# Patient Record
Sex: FEMALE | Race: WHITE | Employment: OTHER | URBAN - METROPOLITAN AREA
[De-identification: names, ages, dates, MRNs, and addresses within clinical notes are randomized per-mention and may not be internally consistent; named-entity substitution may affect disease eponyms.]

---

## 2018-01-11 ENCOUNTER — HOSPITAL ENCOUNTER (OUTPATIENT)
Dept: OTHER | Age: 68
Discharge: HOME | End: 2018-01-11
Attending: INTERNAL MEDICINE | Admitting: INTERNAL MEDICINE

## 2018-01-11 DIAGNOSIS — R52 PAIN: ICD-10-CM

## 2020-11-08 ENCOUNTER — APPOINTMENT (OUTPATIENT)
Dept: CT IMAGING | Facility: HOSPITAL | Age: 70
End: 2020-11-08

## 2020-11-08 ENCOUNTER — APPOINTMENT (OUTPATIENT)
Dept: MRI IMAGING | Facility: HOSPITAL | Age: 70
End: 2020-11-08

## 2020-11-08 ENCOUNTER — APPOINTMENT (OUTPATIENT)
Dept: GENERAL RADIOLOGY | Facility: HOSPITAL | Age: 70
End: 2020-11-08

## 2020-11-08 ENCOUNTER — HOSPITAL ENCOUNTER (OUTPATIENT)
Facility: HOSPITAL | Age: 70
Setting detail: OBSERVATION
Discharge: HOME | End: 2020-11-12
Attending: EMERGENCY MEDICINE | Admitting: INTERNAL MEDICINE

## 2020-11-08 DIAGNOSIS — R47.9 DIFFICULTY WITH SPEECH: ICD-10-CM

## 2020-11-08 DIAGNOSIS — I10 ESSENTIAL HYPERTENSION: ICD-10-CM

## 2020-11-08 DIAGNOSIS — E11.69 TYPE 2 DIABETES MELLITUS WITH OTHER SPECIFIED COMPLICATION, UNSPECIFIED WHETHER LONG TERM INSULIN USE (HCC): ICD-10-CM

## 2020-11-08 DIAGNOSIS — N39.0 URINARY TRACT INFECTION WITHOUT HEMATURIA, SITE UNSPECIFIED: ICD-10-CM

## 2020-11-08 DIAGNOSIS — G45.9 TIA (TRANSIENT ISCHEMIC ATTACK): ICD-10-CM

## 2020-11-08 DIAGNOSIS — Z74.09 IMPAIRED MOBILITY AND ADLS: ICD-10-CM

## 2020-11-08 DIAGNOSIS — N28.9 RENAL INSUFFICIENCY: ICD-10-CM

## 2020-11-08 DIAGNOSIS — R47.1 DYSARTHRIA: ICD-10-CM

## 2020-11-08 DIAGNOSIS — R41.841 COGNITIVE COMMUNICATION DEFICIT: ICD-10-CM

## 2020-11-08 DIAGNOSIS — R53.1 GENERALIZED WEAKNESS: Primary | ICD-10-CM

## 2020-11-08 DIAGNOSIS — Z78.9 IMPAIRED MOBILITY AND ADLS: ICD-10-CM

## 2020-11-08 PROBLEM — E11.9 TYPE 2 DIABETES MELLITUS (HCC): Status: ACTIVE | Noted: 2020-11-08

## 2020-11-08 LAB
ALBUMIN SERPL-MCNC: 4 G/DL (ref 3.5–5.2)
ALBUMIN/GLOB SERPL: 1.7 G/DL
ALP SERPL-CCNC: 85 U/L (ref 39–117)
ALT SERPL W P-5'-P-CCNC: 12 U/L (ref 1–33)
ANION GAP SERPL CALCULATED.3IONS-SCNC: 13 MMOL/L (ref 5–15)
AST SERPL-CCNC: 14 U/L (ref 1–32)
BACTERIA UR QL AUTO: ABNORMAL /HPF
BASOPHILS # BLD AUTO: 0.03 10*3/MM3 (ref 0–0.2)
BASOPHILS NFR BLD AUTO: 0.4 % (ref 0–1.5)
BILIRUB SERPL-MCNC: 0.2 MG/DL (ref 0–1.2)
BILIRUB UR QL STRIP: NEGATIVE
BUN SERPL-MCNC: 36 MG/DL (ref 8–23)
BUN/CREAT SERPL: 22.4 (ref 7–25)
CALCIUM SPEC-SCNC: 9.1 MG/DL (ref 8.6–10.5)
CHLORIDE SERPL-SCNC: 103 MMOL/L (ref 98–107)
CLARITY UR: CLEAR
CO2 SERPL-SCNC: 21 MMOL/L (ref 22–29)
COLOR UR: YELLOW
CREAT BLDA-MCNC: 1.6 MG/DL (ref 0.6–1.3)
CREAT SERPL-MCNC: 1.61 MG/DL (ref 0.57–1)
DEPRECATED RDW RBC AUTO: 47.3 FL (ref 37–54)
EOSINOPHIL # BLD AUTO: 0.24 10*3/MM3 (ref 0–0.4)
EOSINOPHIL NFR BLD AUTO: 3 % (ref 0.3–6.2)
ERYTHROCYTE [DISTWIDTH] IN BLOOD BY AUTOMATED COUNT: 14.2 % (ref 12.3–15.4)
GFR SERPL CREATININE-BSD FRML MDRD: 32 ML/MIN/1.73
GLOBULIN UR ELPH-MCNC: 2.4 GM/DL
GLUCOSE BLDC GLUCOMTR-MCNC: 122 MG/DL (ref 70–130)
GLUCOSE BLDC GLUCOMTR-MCNC: 170 MG/DL (ref 70–130)
GLUCOSE SERPL-MCNC: 173 MG/DL (ref 65–99)
GLUCOSE UR STRIP-MCNC: NEGATIVE MG/DL
HCT VFR BLD AUTO: 35.5 % (ref 34–46.6)
HGB BLD-MCNC: 10.6 G/DL (ref 12–15.9)
HGB UR QL STRIP.AUTO: NEGATIVE
HOLD SPECIMEN: NORMAL
HOLD SPECIMEN: NORMAL
HYALINE CASTS UR QL AUTO: ABNORMAL /LPF
IMM GRANULOCYTES # BLD AUTO: 0.05 10*3/MM3 (ref 0–0.05)
IMM GRANULOCYTES NFR BLD AUTO: 0.6 % (ref 0–0.5)
KETONES UR QL STRIP: NEGATIVE
LEUKOCYTE ESTERASE UR QL STRIP.AUTO: ABNORMAL
LYMPHOCYTES # BLD AUTO: 0.78 10*3/MM3 (ref 0.7–3.1)
LYMPHOCYTES NFR BLD AUTO: 9.6 % (ref 19.6–45.3)
MAGNESIUM SERPL-MCNC: 1.5 MG/DL (ref 1.6–2.4)
MCH RBC QN AUTO: 27.2 PG (ref 26.6–33)
MCHC RBC AUTO-ENTMCNC: 29.9 G/DL (ref 31.5–35.7)
MCV RBC AUTO: 91.3 FL (ref 79–97)
MONOCYTES # BLD AUTO: 0.5 10*3/MM3 (ref 0.1–0.9)
MONOCYTES NFR BLD AUTO: 6.2 % (ref 5–12)
NEUTROPHILS NFR BLD AUTO: 6.53 10*3/MM3 (ref 1.7–7)
NEUTROPHILS NFR BLD AUTO: 80.2 % (ref 42.7–76)
NITRITE UR QL STRIP: NEGATIVE
NRBC BLD AUTO-RTO: 0 /100 WBC (ref 0–0.2)
PH UR STRIP.AUTO: 5.5 [PH] (ref 5–8)
PLATELET # BLD AUTO: 208 10*3/MM3 (ref 140–450)
PMV BLD AUTO: 10.3 FL (ref 6–12)
POTASSIUM SERPL-SCNC: 4.2 MMOL/L (ref 3.5–5.2)
PROT SERPL-MCNC: 6.4 G/DL (ref 6–8.5)
PROT UR QL STRIP: NEGATIVE
QT INTERVAL: 400 MS
QTC INTERVAL: 481 MS
RBC # BLD AUTO: 3.89 10*6/MM3 (ref 3.77–5.28)
RBC # UR: ABNORMAL /HPF
REF LAB TEST METHOD: ABNORMAL
SARS-COV-2 RNA RESP QL NAA+PROBE: NOT DETECTED
SODIUM SERPL-SCNC: 137 MMOL/L (ref 136–145)
SP GR UR STRIP: 1.03 (ref 1–1.03)
SQUAMOUS #/AREA URNS HPF: ABNORMAL /HPF
TROPONIN T SERPL-MCNC: <0.01 NG/ML (ref 0–0.03)
UROBILINOGEN UR QL STRIP: ABNORMAL
WBC # BLD AUTO: 8.13 10*3/MM3 (ref 3.4–10.8)
WBC UR QL AUTO: ABNORMAL /HPF
WHOLE BLOOD HOLD SPECIMEN: NORMAL
WHOLE BLOOD HOLD SPECIMEN: NORMAL

## 2020-11-08 PROCEDURE — 96365 THER/PROPH/DIAG IV INF INIT: CPT

## 2020-11-08 PROCEDURE — 96367 TX/PROPH/DG ADDL SEQ IV INF: CPT

## 2020-11-08 PROCEDURE — 99285 EMERGENCY DEPT VISIT HI MDM: CPT

## 2020-11-08 PROCEDURE — G0378 HOSPITAL OBSERVATION PER HR: HCPCS

## 2020-11-08 PROCEDURE — 70450 CT HEAD/BRAIN W/O DYE: CPT

## 2020-11-08 PROCEDURE — 82962 GLUCOSE BLOOD TEST: CPT

## 2020-11-08 PROCEDURE — 84295 ASSAY OF SERUM SODIUM: CPT

## 2020-11-08 PROCEDURE — 25010000002 CEFTRIAXONE PER 250 MG: Performed by: EMERGENCY MEDICINE

## 2020-11-08 PROCEDURE — 70551 MRI BRAIN STEM W/O DYE: CPT

## 2020-11-08 PROCEDURE — 82947 ASSAY GLUCOSE BLOOD QUANT: CPT

## 2020-11-08 PROCEDURE — 83735 ASSAY OF MAGNESIUM: CPT | Performed by: EMERGENCY MEDICINE

## 2020-11-08 PROCEDURE — 82330 ASSAY OF CALCIUM: CPT

## 2020-11-08 PROCEDURE — 82803 BLOOD GASES ANY COMBINATION: CPT

## 2020-11-08 PROCEDURE — 25010000002 MAGNESIUM SULFATE 2 GM/50ML SOLUTION: Performed by: INTERNAL MEDICINE

## 2020-11-08 PROCEDURE — 96361 HYDRATE IV INFUSION ADD-ON: CPT

## 2020-11-08 PROCEDURE — 63710000001 INSULIN LISPRO (HUMAN) PER 5 UNITS: Performed by: INTERNAL MEDICINE

## 2020-11-08 PROCEDURE — 0042T HC CT CEREBRAL PERFUSION W/WO CONTRAST: CPT

## 2020-11-08 PROCEDURE — 84484 ASSAY OF TROPONIN QUANT: CPT | Performed by: EMERGENCY MEDICINE

## 2020-11-08 PROCEDURE — 96366 THER/PROPH/DIAG IV INF ADDON: CPT

## 2020-11-08 PROCEDURE — 93005 ELECTROCARDIOGRAM TRACING: CPT | Performed by: EMERGENCY MEDICINE

## 2020-11-08 PROCEDURE — 80053 COMPREHEN METABOLIC PANEL: CPT | Performed by: EMERGENCY MEDICINE

## 2020-11-08 PROCEDURE — 99220 PR INITIAL OBSERVATION CARE/DAY 70 MINUTES: CPT | Performed by: INTERNAL MEDICINE

## 2020-11-08 PROCEDURE — 85014 HEMATOCRIT: CPT

## 2020-11-08 PROCEDURE — 70498 CT ANGIOGRAPHY NECK: CPT

## 2020-11-08 PROCEDURE — 81001 URINALYSIS AUTO W/SCOPE: CPT | Performed by: EMERGENCY MEDICINE

## 2020-11-08 PROCEDURE — C9803 HOPD COVID-19 SPEC COLLECT: HCPCS

## 2020-11-08 PROCEDURE — 71045 X-RAY EXAM CHEST 1 VIEW: CPT

## 2020-11-08 PROCEDURE — 99204 OFFICE O/P NEW MOD 45 MIN: CPT | Performed by: PSYCHIATRY & NEUROLOGY

## 2020-11-08 PROCEDURE — 70496 CT ANGIOGRAPHY HEAD: CPT

## 2020-11-08 PROCEDURE — 82565 ASSAY OF CREATININE: CPT

## 2020-11-08 PROCEDURE — U0004 COV-19 TEST NON-CDC HGH THRU: HCPCS | Performed by: INTERNAL MEDICINE

## 2020-11-08 PROCEDURE — 25010000002 LORAZEPAM PER 2 MG: Performed by: EMERGENCY MEDICINE

## 2020-11-08 PROCEDURE — 84132 ASSAY OF SERUM POTASSIUM: CPT

## 2020-11-08 PROCEDURE — 96375 TX/PRO/DX INJ NEW DRUG ADDON: CPT

## 2020-11-08 PROCEDURE — 0 IOPAMIDOL PER 1 ML: Performed by: EMERGENCY MEDICINE

## 2020-11-08 PROCEDURE — 85025 COMPLETE CBC W/AUTO DIFF WBC: CPT | Performed by: EMERGENCY MEDICINE

## 2020-11-08 RX ORDER — SODIUM CHLORIDE 0.9 % (FLUSH) 0.9 %
10 SYRINGE (ML) INJECTION AS NEEDED
Status: DISCONTINUED | OUTPATIENT
Start: 2020-11-08 | End: 2020-11-12 | Stop reason: HOSPADM

## 2020-11-08 RX ORDER — ATORVASTATIN CALCIUM 40 MG/1
80 TABLET, FILM COATED ORAL NIGHTLY
Status: DISCONTINUED | OUTPATIENT
Start: 2020-11-08 | End: 2020-11-09

## 2020-11-08 RX ORDER — DEXTROSE MONOHYDRATE 25 G/50ML
25 INJECTION, SOLUTION INTRAVENOUS
Status: DISCONTINUED | OUTPATIENT
Start: 2020-11-08 | End: 2020-11-12 | Stop reason: HOSPADM

## 2020-11-08 RX ORDER — ASPIRIN 81 MG/1
324 TABLET, CHEWABLE ORAL ONCE
Status: COMPLETED | OUTPATIENT
Start: 2020-11-08 | End: 2020-11-08

## 2020-11-08 RX ORDER — SODIUM CHLORIDE 0.9 % (FLUSH) 0.9 %
10 SYRINGE (ML) INJECTION EVERY 12 HOURS SCHEDULED
Status: DISCONTINUED | OUTPATIENT
Start: 2020-11-08 | End: 2020-11-08

## 2020-11-08 RX ORDER — SERTRALINE HYDROCHLORIDE 100 MG/1
100 TABLET, FILM COATED ORAL DAILY
Status: DISCONTINUED | OUTPATIENT
Start: 2020-11-08 | End: 2020-11-12 | Stop reason: HOSPADM

## 2020-11-08 RX ORDER — NICOTINE POLACRILEX 4 MG
15 LOZENGE BUCCAL
Status: DISCONTINUED | OUTPATIENT
Start: 2020-11-08 | End: 2020-11-12 | Stop reason: HOSPADM

## 2020-11-08 RX ORDER — PANTOPRAZOLE SODIUM 40 MG/1
40 TABLET, DELAYED RELEASE ORAL DAILY
COMMUNITY

## 2020-11-08 RX ORDER — GLIPIZIDE 5 MG/1
5 TABLET ORAL
COMMUNITY

## 2020-11-08 RX ORDER — SODIUM CHLORIDE 0.9 % (FLUSH) 0.9 %
10 SYRINGE (ML) INJECTION AS NEEDED
Status: DISCONTINUED | OUTPATIENT
Start: 2020-11-08 | End: 2020-11-08 | Stop reason: SDUPTHER

## 2020-11-08 RX ORDER — MAGNESIUM SULFATE HEPTAHYDRATE 40 MG/ML
4 INJECTION, SOLUTION INTRAVENOUS AS NEEDED
Status: DISCONTINUED | OUTPATIENT
Start: 2020-11-08 | End: 2020-11-12 | Stop reason: HOSPADM

## 2020-11-08 RX ORDER — SERTRALINE HYDROCHLORIDE 100 MG/1
100 TABLET, FILM COATED ORAL DAILY
COMMUNITY

## 2020-11-08 RX ORDER — MAGNESIUM SULFATE HEPTAHYDRATE 40 MG/ML
2 INJECTION, SOLUTION INTRAVENOUS AS NEEDED
Status: DISCONTINUED | OUTPATIENT
Start: 2020-11-08 | End: 2020-11-12 | Stop reason: HOSPADM

## 2020-11-08 RX ORDER — ATORVASTATIN CALCIUM 40 MG/1
40 TABLET, FILM COATED ORAL DAILY
COMMUNITY

## 2020-11-08 RX ORDER — SERTRALINE HYDROCHLORIDE 100 MG/1
100 TABLET, FILM COATED ORAL DAILY
Status: DISCONTINUED | OUTPATIENT
Start: 2020-11-08 | End: 2020-11-08

## 2020-11-08 RX ORDER — AMLODIPINE BESYLATE 2.5 MG/1
2.5 TABLET ORAL DAILY
COMMUNITY
End: 2020-11-12 | Stop reason: HOSPADM

## 2020-11-08 RX ORDER — SODIUM CHLORIDE 9 MG/ML
100 INJECTION, SOLUTION INTRAVENOUS CONTINUOUS
Status: DISCONTINUED | OUTPATIENT
Start: 2020-11-08 | End: 2020-11-09

## 2020-11-08 RX ORDER — PANTOPRAZOLE SODIUM 40 MG/1
40 TABLET, DELAYED RELEASE ORAL DAILY
Status: DISCONTINUED | OUTPATIENT
Start: 2020-11-08 | End: 2020-11-12 | Stop reason: HOSPADM

## 2020-11-08 RX ORDER — SODIUM CHLORIDE 0.9 % (FLUSH) 0.9 %
10 SYRINGE (ML) INJECTION EVERY 12 HOURS SCHEDULED
Status: DISCONTINUED | OUTPATIENT
Start: 2020-11-08 | End: 2020-11-12 | Stop reason: HOSPADM

## 2020-11-08 RX ORDER — PIOGLITAZONEHYDROCHLORIDE 15 MG/1
15 TABLET ORAL DAILY
COMMUNITY

## 2020-11-08 RX ORDER — ASPIRIN 300 MG/1
300 SUPPOSITORY RECTAL DAILY
Status: DISCONTINUED | OUTPATIENT
Start: 2020-11-09 | End: 2020-11-12 | Stop reason: HOSPADM

## 2020-11-08 RX ORDER — CLOPIDOGREL BISULFATE 75 MG/1
75 TABLET ORAL DAILY
Status: DISCONTINUED | OUTPATIENT
Start: 2020-11-08 | End: 2020-11-09

## 2020-11-08 RX ORDER — ASPIRIN 325 MG
325 TABLET ORAL DAILY
Status: DISCONTINUED | OUTPATIENT
Start: 2020-11-09 | End: 2020-11-12 | Stop reason: HOSPADM

## 2020-11-08 RX ORDER — ATORVASTATIN CALCIUM 40 MG/1
40 TABLET, FILM COATED ORAL NIGHTLY
Status: DISCONTINUED | OUTPATIENT
Start: 2020-11-08 | End: 2020-11-08 | Stop reason: SDUPTHER

## 2020-11-08 RX ORDER — POTASSIUM CHLORIDE 7.45 MG/ML
10 INJECTION INTRAVENOUS
Status: DISCONTINUED | OUTPATIENT
Start: 2020-11-08 | End: 2020-11-12 | Stop reason: HOSPADM

## 2020-11-08 RX ORDER — LORAZEPAM 2 MG/ML
1 INJECTION INTRAMUSCULAR ONCE
Status: COMPLETED | OUTPATIENT
Start: 2020-11-08 | End: 2020-11-08

## 2020-11-08 RX ORDER — CLOPIDOGREL BISULFATE 75 MG/1
75 TABLET ORAL DAILY
Status: DISCONTINUED | OUTPATIENT
Start: 2020-11-08 | End: 2020-11-08

## 2020-11-08 RX ORDER — HYDROCHLOROTHIAZIDE 25 MG/1
25 TABLET ORAL DAILY
COMMUNITY

## 2020-11-08 RX ORDER — ASPIRIN 81 MG/1
TABLET, CHEWABLE ORAL
Status: DISPENSED
Start: 2020-11-08 | End: 2020-11-08

## 2020-11-08 RX ORDER — POTASSIUM CHLORIDE 750 MG/1
40 CAPSULE, EXTENDED RELEASE ORAL AS NEEDED
Status: DISCONTINUED | OUTPATIENT
Start: 2020-11-08 | End: 2020-11-12 | Stop reason: HOSPADM

## 2020-11-08 RX ORDER — METOPROLOL TARTRATE 100 MG/1
100 TABLET ORAL 2 TIMES DAILY
COMMUNITY
End: 2020-11-12 | Stop reason: HOSPADM

## 2020-11-08 RX ORDER — LOSARTAN POTASSIUM 50 MG/1
100 TABLET ORAL DAILY
COMMUNITY
End: 2020-11-12 | Stop reason: HOSPADM

## 2020-11-08 RX ORDER — PANTOPRAZOLE SODIUM 40 MG/1
40 TABLET, DELAYED RELEASE ORAL DAILY
Status: DISCONTINUED | OUTPATIENT
Start: 2020-11-08 | End: 2020-11-08

## 2020-11-08 RX ORDER — SODIUM CHLORIDE 0.9 % (FLUSH) 0.9 %
10 SYRINGE (ML) INJECTION AS NEEDED
Status: DISCONTINUED | OUTPATIENT
Start: 2020-11-08 | End: 2020-11-08

## 2020-11-08 RX ORDER — SODIUM CHLORIDE 9 MG/ML
125 INJECTION, SOLUTION INTRAVENOUS CONTINUOUS
Status: DISCONTINUED | OUTPATIENT
Start: 2020-11-08 | End: 2020-11-08 | Stop reason: SDUPTHER

## 2020-11-08 RX ORDER — POTASSIUM CHLORIDE 1.5 G/1.77G
40 POWDER, FOR SOLUTION ORAL AS NEEDED
Status: DISCONTINUED | OUTPATIENT
Start: 2020-11-08 | End: 2020-11-12 | Stop reason: HOSPADM

## 2020-11-08 RX ADMIN — MAGNESIUM SULFATE 2 G: 2 INJECTION INTRAVENOUS at 22:16

## 2020-11-08 RX ADMIN — SERTRALINE HYDROCHLORIDE 100 MG: 100 TABLET ORAL at 21:58

## 2020-11-08 RX ADMIN — MAGNESIUM SULFATE 2 G: 2 INJECTION INTRAVENOUS at 18:36

## 2020-11-08 RX ADMIN — INSULIN LISPRO 2 UNITS: 100 INJECTION, SOLUTION INTRAVENOUS; SUBCUTANEOUS at 10:41

## 2020-11-08 RX ADMIN — SODIUM CHLORIDE, PRESERVATIVE FREE 10 ML: 5 INJECTION INTRAVENOUS at 21:59

## 2020-11-08 RX ADMIN — SODIUM CHLORIDE 1000 ML: 9 INJECTION, SOLUTION INTRAVENOUS at 06:54

## 2020-11-08 RX ADMIN — CLOPIDOGREL BISULFATE 75 MG: 75 TABLET ORAL at 21:58

## 2020-11-08 RX ADMIN — LORAZEPAM 1 MG: 2 INJECTION INTRAMUSCULAR; INTRAVENOUS at 08:29

## 2020-11-08 RX ADMIN — PANTOPRAZOLE SODIUM 40 MG: 40 TABLET, DELAYED RELEASE ORAL at 21:58

## 2020-11-08 RX ADMIN — CEFTRIAXONE SODIUM 1 G: 1 INJECTION, POWDER, FOR SOLUTION INTRAMUSCULAR; INTRAVENOUS at 07:35

## 2020-11-08 RX ADMIN — ATORVASTATIN CALCIUM 80 MG: 40 TABLET, FILM COATED ORAL at 21:58

## 2020-11-08 RX ADMIN — MAGNESIUM SULFATE 2 G: 2 INJECTION INTRAVENOUS at 16:21

## 2020-11-08 RX ADMIN — ASPIRIN 324 MG: 81 TABLET, CHEWABLE ORAL at 08:28

## 2020-11-08 RX ADMIN — IOPAMIDOL 110 ML: 755 INJECTION, SOLUTION INTRAVENOUS at 05:50

## 2020-11-08 RX ADMIN — SODIUM CHLORIDE 100 ML/HR: 9 INJECTION, SOLUTION INTRAVENOUS at 10:58

## 2020-11-08 RX ADMIN — SODIUM CHLORIDE 125 ML/HR: 9 INJECTION, SOLUTION INTRAVENOUS at 06:54

## 2020-11-08 NOTE — CONSULTS
Stroke Consult Note    Patient Name: Zita Ramirez   MRN: [id number]  Age: 70 y.o.  Sex: female  : 1950    Primary Care Physician: No primary care provider on file.  Referring Physician:  No ref. provider found    TIME STROKE TEAM CALLED: 0515 EST     TIME PATIENT SEEN: 0515 EST    Handedness: right   Race:     Chief Complaint/Reason for Consultation: drowsiness, generalized weakness, dysarthria    HPI:   Zita Ramirez is a 70 y.o. right handed  female with known medical diagnoses of prior CVA (deficits unknown), HTN, DM. She presents to [facility] ED via [facility] for evaluation of generalized weakness and dysarthria.  Per the family patient was last in her usual state of health at midnight before going to sleep. The patient awoke about 4:00 this morning while she was sleeping on the couch, was very weak. She had trouble coming to a sitting then standing position. She was off balance, dizzy and lightheaded. Subsequently she fell to the floor. No injury was sustained.  The patient denies any unilateral weakness, numbness, visual disturbance, headache The patient has had a stroke in the past but she cannot remember when exactly or what the cause of the stroke was. She takes a  daily.  Of note, EMS reports her BP on their arrival was in the 90's/50's. She denies having felt feverish, sick, nauseated or having any pain. On my exam, she is drowsy but arousable to light verbal stimuli. She has some slight delay in speech at times and has mild dysarthria. She is alert and oriented. Moving all extremities but weak. No obvious dysmetria. No diminished sensation to light touch. Did not walk patient at this time       Last Known Normal Date/Time: 0000 EST     Review of Systems   Constitutional: Negative for chills, diaphoresis, fatigue and fever.   Respiratory: Negative for cough and shortness of breath.    Cardiovascular: Negative for chest pain and leg swelling.   Gastrointestinal: Negative for  abdominal pain and nausea.   Musculoskeletal: Positive for gait problem.   Neurological: Positive for dizziness, speech difficulty and weakness. Negative for facial asymmetry, numbness and headaches.        Generalized weakness   All other systems reviewed and are negative.       Temp:  [97.8 °F (36.6 °C)] 97.8 °F (36.6 °C)  Heart Rate:  [95] 95  Resp:  [16] 16  BP: (122)/(66) 122/66    Neurological Exam  Mental Status  Drowsy. Oriented only to person, place, time and situation. Mild dysarthria present. Expressive aphasia present. Language: Speech is slightly delayed at times .    Cranial Nerves  CN II: Visual fields full to confrontation.  CN III, IV, VI: Extraocular movements intact bilaterally. Normal lids and orbits bilaterally. Pupils equal round and reactive to light bilaterally.  CN V: Facial sensation is normal.  CN VII: Full and symmetric facial movement.  CN VIII: Hearing is normal to speech.  CN IX, X: Palate elevates symmetrically. Normal gag reflex.  CN XI: Shoulder shrug strength is normal.  CN XII: Tongue midline without atrophy or fasciculations.    Motor    Patient has 4/5 strength in all extremities. She has a mild drift in bilateral upper extremities and right lower extremity .    Sensory  Light touch is normal in upper and lower extremities.     Reflexes                                           Right                      Left  Patellar                                2+                         2+  Plantar                           Downgoing                Downgoing    Coordination  Right: Finger-to-nose normal.  Left: Finger-to-nose normal.    Gait  Not assessed.      Physical Exam  Vitals signs and nursing note reviewed.   Constitutional:       Appearance: Normal appearance.      Comments: Drowsy    HENT:      Head: Normocephalic.      Nose: Nose normal.      Mouth/Throat:      Mouth: Mucous membranes are dry.   Eyes:      General: Lids are normal.      Extraocular Movements: Extraocular  movements intact.      Pupils: Pupils are equal, round, and reactive to light.   Neck:      Musculoskeletal: Normal range of motion and neck supple.   Cardiovascular:      Rate and Rhythm: Normal rate and regular rhythm.   Pulmonary:      Effort: Pulmonary effort is normal.   Skin:     General: Skin is warm and dry.   Neurological:      Cranial Nerves: Dysarthria present.      Motor: Weakness present.      Gait: Gait abnormal.      Deep Tendon Reflexes:      Reflex Scores:       Patellar reflexes are 2+ on the right side and 2+ on the left side.  Psychiatric:         Mood and Affect: Mood normal.         Behavior: Behavior normal.         Acute Stroke Data    Alteplase (tPA) Inclusion / Exclusion Criteria    Time: 06:11 EST  Person Administering Scale: TAN Varghese    Inclusion Criteria  [x]   18 years of age or greater   []   Onset of symptoms < 4.5 hours before beginning treatment (stroke onset = time patient was last seen well or without symptoms).   []   Diagnosis of acute ischemic stroke causing measurable disabling deficit (Complete Hemianopia, Any Aphasia, Visual or Sensory Extinction, Any weakness limiting sustained effort against gravity)   []   Any remaining deficit considered potentially disabling in view of patient and practitioner   Exclusion criteria (Do not proceed with Alteplase if any are checked under exclusion criteria)  [x]   Onset unknown or GREATER than 4.5 hours   []   ICH on CT/MRI   []   CT demonstrates hypodensity representing acute or subacute infarct   []   Significant head trauma or prior stroke in the previous 3 months   []   Symptoms suggestive of subarachnoid hemorrhage   []   History of un-ruptured intracranial aneurysm GREATER than 10 mm   []   Recent intracranial or intraspinal surgery within the last 3 months   []   Arterial puncture at a non-compressible site in the previous 7 days   []   Active internal bleeding   []   Acute bleeding tendency   []   Platelet count  LESS than 100,000 for known hematological diseases such as leukemia, thrombocytopenia or chronic cirrhosis   []   Current use of anticoagulant with INR GREATER than 1.7 or PT GREATER than 15 seconds, aPTT GREATER than 40 seconds   []   Heparin received within 48 hours, resulting in abnormally elevated aPTT GREATER than upper limit of normal   []   Current use of direct thrombin inhibitors or direct factor Xa inhibitors in the past 48 hours   []   Elevated blood pressure refractory to treatment (systolic GREATER than 185 mm/Hg or diastolic  GREATER than 110 mm/Hg   []   Suspected infective endocarditis and aortic arch dissection   []   Current use of therapeutic treatment dose of low-molecular-weight heparin (LMWH) within the previous 24 hours   []   Structural GI malignancy or bleed   Relative exclusion for all patients  []   Only minor non-disabling symptoms   []   Pregnancy   []   Seizure at onset with postictal residual neurological impairments   []   Major surgery or previous trauma within past 14 days   []   History of previous spontaneous ICH, intracranial neoplasm, or AV malformation   []   Postpartum (within previous 14 days)   []   Recent GI or urinary tract hemorrhage (within previous 21 days)   []   Recent acute MI (within previous 3 months)   []   History of un-ruptured intracranial aneurysm LESS than 10 mm   []   History of ruptured intracranial aneurysm   []   Blood glucose LESS than 50 mg/dL (2.7 mmol/L)   []   Dural puncture within the last 7 days   []   Known GREATER than 10 cerebral microbleeds   Additional exclusions for patients with symptoms onset between 3 and 4.5 hours.  []   Age > 80.   []   On any anticoagulants regardless of INR  >>> Warfarin (Coumadin), Heparin, Enoxaparin (Lovenox), fondaparinux (Arixtra), bivalirudin (Angiomax), Argatroban, dabigatran (Pradaxa), rivaroxaban (Xarelto), or apixaban (Eliquis)   []   Severe stroke (NIHSS > 25).   []   History of BOTH diabetes and previous  ischemic stroke.   []   The risks and benefits have been discussed with the patient or family related to the administration of IV Alteplase for stroke symptoms.   []   I have discussed and reviewed the patient's case and imaging with the attending prior to IV Alteplase.   Not given Time Alteplase administered       No past medical history on file.  No past surgical history on file.  No family history on file.  Social History     Socioeconomic History   • Marital status:      Spouse name: Not on file   • Number of children: Not on file   • Years of education: Not on file   • Highest education level: Not on file     Allergies   Allergen Reactions   • Penicillins Unknown - Low Severity     Pt states I dont know     Prior to Admission medications    Not on File       Hospital Meds:  Scheduled-    Infusions-     PRNs- •  sodium chloride    Functional Status Prior to Current Stroke/Caswell Score: 0    NIH Stroke Scale  Time: 06:11 EST  Person Administering Scale: TAN Varghese    Interval: baseline  1a. Level of Consciousness: 0-->Alert, keenly responsive  1b. LOC Questions: 1-->Answers one question correctly  1c. LOC Commands: 0-->Performs both tasks correctly  2. Best Gaze: 0-->Normal  3. Visual: 0-->No visual loss  4. Facial Palsy: 0-->Normal symmetrical movements  5a. Motor Arm, Left: 1-->Drift, limb holds 90 (or 45) degrees, but drifts down before full 10 seconds, does not hit bed or other support  5b. Motor Arm, Right: 1-->Drift, limb holds 90 (or 45) degrees, but drifts down before full 10 secs, does not hit bed or other support  6a. Motor Leg, Left: 0-->No drift, leg holds 30 degree position for full 5 secs  6b. Motor Leg, Right: 1-->Drift, leg falls by the end of the 5-sec period but does not hit bed  7. Limb Ataxia: 0-->Absent  8. Sensory: 0-->Normal, no sensory loss  9. Best Language: 1-->Mild-to-moderate aphasia, some obvious loss of fluency or facility of comprehension, without significant  limitation on ideas expressed or form of expression. Reduction of speech and/or comprehension, however, makes conversation. . . (see row details)  10. Dysarthria: 1-->Mild-to-moderate dysarthria, patient slurs at least some words and, at worst, can be understood with some difficulty  11. Extinction and Inattention (formerly Neglect): 0-->No abnormality    Total (NIH Stroke Scale): 6      Results Reviewed:  I have personally reviewed current lab, radiology, and data and agree with results.  Lab Results (last 24 hours)     Procedure Component Value Units Date/Time    CBC & Differential [[id number]]  (Abnormal) Collected: 11/08/20 0521    Specimen: Blood Updated: 11/08/20 0557    Narrative:      The following orders were created for panel order CBC & Differential.  Procedure                               Abnormality         Status                     ---------                               -----------         ------                     CBC Auto Differential[[id number]]        Abnormal            Final result                 Please view results for these tests on the individual orders.    CBC Auto Differential [[id number]]  (Abnormal) Collected: 11/08/20 0521    Specimen: Blood Updated: 11/08/20 0557     WBC 8.13 10*3/mm3      RBC 3.89 10*6/mm3      Hemoglobin 10.6 g/dL      Hematocrit 35.5 %      MCV 91.3 fL      MCH 27.2 pg      MCHC 29.9 g/dL      RDW 14.2 %      RDW-SD 47.3 fl      MPV 10.3 fL      Platelets 208 10*3/mm3      Neutrophil % 80.2 %      Lymphocyte % 9.6 %      Monocyte % 6.2 %      Eosinophil % 3.0 %      Basophil % 0.4 %      Immature Grans % 0.6 %      Neutrophils, Absolute 6.53 10*3/mm3      Lymphocytes, Absolute 0.78 10*3/mm3      Monocytes, Absolute 0.50 10*3/mm3      Eosinophils, Absolute 0.24 10*3/mm3      Basophils, Absolute 0.03 10*3/mm3      Immature Grans, Absolute 0.05 10*3/mm3      nRBC 0.0 /100 WBC     POC Creatinine [[id number]]  (Abnormal) Collected: 11/08/20 0533    Specimen: Blood  Updated: 11/08/20 0552     Creatinine 1.60 mg/dL      Comment: Serial Number: [id number]:  [id number]       Light Blue Top [[id number]] Collected: 11/08/20 0521    Specimen: Blood Updated: 11/08/20 0550    Lavender Top [[id number]] Collected: 11/08/20 0521    Specimen: Blood Updated: 11/08/20 0549    Gold Top - SST [[id number]] Collected: 11/08/20 0521    Specimen: Blood Updated: 11/08/20 0537    [place] Draw [[id number]] Collected: 11/08/20 0521    Specimen: Blood Updated: 11/08/20 0537    Narrative:      The following orders were created for panel order [place] Draw.  Procedure                               Abnormality         Status                     ---------                               -----------         ------                     Light Blue Top[[id number]]                                   In process                 Green Top (Gel)[[id number]]                                  In process                 Lavender Top[[id number]]                                     In process                 Gold Top - SST[[id number]]                                   In process                   Please view results for these tests on the individual orders.    Comprehensive Metabolic Panel [[id number]] Collected: 11/08/20 0521    Specimen: Blood Updated: 11/08/20 0537    Troponin [[id number]] Collected: 11/08/20 0521    Specimen: Blood Updated: 11/08/20 0537    Magnesium [[id number]] Collected: 11/08/20 0521    Specimen: Blood Updated: 11/08/20 0537    Green Top (Gel) [[id number]] Collected: 11/08/20 0521    Specimen: Blood Updated: 11/08/20 0537        Imaging Results (Last 24 Hours)     Procedure Component Value Units Date/Time    XR Chest 1 View [[id number]] Resulted: 11/08/20 0558     Updated: 11/08/20 0608    CT Cerebral Perfusion With & Without Contrast [[id number]] Collected: 11/08/20 0605     Updated: 11/08/20 0607    Narrative:      CT CEREBRAL PERFUSION W WO CONTRAST    HISTORY:   70-year-old female with weakness confusion and  history of stroke.    TECHNIQUE:   Axial CT images of the brain without and with intravenous contrast using cerebral perfusion protocol. Post-processing parametric maps were created using RAPID software and reviewed. Radiation dose reduction techniques included automated exposure control  or exposure modulation based on body size. CT and nuclear cardiology exams in the last 12 months: 0.    COMPARISON:   None available    FINDINGS:   Arterial input function is optimal.     Perfusion maps are symmetric without evidence of cerebral ischemia or core infarction.      Impression:      Normal brain perfusion CT.          Signer Name: Kelby Seals MD   Signed: 11/8/2020 6:05 AM   Workstation Name: [email or web address]    [facility]    CT Angiogram Head [[id number]] Resulted: 11/08/20 0536     Updated: 11/08/20 0551    CT Angiogram Neck [[id number]] Resulted: 11/08/20 0536     Updated: 11/08/20 0551    CT Head Without Contrast Stroke Protocol [[id number]] Collected: 11/08/20 0541     Updated: 11/08/20 0543    Narrative:      CT Head WO Code Stroke    HISTORY:   70-year-old female with weakness and confusion. History of stroke. Acute stroke suspected.    TECHNIQUE:   Axial unenhanced head CT. Radiation dose reduction techniques included automated exposure control or exposure modulation based on body size. Count of known CT and cardiac nuc med studies performed in previous 12 months: 0.     Time of scan: 0527 hours    COMPARISON:   None.    FINDINGS:   No intracranial hemorrhage, mass, or infarct. No hydrocephalus or extra-axial fluid collection. There are senescent changes, including volume loss and nonspecific white matter change, but no acute abnormality is seen. The skull base, calvarium, and  extracranial soft tissues are normal.      Impression:      Senescent changes without acute abnormality.      NOTIFICATION: Critical Value/emergent results were called by telephone at the time of interpretation on  11/8/2020 5:39 AM to Janki CT technologist who verbally acknowledged these results per protocol.    Signer Name: Kelby Seals MD   Signed: 11/8/2020 5:41 AM   Workstation Name: [email or web address]    [facility]             Assessment/Plan:    Zita Soto is a 70-year-old female with known medical diagnoses of hypertension, diabetes, and previous CVA.  She presents to [facility] ED with new onset generalized weakness, gait instability resulting in a fall, dysarthria and drowsiness.    1.  Weakness  -CVA versus metabolic versus infectious process  -Per EMS patient was hypotensive on arrival but has since improved and is perfusing with adequate blood pressure  -Initiate TIA/ischemic stroke standing order set  -Patient not a candidate for TPA due to symptom onset being greater than 4.5 hours prior to arrival  -CT head without contrast negative for acute intracranial abnormality  -CTA head and neck and CT cerebral perfusion reviewed with on-call stroke interventional list and no large vessel occlusion or reversible ischemia  -MRI brain without ordered  -Check echo, lipid panel, hemoglobin A1c  -Patient takes aspirin 325 at home, continue  -Start high intensity statin for secondary stroke prevention  -Consider initiating Plavix 75 mg  -Normal saline at 75 cc/ hour  -UA/UCx    2.  Hypertension  -Initially hypotensive but now slightly hypertensive  -Allow for permissive hypertension    3.  Diabetes mellitus  -Management per hospitalist team  -Check hemoglobin A1c    4. Activity   -bedrest until after MRI  -PT/OT consulted for later today or tomorrow     5.Diet   -NPO until dysphagia screening   -healthy heart, consistent carb diet     Discussed this plan with patient, , nursing staff, on call interventionalist and ED physician. Thank you for this consult.         Suzanne Pryor, APRN  November 8, 2020  06:11 EST      Addendum-  Patient is a 70-year-old female with a history of hypertension, diabetes  mellitus type 2, prior stroke who presented with dysarthria and generalized weakness.  As per  they are currently residing with their daughter and had been sleeping on a mattress in her living room.  Earlier today patient stood up and fell down as her legs gave out.  She did not lose consciousness but felt weak all over and also had mild slurring of speech with no word finding problems.  Her blood pressure monitored by the EMS was 90/50.  It has been running low ever since she got here.  She had a CT scan of the head that was unremarkable and a CT perfusion study that did not show any perfusion deficits.  CT angiogram of the head showed mild right A1 stenosis and CT angiogram of the neck showed plaques in bilateral carotid bifurcations with about 60% stenosis in the distal left common carotid artery as well as calcifications of the both carotid siphons.  MRI of the brain that I personally reviewed showed an old left posterior parietal infarct but no acute abnormalities.  Patient has been taking aspirin 325 mg at home.  She has a normal white count with a GFR is reduced at 32.  UA also shows a mild UTI and she has been started on Rocephin.  Of note-when I went in to see the patient in the ER she was sitting on a bedside commode but could not get up due to weakness of her legs and had to be held up by 2 people.      Neurology Exam:    General apperance: as above    Mental status: Alert, awake and oriented to time place and person.    Recent and Remote memory:Intact    Language and Speech: Intact- No dysarthria.    Fluency, Naming , Repitition and Comprehension:  Intact    Cranial Nerves:   CN II: Visual fields are full. Intact. Fundi - Normal, No papillederma. Pupils - EVERT.   CN III, IV and VI: Extraocular movements are intact. Normal saccades.   CN V: Facial sensation is intact.   CN VII: Muscles of facial expression reveal no asymmetry. Intact.   CN VIII: Hearing is intact. Whispered voice intact.   CN IX  and X: Palate elevates symmetrically. Intact  CN XI: Shoulder shrug is intact.   CN XII: Tongue is midline without evidence of atrophy or fasciculation.     Motor:  Right UE muscle strength 5/5. Normal tone.     Left UE muscle strength 5/5. Normal tone.      Right LE muscle strength4/5. Normal tone.     Left LE muscle strength 4/5. Normal tone.      Sensory: Normal light touch, vibration and pinprick sensation bilaterally.    DTRs: 2+ bilaterally in upper and lower extremities.    Babinski: Negative bilaterally.    Co-ordination: Normal finger-to-nose, heel to shin B/L.    Rhomberg: Negative.    Gait: deferred     Cardiovascular: Regular rate and rhythm without murmur, gallop or rub.    70-year-old female female with a past medical history of hypertension, prior stroke, diabetes mellitus type 2 presents with an episode of falling secondary to her legs giving out.  Blood pressure 90/50    1.  Orthostatic hypotension  Based on her symptoms I feel patient could have  autonomic dysfunction as a result of her diabetes further worsened by her UTI (for which she is on Rocephin)  -MRI brain did not show any acute abnormalities.  Echocardiogram is still pending  -Whenever she feels stronger her orthostatics need to be checked and if positive she needs to be started on medication such as midodrine or fludrocortisone  -A1c is still pending  -She should also keep her self well-hydrated at home and start wearing above-knee moderate compression stockings for increased venous return    2.  Extracranial stenosis   I will add Plavix to her aspirin.After 3 months her Plavix can be stopped   We will also start her on Lipitor 40 mg daily.  Lipid panel still pending

## 2020-11-08 NOTE — PLAN OF CARE
Goal Outcome Evaluation:  Plan of Care Reviewed With: patient  Progress: improving  Outcome Summary: Pt VSS this shift. A&O to self and situation. NSR per monitor. NIH- 9. Spouse at bedside this shift. Mag replaced per protocol. Will continue to monitor.

## 2020-11-08 NOTE — ED PROVIDER NOTES
EMERGENCY DEPARTMENT ENCOUNTER      Pt Name: Zita Ramirez  MRN: [id number]  YOB: 1950  Date of evaluation: 11/8/2020  Provider: David Brar DO    CHIEF COMPLAINT       Chief Complaint   Patient presents with   • Weakness - Generalized         HISTORY OF PRESENT ILLNESS  (Location/Symptom, Timing/Onset, Context/Setting, Quality, Duration, Modifying Factors, Severity.)   Zita Ramirez is a 70 y.o. female who presents to the emergency department via [facility] for evaluation of generalized weakness.  Per the family and EMS the patient awoke about 4:00 this morning while she was sleeping on the couch, was very weak, tried to get up and was unable to assist herself up off the couch and move around she was off balance, dizzy and lightheaded which was an acute change for the patient.  The patient denies any unilateral weakness, her family was concerned about her acute change called EMS for evaluation of concern for possible neurological deficit or stroke.  The patient has had a prior history of mini strokes.  She denies any loss of sensation unilaterally, also does note that she is having some difficulty with getting her words out, has a very dry mouth.  She denies any recent illness, denies any chest pain or difficulty breathing.  She denies any nausea, vomiting, diarrhea.  She denies any recent head injury or trauma.  Last known well she notes with prior to going to bed last night.  She denies any other acute stomach complaints at this time.  No headache, vision changes.      Nursing notes were reviewed.    REVIEW OF SYSTEMS    (2-9 systems for level 4, 10 or more for level 5)   ROS:  General:  No fevers, no chills, + generalized weakness  Cardiovascular:  No chest pain, no palpitations  Respiratory:  No shortness of breath, no cough, no wheezing  Gastrointestinal:  No pain, no nausea, no vomiting, no diarrhea  Musculoskeletal:  No muscle pain, no joint pain  Skin:  No rash, no easy  bruising  Neurologic:  + speech problems, no headache, no extremity numbness, no extremity tingling, no extremity weakness  Psychiatric:  No anxiety  Genitourinary:  No dysuria, no hematuria    Except as noted above the remainder of the review of systems was reviewed and negative.       PAST MEDICAL HISTORY     Past Medical History:   Diagnosis Date   • Diabetes mellitus (CMS/HCC)    • Hypertension    • Stroke (CMS/HCC)          SURGICAL HISTORY       Past Surgical History:   Procedure Laterality Date   • HYSTERECTOMY     • TUBAL ABDOMINAL LIGATION           CURRENT MEDICATIONS       Current Facility-Administered Medications:   •  cefTRIAXone (ROCEPHIN) 1 g/100 mL 0.9% NS (MBP), 1 g, Intravenous, Once, David Brar,   •  sodium chloride 0.9 % flush 10 mL, 10 mL, Intravenous, PRN, David Brar,   •  sodium chloride 0.9 % infusion, 100 mL/hr, Intravenous, Continuous, Suzanne Pryor APRN    Current Outpatient Medications:   •  amLODIPine (NORVASC) 2.5 MG tablet, Take 2.5 mg by mouth Daily., Disp: , Rfl:   •  atorvastatin (LIPITOR) 40 MG tablet, Take 40 mg by mouth Daily., Disp: , Rfl:   •  glipizide (GLUCOTROL) 5 MG tablet, Take 5 mg by mouth 2 (Two) Times a Day Before Meals., Disp: , Rfl:   •  hydroCHLOROthiazide (HYDRODIURIL) 25 MG tablet, Take 25 mg by mouth Daily., Disp: , Rfl:   •  losartan (COZAAR) 50 MG tablet, Take 100 mg by mouth Daily., Disp: , Rfl:   •  metFORMIN (GLUCOPHAGE) 500 MG tablet, Take 500 mg by mouth 2 (Two) Times a Day With Meals., Disp: , Rfl:   •  metoprolol tartrate (LOPRESSOR) 100 MG tablet, Take 100 mg by mouth 2 (Two) Times a Day., Disp: , Rfl:   •  pantoprazole (PROTONIX) 40 MG EC tablet, Take 40 mg by mouth Daily., Disp: , Rfl:   •  pioglitazone (ACTOS) 15 MG tablet, Take 15 mg by mouth Daily., Disp: , Rfl:   •  sertraline (ZOLOFT) 100 MG tablet, Take 100 mg by mouth Daily., Disp: , Rfl:     ALLERGIES     Penicillins    FAMILY HISTORY     History reviewed. No  pertinent family history.       SOCIAL HISTORY       Social History     Socioeconomic History   • Marital status:      Spouse name: Not on file   • Number of children: Not on file   • Years of education: Not on file   • Highest education level: Not on file   Tobacco Use   • Smoking status: Former Smoker   • Smokeless tobacco: Never Used   Substance and Sexual Activity   • Alcohol use: Not Currently   • Drug use: Not Currently   • Sexual activity: Defer         PHYSICAL EXAM    (up to 7 for level 4, 8 or more for level 5)     Vitals:    11/08/20 0620 11/08/20 0630 11/08/20 0645 11/08/20 0715   BP:  (!) 142/114     BP Location:       Patient Position:       Pulse: 87 91 92 87   Resp:       Temp:       TempSrc:       SpO2: 92% 96% 98% 100%   Weight:       Height:           Physical Exam  General : Patient is awake, alert, oriented, answering questions appropriately.  Having some difficulty with getting her words out.  HEENT: Pupils are equally round and reactive to light, EOMI, conjunctivae clear, sclerae white, there is no injection no icterus.  Oral mucosa is dry, no exudate. Uvula is midline  Neck: Neck is supple, full range of motion, trachea midline  Cardiac: Heart regular rate, rhythm, no murmurs, rubs, or gallops  Lungs: Lungs are clear to auscultation, there is no wheezing, rhonchi, or rales. There is no use of accessory muscles  Abdomen: Abdomen is soft, nontender, nondistended. There are no firm or pulsatile masses, no rebound rigidity or guarding.   Musculoskeletal: 5 out of 5 strength in all 4 extremities.  No focal muscle deficits are appreciated  Neuro: Patient is awake, answering questions appropriately, seems to have some very mild difficulty with getting her words out.  There is no facial droop, no sensory deficit in the face, upper or lower extremities.  No dysdiadochokinesia, no unilateral weakness, normal finger-to-nose.   strength is equal bilateral upper extremities.  4-5 strength  bilateral lower extremities.  Gait not tested.  Dermatology: Skin is warm and dry  Psych: Mentation is grossly normal, cognition is grossly normal. Affect is appropriate.      DIAGNOSTIC RESULTS     EKG: All EKG's are interpreted by the Emergency Department Physician who either signs or Co-signs this chart in the absence of a cardiologist.    ECG 12 Lead             RADIOLOGY:   Non-plain film images such as CT, Ultrasound and MRI are read by the radiologist. Plain radiographic images are visualized and preliminarily interpreted by the emergency physician with the below findings:      [] Radiologist's Report Reviewed:  XR Chest 1 View   Final Result      1. Cardiomegaly and probable chronic interstitial changes.      Signer Name: Kelby Seals MD    Signed: 11/8/2020 6:14 AM    Workstation Name: [facility]      CT Cerebral Perfusion With & Without Contrast   Final Result   Normal brain perfusion CT.               Signer Name: Kelby Seals MD    Signed: 11/8/2020 6:05 AM    Workstation Name: [facility]      CT Angiogram Head         CT Angiogram Neck         CT Head Without Contrast Stroke Protocol   Final Result   Senescent changes without acute abnormality.         NOTIFICATION: Critical Value/emergent results were called by telephone at the time of interpretation on 11/8/2020 5:39 AM to Janki CT technologist who verbally acknowledged these results per protocol.      Signer Name: Kelby Seals MD    Signed: 11/8/2020 5:41 AM    Workstation Name: [facility]      MRI Brain Without Contrast    (Results Pending)         ED BEDSIDE ULTRASOUND:   Performed by ED Physician - none    LABS:    I have reviewed and interpreted all of the currently available lab results from this visit (if applicable):  Results for orders placed or performed during the hospital encounter of 11/08/20   Comprehensive  Metabolic Panel    Specimen: Blood   Result Value Ref Range    Glucose 173 (H) 65 - 99 mg/dL    BUN 36 (H) 8 - 23 mg/dL    Creatinine 1.61 (H) 0.57 - 1.00 mg/dL    Sodium 137 136 - 145 mmol/L    Potassium 4.2 3.5 - 5.2 mmol/L    Chloride 103 98 - 107 mmol/L    CO2 21.0 (L) 22.0 - 29.0 mmol/L    Calcium 9.1 8.6 - 10.5 mg/dL    Total Protein 6.4 6.0 - 8.5 g/dL    Albumin 4.00 3.50 - 5.20 g/dL    ALT (SGPT) 12 1 - 33 U/L    AST (SGOT) 14 1 - 32 U/L    Alkaline Phosphatase 85 39 - 117 U/L    Total Bilirubin 0.2 0.0 - 1.2 mg/dL    eGFR Non African Amer 32 (L) >60 mL/min/1.73    Globulin 2.4 gm/dL    A/G Ratio 1.7 g/dL    BUN/Creatinine Ratio 22.4 7.0 - 25.0    Anion Gap 13.0 5.0 - 15.0 mmol/L   Troponin    Specimen: Blood   Result Value Ref Range    Troponin T <0.010 0.000 - 0.030 ng/mL   Magnesium    Specimen: Blood   Result Value Ref Range    Magnesium 1.5 (L) 1.6 - 2.4 mg/dL   Urinalysis With Microscopic If Indicated (No Culture) - Urine, Clean Catch    Specimen: Urine, Clean Catch   Result Value Ref Range    Color, UA Yellow Yellow, Straw    Appearance, UA Clear Clear    pH, UA 5.5 5.0 - 8.0    Specific Gravity, UA 1.032 (H) 1.001 - 1.030    Glucose, UA Negative Negative    Ketones, UA Negative Negative    Bilirubin, UA Negative Negative    Blood, UA Negative Negative    Protein, UA Negative Negative    Leuk Esterase, UA Moderate (2+) (A) Negative    Nitrite, UA Negative Negative    Urobilinogen, UA 0.2 E.U./dL 0.2 - 1.0 E.U./dL   CBC Auto Differential    Specimen: Blood   Result Value Ref Range    WBC 8.13 3.40 - 10.80 10*3/mm3    RBC 3.89 3.77 - 5.28 10*6/mm3    Hemoglobin 10.6 (L) 12.0 - 15.9 g/dL    Hematocrit 35.5 34.0 - 46.6 %    MCV 91.3 79.0 - 97.0 fL    MCH 27.2 26.6 - 33.0 pg    MCHC 29.9 (L) 31.5 - 35.7 g/dL    RDW 14.2 12.3 - 15.4 %    RDW-SD 47.3 37.0 - 54.0 fl    MPV 10.3 6.0 - 12.0 fL    Platelets 208 140 - 450 10*3/mm3    Neutrophil % 80.2 (H) 42.7 - 76.0 %    Lymphocyte % 9.6 (L) 19.6 - 45.3 %     Monocyte % 6.2 5.0 - 12.0 %    Eosinophil % 3.0 0.3 - 6.2 %    Basophil % 0.4 0.0 - 1.5 %    Immature Grans % 0.6 (H) 0.0 - 0.5 %    Neutrophils, Absolute 6.53 1.70 - 7.00 10*3/mm3    Lymphocytes, Absolute 0.78 0.70 - 3.10 10*3/mm3    Monocytes, Absolute 0.50 0.10 - 0.90 10*3/mm3    Eosinophils, Absolute 0.24 0.00 - 0.40 10*3/mm3    Basophils, Absolute 0.03 0.00 - 0.20 10*3/mm3    Immature Grans, Absolute 0.05 0.00 - 0.05 10*3/mm3    nRBC 0.0 0.0 - 0.2 /100 WBC   Urinalysis, Microscopic Only - Urine, Clean Catch    Specimen: Urine, Clean Catch   Result Value Ref Range    RBC, UA 0-2 None Seen, 0-2 /HPF    WBC, UA 21-30 (A) None Seen, 0-2 /HPF    Bacteria, UA Trace None Seen, Trace /HPF    Squamous Epithelial Cells, UA 3-6 (A) None Seen, 0-2 /HPF    Hyaline Casts, UA 0-6 0 - 6 /LPF    Methodology Automated Microscopy    POC Creatinine    Specimen: Blood   Result Value Ref Range    Creatinine 1.60 (H) 0.60 - 1.30 mg/dL   Light Blue Top   Result Value Ref Range    Extra Tube hold for add-on    Green Top (Gel)   Result Value Ref Range    Extra Tube Hold for add-ons.    Lavender Top   Result Value Ref Range    Extra Tube hold for add-on    Gold Top - SST   Result Value Ref Range    Extra Tube Hold for add-ons.         All other labs were within normal range or not returned as of this dictation.      EMERGENCY DEPARTMENT COURSE and DIFFERENTIAL DIAGNOSIS/MDM:   Vitals:    Vitals:    11/08/20 0620 11/08/20 0630 11/08/20 0645 11/08/20 0715   BP:  (!) 142/114     BP Location:       Patient Position:       Pulse: 87 91 92 87   Resp:       Temp:       TempSrc:       SpO2: 92% 96% 98% 100%   Weight:       Height:                Patient presents for a generalized weakness suddenly upon awakening early this morning prior to arrival.  Last known well was prior to wanting she went to bed last night.  Patient was concerned secondary to dizziness lightheadedness, difficulties with getting herself up off the couch was an acute change  for the patient.  Blood sugar stable per EMS on arrival.  After my evaluation was concerned secondary to her acute onset of dizziness lightheadedness and difficulty with speech, stroke was initiated after my evaluation in the ED.  Case discussed with our stroke navigator, CT, angiography and perfusions were obtained.  Results as above, BUN and creatinine are slightly elevated, patient was given IV fluids.  Urinalysis with moderate leukocytes 21-30 WBCs with trace bacteria.  We will treat for underlying urinary tract infection, no acute abnormalities on angiography or perfusion studies.  Give the patient a full dose aspirin, antibiotics urinary tract infection, plan for admission for more detailed imaging, MRI and neuro evaluation.  Patient family updated current plan of care and they are in agreement.  Case discussed with the hospitalist team for admission.      MEDICATIONS ADMINISTERED IN ED:  Medications   sodium chloride 0.9 % flush 10 mL (has no administration in time range)   cefTRIAXone (ROCEPHIN) 1 g/100 mL 0.9% NS (MBP) (has no administration in time range)   sodium chloride 0.9 % infusion (has no administration in time range)   iopamidol (ISOVUE-370) 76 % injection 150 mL (110 mL Intravenous Given 11/8/20 0677)   sodium chloride 0.9 % bolus 1,000 mL (1,000 mL Intravenous New Bag 11/8/20 5795)       PROCEDURES:  Procedures    CRITICAL CARE TIME    Total Critical Care time was 30 minutes, excluding separately reportable procedures.  Acute CVA, stroke work-up require multiple neurological evaluations, rechecks, discussions with consultants.  There was a high probability of clinically significant/life threatening deterioration in the patient's condition which required my urgent intervention.      FINAL IMPRESSION      1. Generalized weakness    2. Difficulty with speech    3. Renal insufficiency    4. Urinary tract infection without hematuria, site unspecified          DISPOSITION/PLAN     ED Disposition     ED  Disposition Condition Comment    Decision to Admit            PATIENT REFERRED TO:  No follow-up provider specified.    DISCHARGE MEDICATIONS:     Medication List      ASK your doctor about these medications    amLODIPine 2.5 MG tablet  Commonly known as: NORVASC     atorvastatin 40 MG tablet  Commonly known as: LIPITOR     glipizide 5 MG tablet  Commonly known as: GLUCOTROL     hydroCHLOROthiazide 25 MG tablet  Commonly known as: HYDRODIURIL     losartan 50 MG tablet  Commonly known as: COZAAR     metFORMIN 500 MG tablet  Commonly known as: GLUCOPHAGE     metoprolol tartrate 100 MG tablet  Commonly known as: LOPRESSOR     pantoprazole 40 MG EC tablet  Commonly known as: PROTONIX     pioglitazone 15 MG tablet  Commonly known as: ACTOS     sertraline 100 MG tablet  Commonly known as: ZOLOFT                Comment: Please note this report has been produced using speech recognition software.      David Brar DO  Attending Emergency Physician               David Brar DO  11/08/20 0798

## 2020-11-09 ENCOUNTER — APPOINTMENT (OUTPATIENT)
Dept: CARDIOLOGY | Facility: HOSPITAL | Age: 70
End: 2020-11-09

## 2020-11-09 LAB
25(OH)D3 SERPL-MCNC: 13.4 NG/ML (ref 30–100)
ANION GAP SERPL CALCULATED.3IONS-SCNC: 7 MMOL/L (ref 5–15)
BH CV ECHO MEAS - AO MAX PG (FULL): 9.4 MMHG
BH CV ECHO MEAS - AO MAX PG: 14 MMHG
BH CV ECHO MEAS - AO MEAN PG (FULL): 6.2 MMHG
BH CV ECHO MEAS - AO MEAN PG: 8.3 MMHG
BH CV ECHO MEAS - AO ROOT AREA (BSA CORRECTED): 1.9
BH CV ECHO MEAS - AO ROOT AREA: 9.6 CM^2
BH CV ECHO MEAS - AO ROOT DIAM: 3.5 CM
BH CV ECHO MEAS - AO V2 MAX: 186.9 CM/SEC
BH CV ECHO MEAS - AO V2 MEAN: 134.7 CM/SEC
BH CV ECHO MEAS - AO V2 VTI: 43.1 CM
BH CV ECHO MEAS - AVA(I,A): 1.5 CM^2
BH CV ECHO MEAS - AVA(I,D): 1.5 CM^2
BH CV ECHO MEAS - AVA(V,A): 1.7 CM^2
BH CV ECHO MEAS - AVA(V,D): 1.7 CM^2
BH CV ECHO MEAS - BSA(HAYCOCK): 1.9 M^2
BH CV ECHO MEAS - BSA(HAYCOCK): 1.9 M^2
BH CV ECHO MEAS - BSA: 1.9 M^2
BH CV ECHO MEAS - BSA: 1.9 M^2
BH CV ECHO MEAS - BZI_BMI: 27.4 KILOGRAMS/M^2
BH CV ECHO MEAS - BZI_BMI: 27.4 KILOGRAMS/M^2
BH CV ECHO MEAS - BZI_METRIC_HEIGHT: 167.6 CM
BH CV ECHO MEAS - BZI_METRIC_HEIGHT: 167.6 CM
BH CV ECHO MEAS - BZI_METRIC_WEIGHT: 77.1 KG
BH CV ECHO MEAS - BZI_METRIC_WEIGHT: 77.1 KG
BH CV ECHO MEAS - EDV(CUBED): 59.2 ML
BH CV ECHO MEAS - EDV(MOD-SP2): 50 ML
BH CV ECHO MEAS - EDV(MOD-SP4): 52 ML
BH CV ECHO MEAS - EDV(TEICH): 65.8 ML
BH CV ECHO MEAS - EF(CUBED): 80.5 %
BH CV ECHO MEAS - EF(MOD-BP): 67 %
BH CV ECHO MEAS - EF(MOD-SP2): 64 %
BH CV ECHO MEAS - EF(MOD-SP4): 67.3 %
BH CV ECHO MEAS - EF(TEICH): 73.6 %
BH CV ECHO MEAS - ESV(CUBED): 11.5 ML
BH CV ECHO MEAS - ESV(MOD-SP2): 18 ML
BH CV ECHO MEAS - ESV(MOD-SP4): 17 ML
BH CV ECHO MEAS - ESV(TEICH): 17.3 ML
BH CV ECHO MEAS - FS: 42 %
BH CV ECHO MEAS - IVS/LVPW: 0.96
BH CV ECHO MEAS - IVSD: 1.2 CM
BH CV ECHO MEAS - LA DIMENSION: 2.4 CM
BH CV ECHO MEAS - LA/AO: 0.7
BH CV ECHO MEAS - LAD MAJOR: 5.2 CM
BH CV ECHO MEAS - LAT PEAK E' VEL: 6.7 CM/SEC
BH CV ECHO MEAS - LATERAL E/E' RATIO: 21.6
BH CV ECHO MEAS - LV DIASTOLIC VOL/BSA (35-75): 27.9 ML/M^2
BH CV ECHO MEAS - LV MASS(C)D: 147.3 GRAMS
BH CV ECHO MEAS - LV MASS(C)DI: 78.9 GRAMS/M^2
BH CV ECHO MEAS - LV MAX PG: 4.6 MMHG
BH CV ECHO MEAS - LV MEAN PG: 2.2 MMHG
BH CV ECHO MEAS - LV SYSTOLIC VOL/BSA (12-30): 9.1 ML/M^2
BH CV ECHO MEAS - LV V1 MAX: 106.9 CM/SEC
BH CV ECHO MEAS - LV V1 MEAN: 66.2 CM/SEC
BH CV ECHO MEAS - LV V1 VTI: 21.9 CM
BH CV ECHO MEAS - LVIDD: 3.9 CM
BH CV ECHO MEAS - LVIDS: 2.3 CM
BH CV ECHO MEAS - LVLD AP2: 7.3 CM
BH CV ECHO MEAS - LVLD AP4: 6.9 CM
BH CV ECHO MEAS - LVLS AP2: 5.9 CM
BH CV ECHO MEAS - LVLS AP4: 5.9 CM
BH CV ECHO MEAS - LVOT AREA (M): 3.1 CM^2
BH CV ECHO MEAS - LVOT AREA: 3 CM^2
BH CV ECHO MEAS - LVOT DIAM: 2 CM
BH CV ECHO MEAS - LVPWD: 1.2 CM
BH CV ECHO MEAS - MED PEAK E' VEL: 4.6 CM/SEC
BH CV ECHO MEAS - MEDIAL E/E' RATIO: 31.4
BH CV ECHO MEAS - MV A MAX VEL: 99.3 CM/SEC
BH CV ECHO MEAS - MV DEC TIME: 0.15 SEC
BH CV ECHO MEAS - MV E MAX VEL: 147 CM/SEC
BH CV ECHO MEAS - MV E/A: 1.5
BH CV ECHO MEAS - PA ACC SLOPE: 1367 CM/SEC^2
BH CV ECHO MEAS - PA ACC TIME: 0.06 SEC
BH CV ECHO MEAS - PA PR(ACCEL): 50.5 MMHG
BH CV ECHO MEAS - PI END-D VEL: 137.1 CM/SEC
BH CV ECHO MEAS - PULM DIAS VEL: 60.5 CM/SEC
BH CV ECHO MEAS - PULM S/D: 0.71
BH CV ECHO MEAS - PULM SYS VEL: 42.8 CM/SEC
BH CV ECHO MEAS - SI(AO): 220.8 ML/M^2
BH CV ECHO MEAS - SI(CUBED): 25.5 ML/M^2
BH CV ECHO MEAS - SI(LVOT): 35.2 ML/M^2
BH CV ECHO MEAS - SI(MOD-SP2): 17.1 ML/M^2
BH CV ECHO MEAS - SI(MOD-SP4): 18.8 ML/M^2
BH CV ECHO MEAS - SI(TEICH): 25.9 ML/M^2
BH CV ECHO MEAS - SV(AO): 412.2 ML
BH CV ECHO MEAS - SV(CUBED): 47.6 ML
BH CV ECHO MEAS - SV(LVOT): 65.8 ML
BH CV ECHO MEAS - SV(MOD-SP2): 32 ML
BH CV ECHO MEAS - SV(MOD-SP4): 35 ML
BH CV ECHO MEAS - SV(TEICH): 48.4 ML
BH CV ECHO MEAS - TAPSE (>1.6): 2.1 CM
BH CV ECHO MEASUREMENTS AVERAGE E/E' RATIO: 26.02
BH CV XLRA - RV BASE: 2.9 CM
BH CV XLRA - RV LENGTH: 7.7 CM
BH CV XLRA - RV MID: 3 CM
BH CV XLRA - TDI S': 12.6 CM/SEC
BH CV XLRA MEAS LEFT CCA RATIO VEL: 84.5 CM/SEC
BH CV XLRA MEAS LEFT DIST CCA EDV: 19.2 CM/SEC
BH CV XLRA MEAS LEFT DIST CCA PSV: 67.8 CM/SEC
BH CV XLRA MEAS LEFT DIST ICA EDV: 38.5 CM/SEC
BH CV XLRA MEAS LEFT DIST ICA PSV: 107.6 CM/SEC
BH CV XLRA MEAS LEFT ICA RATIO VEL: 104 CM/SEC
BH CV XLRA MEAS LEFT ICA/CCA RATIO: 1.2
BH CV XLRA MEAS LEFT MID CCA EDV: 15.2 CM/SEC
BH CV XLRA MEAS LEFT MID CCA PSV: 85 CM/SEC
BH CV XLRA MEAS LEFT MID ICA EDV: 41.6 CM/SEC
BH CV XLRA MEAS LEFT MID ICA PSV: 104.5 CM/SEC
BH CV XLRA MEAS LEFT PROX CCA EDV: 17.2 CM/SEC
BH CV XLRA MEAS LEFT PROX CCA PSV: 75.6 CM/SEC
BH CV XLRA MEAS LEFT PROX ECA PSV: 174.7 CM/SEC
BH CV XLRA MEAS LEFT PROX ICA EDV: 27.5 CM/SEC
BH CV XLRA MEAS LEFT PROX ICA PSV: 102.9 CM/SEC
BH CV XLRA MEAS LEFT PROX SCLA PSV: 232.6 CM/SEC
BH CV XLRA MEAS LEFT VERTEBRAL A EDV: 28.9 CM/SEC
BH CV XLRA MEAS LEFT VERTEBRAL A PSV: 73.5 CM/SEC
BH CV XLRA MEAS RIGHT CCA RATIO VEL: 69.8 CM/SEC
BH CV XLRA MEAS RIGHT DIST CCA EDV: 11.8 CM/SEC
BH CV XLRA MEAS RIGHT DIST CCA PSV: 52.4 CM/SEC
BH CV XLRA MEAS RIGHT ICA RATIO VEL: 86.6 CM/SEC
BH CV XLRA MEAS RIGHT ICA/CCA RATIO: 1.2
BH CV XLRA MEAS RIGHT MID CCA EDV: 11.4 CM/SEC
BH CV XLRA MEAS RIGHT MID CCA PSV: 70.3 CM/SEC
BH CV XLRA MEAS RIGHT MID ICA EDV: 22.6 CM/SEC
BH CV XLRA MEAS RIGHT MID ICA PSV: 87.1 CM/SEC
BH CV XLRA MEAS RIGHT PROX CCA EDV: 10.5 CM/SEC
BH CV XLRA MEAS RIGHT PROX CCA PSV: 72.5 CM/SEC
BH CV XLRA MEAS RIGHT PROX ECA PSV: 176.8 CM/SEC
BH CV XLRA MEAS RIGHT PROX ICA EDV: 14.3 CM/SEC
BH CV XLRA MEAS RIGHT PROX ICA PSV: 62.3 CM/SEC
BH CV XLRA MEAS RIGHT PROX SCLA PSV: 184.8 CM/SEC
BH CV XLRA MEAS RIGHT VERTEBRAL A EDV: 17.7 CM/SEC
BH CV XLRA MEAS RIGHT VERTEBRAL A PSV: 50.1 CM/SEC
BUN SERPL-MCNC: 23 MG/DL (ref 8–23)
BUN/CREAT SERPL: 19.7 (ref 7–25)
CALCIUM SPEC-SCNC: 8.2 MG/DL (ref 8.6–10.5)
CHLORIDE SERPL-SCNC: 109 MMOL/L (ref 98–107)
CHOLEST SERPL-MCNC: 91 MG/DL (ref 0–200)
CO2 SERPL-SCNC: 22 MMOL/L (ref 22–29)
CREAT SERPL-MCNC: 1.17 MG/DL (ref 0.57–1)
FOLATE SERPL-MCNC: 8.27 NG/ML (ref 4.78–24.2)
GFR SERPL CREATININE-BSD FRML MDRD: 46 ML/MIN/1.73
GLUCOSE BLDC GLUCOMTR-MCNC: 130 MG/DL (ref 70–130)
GLUCOSE BLDC GLUCOMTR-MCNC: 251 MG/DL (ref 70–130)
GLUCOSE BLDC GLUCOMTR-MCNC: 262 MG/DL (ref 70–130)
GLUCOSE BLDC GLUCOMTR-MCNC: 274 MG/DL (ref 70–130)
GLUCOSE SERPL-MCNC: 150 MG/DL (ref 65–99)
HBA1C MFR BLD: 6.5 % (ref 4.8–5.6)
HDLC SERPL-MCNC: 31 MG/DL (ref 40–60)
IRON 24H UR-MRATE: 22 MCG/DL (ref 37–145)
IRON SATN MFR SERPL: 7 % (ref 20–50)
LDLC SERPL CALC-MCNC: 40 MG/DL (ref 0–100)
LDLC/HDLC SERPL: 1.25 {RATIO}
LEFT ATRIUM VOLUME INDEX: 27.3 ML/M^2
LEFT ATRIUM VOLUME: 51 ML
LV EF 2D ECHO EST: 68 %
MAGNESIUM SERPL-MCNC: 2.9 MG/DL (ref 1.6–2.4)
MAXIMAL PREDICTED HEART RATE: 151 BPM
POTASSIUM SERPL-SCNC: 4.6 MMOL/L (ref 3.5–5.2)
SODIUM SERPL-SCNC: 138 MMOL/L (ref 136–145)
STRESS TARGET HR: 128 BPM
T4 FREE SERPL-MCNC: 0.99 NG/DL (ref 0.93–1.7)
TIBC SERPL-MCNC: 319 MCG/DL (ref 298–536)
TRANSFERRIN SERPL-MCNC: 214 MG/DL (ref 200–360)
TRIGL SERPL-MCNC: 107 MG/DL (ref 0–150)
TSH SERPL DL<=0.05 MIU/L-ACNC: 0.75 UIU/ML (ref 0.27–4.2)
VIT B12 BLD-MCNC: 197 PG/ML (ref 211–946)
VLDLC SERPL-MCNC: 20 MG/DL (ref 5–40)

## 2020-11-09 PROCEDURE — 84439 ASSAY OF FREE THYROXINE: CPT | Performed by: INTERNAL MEDICINE

## 2020-11-09 PROCEDURE — 84466 ASSAY OF TRANSFERRIN: CPT | Performed by: INTERNAL MEDICINE

## 2020-11-09 PROCEDURE — 96361 HYDRATE IV INFUSION ADD-ON: CPT

## 2020-11-09 PROCEDURE — 93880 EXTRACRANIAL BILAT STUDY: CPT | Performed by: INTERNAL MEDICINE

## 2020-11-09 PROCEDURE — 80048 BASIC METABOLIC PNL TOTAL CA: CPT | Performed by: INTERNAL MEDICINE

## 2020-11-09 PROCEDURE — 82746 ASSAY OF FOLIC ACID SERUM: CPT | Performed by: INTERNAL MEDICINE

## 2020-11-09 PROCEDURE — 82607 VITAMIN B-12: CPT | Performed by: INTERNAL MEDICINE

## 2020-11-09 PROCEDURE — 25010000002 CEFTRIAXONE PER 250 MG: Performed by: INTERNAL MEDICINE

## 2020-11-09 PROCEDURE — 83036 HEMOGLOBIN GLYCOSYLATED A1C: CPT | Performed by: PSYCHIATRY & NEUROLOGY

## 2020-11-09 PROCEDURE — 92523 SPEECH SOUND LANG COMPREHEN: CPT

## 2020-11-09 PROCEDURE — G0378 HOSPITAL OBSERVATION PER HR: HCPCS

## 2020-11-09 PROCEDURE — 93880 EXTRACRANIAL BILAT STUDY: CPT

## 2020-11-09 PROCEDURE — 99225 PR SBSQ OBSERVATION CARE/DAY 25 MINUTES: CPT | Performed by: INTERNAL MEDICINE

## 2020-11-09 PROCEDURE — 97161 PT EVAL LOW COMPLEX 20 MIN: CPT

## 2020-11-09 PROCEDURE — 80061 LIPID PANEL: CPT | Performed by: PSYCHIATRY & NEUROLOGY

## 2020-11-09 PROCEDURE — 96366 THER/PROPH/DIAG IV INF ADDON: CPT

## 2020-11-09 PROCEDURE — 83540 ASSAY OF IRON: CPT | Performed by: INTERNAL MEDICINE

## 2020-11-09 PROCEDURE — 97165 OT EVAL LOW COMPLEX 30 MIN: CPT | Performed by: OCCUPATIONAL THERAPIST

## 2020-11-09 PROCEDURE — 93306 TTE W/DOPPLER COMPLETE: CPT | Performed by: INTERNAL MEDICINE

## 2020-11-09 PROCEDURE — 82306 VITAMIN D 25 HYDROXY: CPT | Performed by: INTERNAL MEDICINE

## 2020-11-09 PROCEDURE — 82962 GLUCOSE BLOOD TEST: CPT

## 2020-11-09 PROCEDURE — 83735 ASSAY OF MAGNESIUM: CPT | Performed by: INTERNAL MEDICINE

## 2020-11-09 PROCEDURE — 84443 ASSAY THYROID STIM HORMONE: CPT | Performed by: INTERNAL MEDICINE

## 2020-11-09 PROCEDURE — 93306 TTE W/DOPPLER COMPLETE: CPT

## 2020-11-09 PROCEDURE — 63710000001 INSULIN LISPRO (HUMAN) PER 5 UNITS: Performed by: INTERNAL MEDICINE

## 2020-11-09 PROCEDURE — G0108 DIAB MANAGE TRN  PER INDIV: HCPCS

## 2020-11-09 PROCEDURE — 99214 OFFICE O/P EST MOD 30 MIN: CPT | Performed by: PSYCHIATRY & NEUROLOGY

## 2020-11-09 PROCEDURE — 97530 THERAPEUTIC ACTIVITIES: CPT

## 2020-11-09 RX ORDER — ATORVASTATIN CALCIUM 40 MG/1
40 TABLET, FILM COATED ORAL NIGHTLY
Status: DISCONTINUED | OUTPATIENT
Start: 2020-11-09 | End: 2020-11-12 | Stop reason: HOSPADM

## 2020-11-09 RX ADMIN — CEFTRIAXONE SODIUM 1 G: 1 INJECTION, POWDER, FOR SOLUTION INTRAMUSCULAR; INTRAVENOUS at 18:19

## 2020-11-09 RX ADMIN — Medication 5000 UNITS: at 15:16

## 2020-11-09 RX ADMIN — SODIUM CHLORIDE 100 ML/HR: 9 INJECTION, SOLUTION INTRAVENOUS at 03:02

## 2020-11-09 RX ADMIN — ATORVASTATIN CALCIUM 40 MG: 40 TABLET, FILM COATED ORAL at 21:11

## 2020-11-09 RX ADMIN — PANTOPRAZOLE SODIUM 40 MG: 40 TABLET, DELAYED RELEASE ORAL at 08:20

## 2020-11-09 RX ADMIN — SERTRALINE HYDROCHLORIDE 100 MG: 100 TABLET ORAL at 08:20

## 2020-11-09 RX ADMIN — SODIUM CHLORIDE, PRESERVATIVE FREE 10 ML: 5 INJECTION INTRAVENOUS at 08:22

## 2020-11-09 RX ADMIN — INSULIN LISPRO 4 UNITS: 100 INJECTION, SOLUTION INTRAVENOUS; SUBCUTANEOUS at 08:20

## 2020-11-09 RX ADMIN — CLOPIDOGREL BISULFATE 75 MG: 75 TABLET ORAL at 08:20

## 2020-11-09 RX ADMIN — ASPIRIN 325 MG ORAL TABLET 325 MG: 325 PILL ORAL at 08:20

## 2020-11-09 RX ADMIN — INSULIN LISPRO 4 UNITS: 100 INJECTION, SOLUTION INTRAVENOUS; SUBCUTANEOUS at 12:44

## 2020-11-09 NOTE — THERAPY EVALUATION
Patient Name: Zita Ramirez  : 1950    MRN: [id number]                              Today's Date: 2020       Admit Date: 2020    Visit Dx:     ICD-10-CM ICD-9-CM   1. Generalized weakness  R53.1 780.79   2. Difficulty with speech  R47.9 784.59   3. Renal insufficiency  N28.9 593.9   4. Urinary tract infection without hematuria, site unspecified  N39.0 599.0   5. Cognitive communication deficit  R41.841 799.52   6. Dysarthria  R47.1 784.51   7. Impaired mobility and ADLs  Z74.09 V49.89    Z78.9      Patient Active Problem List   Diagnosis   • TIA (transient ischemic attack)   • Essential hypertension   • Type 2 diabetes mellitus (CMS/HCC)     Past Medical History:   Diagnosis Date   • Diabetes mellitus (CMS/HCC)    • Hypertension    • Stroke (CMS/HCC)      Past Surgical History:   Procedure Laterality Date   • HYSTERECTOMY     • TUBAL ABDOMINAL LIGATION       General Information     Row Name 20 1026          OT Time and Intention    Document Type  evaluation  -SD     Mode of Treatment  occupational therapy  -SD     Row Name 20 1026          General Information    Patient Profile Reviewed  yes  -SD     Prior Level of Function  independent:;all household mobility;community mobility;ADL's  -SD     Existing Precautions/Restrictions  fall  -SD     Barriers to Rehab  none identified  -SD     Row Name 20 1026          Living Environment    Lives With  spouse  -SD     Row Name 20 1026          Home Main Entrance    Number of Stairs, Main Entrance  four  -SD     Row Name 20 1026          Stairs Within Home, Primary    Number of Stairs, Within Home, Primary  other (see comments) pt. estimated 15  -SD     Stair Railings, Within Home, Primary  railings safe and in good condition  -SD     Stairs Comment, Within Home, Primary  pt. stated that her bedroon is on the 2nd floor  -SD     Row Name 20 1026          Cognition    Orientation Status (Cognition)  oriented x 4  -SD     Row  Name 11/09/20 1026          Safety Issues, Functional Mobility    Safety Issues Affecting Function (Mobility)  awareness of need for assistance;insight into deficits/self-awareness;safety precautions follow-through/compliance;safety precaution awareness  -SD     Impairments Affecting Function (Mobility)  balance;coordination;endurance/activity tolerance;strength  -SD       User Key  (r) = Recorded By, (t) = Taken By, (c) = Cosigned By    Initials Name Provider Type    Megan Maharaj OT Occupational Therapist        Mobility/ADL's     Row Name 11/09/20 1115          Bed Mobility    Comment (Bed Mobility)  pt. UIC; pt. stated that getting out of the bed required more effort than usual  -SD     Row Name 11/09/20 1115          Transfers    Transfers  sit-stand transfer;toilet transfer  -SD     Sit-Stand [place] (Transfers)  minimum assist (75% patient effort);verbal cues  -SD     [place] Level (Toilet Transfer)  minimum assist (75% patient effort);verbal cues  -SD     Assistive Device (Toilet Transfer)  commode;grab bars/safety frame;raised toilet seat;walker, front-wheeled  -SD     Row Name 11/09/20 1115          Sit-Stand Transfer    Assistive Device (Sit-Stand Transfers)  walker, front-wheeled  -SD     Row Name 11/09/20 1115          Toilet Transfer    Type (Toilet Transfer)  sit-stand;stand-sit  -SD     Row Name 11/09/20 1115          Functional Mobility    Functional Mobility- Ind. Level  contact guard assist;verbal cues required  -SD     Functional Mobility- Device  rolling walker  -SD     Functional Mobility-Distance (Feet)  10  -SD     Functional Mobility- Safety Issues  balance decreased during turns;step length decreased  -SD     Row Name 11/09/20 1115          Activities of Daily Living    BADL Assessment/Intervention  upper body dressing;lower body dressing;grooming;toileting  -SD     Row Name 11/09/20 1115          Upper Body Dressing Assessment/Training    [place] Level (Upper  Body Dressing)  doff;don;pajama/robe;minimum assist (75% patient effort)  -SD     Position (Upper Body Dressing)  supported sitting  -SD     Row Name 11/09/20 1115          Lower Body Dressing Assessment/Training    [place] Level (Lower Body Dressing)  doff;don;pants/bottoms;moderate assist (50% patient effort) underwear  -SD     Position (Lower Body Dressing)  supported sitting;supported standing  -SD     Row Name 11/09/20 1115          Grooming Assessment/Training    [place] Level (Grooming)  wash face, hands;set up  -SD     Position (Grooming)  supported sitting  -SD     Row Name 11/09/20 1115          Toileting Assessment/Training    [place] Level (Toileting)  perform perineal hygiene;set up;adjust/manage clothing;change pad/brief;moderate assist (50% patient effort)  -SD     Assistive Devices (Toileting)  commode;raised toilet seat;grab bar/safety frame  -SD     Position (Toileting)  supported sitting;supported standing  -SD       User Key  (r) = Recorded By, (t) = Taken By, (c) = Cosigned By    Initials Name Provider Type    SD Megan Woodard OT Occupational Therapist        Obj/Interventions     Row Name 11/09/20 1118          Sensory Assessment (Somatosensory)    Sensory Assessment (Somatosensory)  sensation intact  -[facility] Name 11/09/20 1118          Vision Assessment/Intervention    Visual Impairment/Limitations  corrective lenses full-time;WFL  -SD     [facility] Name 11/09/20 1118          Range of Motion Comprehensive    General Range of Motion  bilateral upper extremity ROM WFL  -SD     Row Name 11/09/20 1118          Strength Comprehensive (MMT)    General Manual Muscle Testing (MMT) Assessment  upper extremity strength deficits identified  -SD     Comment, General Manual Muscle Testing (MMT) Assessment  B UE Strength: 4/5 grossly  -SD     Row Name 11/09/20 1118          Balance    Balance Assessment  sitting static balance;sitting dynamic balance;sit to stand dynamic  balance;standing static balance;standing dynamic balance  -SD     Static Sitting Balance  WFL  -SD     Dynamic Sitting Balance  mild impairment  -SD     Sit to Stand Dynamic Balance  mild impairment  -SD     Static Standing Balance  mild impairment  -SD     Dynamic Standing Balance  mild impairment  -SD     Balance Interventions  occupation based/functional task;supported;standing;sitting;UE activity with balance activity  -SD       User Key  (r) = Recorded By, (t) = Taken By, (c) = Cosigned By    Initials Name Provider Type    Megan Maharaj OT Occupational Therapist        Goals/Plan     Row Name 11/09/20 1125          Bed Mobility Goal 1 (OT)    Activity/Assistive Device (Bed Mobility Goal 1, OT)  bed mobility activities, all  -SD     [place] Level/Cues Needed (Bed Mobility Goal 1, OT)  modified independence  -SD     Time Frame (Bed Mobility Goal 1, OT)  by discharge  -SD     Progress/Outcomes (Bed Mobility Goal 1, OT)  goal ongoing  -SD     Row Name 11/09/20 1125          Transfer Goal 1 (OT)    Activity/Assistive Device (Transfer Goal 1, OT)  sit-to-stand/stand-to-sit;bed-to-chair/chair-to-bed;toilet;shower chair;walker, rolling  -SD     [place] Level/Cues Needed (Transfer Goal 1, OT)  standby assist  -SD     Time Frame (Transfer Goal 1, OT)  by discharge  -SD     Progress/Outcome (Transfer Goal 1, OT)  goal ongoing  -SD     Row Name 11/09/20 1125          Toileting Goal 1 (OT)    Activity/Device (Toileting Goal 1, OT)  toileting skills, all;grab bar/safety frame;commode;raised toilet seat  -SD     [place] Level/Cues Needed (Toileting Goal 1, OT)  modified independence  -SD     Time Frame (Toileting Goal 1, OT)  by discharge  -SD     Progress/Outcome (Toileting Goal 1, OT)  goal ongoing  -SD     Row Name 11/09/20 1125          Therapy Assessment/Plan (OT)    Planned Therapy Interventions (OT)  activity tolerance training;IADL retraining;functional balance retraining;patient/caregiver  education/training;transfer/mobility retraining;strengthening exercise;occupation/activity based interventions  -SD       User Key  (r) = Recorded By, (t) = Taken By, (c) = Cosigned By    Initials Name Provider Type    Megan Maharaj, OT Occupational Therapist        Clinical Impression     Row Name 11/09/20 1120          Pain Assessment    Additional Documentation  Pain Scale: Numbers Pre/Post-Treatment (Group)  -SD     Row Name 11/09/20 1120          Pain Scale: Numbers Pre/Post-Treatment    Pretreatment Pain Rating  0/10 - no pain  -SD     Posttreatment Pain Rating  0/10 - no pain  -SD     Pain Intervention(s)  Repositioned;Ambulation/increased activity;Emotional support  -SD     Row Name 11/09/20 1120          Plan of Care Review    Plan of Care Reviewed With  patient;spouse  -SD     Progress  no change  -SD     Outcome Summary  OT IE completed. Pt. sitting up in b/s chair w/ present. Pt. reporting that she has been incontinent of urine during this hospital stay. STS from chair: min A using RW, LBD: mod A, toileting: min A, toilet t/f: CGA using RW, ambulating in room & bathroom: CGA using RW, standing balance: CGA. Pt. is appropriate for OT skilled services to address decreased ADL & functional mobility independence, decreased strength, and decreased occupational endurance. Recommend IPRF upon d/c.  -SD     Row Name 11/09/20 1120          Therapy Assessment/Plan (OT)    Patient/Family Therapy Goal Statement (OT)  return to PLOF  -SD     Rehab Potential (OT)  good, to achieve stated therapy goals  -SD     Criteria for Skilled Therapeutic Interventions Met (OT)  yes;meets criteria;skilled treatment is necessary  -SD     Therapy Frequency (OT)  daily  -SD     Row Name 11/09/20 1120          Therapy Plan Review/Discharge Plan (OT)    Anticipated Discharge Disposition (OT)  inpatient rehabilitation facility  -SD     Row Name 11/09/20 1120          Vital Signs    Pre Systolic BP Rehab  112  -SD      Pre Treatment Diastolic BP  58  -SD     Post Systolic BP Rehab  131  -SD     Post Treatment Diastolic BP  58  -SD     Posttreatment Heart Rate (beats/min)  79  -SD     Intra SpO2 (%)  94  -SD     O2 Delivery Intra Treatment  room air  -SD     Post SpO2 (%)  100  -SD     O2 Delivery Post Treatment  supplemental O2  -SD     Pre Patient Position  Sitting  -SD     Intra Patient Position  Standing  -SD     Post Patient Position  Sitting  -SD     Row Name 11/09/20 1120          Positioning and Restraints    Pre-Treatment Position  sitting in chair/recliner  -SD     Post Treatment Position  chair  -SD     In Chair  notified nsg;reclined;call light within reach;encouraged to call for assist;exit alarm on;with family/caregiver;legs elevated  -SD       User Key  (r) = Recorded By, (t) = Taken By, (c) = Cosigned By    Initials Name Provider Type    Megan Maharaj OT Occupational Therapist        Outcome Measures     Row Name 11/09/20 1026          How much help from another is currently needed...    Bathing (including washing, rinsing, and drying)  2  -SD     Toileting (which includes using toilet bed pan or urinal)  3  -SD     Putting on and taking off regular upper body clothing  3  -SD     Taking care of personal grooming (such as brushing teeth)  3  -SD     Eating meals  4  -SD     Row Name 11/09/20 1026          Functional Assessment    Outcome Measure Options  AM-PAC 6 Clicks Daily Activity (OT)  -SD       User Key  (r) = Recorded By, (t) = Taken By, (c) = Cosigned By    Initials Name Provider Type    Megan Maharaj OT Occupational Therapist        Occupational Therapy Education                 Title: PT OT SLP Therapies (Done)     Topic: Occupational Therapy (Done)     Point: ADL training (Done)     Description:   Instruct learner(s) on proper safety adaptation and remediation techniques during self care or transfers.   Instruct in proper use of assistive devices.              Learning Progress  Summary           Patient Acceptance, E, VU,NR by SD at 11/9/2020 1127    Comment: Pt. & spouse educated on role of OT, and both are agreeable with OT POC.   Significant Other Acceptance, E, VU,NR by SD at 11/9/2020 1127    Comment: Pt. & spouse educated on role of OT, and both are agreeable with OT POC.                   Point: Home exercise program (Done)     Description:   Instruct learner(s) on appropriate technique for monitoring, assisting and/or progressing therapeutic exercises/activities.              Learning Progress Summary           Patient Acceptance, E, VU,NR by SD at 11/9/2020 1127    Comment: Pt. & spouse educated on role of OT, and both are agreeable with OT POC.   Significant Other Acceptance, E, VU,NR by SD at 11/9/2020 1127    Comment: Pt. & spouse educated on role of OT, and both are agreeable with OT POC.                   Point: Precautions (Done)     Description:   Instruct learner(s) on prescribed precautions during self-care and functional transfers.              Learning Progress Summary           Patient Acceptance, E, VU,NR by SD at 11/9/2020 1127    Comment: Pt. & spouse educated on role of OT, and both are agreeable with OT POC.   Significant Other Acceptance, E, VU,NR by SD at 11/9/2020 1127    Comment: Pt. & spouse educated on role of OT, and both are agreeable with OT POC.                   Point: Body mechanics (Done)     Description:   Instruct learner(s) on proper positioning and spine alignment during self-care, functional mobility activities and/or exercises.              Learning Progress Summary           Patient Acceptance, E, VU,NR by SD at 11/9/2020 1127    Comment: Pt. & spouse educated on role of OT, and both are agreeable with OT POC.   Significant Other Acceptance, E, VU,NR by SD at 11/9/2020 1127    Comment: Pt. & spouse educated on role of OT, and both are agreeable with OT POC.                               User Key     Initials Effective Dates Name Provider Type  Discipline    SD 06/08/18 -  Megan Woodard OT Occupational Therapist OT              OT Recommendation and Plan  Planned Therapy Interventions (OT): activity tolerance training, IADL retraining, functional balance retraining, patient/caregiver education/training, transfer/mobility retraining, strengthening exercise, occupation/activity based interventions  Therapy Frequency (OT): daily  Plan of Care Review  Plan of Care Reviewed With: patient, spouse  Progress: no change  Outcome Summary: OT IE completed. Pt. sitting up in b/s chair w/ present. Pt. reporting that she has been incontinent of urine during this hospital stay. STS from chair: min A using RW, LBD: mod A, toileting: min A, toilet t/f: CGA using RW, ambulating in room & bathroom: CGA using RW, standing balance: CGA. Pt. is appropriate for OT skilled services to address decreased ADL & functional mobility independence, decreased strength, and decreased occupational endurance. Recommend IPRF upon d/c.     Time Calculation:   Time Calculation- OT     Row Name 11/09/20 1128             Time Calculation- OT    OT Received On  11/09/20  -SD      OT Goal Re-Cert Due Date  11/19/20  -SD        User Key  (r) = Recorded By, (t) = Taken By, (c) = Cosigned By    Initials Name Provider Type    Megan Maharaj OT Occupational Therapist        Therapy Charges for Today     Code Description Service Date Service Provider Modifiers Qty    [id number]  OT EVAL LOW COMPLEXITY 4 11/9/2020 Megan Woodard OT GO 1               Megan Woodard OT  11/9/2020

## 2020-11-09 NOTE — PLAN OF CARE
Problem: Adult Inpatient Plan of Care  Goal: Plan of Care Review  Outcome: Ongoing, Progressing  Flowsheets (Taken 11/9/2020 2797)  Plan of Care Reviewed With:   patient   spouse    SLP evaluation completed. Will address cognitive communication in treatment. Please see note for further details and recommendations.

## 2020-11-09 NOTE — THERAPY EVALUATION
Patient Name: Zita Ramirez  : 1950    MRN: [id number]                              Today's Date: 2020       Admit Date: 2020    Visit Dx:     ICD-10-CM ICD-9-CM   1. Generalized weakness  R53.1 780.79   2. Difficulty with speech  R47.9 784.59   3. Renal insufficiency  N28.9 593.9   4. Urinary tract infection without hematuria, site unspecified  N39.0 599.0   5. Cognitive communication deficit  R41.841 799.52   6. Dysarthria  R47.1 784.51   7. Impaired mobility and ADLs  Z74.09 V49.89    Z78.9      Patient Active Problem List   Diagnosis   • TIA (transient ischemic attack)   • Essential hypertension   • Type 2 diabetes mellitus (CMS/HCC)     Past Medical History:   Diagnosis Date   • Diabetes mellitus (CMS/HCC)    • Hypertension    • Stroke (CMS/HCC)      Past Surgical History:   Procedure Laterality Date   • HYSTERECTOMY     • TUBAL ABDOMINAL LIGATION       General Information     Row Name 20 [facility]          Physical Therapy Time and Intention    Document Type  evaluation  -CD     Mode of Treatment  physical therapy  -CD     Row Name 20 [facility]          General Information    Patient Profile Reviewed  yes  -CD     Prior Level of Function  independent:;all household mobility;community mobility;gait;transfer;ADL's;bed mobility  -CD     Existing Precautions/Restrictions  fall GENERALIZED WEAKNESS AND FUNCTIONAL DECLINE. INCONTINENT - WEAR DEPENDS FOR MOBILITY.  -CD     Barriers to Rehab  none identified  -CD     Row Name 20 1358          Living Environment    Lives With  spouse  -CD     Row Name 20 [facility]          Home Main Entrance    Number of Stairs, Main Entrance  four  -CD     Stair Railings, Main Entrance  railing on left side (ascending)  -CD     Row Name 20 1358          Stairs Within Home, Primary    Number of Stairs, Within Home, Primary  other (see comments) 15  -CD     Stair Railings, Within Home, Primary  railing on left side (ascending)  -CD     Stairs Comment, Within  Home, Primary  BEDROOM IS ON 2ND FLOOR.  -CD     Row Name 11/09/20 1358          Cognition    Orientation Status (Cognition)  oriented x 4  -CD     Row Name 11/09/20 1358          Safety Issues, Functional Mobility    Safety Issues Affecting Function (Mobility)  awareness of need for assistance;insight into deficits/self-awareness;safety precaution awareness;safety precautions follow-through/compliance  -CD     Impairments Affecting Function (Mobility)  balance;coordination;endurance/activity tolerance;strength  -CD     Comment, Safety Issues/Impairments (Mobility)  PT IS SLOW TO RESPOND BUT FOLLOWS COMMANDS. NOTED SLURRED SPEECH BUT SPOUSE REPORTS SPEECH IS BASELINE.  -CD       User Key  (r) = Recorded By, (t) = Taken By, (c) = Cosigned By    Initials Name Provider Type    CD Celeste Morrison, PT Physical Therapist        Mobility     Row Name 11/09/20 1400          Bed Mobility    Bed Mobility  supine-sit  -CD     Supine-Sit [place] (Bed Mobility)  minimum assist (75% patient effort);verbal cues  -CD     Assistive Device (Bed Mobility)  head of bed elevated  -CD     Comment (Bed Mobility)  INCREASED TIME AND EFFORT.  -CD     Row Name 11/09/20 1400          Transfers    Comment (Transfers)  CUES FOR HAND PLACEMENT.  -CD     Row Name 11/09/20 1400          Bed-Chair Transfer    Bed-Chair [place] (Transfers)  minimum assist (75% patient effort)  -CD     Assistive Device (Bed-Chair Transfers)  walker, front-wheeled  -CD     Row Name 11/09/20 1400          Sit-Stand Transfer    Sit-Stand [place] (Transfers)  contact guard;verbal cues  -CD     Assistive Device (Sit-Stand Transfers)  walker, front-wheeled  -CD     Row Name 11/09/20 1400          Gait/Stairs (Locomotion)    Comment (Gait/Stairs)  PT DECLINED DUE TO INCONTINENCE. RECOMMEND BRIEF FOR MOBILITY NEXT VISIT.  -CD       User Key  (r) = Recorded By, (t) = Taken By, (c) = Cosigned By    Initials Name Provider Type    Celeste Reed PT Physical  Therapist        Obj/Interventions     Row Name 11/09/20 1403          Range of Motion Comprehensive    General Range of Motion  bilateral lower extremity ROM WFL  -CD     Row Name 11/09/20 1403          Strength Comprehensive (MMT)    General Manual Muscle Testing (MMT) Assessment  lower extremity strength deficits identified  -CD     Comment, General Manual Muscle Testing (MMT) Assessment  B HIP FLEX 3+/5, B KNEE EXT/DF 4/5.  -CD     Row Name 11/09/20 1403          Motor Skills    Motor Skills  functional endurance  -CD     Functional Endurance  LIMITED BY FATIGUE/WEAKNESS.  -CD     Row Name 11/09/20 1403          Balance    Balance Assessment  sitting static balance  -CD     Static Sitting Balance  WFL  -CD     Dynamic Sitting Balance  mild impairment HAS DIFFICULTY WITH RECIPROCAL SCOOTING.  -CD     Static Standing Balance  mild impairment;supported  -CD     Dynamic Standing Balance  mild impairment;supported  -CD       User Key  (r) = Recorded By, (t) = Taken By, (c) = Cosigned By    Initials Name Provider Type    CD Celeste Morrison A, PT Physical Therapist        Goals/Plan     Row Name 11/09/20 1414          Bed Mobility Goal 1 (PT)    Activity/Assistive Device (Bed Mobility Goal 1, PT)  sit to supine/supine to sit  -CD     [place] Level/Cues Needed (Bed Mobility Goal 1, PT)  independent  -CD     Time Frame (Bed Mobility Goal 1, PT)  long term goal (LTG);2 weeks  -CD     Row Name 11/09/20 1414          Transfer Goal 1 (PT)    Activity/Assistive Device (Transfer Goal 1, PT)  sit-to-stand/stand-to-sit;bed-to-chair/chair-to-bed  -CD     [place] Level/Cues Needed (Transfer Goal 1, PT)  independent  -CD     Time Frame (Transfer Goal 1, PT)  long term goal (LTG);2 weeks  -CD     Row Name 11/09/20 1414          Gait Training Goal 1 (PT)    Activity/Assistive Device (Gait Training Goal 1, PT)  gait (walking locomotion);walker, rolling  -CD     [place] Level (Gait Training Goal 1, PT)  independent  -CD      Distance (Gait Training Goal 1, PT)  400 FEET.  -CD     Time Frame (Gait Training Goal 1, PT)  long term goal (LTG);2 weeks  -CD     Row Name 11/09/20 1414          Stairs Goal 1 (PT)    Activity/Assistive Device (Stairs Goal 1, PT)  ascending stairs;descending stairs;using handrail, left  -CD     [place] Level/Cues Needed (Stairs Goal 1, PT)  contact guard assist  -CD     Time Frame (Stairs Goal 1, PT)  long term goal (LTG);2 weeks  -CD       User Key  (r) = Recorded By, (t) = Taken By, (c) = Cosigned By    Initials Name Provider Type    CD Celeste Morrison, PT Physical Therapist        Clinical Impression     Row Name 11/09/20 1407 11/09/20 1405       Pain Scale: Numbers Pre/Post-Treatment    Pretreatment Pain Rating  0/10 - no pain  -CD  0/10 - no pain  -CD    Posttreatment Pain Rating  0/10 - no pain  -CD  0/10 - no pain  -CD    Row Name 11/09/20 1407          Plan of Care Review    Plan of Care Reviewed With  patient;spouse  -CD     Outcome Summary  PT PRESENTS WITH EVOLVING SYMPTOMS TO INCLUDE IMPAIRED BALANCE, GENERALIZED WEAKNESS, AND DECLINE IN FUNCTIONAL MOBILITY. ORTHOSTATIC BLOOD PRESSURE STABLE AT TIME OF EVAL. PT FOLLOWS COMMANDS BUT IS DELAYED AND DEMONSTRATES SLURRED SPEECH. SPOUSE REPORTS SPEECH IS BASELINE. RECOMMEND IRF AT D/C FOR RETURN TO MAX LEVEL OF FUNCTION PRIOR TO HOME.  -CD     Row Name 11/09/20 1407          Therapy Assessment/Plan (PT)    Patient/Family Therapy Goals Statement (PT)  DID NOT STATE.  -CD     Rehab Potential (PT)  good, to achieve stated therapy goals  -CD     Criteria for Skilled Interventions Met (PT)  yes  -CD     Row Name 11/09/20 1407          Vital Signs    Pre Systolic BP Rehab  121  -CD     Pre Treatment Diastolic BP  55  -CD     Intra Systolic BP Rehab  131  -CD     Intra Treatment Diastolic BP  55  -CD     Post Systolic BP Rehab  112  -CD     Post Treatment Diastolic BP  58  -CD     Pre SpO2 (%)  94  -CD     O2 Delivery Pre Treatment  supplemental O2  -CD      Intra SpO2 (%)  88  -CD     O2 Delivery Intra Treatment  room air  -CD     Post SpO2 (%)  95  -CD     O2 Delivery Post Treatment  supplemental O2  -CD     Pre Patient Position  Supine  -CD     Intra Patient Position  Standing  -CD     Post Patient Position  Sitting  -CD     Row Name 11/09/20 1407          Positioning and Restraints    Pre-Treatment Position  in bed  -CD     Post Treatment Position  chair  -CD     In Chair  call light within reach;encouraged to call for assist;exit alarm on;with family/caregiver;legs elevated;notified nsg  -CD       User Key  (r) = Recorded By, (t) = Taken By, (c) = Cosigned By    Initials Name Provider Type    CD Celeste Morrison PT Physical Therapist        Outcome Measures     Row Name 11/09/20 1416          How much help from another person do you currently need...    Turning from your back to your side while in flat bed without using bedrails?  3  -CD     Moving from lying on back to sitting on the side of a flat bed without bedrails?  3  -CD     Moving to and from a bed to a chair (including a wheelchair)?  3  -CD     Standing up from a chair using your arms (e.g., wheelchair, bedside chair)?  3  -CD     Climbing 3-5 steps with a railing?  2  -CD     To walk in hospital room?  3  -CD     AM-PAC 6 Clicks Score (PT)  17  -CD     Row Name 11/09/20 1416          Modified [place] Scale    Modified [place] Scale  4 - Moderately severe disability.  Unable to walk without assistance, and unable to attend to own bodily needs without assistance.  -CD     Row Name 11/09/20 1416          Functional Assessment    Outcome Measure Options  AM-PAC 6 Clicks Basic Mobility (PT);Modified [place]  -CD       User Key  (r) = Recorded By, (t) = Taken By, (c) = Cosigned By    Initials Name Provider Type    Celeste Reed PT Physical Therapist        Physical Therapy Education                 Title: PT OT SLP Therapies (Done)     Topic: Physical Therapy (Done)     Point: Mobility training (Done)      Learning Progress Summary           Patient Acceptance, E, VU,NR by CD at 11/9/2020 1417    Comment: BENEFITS OF OOB ACTIVITY,SAFETY WITH MOBILITY, PROGRESSION OF POC, D/C PLANNING,   Significant Other Acceptance, E, VU,NR by CD at 11/9/2020 1417    Comment: BENEFITS OF OOB ACTIVITY,SAFETY WITH MOBILITY, PROGRESSION OF POC, D/C PLANNING,                   Point: Home exercise program (Done)     Learning Progress Summary           Patient Acceptance, E, VU,NR by CD at 11/9/2020 1417    Comment: BENEFITS OF OOB ACTIVITY,SAFETY WITH MOBILITY, PROGRESSION OF POC, D/C PLANNING,   Significant Other Acceptance, E, VU,NR by CD at 11/9/2020 1417    Comment: BENEFITS OF OOB ACTIVITY,SAFETY WITH MOBILITY, PROGRESSION OF POC, D/C PLANNING,                   Point: Body mechanics (Done)     Learning Progress Summary           Patient Acceptance, E, VU,NR by CD at 11/9/2020 1417    Comment: BENEFITS OF OOB ACTIVITY,SAFETY WITH MOBILITY, PROGRESSION OF POC, D/C PLANNING,   Significant Other Acceptance, E, VU,NR by CD at 11/9/2020 1417    Comment: BENEFITS OF OOB ACTIVITY,SAFETY WITH MOBILITY, PROGRESSION OF POC, D/C PLANNING,                   Point: Precautions (Done)     Learning Progress Summary           Patient Acceptance, E, VU,NR by CD at 11/9/2020 1417    Comment: BENEFITS OF OOB ACTIVITY,SAFETY WITH MOBILITY, PROGRESSION OF POC, D/C PLANNING,   Significant Other Acceptance, E, VU,NR by CD at 11/9/2020 1417    Comment: BENEFITS OF OOB ACTIVITY,SAFETY WITH MOBILITY, PROGRESSION OF POC, D/C PLANNING,                               User Key     Initials Effective Dates Name Provider Type Discipline    CD 06/19/15 -  Celeste Morrison, PT Physical Therapist PT              PT Recommendation and Plan  Planned Therapy Interventions (PT): balance training, bed mobility training, gait training, home exercise program, transfer training, strengthening, stair training, patient/family education  Plan of Care Reviewed With: patient,  spouse  Outcome Summary: PT PRESENTS WITH EVOLVING SYMPTOMS TO INCLUDE IMPAIRED BALANCE, GENERALIZED WEAKNESS, AND DECLINE IN FUNCTIONAL MOBILITY. ORTHOSTATIC BLOOD PRESSURE STABLE AT TIME OF EVAL. PT FOLLOWS COMMANDS BUT IS DELAYED AND DEMONSTRATES SLURRED SPEECH. SPOUSE REPORTS SPEECH IS BASELINE. RECOMMEND IRF AT D/C FOR RETURN TO MAX LEVEL OF FUNCTION PRIOR TO HOME.     Time Calculation:   PT Charges     Row Name 11/09/20 1420             Time Calculation    Start Time  0908  -CD      PT Received On  11/09/20  -CD      PT Goal Re-Cert Due Date  11/19/20  -CD         Time Calculation- PT    Total Timed Code Minutes- PT  17 minute(s)  -CD         Timed Charges    56125 - PT Therapeutic Activity Minutes  17  -CD        User Key  (r) = Recorded By, (t) = Taken By, (c) = Cosigned By    Initials Name Provider Type    CD Celeste Morrison, PT Physical Therapist        Therapy Charges for Today     Code Description Service Date Service Provider Modifiers Qty    [id number]  PT THERAPEUTIC ACT EA 15 MIN 11/9/2020 Celeste Morrison, PT GP 1    [id number]  PT EVAL LOW COMPLEXITY 4 11/9/2020 Celeste Morrison, PT GP 1          PT G-Codes  Outcome Measure Options: AM-PAC 6 Clicks Basic Mobility (PT), Modified [place]  AM-PAC 6 Clicks Score (PT): 17  Modified [place] Scale: 4 - Moderately severe disability.  Unable to walk without assistance, and unable to attend to own bodily needs without assistance.    Celeste Morrison, ANA M  11/9/2020

## 2020-11-09 NOTE — PROGRESS NOTES
Discharge Planning Assessment  [facility]     Patient Name: Zita Ramirez  MRN: [id number]  Today's Date: 11/9/2020    Admit Date: 11/8/2020    Discharge Needs Assessment     Row Name 11/09/20 1342       Living Environment    Lives With  spouse    Name(s) of Who Lives With Patient  spouse is Vinh    Current Living Arrangements  home/apartment/condo    Primary Care Provided by  self    Provides Primary Care For  no one    Family Caregiver if Needed  spouse    Quality of Family Relationships  supportive    Able to Return to Prior Arrangements  yes       Resource/Environmental Concerns    Resource/Environmental Concerns  none    Transportation Concerns  car, none       Transition Planning    Patient/Family Anticipates Transition to  home with family;inpatient rehabilitation facility    Patient/Family Anticipated Services at Transition  rehabilitation services    Transportation Anticipated  family or friend will provide       Discharge Needs Assessment    Readmission Within the Last 30 Days  no previous admission in last 30 days    Current Outpatient/Agency/Support Group  inpatient rehabilitation facility    Equipment Currently Used at Home  none Patient tells me she has no equipment in the home    Concerns to be Addressed  discharge planning    Anticipated Changes Related to Illness  none    Equipment Needed After Discharge  -- to be determined after rehab sees    Outpatient/Agency/Support Group Needs  homecare agency;inpatient rehabilitation facility        Discharge Plan     Row Name 11/09/20 1345       Plan    Plan  Home vs rehab    Plan Comments  I spoke with patient and her spouse this am regarding discharge planning. She tells me she uses no DME in the home or home health. She tells me she has been in rehab but unable to name facility in [place] (associated with [facility]). I told her we can make plans based upon rehab recommendations.     Final Discharge Disposition Code  30 - still a patient         Continued Care and Services - Admitted Since 11/8/2020    Coordination has not been started for this encounter.         Demographic Summary     Row Name 11/09/20 1340       General Information    Admission Type  observation    Referral Source  admission list    Preferred Language  English    General Information Comments  PCP is Clovis Turner [phone]       Contact Information    Permission Granted to Share Info With  ;family/designee        Functional Status     Row Name 11/09/20 1341       Functional Status    Usual Activity Tolerance  moderate       Functional Status, IADL    Medications  independent    Meal Preparation  independent    Housekeeping  independent    Laundry  independent    Shopping  independent       Employment/    Employment/ Comments Patient has [employer] Medicare  insurance and denies concerns regarding coverage or disruption in coverage issues. Patient has drug coverage and denies issues obtaining or affording current medications.  .        Psychosocial    No documentation.       Abuse/Neglect    No documentation.       Legal    No documentation.       Substance Abuse    No documentation.       Patient Forms    No documentation.           Diana Maldonado RN

## 2020-11-09 NOTE — PROGRESS NOTES
[facility] Medicine Services  PROGRESS NOTE    Patient Name: Zita Ramirez  : 1950  MRN: [id number]    Date of Admission: 2020  Primary Care Physician: System, Provider Not In    Subjective   Subjective     CC:  Follow up for generalized weakness, slow speech     HPI:  Feeling much better this morning, she is alert and oriented x3, but still feeling really weak.    Review of Systems  Gen- No fevers, chills  CV- No chest pain, palpitations  Resp- No cough, dyspnea  GI- No N/V/D, abd pain      Objective   Objective     Vital Signs:   Temp:  [97.5 °F (36.4 °C)-98.3 °F (36.8 °C)] 97.6 °F (36.4 °C)  Heart Rate:  [67-87] 87  Resp:  [16-18] 18  BP: (117-138)/(57-75) 134/57  Total (NIH Stroke Scale): 5     Physical Exam:  Constitutional: No acute distress, awake, alert  HENT: NCAT, mucous membranes moist  Respiratory: Clear to auscultation bilaterally, respiratory effort normal on room air   Cardiovascular: RRR, no murmurs  Gastrointestinal: Positive bowel sounds, soft, nontender, nondistended  Psychiatric: Appropriate affect, cooperative  Neurologic: Oriented x 3, Cranial Nerves grossly intact to confrontation, speech clear  Skin: No rashes    Results Reviewed:  Results from last 7 days   Lab Units 20  0521   WBC 10*3/mm3 8.13   HEMOGLOBIN g/dL 10.6*   HEMATOCRIT % 35.5   PLATELETS 10*3/mm3 208     Results from last 7 days   Lab Units 20  0529 20  0533 20  0521   SODIUM mmol/L 138  --  137   POTASSIUM mmol/L 4.6  --  4.2   CHLORIDE mmol/L 109*  --  103   CO2 mmol/L 22.0  --  21.0*   BUN mg/dL 23  --  36*   CREATININE mg/dL 1.17* 1.60* 1.61*   GLUCOSE mg/dL 150*  --  173*   CALCIUM mg/dL 8.2*  --  9.1   ALT (SGPT) U/L  --   --  12   AST (SGOT) U/L  --   --  14   TROPONIN T ng/mL  --   --  <0.010     Estimated Creatinine Clearance: 47.6 mL/min (A) (by C-G formula based on SCr of 1.17 mg/dL (H)).    Microbiology Results Abnormal     Procedure Component Value -  Date/Time    COVID PRE-OP / PRE-PROCEDURE SCREENING ORDER (NO ISOLATION) - Swab, Nasopharynx [[id number]] Collected: 11/08/20 1032    Lab Status: Final result Specimen: Swab from Nasopharynx Updated: 11/08/20 1949    Narrative:      The following orders were created for panel order COVID PRE-OP / PRE-PROCEDURE SCREENING ORDER (NO ISOLATION) - Swab, Nasopharynx.  Procedure                               Abnormality         Status                     ---------                               -----------         ------                     COVID-19,[facility], NP ...[[id number]]                      Final result                 Please view results for these tests on the individual orders.    COVID-19,[facility], NP SWAB IN [facility] VIRAL TRANSPORT MEDIA 24-30 HR TAT - Swab, Nasopharynx [[id number]] Collected: 11/08/20 1032    Lab Status: Final result Specimen: Swab from Nasopharynx Updated: 11/08/20 1949     SARS-CoV-2 GINNA Not Detected          Imaging Results (Last 24 Hours)     Procedure Component Value Units Date/Time    MRI Brain Without Contrast [[id number]] Collected: 11/08/20 0928     Updated: 11/09/20 1046    Narrative:      EXAMINATION: MRI BRAIN WO CONTRAST - 11/08/2020     INDICATION: Altered mental status, generalized weakness, difficulty with  ambulation; R53.1-Weakness; R47.9-Unspecified speech disturbances;  N28.9-Disorder of kidney and ureter, unspecified; N39.0-Urinary tract  infection, site not specified. Hypertension, generalized weakness and  dysphasia.     TECHNIQUE: Routine multiplanar imaging is obtained of the brain without  the administration of gadolinium contrast.     COMPARISON: None.     FINDINGS: There is no area of restricted diffusion to suggest evidence  of an acute ischemic insult. Abnormal signal intensity seen diffusely  throughout the periventricular and subcortical white matter does suggest  chronic small vessel ischemic change. Old insult identified near the  left posterior vertex. Pituitary  and sella are unremarkable.  Craniovertebral junction is preserved. No hemorrhage or hydrocephalus.  No mass, mass effect, or midline shift. No abnormal extra-axial fluid  collection identified. Visualized paranasal sinuses are clear. The  mastoid air cells are patent. Globes and of its are intact. No  cerebellar pontine angle mass is identified.       Impression:      Atrophy and chronic changes seen within the brain with no  acute intracranial abnormality identified. Old insult identified near  the left posterior vertex.      DICTATED:   11/08/2020  EDITED/ls :   11/08/2020      This report was finalized on 11/9/2020 10:43 AM by Dr. Renetta Houston MD.                  I have reviewed the medications:  Scheduled Meds:aspirin, 325 mg, Oral, Daily    Or  aspirin, 300 mg, Rectal, Daily  atorvastatin, 40 mg, Oral, Nightly  cefTRIAXone, 1 g, Intravenous, Q24H  insulin lispro, 0-7 Units, Subcutaneous, TID AC  pantoprazole, 40 mg, Oral, Daily  sertraline, 100 mg, Oral, Daily  sodium chloride, 10 mL, Intravenous, Q12H      Continuous Infusions:   PRN Meds:.dextrose  •  dextrose  •  glucagon (human recombinant)  •  magnesium sulfate **OR** magnesium sulfate **OR** magnesium sulfate  •  potassium chloride **OR** potassium chloride **OR** potassium chloride  •  sodium chloride    Assessment/Plan   Assessment & Plan     Active Hospital Problems    Diagnosis  POA   • **TIA (transient ischemic attack) [G45.9]  Yes   • Essential hypertension [I10]  Yes   • Type 2 diabetes mellitus (CMS/HCC) [E11.9]  Yes      Resolved Hospital Problems   No resolved problems to display.        Brief Hospital Course to date:  Zita Ramirez is a 70 y.o. female with a past medical history of T2DM, and hypertension who was admitted for generalized weakness, dysarthria, and witnessed fall.    Generalized weakness  -PT and OT recommending inpatient rehab facility,  helping to facilitate  -CTA head and neck, CT perfusion, MRI brain  unremarkable, MRI showed old insult near left posterior vertex  -Generalized weakness likely secondary to deconditioning -continue PT/OT  -Continue aspirin and statin  -Follow-up echocardiogram  -Vitamin D low, will start on supplement  -Vitamin B12 also low and will start supplements  -TSH within normal limits    MEMO-improving, continue IV fluids    UTI with urinary incontinence-continue ceftriaxone IV x3 days    T2DM- A1c 6.5, continue ssi    DVT Prophylaxis:  SCD      Disposition: I expect the patient to be discharged tomorrow pending arrangement of IRF.    CODE STATUS:   Code Status and Medical Interventions:   Ordered at: 11/08/20 1547     Limited Support to NOT Include:    Intubation     Level Of Support Discussed With:    Health Care Surrogate     Code Status:    No CPR     Medical Interventions (Level of Support Prior to Arrest):    Limited       Itzel Chavez MD  11/09/20

## 2020-11-09 NOTE — PLAN OF CARE
Goal Outcome Evaluation:  Plan of Care Reviewed With: patient, spouse  Progress: no change  Outcome Summary: Pt VSS this shift. A&Ox4. NSR per monitor. Spouse and daughter at bedside this shift. NIH- 5. Orthostatic BP taken. Up to chair. No c/o pain, will continue to monitor.

## 2020-11-09 NOTE — PLAN OF CARE
Problem: Adult Inpatient Plan of Care  Goal: Plan of Care Review  Recent Flowsheet Documentation  Taken 11/9/2020 1407 by Celeste Morrison PT  Plan of Care Reviewed With:   patient   spouse  Outcome Summary: PT PRESENTS WITH EVOLVING SYMPTOMS TO INCLUDE IMPAIRED BALANCE, GENERALIZED WEAKNESS, AND DECLINE IN FUNCTIONAL MOBILITY. ORTHOSTATIC BLOOD PRESSURE STABLE AT TIME OF EVAL. PT FOLLOWS COMMANDS BUT IS DELAYED AND DEMONSTRATES SLURRED SPEECH. SPOUSE REPORTS SPEECH IS BASELINE. RECOMMEND IRF AT D/C FOR RETURN TO MAX LEVEL OF FUNCTION PRIOR TO HOME.

## 2020-11-09 NOTE — PROGRESS NOTES
Neurology       Patient Care Team:  System, Provider Not In as PCP - General    Chief complaint: Falling    History: Reviewed Dr. Wylie's note and I had extensive conversation with the patient and her  and daughter.    The patient has had sensations of leg weakness usually with position changes for long time.  She fell prompting this admission when attempting to stand.    The daughter indicates that she has done this several times over the summer again usually with position change.    The patient is on 4 antihypertensive medications and in spite of having the medicines being held pressure at the moment is ~431 and has been his lowest in the 90s.    Home medications include amlodipine 2.5 mg daily, Lipitor 40 mg daily, glipizide 5 mg tablet 2 times a day.    She is on HCTZ 25 mg daily, losartan 5000 mg daily, Metformin 100 mg twice daily as well as Metformin 500 mg twice daily.    She admits to not getting very much exercise.    She eats and drinks well according to both she and her .    Her stroke was 5 years ago.    She was a heavy smoker prior to that and has stopped smoking since.      Past Medical History:   Diagnosis Date   • Diabetes mellitus (CMS/Formerly [facility])    • Hypertension    • Stroke (CMS/Formerly [facility])        Vital Signs   Vitals:    11/09/20 0322 11/09/20 0800 11/09/20 0820 11/09/20 1000   BP: 134/65  132/59    BP Location: Right arm  Left arm    Patient Position: Lying  Lying    Pulse: 78 79 79 78   Resp: 16  18    Temp: 98.3 °F (36.8 °C)  97.6 °F (36.4 °C)    TempSrc: Oral  Oral    SpO2: 96%  92% 96%   Weight:       Height:           Physical Exam:   General: Blood pressure is 132/59.    Orthostatic blood pressures not been measured.                  Neuro: Pleasant and alert.  She is oriented to person place and time.    Speech is articulate with no word finding problems.    Coordination is normal on finger-to-nose testing bilaterally.    Cranial nerves show benign fundi equal pupils full eye movements.   Visual fields are full to confrontation.    Facial sensation is symmetrical with no neglect.    Palate elevates normally tongue protrudes normally.    Reflexes are 2+ right 3+ left.    Motor testing shows normal power and tone in all muscle groups.    Sensory testing shows decreased vibration and pinprick in the feet.        Results Review:  MRI shows remote strokes but no acute injuries.    Hemoglobin A1c is 6.5.    LDL cholesterol is 40.    T perfusion is normal.    CT angiogram of the head and neck shows 20% right carotid bifurcation stenosis at 35% diameter stenosis of the left carotid bifurcation.    Is also about a 60% diameter stenosis of the distal left common carotid artery.    There is extensive calcification of the carotid siphons bilaterally and likely moderate to severe stenosis.    Also calcified plaque at the origin of the right subclavian.    Vertebrals are clear.  There is mild stenosis of the right A1 segment.  Results from last 7 days   Lab Units 11/08/20  0521   WBC 10*3/mm3 8.13   HEMOGLOBIN g/dL 10.6*   HEMATOCRIT % 35.5   PLATELETS 10*3/mm3 208     Results from last 7 days   Lab Units 11/09/20  0529 11/08/20  0533 11/08/20  0521   SODIUM mmol/L 138  --  137   POTASSIUM mmol/L 4.6  --  4.2   CHLORIDE mmol/L 109*  --  103   CO2 mmol/L 22.0  --  21.0*   BUN mg/dL 23  --  36*   CREATININE mg/dL 1.17* 1.60* 1.61*   CALCIUM mg/dL 8.2*  --  9.1   BILIRUBIN mg/dL  --   --  0.2   ALK PHOS U/L  --   --  85   ALT (SGPT) U/L  --   --  12   AST (SGOT) U/L  --   --  14   GLUCOSE mg/dL 150*  --  173*       Imaging Results (Last 24 Hours)     Procedure Component Value Units Date/Time    MRI Brain Without Contrast [[id number]] Collected: 11/08/20 0928     Updated: 11/09/20 1046    Narrative:      EXAMINATION: MRI BRAIN WO CONTRAST - 11/08/2020     INDICATION: Altered mental status, generalized weakness, difficulty with  ambulation; R53.1-Weakness; R47.9-Unspecified speech disturbances;  N28.9-Disorder of  kidney and ureter, unspecified; N39.0-Urinary tract  infection, site not specified. Hypertension, generalized weakness and  dysphasia.     TECHNIQUE: Routine multiplanar imaging is obtained of the brain without  the administration of gadolinium contrast.     COMPARISON: None.     FINDINGS: There is no area of restricted diffusion to suggest evidence  of an acute ischemic insult. Abnormal signal intensity seen diffusely  throughout the periventricular and subcortical white matter does suggest  chronic small vessel ischemic change. Old insult identified near the  left posterior vertex. Pituitary and sella are unremarkable.  Craniovertebral junction is preserved. No hemorrhage or hydrocephalus.  No mass, mass effect, or midline shift. No abnormal extra-axial fluid  collection identified. Visualized paranasal sinuses are clear. The  mastoid air cells are patent. Globes and of its are intact. No  cerebellar pontine angle mass is identified.       Impression:      Atrophy and chronic changes seen within the brain with no  acute intracranial abnormality identified. Old insult identified near  the left posterior vertex.      DICTATED:   11/08/2020  EDITED/ls :   11/08/2020      This report was finalized on 11/9/2020 10:43 AM by Dr. Renetta Houston MD.             Assessment:  Fall likely secondary to orthostatic hypotension and deconditioning.    Diabetic peripheral neuropathy.    Remote stroke.    Carotid stenosis with heavy calcifications    Plan:  Carotid duplex.    Up in a chair twice daily.    Orthostatic blood pressure checks.    Cautious resumption of significantly reduced blood pressure medications would seem appropriate.    Discontinue Plavix    Comment:  The patient is currently on best medical therapy.    She has had no evidence of an acute stroke and I do not see any utility in having her on Plavix.    Do feel that deconditioning is a major contributor to her problem and have suggested that she pursue an  exercise program with a target of walking 10 minutes a day every day.    Given that her pressure is 130 off of all her medications 1 would wonder why she needs for different blood pressure medications.    I would think that the calcifications on CTA probably magnify the degree of stenosis.    Carotid duplex should be helpful.    That said current recommendations for asymptomatic carotid stenosis is best medical therapy.       45 minutes were spent examining the patient reviewing the chart and discussing the various treatment protocols.  I discussed the patients findings and my recommendations with patient and family    Bryn Tobias MD  11/09/20  12:36 EST

## 2020-11-09 NOTE — CONSULTS
" Discussed and taught patient about type 2 diabetes self-management, risk factors, and importance of blood glucose control to reduce complications. Target blood glucose readings and A1c goals per ADA were reviewed. Reviewed with patient current A1c 6.5 and discussed its significance. Signs, symptoms, and treatment of hyperglycemia and hypoglycemia were discussed. Lifestyle changes such as physical activity with MD approval and healthy eating were encouraged. Stressed the importance of strict blood sugar control after surgery to prevent complications such as infection and to promote healing of incision. Encouraged pt to monitor blood sugar at home 2-3  times per day and to call PCP if blood sugar is trending juan r. Patient states she does have a meter at home that is very old and does not check fingerstick's. Will return later today for meter education and to provide donated meter Encouraged to keep record of blood glucose readings to take to follow up appointment with PCP. Provided patient with copy of [employer]'s \"What is Diabetes\" handout, \"Blood Glucose Goals\" handout, and \"What is A1c\" handout. Patient does not qualify for the follow up stroke class based on the exclusion criteria of no stroke diagnosis given per most recent provider notes . Thank you for this referral, please re consult as needed.  "

## 2020-11-09 NOTE — PLAN OF CARE
Problem: Adult Inpatient Plan of Care  Goal: Plan of Care Review  Recent Flowsheet Documentation  Taken 11/9/2020 1120 by Megan Woodard OT  Progress: no change  Plan of Care Reviewed With:   patient   spouse  Outcome Summary: OT IE completed. Pt. sitting up in b/s chair w/ present. Pt. reporting that she has been incontinent of urine during this hospital stay. STS from chair: min A using RW, LBD: mod A, toileting: min A, toilet t/f: CGA using RW, ambulating in room & bathroom: CGA using RW, standing balance: CGA. Pt. is appropriate for OT skilled services to address decreased ADL & functional mobility independence, decreased strength, and decreased occupational endurance. Recommend IPRF upon d/c.

## 2020-11-09 NOTE — DISCHARGE PLACEMENT REQUEST
"Sang Pearl (70 y.o. Female)   Short term rehab, then to home    From Diana LUCERO, RN, [facility]  [phone]    Date of Birth Social Security Number Address Home Phone MRN    1950  [id number]  [facility] [id number] [phone] [id number]    Faith Marital Status          None        Admission Date Admission Type Admitting Provider Attending Provider Department, Room/Bed    20 Emergency Itzel Chavez MD Anciro, Audree, MD [facility] 3F, S305/    Discharge Date Discharge Disposition Discharge Destination                       Attending Provider: Itzel Chavez MD    Allergies: Penicillins    Isolation: None   Infection: None   Code Status: No CPR    Ht: 167.6 cm (66\")   Wt: 77.1 kg (170 lb)    Admission Cmt: None   Principal Problem: TIA (transient ischemic attack) [G45.9]                 Active Insurance as of 2020     Primary Coverage     Payor Plan Insurance Group Employer/Plan Group    AETNA MEDICARE REPLACEMENT AETNA MEDICARE REPLACEMENT [id number]     Payor Plan Address Payor Plan Phone Number Payor Plan Fax Number Effective Dates    [address] [phone]  2020 - None Entered    [place] [address]       Subscriber Name Subscriber Birth Date Member ID       SANG PEARL [id number]                 Emergency Contacts      (Rel.) Home Phone Work Phone Mobile Phone    Vinh Pearl (Spouse) [phone] -- --            Insurance Information                AETNA MEDICARE REPLACEMENT/AETNA MEDICARE REPLACEMENT Phone: [phone]    Subscriber: Sang Pearl Subscriber#: [id number]    Group#: [id number] Precert#:              History & Physical      Verito Lainez MD at [address] [place] [address] Medicine Services  HISTORY AND PHYSICAL    Patient Name: Sang Pearl  : 1950  MRN: [id number]  Primary Care Physician: System, Provider Not In  Date of admission: 2020      Subjective "   Subjective     Chief Complaint: Generalized weakness and dysarthria    HPI:  Zita Ramirez is a 70 y.o. female with past medical history of hypertension, type 2 diabetes, prior CVA with unknown deficits.  Patient presents to the ED s/p fall, with generalized weakness and dysarthria.  Per spouse, patient stood up to go to the bathrooo, she however, complained of dizziness and lightheadedness before falling, spouse sat her on her on a chair and noticed that she was weak and speech was slurred.  EMS was called, on their arrival the patient was hypotensive with SBP in the 90s. On arrival to the ED she was hemodynamically stable with improved BP, remained dysarthric.  Initial lab work included a CMP and CBC that were significant for Cr of 1.60, hemoglobin 10.6.  Stroke work-up was initiated, she did get a CT head CTA head and neck, and CT cerebral perfusion all unrevealing.  Stroke navigators were consulted, hospital medicine was then asked to admit.  At the time of my evaluation she remains lethargic abut arouses easily, speech is slurred, she denies any fevers or chills, no n/v.      Current COVID Risks are:  [] Fever []  Cough [] Shortness of breath [] Fatigue [] Change in taste or smell    [] Exposure to COVID positive patient  [] High risk facility   [x]  NONE    Review of Systems   Gen- No fevers, chills  CV- No chest pain, palpitations  Resp- No cough, dyspnea  GI- No N/V/D, abd pain    All other systems reviewed and are negative.     Personal History     Past Medical History:   Diagnosis Date   • Diabetes mellitus (CMS/HCC)    • Hypertension    • Stroke (CMS/HCC)        Past Surgical History:   Procedure Laterality Date   • HYSTERECTOMY     • TUBAL ABDOMINAL LIGATION         Family History: family history is not on file. Otherwise pertinent FHx was reviewed and unremarkable.     Social History:  reports that she has quit smoking. She has never used smokeless tobacco. She reports previous alcohol use. She reports  previous drug use.  Social History     Social History Narrative   • Not on file       Medications:  Available home medication information reviewed.  (Not in a hospital admission)      Allergies   Allergen Reactions   • Penicillins Unknown - Low Severity     Pt states I dont know       Objective   Objective     Vital Signs:   Temp:  [97.8 °F (36.6 °C)] 97.8 °F (36.6 °C)  Heart Rate:  [87-95] 89  Resp:  [16] 16  BP: (122-142)/() 128/54   Total (NIH Stroke Scale): 6    Physical Exam   Constitutional: Ill-appearing lady, lethargic   Eyes: PERRLA, sclerae anicteric, no conjunctival injection  HENT: NCAT, mucous membranes moist  Neck: Supple, no thyromegaly, no lymphadenopathy, trachea midline  Respiratory: Clear to auscultation bilaterally, nonlabored respirations   Cardiovascular: RRR, no murmurs, rubs, or gallops, palpable pedal pulses bilaterally  Gastrointestinal: Positive bowel sounds, soft, nontender, nondistended  Musculoskeletal: No bilateral ankle edema, no clubbing or cyanosis to extremities  Psychiatric: Flat affect, cooperative  Neurologic: Generalized weakness, dysarthric speech  Skin: No rashes    Results Reviewed:  I have personally reviewed most recent indicated data and agree with findings including:  [x]  Laboratory  [x]  Radiology  []  EKG/Telemetry  []  Pathology  []  Cardiac/Vascular Studies  []  Old records  []  Other:  Most pertinent findings include:    Results from last 7 days   Lab Units 11/08/20  0521   WBC 10*3/mm3 8.13   HEMOGLOBIN g/dL 10.6*   HEMATOCRIT % 35.5   PLATELETS 10*3/mm3 208     Results from last 7 days   Lab Units 11/08/20  0533 11/08/20  0521   SODIUM mmol/L  --  137   POTASSIUM mmol/L  --  4.2   CHLORIDE mmol/L  --  103   CO2 mmol/L  --  21.0*   BUN mg/dL  --  36*   CREATININE mg/dL 1.60* 1.61*   GLUCOSE mg/dL  --  173*   CALCIUM mg/dL  --  9.1   ALT (SGPT) U/L  --  12   AST (SGOT) U/L  --  14   TROPONIN T ng/mL  --  <0.010     Estimated Creatinine Clearance: 34.8 mL/min  (A) (by C-G formula based on SCr of 1.6 mg/dL (H)).  Brief Urine Lab Results  (Last result in the past 365 days)      Color   Clarity   Blood   Leuk Est   Nitrite   Protein   CREAT   Urine HCG        11/08/20 0639 Yellow Clear Negative Moderate (2+) Negative Negative             Imaging Results (Last 24 Hours)     Procedure Component Value Units Date/Time    CT Angiogram Head [[id number]] Collected: 11/08/20 0624     Updated: 11/08/20 0624    Narrative:      CTA Head, CTA Neck     INDICATION:    Weakness and confusion. History of stroke.    Head and neck CTA:    Neck - Patent cervical CCA/ICA/vertebral arteries. No apparent dissection. Tortuosity of the proximal internal carotid arteries bilaterally left greater than right. At least moderate atherosclerotic change of the carotid bulbs bilaterally.    Head -Mild to moderate diffuse atherosclerotic change of the cavernous and supraclinoid internal carotid arteries bilaterally. No hemodynamically critical stenosis or vessel cut off. No apparent aneurysm. No dural sinus occlusion. No large completed  infarct.    This is a preliminary wet read by Dr. Kelby Seals at 0614 hours. Final interpretation will be provided by neuroradiology. Please refer to final interpretation for detailed findings and any further recommendations.    CT Angiogram Neck [[id number]] Collected: 11/08/20 0624     Updated: 11/08/20 0624    Narrative:      CTA Head, CTA Neck     INDICATION:    Weakness and confusion. History of stroke.    Head and neck CTA:    Neck - Patent cervical CCA/ICA/vertebral arteries. No apparent dissection. Tortuosity of the proximal internal carotid arteries bilaterally left greater than right. At least moderate atherosclerotic change of the carotid bulbs bilaterally.    Head -Mild to moderate diffuse atherosclerotic change of the cavernous and supraclinoid internal carotid arteries bilaterally. No hemodynamically critical stenosis or vessel cut off. No apparent aneurysm. No  dural sinus occlusion. No large completed  infarct.    This is a preliminary wet read by Dr. Kelby Seals at 0614 hours. Final interpretation will be provided by neuroradiology. Please refer to final interpretation for detailed findings and any further recommendations.    XR Chest 1 View [[id number]] Collected: 11/08/20 0614     Updated: 11/08/20 0616    Narrative:      CR Chest 1 Vw    INDICATION:   Weakness and shortness of air.     COMPARISON:    None available.    FINDINGS:  Single portable AP view(s) of the chest.  The heart is enlarged. The lungs are emphysematous. No distinct evidence of volume overload. Coarsened interstitial markings are favored to represent chronic interstitial fibrosis and scarring although we have no  comparisons for this patient and acute inflammatory or infectious infiltrates are technically within the differential. No effusion dense consolidation or pneumothorax.      Impression:        1. Cardiomegaly and probable chronic interstitial changes.    Signer Name: Kelby Seals MD   Signed: 11/8/2020 6:14 AM   Workstation Name: [facility]    [facility]    CT Cerebral Perfusion With & Without Contrast [[id number]] Collected: 11/08/20 0605     Updated: 11/08/20 0607    Narrative:      CT CEREBRAL PERFUSION W WO CONTRAST    HISTORY:   70-year-old female with weakness confusion and history of stroke.    TECHNIQUE:   Axial CT images of the brain without and with intravenous contrast using cerebral perfusion protocol. Post-processing parametric maps were created using RAPID software and reviewed. Radiation dose reduction techniques included automated exposure control  or exposure modulation based on body size. CT and nuclear cardiology exams in the last 12 months: 0.    COMPARISON:   None available    FINDINGS:   Arterial input function is optimal.     Perfusion maps are symmetric without evidence of cerebral ischemia or core infarction.      Impression:      Normal  brain perfusion CT.          Signer Name: Kelby Seals MD   Signed: 11/8/2020 6:05 AM   Workstation Name: [facility]    [facility]    CT Head Without Contrast Stroke Protocol [[id number]] Collected: 11/08/20 0541     Updated: 11/08/20 0543    Narrative:      CT Head WO Code Stroke    HISTORY:   70-year-old female with weakness and confusion. History of stroke. Acute stroke suspected.    TECHNIQUE:   Axial unenhanced head CT. Radiation dose reduction techniques included automated exposure control or exposure modulation based on body size. Count of known CT and cardiac nuc med studies performed in previous 12 months: 0.     Time of scan: 0527 hours    COMPARISON:   None.    FINDINGS:   No intracranial hemorrhage, mass, or infarct. No hydrocephalus or extra-axial fluid collection. There are senescent changes, including volume loss and nonspecific white matter change, but no acute abnormality is seen. The skull base, calvarium, and  extracranial soft tissues are normal.      Impression:      Senescent changes without acute abnormality.      NOTIFICATION: Critical Value/emergent results were called by telephone at the time of interpretation on 11/8/2020 5:39 AM to Janki CT technologist who verbally acknowledged these results per protocol.    Signer Name: Kelby Seals MD   Signed: 11/8/2020 5:41 AM   Workstation Name: [facility]    Radiology [facility]             Assessment/Plan   Assessment & Plan     Active Hospital Problems    Diagnosis POA   • **TIA (transient ischemic attack) [G45.9] Yes   • Essential hypertension [I10] Yes   • Type 2 diabetes mellitus (CMS/HCC) [E11.9] Yes     70-year-old female past medical history of hypertension, and type 2 diabetes who presented to the ED s/p fall with generalized weakness and dysarthria, to the general admitted for suspected TIA.    Plan  Generalized weakness with dysarthria and fall   -Suspected to be TIA/CVA, however patient  with dizziness and lightheadedness prior to her fall, found to be hypotensive with the EMS.  As such, her symptoms could likely be just post syncopal  -Stroke work-up initiated per protocol CT head that was negative, CTA head and neck, and CT cerebral perfusion all unrevealing.   -Follow-up MRI, echo, lipid panel  -Neurology consulted and aware, continue aspirin, statin  -PT/OT/SLP to evaluate    MEMO  -Baseline Cr is unknown, however, Cr here is 1.60, likely prerenal, continue gentle hydration.    Suspected UTI  - patient has a hx of recurrent UTI, UA is however unimpressive, she is s/p a dose of rocephin in the ED,we will hold on further abx at this time    Hypertension  -Patient initially hypotensive, BP currently improved, allow permissive hypertension for now.  -Start gentle hydration normal saline at 75 mL/h  -Obtain orthostatic vital signs after MRI    Type 2 diabetes, control is unknown  -Follow-up A1c, initiate SSI for now.    Follow-up COVID-19 screening test.    DVT prophylaxis: Mechanical    CODE STATUS: DNR/DNI  There are no questions and answers to display.       Admission Status:  I believe this patient meets OBSERVATION status, however if further evaluation or treatment plans warrant, status may change.  Based upon current information, I predict patient's care encounter to be less than or equal to 2 midnights.    Verito Lainez MD  11/08/20    Electronically signed by Verito Lainez MD at 11/08/20 0828         Current Facility-Administered Medications   Medication Dose Route Frequency Provider Last Rate Last Dose   • aspirin tablet 325 mg  325 mg Oral Daily Suzanne Pryor APRN   325 mg at 11/09/20 0820    Or   • aspirin suppository 300 mg  300 mg Rectal Daily Suzanne Pryor APRN       • atorvastatin (LIPITOR) tablet 40 mg  40 mg Oral Nightly Bryn Tobias MD       • cefTRIAXone (ROCEPHIN) 1 g/100 mL 0.9% NS (MBP)  1 g Intravenous Q24H Itzel Chavez MD       • dextrose (D50W) 25 g/  50mL Intravenous Solution 25 g  25 g Intravenous Q15 Min PRN Verito Lainez MD       • dextrose (GLUTOSE) oral gel 15 g  15 g Oral Q15 Min PRN Verito Lainez MD       • glucagon (human recombinant) (GLUCAGEN DIAGNOSTIC) injection 1 mg  1 mg Subcutaneous Q15 Min PRN Verito Lainez MD       • insulin lispro (humaLOG) injection 0-7 Units  0-7 Units Subcutaneous TID AC Verito Lainez MD   4 Units at 20 1244   • Magnesium Sulfate 2 gram Bolus, followed by 8 gram infusion (total Mg dose 10 grams)- Mg less than or equal to 1mg/dL  2 g Intravenous PRN Verito Lainez MD        Or   • Magnesium Sulfate 2 gram / 50mL Infusion (GIVE X 3 BAGS TO EQUAL 6GM TOTAL DOSE) - Mg 1.1 - 1.5 mg/dl  2 g Intravenous PRN Verito Lainez MD   Stopped at 20 0018    Or   • Magnesium Sulfate 4 gram infusion- Mg 1.6-1.9 mg/dL  4 g Intravenous PRN Verito Lainez MD       • pantoprazole (PROTONIX) EC tablet 40 mg  40 mg Oral Daily Verito Lainez MD   40 mg at 20 0820   • potassium chloride (MICRO-K) CR capsule 40 mEq  40 mEq Oral PRN Verito Lainez MD        Or   • potassium chloride (KLOR-CON) packet 40 mEq  40 mEq Oral PRN Verito Lainez MD        Or   • potassium chloride 10 mEq in 100 mL IVPB  10 mEq Intravenous Q1H PRN Verito Lainez MD       • sertraline (ZOLOFT) tablet 100 mg  100 mg Oral Daily Verito Lainez MD   100 mg at 20 0820   • sodium chloride 0.9 % flush 10 mL  10 mL Intravenous PRN David Brar DO       • sodium chloride 0.9 % flush 10 mL  10 mL Intravenous Q12H Suzanne Pryor APRN   10 mL at 20 0822   • vitamin D3 capsule 5,000 Units  5,000 Units Oral Daily Itzel Chavez MD   5,000 Units at 20 1516        Physical Therapy Notes (most recent note)      Celeste Morrison, PT at 20 0908  Version 1 of 1         Patient Name: Zita Ramirez  : 1950    MRN: [id number]                              Today's Date: 2020       Admit Date: 2020    Visit Dx:      ICD-10-CM ICD-9-CM   1. Generalized weakness  R53.1 780.79   2. Difficulty with speech  R47.9 784.59   3. Renal insufficiency  N28.9 593.9   4. Urinary tract infection without hematuria, site unspecified  N39.0 599.0   5. Cognitive communication deficit  R41.841 799.52   6. Dysarthria  R47.1 784.51   7. Impaired mobility and ADLs  Z74.09 V49.89    Z78.9      Patient Active Problem List   Diagnosis   • TIA (transient ischemic attack)   • Essential hypertension   • Type 2 diabetes mellitus (CMS/HCC)     Past Medical History:   Diagnosis Date   • Diabetes mellitus (CMS/HCC)    • Hypertension    • Stroke (CMS/HCC)      Past Surgical History:   Procedure Laterality Date   • HYSTERECTOMY     • TUBAL ABDOMINAL LIGATION       General Information     Row Name 11/09/20 1358          Physical Therapy Time and Intention    Document Type  evaluation  -CD     Mode of Treatment  physical therapy  -CD     Row Name 11/09/20 1358          General Information    Patient Profile Reviewed  yes  -CD     Prior Level of Function  independent:;all household mobility;community mobility;gait;transfer;ADL's;bed mobility  -CD     Existing Precautions/Restrictions  fall GENERALIZED WEAKNESS AND FUNCTIONAL DECLINE. INCONTINENT - WEAR DEPENDS FOR MOBILITY.  -CD     Barriers to Rehab  none identified  -CD     Row Name 11/09/20 1358          Living Environment    Lives With  spouse  -CD     Row Name 11/09/20 1358          Home Main Entrance    Number of Stairs, Main Entrance  four  -CD     Stair Railings, Main Entrance  railing on left side (ascending)  -CD     Row Name 11/09/20 1358          Stairs Within Home, Primary    Number of Stairs, Within Home, Primary  other (see comments) 15  -CD     Stair Railings, Within Home, Primary  railing on left side (ascending)  -CD     Stairs Comment, Within Home, Primary  BEDROOM IS ON 2ND FLOOR.  -CD     Row Name 11/09/20 1358          Cognition    Orientation Status (Cognition)  oriented x 4  -CD     Row Name  11/09/20 1358          Safety Issues, Functional Mobility    Safety Issues Affecting Function (Mobility)  awareness of need for assistance;insight into deficits/self-awareness;safety precaution awareness;safety precautions follow-through/compliance  -CD     Impairments Affecting Function (Mobility)  balance;coordination;endurance/activity tolerance;strength  -CD     Comment, Safety Issues/Impairments (Mobility)  PT IS SLOW TO RESPOND BUT FOLLOWS COMMANDS. NOTED SLURRED SPEECH BUT SPOUSE REPORTS SPEECH IS BASELINE.  -CD       User Key  (r) = Recorded By, (t) = Taken By, (c) = Cosigned By    Initials Name Provider Type    CD Celeste Morrison, PT Physical Therapist        Mobility     Row Name 11/09/20 1400          Bed Mobility    Bed Mobility  supine-sit  -CD     Supine-Sit [place] (Bed Mobility)  minimum assist (75% patient effort);verbal cues  -CD     Assistive Device (Bed Mobility)  head of bed elevated  -CD     Comment (Bed Mobility)  INCREASED TIME AND EFFORT.  -CD     Row Name 11/09/20 1400          Transfers    Comment (Transfers)  CUES FOR HAND PLACEMENT.  -CD     Row Name 11/09/20 1400          Bed-Chair Transfer    Bed-Chair [place] (Transfers)  minimum assist (75% patient effort)  -CD     Assistive Device (Bed-Chair Transfers)  walker, front-wheeled  -CD     Row Name 11/09/20 1400          Sit-Stand Transfer    Sit-Stand [place] (Transfers)  contact guard;verbal cues  -CD     Assistive Device (Sit-Stand Transfers)  walker, front-wheeled  -CD     Row Name 11/09/20 1400          Gait/Stairs (Locomotion)    Comment (Gait/Stairs)  PT DECLINED DUE TO INCONTINENCE. RECOMMEND BRIEF FOR MOBILITY NEXT VISIT.  -CD       User Key  (r) = Recorded By, (t) = Taken By, (c) = Cosigned By    Initials Name Provider Type    Celeste Reed PT Physical Therapist        Obj/Interventions     Row Name 11/09/20 1403          Range of Motion Comprehensive    General Range of Motion  bilateral lower extremity ROM  WFL  -CD     Row Name 11/09/20 1403          Strength Comprehensive (MMT)    General Manual Muscle Testing (MMT) Assessment  lower extremity strength deficits identified  -CD     Comment, General Manual Muscle Testing (MMT) Assessment  B HIP FLEX 3+/5, B KNEE EXT/DF 4/5.  -CD     Row Name 11/09/20 1403          Motor Skills    Motor Skills  functional endurance  -CD     Functional Endurance  LIMITED BY FATIGUE/WEAKNESS.  -     Row Name 11/09/20 1403          Balance    Balance Assessment  sitting static balance  -CD     Static Sitting Balance  WFL  -     Dynamic Sitting Balance  mild impairment HAS DIFFICULTY WITH RECIPROCAL SCOOTING.  -CD     Static Standing Balance  mild impairment;supported  -CD     Dynamic Standing Balance  mild impairment;supported  -CD       User Key  (r) = Recorded By, (t) = Taken By, (c) = Cosigned By    Initials Name Provider Type    CD Celeste Morrison, PT Physical Therapist        Goals/Plan     Row Name 11/09/20 1414          Bed Mobility Goal 1 (PT)    Activity/Assistive Device (Bed Mobility Goal 1, PT)  sit to supine/supine to sit  -CD     [place] Level/Cues Needed (Bed Mobility Goal 1, PT)  independent  -CD     Time Frame (Bed Mobility Goal 1, PT)  long term goal (LTG);2 weeks  -CD     Row Name 11/09/20 1414          Transfer Goal 1 (PT)    Activity/Assistive Device (Transfer Goal 1, PT)  sit-to-stand/stand-to-sit;bed-to-chair/chair-to-bed  -CD     [place] Level/Cues Needed (Transfer Goal 1, PT)  independent  -CD     Time Frame (Transfer Goal 1, PT)  long term goal (LTG);2 weeks  -     Row Name 11/09/20 1414          Gait Training Goal 1 (PT)    Activity/Assistive Device (Gait Training Goal 1, PT)  gait (walking locomotion);walker, rolling  -CD     [place] Level (Gait Training Goal 1, PT)  independent  -CD     Distance (Gait Training Goal 1, PT)  400 FEET.  -CD     Time Frame (Gait Training Goal 1, PT)  long term goal (LTG);2 weeks  -     Row Name 11/09/20 1414           Stairs Goal 1 (PT)    Activity/Assistive Device (Stairs Goal 1, PT)  ascending stairs;descending stairs;using handrail, left  -CD     [place] Level/Cues Needed (Stairs Goal 1, PT)  contact guard assist  -CD     Time Frame (Stairs Goal 1, PT)  long term goal (LTG);2 weeks  -CD       User Key  (r) = Recorded By, (t) = Taken By, (c) = Cosigned By    Initials Name Provider Type    CD Celeste Morrison, PT Physical Therapist        Clinical Impression     Row Name 11/09/20 1407 11/09/20 1405       Pain Scale: Numbers Pre/Post-Treatment    Pretreatment Pain Rating  0/10 - no pain  -CD  0/10 - no pain  -CD    Posttreatment Pain Rating  0/10 - no pain  -CD  0/10 - no pain  -CD    Row Name 11/09/20 1407          Plan of Care Review    Plan of Care Reviewed With  patient;spouse  -CD     Outcome Summary  PT PRESENTS WITH EVOLVING SYMPTOMS TO INCLUDE IMPAIRED BALANCE, GENERALIZED WEAKNESS, AND DECLINE IN FUNCTIONAL MOBILITY. ORTHOSTATIC BLOOD PRESSURE STABLE AT TIME OF EVAL. PT FOLLOWS COMMANDS BUT IS DELAYED AND DEMONSTRATES SLURRED SPEECH. SPOUSE REPORTS SPEECH IS BASELINE. RECOMMEND IRF AT D/C FOR RETURN TO MAX LEVEL OF FUNCTION PRIOR TO HOME.  -CD     Row Name 11/09/20 1406          Therapy Assessment/Plan (PT)    Patient/Family Therapy Goals Statement (PT)  DID NOT STATE.  -CD     Rehab Potential (PT)  good, to achieve stated therapy goals  -CD     Criteria for Skilled Interventions Met (PT)  yes  -CD     Row Name 11/09/20 1407          Vital Signs    Pre Systolic BP Rehab  121  -CD     Pre Treatment Diastolic BP  55  -CD     Intra Systolic BP Rehab  131  -CD     Intra Treatment Diastolic BP  55  -CD     Post Systolic BP Rehab  112  -CD     Post Treatment Diastolic BP  58  -CD     Pre SpO2 (%)  94  -CD     O2 Delivery Pre Treatment  supplemental O2  -CD     Intra SpO2 (%)  88  -CD     O2 Delivery Intra Treatment  room air  -CD     Post SpO2 (%)  95  -CD     O2 Delivery Post Treatment  supplemental O2  -CD     Pre  Patient Position  Supine  -CD     Intra Patient Position  Standing  -CD     Post Patient Position  Sitting  -CD     Row Name 11/09/20 1407          Positioning and Restraints    Pre-Treatment Position  in bed  -CD     Post Treatment Position  chair  -CD     In Chair  call light within reach;encouraged to call for assist;exit alarm on;with family/caregiver;legs elevated;notified nsg  -CD       User Key  (r) = Recorded By, (t) = Taken By, (c) = Cosigned By    Initials Name Provider Type    Celeste Reed PT Physical Therapist        Outcome Measures     Row Name 11/09/20 1416          How much help from another person do you currently need...    Turning from your back to your side while in flat bed without using bedrails?  3  -CD     Moving from lying on back to sitting on the side of a flat bed without bedrails?  3  -CD     Moving to and from a bed to a chair (including a wheelchair)?  3  -CD     Standing up from a chair using your arms (e.g., wheelchair, bedside chair)?  3  -CD     Climbing 3-5 steps with a railing?  2  -CD     To walk in hospital room?  3  -CD     AM-PAC 6 Clicks Score (PT)  17  -CD     Row Name 11/09/20 1416          Modified [place] Scale    Modified [place] Scale  4 - Moderately severe disability.  Unable to walk without assistance, and unable to attend to own bodily needs without assistance.  -CD     Row Name 11/09/20 1416          Functional Assessment    Outcome Measure Options  AM-PAC 6 Clicks Basic Mobility (PT);Modified [place]  -CD       User Key  (r) = Recorded By, (t) = Taken By, (c) = Cosigned By    Initials Name Provider Type    Celeste Reed, PT Physical Therapist        Physical Therapy Education                 Title: PT OT SLP Therapies (Done)     Topic: Physical Therapy (Done)     Point: Mobility training (Done)     Learning Progress Summary           Patient Acceptance, E, VU,NR by CD at 11/9/2020 1417    Comment: BENEFITS OF OOB ACTIVITY,SAFETY WITH MOBILITY, PROGRESSION  OF POC, D/C PLANNING,   Significant Other Acceptance, E, VU,NR by CD at 11/9/2020 1417    Comment: BENEFITS OF OOB ACTIVITY,SAFETY WITH MOBILITY, PROGRESSION OF POC, D/C PLANNING,                   Point: Home exercise program (Done)     Learning Progress Summary           Patient Acceptance, E, VU,NR by CD at 11/9/2020 1417    Comment: BENEFITS OF OOB ACTIVITY,SAFETY WITH MOBILITY, PROGRESSION OF POC, D/C PLANNING,   Significant Other Acceptance, E, VU,NR by CD at 11/9/2020 1417    Comment: BENEFITS OF OOB ACTIVITY,SAFETY WITH MOBILITY, PROGRESSION OF POC, D/C PLANNING,                   Point: Body mechanics (Done)     Learning Progress Summary           Patient Acceptance, E, VU,NR by CD at 11/9/2020 1417    Comment: BENEFITS OF OOB ACTIVITY,SAFETY WITH MOBILITY, PROGRESSION OF POC, D/C PLANNING,   Significant Other Acceptance, E, VU,NR by CD at 11/9/2020 1417    Comment: BENEFITS OF OOB ACTIVITY,SAFETY WITH MOBILITY, PROGRESSION OF POC, D/C PLANNING,                   Point: Precautions (Done)     Learning Progress Summary           Patient Acceptance, E, VU,NR by CD at 11/9/2020 1417    Comment: BENEFITS OF OOB ACTIVITY,SAFETY WITH MOBILITY, PROGRESSION OF POC, D/C PLANNING,   Significant Other Acceptance, E, VU,NR by CD at 11/9/2020 1417    Comment: BENEFITS OF OOB ACTIVITY,SAFETY WITH MOBILITY, PROGRESSION OF POC, D/C PLANNING,                               User Key     Initials Effective Dates Name Provider Type Discipline    CD 06/19/15 -  Celeste Morrison, PT Physical Therapist PT              PT Recommendation and Plan  Planned Therapy Interventions (PT): balance training, bed mobility training, gait training, home exercise program, transfer training, strengthening, stair training, patient/family education  Plan of Care Reviewed With: patient, spouse  Outcome Summary: PT PRESENTS WITH EVOLVING SYMPTOMS TO INCLUDE IMPAIRED BALANCE, GENERALIZED WEAKNESS, AND DECLINE IN FUNCTIONAL MOBILITY. ORTHOSTATIC BLOOD  PRESSURE STABLE AT TIME OF EVAL. PT FOLLOWS COMMANDS BUT IS DELAYED AND DEMONSTRATES SLURRED SPEECH. SPOUSE REPORTS SPEECH IS BASELINE. RECOMMEND IRF AT D/C FOR RETURN TO MAX LEVEL OF FUNCTION PRIOR TO HOME.     Time Calculation:   PT Charges     Row Name 20 1420             Time Calculation    Start Time  0908  -CD      PT Received On  20  -      PT Goal Re-Cert Due Date  20  -CD         Time Calculation- PT    Total Timed Code Minutes- PT  17 minute(s)  -CD         Timed Charges    57872 - PT Therapeutic Activity Minutes  17  -CD        User Key  (r) = Recorded By, (t) = Taken By, (c) = Cosigned By    Initials Name Provider Type    CD Celeste Morrison, PT Physical Therapist        Therapy Charges for Today     Code Description Service Date Service Provider Modifiers Qty    07911562879 HC PT THERAPEUTIC ACT EA 15 MIN 2020 Celeste Morrison, PT GP 1    49846550855 HC PT EVAL LOW COMPLEXITY 4 2020 Celeste Morrison, PT GP 1          PT G-Codes  Outcome Measure Options: AM-PAC 6 Clicks Basic Mobility (PT), Modified [place]  AM-PAC 6 Clicks Score (PT): 17  Modified [place] Scale: 4 - Moderately severe disability.  Unable to walk without assistance, and unable to attend to own bodily needs without assistance.    Celeste Morrison, PT  2020      Electronically signed by Celeste Morrison, PT at 20 1422          Occupational Therapy Notes (most recent note)      Megan Woodard, OT at 20 1128          Patient Name: Zita Ramirez  : 1950    MRN: [id number]                              Today's Date: 2020       Admit Date: 2020    Visit Dx:     ICD-10-CM ICD-9-CM   1. Generalized weakness  R53.1 780.79   2. Difficulty with speech  R47.9 784.59   3. Renal insufficiency  N28.9 593.9   4. Urinary tract infection without hematuria, site unspecified  N39.0 599.0   5. Cognitive communication deficit  R41.841 799.52   6. Dysarthria  R47.1 784.51   7. Impaired mobility and ADLs   Z74.09 V49.89    Z78.9      Patient Active Problem List   Diagnosis   • TIA (transient ischemic attack)   • Essential hypertension   • Type 2 diabetes mellitus (CMS/HCC)     Past Medical History:   Diagnosis Date   • Diabetes mellitus (CMS/HCC)    • Hypertension    • Stroke (CMS/HCC)      Past Surgical History:   Procedure Laterality Date   • HYSTERECTOMY     • TUBAL ABDOMINAL LIGATION       General Information     Row Name 11/09/20 1026          OT Time and Intention    Document Type  evaluation  -SD     Mode of Treatment  occupational therapy  -SD     Row Name 11/09/20 1026          General Information    Patient Profile Reviewed  yes  -SD     Prior Level of Function  independent:;all household mobility;community mobility;ADL's  -SD     Existing Precautions/Restrictions  fall  -SD     Barriers to Rehab  none identified  -SD     Row Name 11/09/20 1026          Living Environment    Lives With  spouse  -SD     Row Name 11/09/20 1026          Home Main Entrance    Number of Stairs, Main Entrance  four  -SD     Row Name 11/09/20 1026          Stairs Within Home, Primary    Number of Stairs, Within Home, Primary  other (see comments) pt. estimated 15  -SD     Stair Railings, Within Home, Primary  railings safe and in good condition  -SD     Stairs Comment, Within Home, Primary  pt. stated that her bedroon is on the 2nd floor  -SD     Row Name 11/09/20 1026          Cognition    Orientation Status (Cognition)  oriented x 4  -SD     Row Name 11/09/20 1026          Safety Issues, Functional Mobility    Safety Issues Affecting Function (Mobility)  awareness of need for assistance;insight into deficits/self-awareness;safety precautions follow-through/compliance;safety precaution awareness  -SD     Impairments Affecting Function (Mobility)  balance;coordination;endurance/activity tolerance;strength  -SD       User Key  (r) = Recorded By, (t) = Taken By, (c) = Cosigned By    Initials Name Provider Type    Megan Maharaj  EZ Leary Occupational Therapist        Mobility/ADL's     Row Name 11/09/20 1115          Bed Mobility    Comment (Bed Mobility)  pt. UIC; pt. stated that getting out of the bed required more effort than usual  -[facility] Name 11/09/20 1115          Transfers    Transfers  sit-stand transfer;toilet transfer  -SD     Sit-Stand [place] (Transfers)  minimum assist (75% patient effort);verbal cues  -SD     [place] Level (Toilet Transfer)  minimum assist (75% patient effort);verbal cues  -SD     Assistive Device (Toilet Transfer)  commode;grab bars/safety frame;raised toilet seat;walker, front-wheeled  -SD     Row Name 11/09/20 1115          Sit-Stand Transfer    Assistive Device (Sit-Stand Transfers)  walker, front-wheeled  -SD     [place] [facility] Name 11/09/20 1115          Toilet Transfer    Type (Toilet Transfer)  sit-stand;stand-sit  -[facility] Name 11/09/20 1115          Functional Mobility    Functional Mobility- Ind. Level  contact guard assist;verbal cues required  -SD     Functional Mobility- Device  rolling walker  -SD     Functional Mobility-Distance (Feet)  10  -SD     Functional Mobility- Safety Issues  balance decreased during turns;step length decreased  -SD     Row Name 11/09/20 1115          Activities of Daily Living    BADL Assessment/Intervention  upper body dressing;lower body dressing;grooming;toileting  -[facility] Name 11/09/20 1115          Upper Body Dressing Assessment/Training    [place] Level (Upper Body Dressing)  doff;don;pajama/robe;minimum assist (75% patient effort)  -SD     Position (Upper Body Dressing)  supported sitting  -SD     Row Name 11/09/20 1115          Lower Body Dressing Assessment/Training    [place] Level (Lower Body Dressing)  doff;don;pants/bottoms;moderate assist (50% patient effort) underwear  -SD     Position (Lower Body Dressing)  supported sitting;supported standing  -SD     Row Name 11/09/20 1115          Grooming Assessment/Training    [place]  Level (Grooming)  wash face, hands;set up  -SD     Position (Grooming)  supported sitting  -SD     Row Name 11/09/20 1115          Toileting Assessment/Training    [place] Level (Toileting)  perform perineal hygiene;set up;adjust/manage clothing;change pad/brief;moderate assist (50% patient effort)  -SD     Assistive Devices (Toileting)  commode;raised toilet seat;grab bar/safety frame  -SD     Position (Toileting)  supported sitting;supported standing  -SD       User Key  (r) = Recorded By, (t) = Taken By, (c) = Cosigned By    Initials Name Provider Type    SD Megan Woodard OT Occupational Therapist        Obj/Interventions     Row Name 11/09/20 1118          Sensory Assessment (Somatosensory)    Sensory Assessment (Somatosensory)  sensation intact  -SD     Row Name 11/09/20 1118          Vision Assessment/Intervention    Visual Impairment/Limitations  corrective lenses full-time;WFL  -SD     Row Name 11/09/20 1118          Range of Motion Comprehensive    General Range of Motion  bilateral upper extremity ROM WFL  -SD     Row Name 11/09/20 1118          Strength Comprehensive (MMT)    General Manual Muscle Testing (MMT) Assessment  upper extremity strength deficits identified  -SD     Comment, General Manual Muscle Testing (MMT) Assessment  B UE Strength: 4/5 grossly  -SD     Row Name 11/09/20 1118          Balance    Balance Assessment  sitting static balance;sitting dynamic balance;sit to stand dynamic balance;standing static balance;standing dynamic balance  -SD     Static Sitting Balance  WFL  -SD     Dynamic Sitting Balance  mild impairment  -SD     Sit to Stand Dynamic Balance  mild impairment  -SD     Static Standing Balance  mild impairment  -SD     Dynamic Standing Balance  mild impairment  -SD     Balance Interventions  occupation based/functional task;supported;standing;sitting;UE activity with balance activity  -SD       User Key  (r) = Recorded By, (t) = Taken By, (c) = Cosigned By     Initials Name Provider Type    Megan Maharaj OT Occupational Therapist        Goals/Plan     Row Name 11/09/20 1125          Bed Mobility Goal 1 (OT)    Activity/Assistive Device (Bed Mobility Goal 1, OT)  bed mobility activities, all  -SD     [place] Level/Cues Needed (Bed Mobility Goal 1, OT)  modified independence  -SD     Time Frame (Bed Mobility Goal 1, OT)  by discharge  -SD     Progress/Outcomes (Bed Mobility Goal 1, OT)  goal ongoing  -SD     Row Name 11/09/20 1125          Transfer Goal 1 (OT)    Activity/Assistive Device (Transfer Goal 1, OT)  sit-to-stand/stand-to-sit;bed-to-chair/chair-to-bed;toilet;shower chair;walker, rolling  -SD     [place] Level/Cues Needed (Transfer Goal 1, OT)  standby assist  -SD     Time Frame (Transfer Goal 1, OT)  by discharge  -SD     Progress/Outcome (Transfer Goal 1, OT)  goal ongoing  -SD     Row Name 11/09/20 1125          Toileting Goal 1 (OT)    Activity/Device (Toileting Goal 1, OT)  toileting skills, all;grab bar/safety frame;commode;raised toilet seat  -SD     [place] Level/Cues Needed (Toileting Goal 1, OT)  modified independence  -SD     Time Frame (Toileting Goal 1, OT)  by discharge  -SD     Progress/Outcome (Toileting Goal 1, OT)  goal ongoing  -SD     Row Name 11/09/20 1125          Therapy Assessment/Plan (OT)    Planned Therapy Interventions (OT)  activity tolerance training;IADL retraining;functional balance retraining;patient/caregiver education/training;transfer/mobility retraining;strengthening exercise;occupation/activity based interventions  -SD       User Key  (r) = Recorded By, (t) = Taken By, (c) = Cosigned By    Initials Name Provider Type    Megan Maharaj OT Occupational Therapist        Clinical Impression     Row Name 11/09/20 1120          Pain Assessment    Additional Documentation  Pain Scale: Numbers Pre/Post-Treatment (Group)  -SD     Row Name 11/09/20 1120          Pain Scale: Numbers  Pre/Post-Treatment    Pretreatment Pain Rating  0/10 - no pain  -SD     Posttreatment Pain Rating  0/10 - no pain  -SD     Pain Intervention(s)  Repositioned;Ambulation/increased activity;Emotional support  -SD     Row Name 11/09/20 1120          Plan of Care Review    Plan of Care Reviewed With  patient;spouse  -SD     Progress  no change  -SD     Outcome Summary  OT IE completed. Pt. sitting up in b/s chair w/ present. Pt. reporting that she has been incontinent of urine during this hospital stay. STS from chair: min A using RW, LBD: mod A, toileting: min A, toilet t/f: CGA using RW, ambulating in room & bathroom: CGA using RW, standing balance: CGA. Pt. is appropriate for OT skilled services to address decreased ADL & functional mobility independence, decreased strength, and decreased occupational endurance. Recommend IPRF upon d/c.  -SD     Row Name 11/09/20 1120          Therapy Assessment/Plan (OT)    Patient/Family Therapy Goal Statement (OT)  return to PLOF  -SD     Rehab Potential (OT)  good, to achieve stated therapy goals  -SD     Criteria for Skilled Therapeutic Interventions Met (OT)  yes;meets criteria;skilled treatment is necessary  -SD     Therapy Frequency (OT)  daily  -SD     Row Name 11/09/20 1120          Therapy Plan Review/Discharge Plan (OT)    Anticipated Discharge Disposition (OT)  inpatient rehabilitation facility  -SD     Row Name 11/09/20 1120          Vital Signs    Pre Systolic BP Rehab  112  -SD     Pre Treatment Diastolic BP  58  -SD     Post Systolic BP Rehab  131  -SD     Post Treatment Diastolic BP  58  -SD     Posttreatment Heart Rate (beats/min)  79  -SD     Intra SpO2 (%)  94  -SD     O2 Delivery Intra Treatment  room air  -SD     Post SpO2 (%)  100  -SD     O2 Delivery Post Treatment  supplemental O2  -SD     Pre Patient Position  Sitting  -SD     Intra Patient Position  Standing  -SD     Post Patient Position  Sitting  -SD     Row Name 11/09/20 1120          Positioning  and Restraints    Pre-Treatment Position  sitting in chair/recliner  -SD     Post Treatment Position  chair  -SD     In Chair  notified nsg;reclined;call light within reach;encouraged to call for assist;exit alarm on;with family/caregiver;legs elevated  -SD       User Key  (r) = Recorded By, (t) = Taken By, (c) = Cosigned By    Initials Name Provider Type    Megan Maharaj OT Occupational Therapist        Outcome Measures     Row Name 11/09/20 1026          How much help from another is currently needed...    Bathing (including washing, rinsing, and drying)  2  -SD     Toileting (which includes using toilet bed pan or urinal)  3  -SD     Putting on and taking off regular upper body clothing  3  -SD     Taking care of personal grooming (such as brushing teeth)  3  -SD     Eating meals  4  -SD     Row Name 11/09/20 1026          Functional Assessment    Outcome Measure Options  AM-PAC 6 Clicks Daily Activity (OT)  -SD       User Key  (r) = Recorded By, (t) = Taken By, (c) = Cosigned By    Initials Name Provider Type    Megan Maharaj OT Occupational Therapist        Occupational Therapy Education                 Title: PT OT SLP Therapies (Done)     Topic: Occupational Therapy (Done)     Point: ADL training (Done)     Description:   Instruct learner(s) on proper safety adaptation and remediation techniques during self care or transfers.   Instruct in proper use of assistive devices.              Learning Progress Summary           Patient Acceptance, E, VU,NR by SD at 11/9/2020 1127    Comment: Pt. & spouse educated on role of OT, and both are agreeable with OT POC.   Significant Other Acceptance, E, VU,NR by SD at 11/9/2020 1127    Comment: Pt. & spouse educated on role of OT, and both are agreeable with OT POC.                   Point: Home exercise program (Done)     Description:   Instruct learner(s) on appropriate technique for monitoring, assisting and/or progressing therapeutic  exercises/activities.              Learning Progress Summary           Patient Acceptance, E, VU,NR by SD at 11/9/2020 1127    Comment: Pt. & spouse educated on role of OT, and both are agreeable with OT POC.   Significant Other Acceptance, E, VU,NR by SD at 11/9/2020 1127    Comment: Pt. & spouse educated on role of OT, and both are agreeable with OT POC.                   Point: Precautions (Done)     Description:   Instruct learner(s) on prescribed precautions during self-care and functional transfers.              Learning Progress Summary           Patient Acceptance, E, VU,NR by SD at 11/9/2020 1127    Comment: Pt. & spouse educated on role of OT, and both are agreeable with OT POC.   Significant Other Acceptance, E, VU,NR by SD at 11/9/2020 1127    Comment: Pt. & spouse educated on role of OT, and both are agreeable with OT POC.                   Point: Body mechanics (Done)     Description:   Instruct learner(s) on proper positioning and spine alignment during self-care, functional mobility activities and/or exercises.              Learning Progress Summary           Patient Acceptance, E, VU,NR by SD at 11/9/2020 1127    Comment: Pt. & spouse educated on role of OT, and both are agreeable with OT POC.   Significant Other Acceptance, E, VU,NR by SD at 11/9/2020 1127    Comment: Pt. & spouse educated on role of OT, and both are agreeable with OT POC.                               User Key     Initials Effective Dates Name Provider Type Discipline    SD 06/08/18 -  Megan Woodard OT Occupational Therapist OT              OT Recommendation and Plan  Planned Therapy Interventions (OT): activity tolerance training, IADL retraining, functional balance retraining, patient/caregiver education/training, transfer/mobility retraining, strengthening exercise, occupation/activity based interventions  Therapy Frequency (OT): daily  Plan of Care Review  Plan of Care Reviewed With: patient, spouse  Progress: no  change  Outcome Summary: OT IE completed. Pt. sitting up in b/s chair w/ present. Pt. reporting that she has been incontinent of urine during this hospital stay. STS from chair: min A using RW, LBD: mod A, toileting: min A, toilet t/f: CGA using RW, ambulating in room & bathroom: CGA using RW, standing balance: CGA. Pt. is appropriate for OT skilled services to address decreased ADL & functional mobility independence, decreased strength, and decreased occupational endurance. Recommend IPRF upon d/c.     Time Calculation:   Time Calculation- OT     Row Name 11/09/20 1128             Time Calculation- OT    OT Received On  11/09/20  -SD      OT Goal Re-Cert Due Date  11/19/20  -SD        User Key  (r) = Recorded By, (t) = Taken By, (c) = Cosigned By    Initials Name Provider Type    Megan Maharaj OT Occupational Therapist        Therapy Charges for Today     Code Description Service Date Service Provider Modifiers Qty    50859806808  OT EVAL LOW COMPLEXITY 4 11/9/2020 Megan Woodard OT GO 1               Megan Woodard OT  11/9/2020    Electronically signed by Megan Woodard OT at 11/09/20 1129          Speech Language Pathology Notes (most recent note)      Alyce Mcneil MS CCC-SLP at 11/09/20 1055          Problem: Adult Inpatient Plan of Care  Goal: Plan of Care Review  Outcome: Ongoing, Progressing  Flowsheets (Taken 11/9/2020 1055)  Plan of Care Reviewed With:  • patient  • spouse    SLP evaluation completed. Will address cognitive communication in treatment. Please see note for further details and recommendations.         Electronically signed by Alyce Mcneil MS CCC-SLP at 11/09/20 1052

## 2020-11-09 NOTE — PROGRESS NOTES
Continued Stay Note  [facility]     Patient Name: Zita Ramirez  MRN: [id number]  Today's Date: 11/9/2020    Admit Date: 11/8/2020    Discharge Plan     Row Name 11/09/20 1650       Plan    [facility]    Plan Comments  [facility] [place], [facility] per patient's request. Called and faxed to attn: Kristin french [phone], fax [phone]    Final Discharge Disposition Code  03 - skilled nursing facility (SNF)        Discharge Codes    No documentation.             Diana Maldonado RN

## 2020-11-09 NOTE — THERAPY EVALUATION
Acute Care - Speech Language Pathology Initial Evaluation  [facility]   Cognitive-Communication Evaluation       Patient Name: Zita Ramirez  : 1950  MRN: [id number]  Today's Date: 2020               Admit Date: 2020     Visit Dx:    ICD-10-CM ICD-9-CM   1. Generalized weakness  R53.1 780.79   2. Difficulty with speech  R47.9 784.59   3. Renal insufficiency  N28.9 593.9   4. Urinary tract infection without hematuria, site unspecified  N39.0 599.0   5. Cognitive communication deficit  R41.841 799.52   6. Dysarthria  R47.1 784.51     Patient Active Problem List   Diagnosis   • TIA (transient ischemic attack)   • Essential hypertension   • Type 2 diabetes mellitus (CMS/HCC)     Past Medical History:   Diagnosis Date   • Diabetes mellitus (CMS/HCC)    • Hypertension    • Stroke (CMS/HCC)      Past Surgical History:   Procedure Laterality Date   • HYSTERECTOMY     • TUBAL ABDOMINAL LIGATION          SLP EVALUATION (last 72 hours)      SLP SLC Evaluation     Row Name 20 1000                   Communication Assessment/Intervention    Document Type  evaluation  -        Subjective Information  no complaints  -        Patient Observations  alert;cooperative;agree to therapy  -        Patient/Family/Caregiver Comments/Observations  Spouse present  -        Patient Effort  good  -           General Information    Patient Profile Reviewed  yes  -        Pertinent History Of Current Problem  Dysarthria, general wakness, dizziness, light headedness. SLC evluation completed per stroke protocol.  MRI, no acute findings.   -        Precautions/Limitations, Vision  WFL;for purposes of eval  -        Precautions/Limitations, Hearing  WFL;for purposes of eval  -        Prior Level of Function-Communication  WFL  -        Plans/Goals Discussed with  patient;spouse/S.O.;agreed upon  -        Barriers to Rehab  none identified  -        Patient's Goals for Discharge  return to home;return to  all previous roles/activities  -           Pain    Additional Documentation  Pain Scale: FACES Pre/Post-Treatment (Group)  -           Pain Scale: FACES Pre/Post-Treatment    Pain: FACES Scale, Pretreatment  0-->no hurt  -        Posttreatment Pain Rating  0-->no hurt  -           Comprehension Assessment/Intervention    Comprehension Assessment/Intervention  Auditory Comprehension;Reading Comprehension  -           Auditory Comprehension Assessment/Intervention    Auditory Comprehension (Communication)  WNL  -        Able to Follow Commands (Communication)  3-step;WFL  -        Narrative Discourse  conversational level;WFL  -           Reading Comprehension Assessment/Intervention    Reading Comprehension (Communication)  WFL  -        Functional Reading Tasks  WFL  -           Expression Assessment/Intervention    Expression Assessment/Intervention  verbal expression  -           Verbal Expression Assessment/Intervention    Verbal Expression  mild impairment  -        Automatic Speech (Communication)  response to greeting;WNL  -        Repetition  sentences;WFL  -        Phrase Completion  unpredictable;WFL  -        Responsive Naming  complex;mild impairment  -CH        Confrontational Naming  low frequency;WFL  -CH        Spontaneous/Functional Words  WFL  -        Sentence Formulation  complex;mild impairment  -           Oral Motor Structure and Function    Oral Motor Structure and Function  WFL  -           Motor Speech Assessment/Intervention    Motor Speech Function  mild impairment  -        Characteristics Consistent with Dysarthria  slow rate;decreased articulation  -           Cursory Voice Assessment/Intervention    Quality and Resonance (Voice)  WNL  -           Speaking Valve    Respiratory Status  WFL  -           Cognitive Assessment Intervention- SLP    Cognitive Function (Cognition)  mild impairment  -CH        Orientation Status (Cognition)  awareness  of basic personal information;person;place;time;situation;WFL  -        Memory (Cognitive)  simple;functional;immediate;long-term;WFL;short-term;delayed;mild impairment  -        Attention (Cognitive)  selective;sustained;attention to detail;WNL  -        Thought Organization (Cognitive)  concrete divergent;abstract divergent;concrete convergent;abstract convergent;mild impairment  -        Reasoning (Cognitive)  simple;deductive;WNL  -        Problem Solving (Cognitive)  simple;temporal;WNL  -        Functional Math (Cognitive)  simple;word problems;WNL  -        Executive Function (Cognition)  WNL  -        Pragmatics (Communication)  WNL  -           SLP Clinical Impressions    SLP Diagnosis  Patient presents with a mild cognitive linguistic impairment as evidenced by difficulty with recall, thought organization,  word finding and mildly dysarthric speech.   -        Rehab Potential/Prognosis  excellent  -[facility] Criteria for Skilled Therapy Interventions Met  yes  -        Functional Impact  functional impact in social situations;functional impact in ADLs;difficulty in expressing complex messages;restrictions in personal and social life  -           Recommendations    Therapy Frequency (SLP SLC)  5 days per week  -        Predicted Duration Therapy Intervention (Days)  until discharge  -        Anticipated Discharge Disposition (SLP)  home with assist  -           SLP Discharge Summary    Discharge Destination  unknown;anticipated therapy at next level of care  -          User Key  (r) = Recorded By, (t) = Taken By, (c) = Cosigned By    Initials Name Effective Dates    Alyce Chase MS CCC-SLP 08/09/20 -              EDUCATION  The patient has been educated in the following areas:     Cognitive Impairment Communication Impairment.    SLP Recommendation and Plan  SLP Diagnosis: Patient presents with a mild cognitive linguistic impairment as evidenced by difficulty  with recall, thought organization,  word finding and mildly dysarthric speech.         SLC Criteria for Skilled Therapy Interventions Met: yes  Anticipated Discharge Disposition (SLP): home with assist        Predicted Duration Therapy Intervention (Days): until discharge                          SLP GOALS     Row Name 11/09/20 1000             Word Retrieval Skills Goal 1 (SLP)    Improve Word Retrieval Skills By Goal 1 (SLP)  low frequency;responsive naming task;supplying an antonym;supplying a synonym;completing a divergent task;80%;with minimal cues (75-90%)  -      Time Frame (Word Retrieval Goal 1, SLP)  by discharge  -CH         Connected Speech to Express Thoughts Goal 1 (SLP)    Improve Narrative Discourse to Express Thoughts By Goal 1 (SLP)  explaining a proverb or idiom;giving multiple definitions of a word;80%;with minimal cues (75-90%)  -      Time Frame (Connected Speech Goal 1, SLP)  by discharge  -CH         Articulation Goal 1 (SLP)    Improve Articulation Goal 1 (SLP)  by over-articulating in connected speech;80%;with minimal cues (75-90%)  -      Time Frame (Articulation Goal 1, SLP)  by discharge  -         Memory Skills Goal 1 (SLP)    Improve Memory Skills Through Goal 1 (SLP)  recalling unrelated word lists immediately;recalling unrelated word lists with an imposed delay;recall details from a word list;80%;with minimal cues (75-90%)  -      Time Frame (Memory Skills Goal 1, SLP)  by discharge  -         Organizational Skills Goal 2 (SLP)    Improve Thought Organization Through Goal 2 (SLP)  completing a divergent naming task;completing mental manipulation task;80%;with minimal cues (75-90%)  -      Time Frame (Thought Organization Skills Goal 2, SLP)  by discharge  -         Additional Goal 1 (SLP)    Additional Goal 1, SLP  LTG: Pt. will improve cognitive linguistic skills to WFL w/o verbal cues in order to improve safety and independence at the next level of care.   -CH       Time Frame (Additional Goal 1, SLP)  by discharge  -        User Key  (r) = Recorded By, (t) = Taken By, (c) = Cosigned By    Initials Name Provider Type    Alyce Chase MS CCC-SLP Speech and Language Pathologist                  Time Calculation:     Time Calculation- SLP     Row Name 11/09/20 1054             Time Calculation- SLP    SLP Start Time  1000  -      SLP Received On  11/09/20  -        User Key  (r) = Recorded By, (t) = Taken By, (c) = Cosigned By    Initials Name Provider Type    Alyce Chase MS CCC-SLP Speech and Language Pathologist          Therapy Charges for Today     Code Description Service Date Service Provider Modifiers Qty    22567490735 HC ST EVAL SPEECH AND PROD W LANG  4 11/9/2020 Alyce Mcneil MS CCC-ARNIE GN 1                     MS ALCON Balderrama  11/9/2020     Patient was not wearing a face mask and did not exhibit coughing during this therapy encounter.  Procedure performed was aerosolizing, involved close contact (within 6 feet for at least 15 minutes or longer), and did not involve contact with infectious secretions or specimens.  Therapist used appropriate personal protective equipment including gloves, standard procedure mask and eye protection.  Appropriate PPE was worn during the entire therapy session.  Hand hygiene was completed before and after therapy session.

## 2020-11-10 LAB
ANION GAP SERPL CALCULATED.3IONS-SCNC: 8 MMOL/L (ref 5–15)
BUN SERPL-MCNC: 20 MG/DL (ref 8–23)
BUN/CREAT SERPL: 18.7 (ref 7–25)
CALCIUM SPEC-SCNC: 8.7 MG/DL (ref 8.6–10.5)
CHLORIDE SERPL-SCNC: 107 MMOL/L (ref 98–107)
CO2 SERPL-SCNC: 24 MMOL/L (ref 22–29)
CREAT SERPL-MCNC: 1.07 MG/DL (ref 0.57–1)
GFR SERPL CREATININE-BSD FRML MDRD: 51 ML/MIN/1.73
GLUCOSE BLDC GLUCOMTR-MCNC: 166 MG/DL (ref 70–130)
GLUCOSE BLDC GLUCOMTR-MCNC: 172 MG/DL (ref 70–130)
GLUCOSE BLDC GLUCOMTR-MCNC: 191 MG/DL (ref 70–130)
GLUCOSE BLDC GLUCOMTR-MCNC: 242 MG/DL (ref 70–130)
GLUCOSE SERPL-MCNC: 196 MG/DL (ref 65–99)
POTASSIUM SERPL-SCNC: 5.1 MMOL/L (ref 3.5–5.2)
SODIUM SERPL-SCNC: 139 MMOL/L (ref 136–145)

## 2020-11-10 PROCEDURE — G0378 HOSPITAL OBSERVATION PER HR: HCPCS

## 2020-11-10 PROCEDURE — 25010000002 CEFTRIAXONE PER 250 MG: Performed by: INTERNAL MEDICINE

## 2020-11-10 PROCEDURE — 82962 GLUCOSE BLOOD TEST: CPT

## 2020-11-10 PROCEDURE — 63710000001 INSULIN LISPRO (HUMAN) PER 5 UNITS: Performed by: INTERNAL MEDICINE

## 2020-11-10 PROCEDURE — 96366 THER/PROPH/DIAG IV INF ADDON: CPT

## 2020-11-10 PROCEDURE — 99225 PR SBSQ OBSERVATION CARE/DAY 25 MINUTES: CPT | Performed by: INTERNAL MEDICINE

## 2020-11-10 PROCEDURE — 80048 BASIC METABOLIC PNL TOTAL CA: CPT | Performed by: INTERNAL MEDICINE

## 2020-11-10 PROCEDURE — 99213 OFFICE O/P EST LOW 20 MIN: CPT | Performed by: PSYCHIATRY & NEUROLOGY

## 2020-11-10 PROCEDURE — 92507 TX SP LANG VOICE COMM INDIV: CPT | Performed by: SPEECH-LANGUAGE PATHOLOGIST

## 2020-11-10 RX ORDER — FERROUS SULFATE 325(65) MG
325 TABLET ORAL
Status: DISCONTINUED | OUTPATIENT
Start: 2020-11-10 | End: 2020-11-12 | Stop reason: HOSPADM

## 2020-11-10 RX ORDER — AMLODIPINE BESYLATE 2.5 MG/1
2.5 TABLET ORAL
Status: DISCONTINUED | OUTPATIENT
Start: 2020-11-10 | End: 2020-11-11

## 2020-11-10 RX ORDER — LANOLIN ALCOHOL/MO/W.PET/CERES
500 CREAM (GRAM) TOPICAL DAILY
Status: DISCONTINUED | OUTPATIENT
Start: 2020-11-10 | End: 2020-11-12 | Stop reason: HOSPADM

## 2020-11-10 RX ORDER — METOPROLOL TARTRATE 50 MG/1
50 TABLET, FILM COATED ORAL EVERY 12 HOURS SCHEDULED
Status: DISCONTINUED | OUTPATIENT
Start: 2020-11-10 | End: 2020-11-12 | Stop reason: HOSPADM

## 2020-11-10 RX ORDER — LOSARTAN POTASSIUM 50 MG/1
50 TABLET ORAL
Status: DISCONTINUED | OUTPATIENT
Start: 2020-11-10 | End: 2020-11-11

## 2020-11-10 RX ADMIN — SODIUM CHLORIDE, PRESERVATIVE FREE 10 ML: 5 INJECTION INTRAVENOUS at 20:55

## 2020-11-10 RX ADMIN — INSULIN LISPRO 3 UNITS: 100 INJECTION, SOLUTION INTRAVENOUS; SUBCUTANEOUS at 12:25

## 2020-11-10 RX ADMIN — ATORVASTATIN CALCIUM 40 MG: 40 TABLET, FILM COATED ORAL at 20:55

## 2020-11-10 RX ADMIN — LOSARTAN POTASSIUM 50 MG: 50 TABLET, FILM COATED ORAL at 11:10

## 2020-11-10 RX ADMIN — PANTOPRAZOLE SODIUM 40 MG: 40 TABLET, DELAYED RELEASE ORAL at 08:26

## 2020-11-10 RX ADMIN — METOPROLOL TARTRATE 50 MG: 50 TABLET, FILM COATED ORAL at 11:10

## 2020-11-10 RX ADMIN — INSULIN LISPRO 2 UNITS: 100 INJECTION, SOLUTION INTRAVENOUS; SUBCUTANEOUS at 08:27

## 2020-11-10 RX ADMIN — METOPROLOL TARTRATE 50 MG: 50 TABLET, FILM COATED ORAL at 20:55

## 2020-11-10 RX ADMIN — CYANOCOBALAMIN TAB 1000 MCG 500 MCG: 1000 TAB at 11:10

## 2020-11-10 RX ADMIN — FERROUS SULFATE TAB 325 MG (65 MG ELEMENTAL FE) 325 MG: 325 (65 FE) TAB at 11:10

## 2020-11-10 RX ADMIN — SODIUM CHLORIDE, PRESERVATIVE FREE 10 ML: 5 INJECTION INTRAVENOUS at 08:27

## 2020-11-10 RX ADMIN — SERTRALINE HYDROCHLORIDE 100 MG: 100 TABLET ORAL at 08:26

## 2020-11-10 RX ADMIN — AMLODIPINE BESYLATE 2.5 MG: 2.5 TABLET ORAL at 11:10

## 2020-11-10 RX ADMIN — CEFTRIAXONE SODIUM 1 G: 1 INJECTION, POWDER, FOR SOLUTION INTRAMUSCULAR; INTRAVENOUS at 08:28

## 2020-11-10 RX ADMIN — INSULIN LISPRO 2 UNITS: 100 INJECTION, SOLUTION INTRAVENOUS; SUBCUTANEOUS at 17:37

## 2020-11-10 RX ADMIN — ASPIRIN 325 MG ORAL TABLET 325 MG: 325 PILL ORAL at 08:26

## 2020-11-10 RX ADMIN — Medication 5000 UNITS: at 08:26

## 2020-11-10 NOTE — PROGRESS NOTES
[facility] Medicine Services  PROGRESS NOTE    Patient Name: Zita Ramirez  : 1950  MRN: [id number]    Date of Admission: 2020  Primary Care Physician: System, Provider Not In    Subjective   Subjective     CC:  Follow-up for brief AMS, UTI     HPI:  No acute events overnight. Did not have any other episodes of AMS overnight. Still very weak.     Review of Systems  Gen- No fevers, chills  CV- No chest pain, palpitations  Resp- No cough, dyspnea  GI- No N/V/D, abd pain     Objective   Objective     Vital Signs:   Temp:  [97.6 °F (36.4 °C)-98.5 °F (36.9 °C)] 98 °F (36.7 °C)  Heart Rate:  [77-94] 82  Resp:  [18] 18  BP: (133-171)/(55-87) 165/87  Total (NIH Stroke Scale): 5     Physical Exam:  Constitutional: No acute distress, awake, alert  HENT: NCAT, mucous membranes moist  Respiratory: Clear to auscultation bilaterally, respiratory effort normal on room air  Cardiovascular: RRR, no murmurs  Gastrointestinal: Positive bowel sounds, soft, nontender, nondistended  Psychiatric: Appropriate affect, cooperative  Neurologic: Oriented x 3, strength symmetric in all extremities, Cranial Nerves grossly intact to confrontation, speech clear  Skin: No rashes    Results Reviewed:  Results from last 7 days   Lab Units 20  0521   WBC 10*3/mm3 8.13   HEMOGLOBIN g/dL 10.6*   HEMATOCRIT % 35.5   PLATELETS 10*3/mm3 208     Results from last 7 days   Lab Units 11/10/20  0601 20  0529 20  0533 20  0521   SODIUM mmol/L 139 138  --  137   POTASSIUM mmol/L 5.1 4.6  --  4.2   CHLORIDE mmol/L 107 109*  --  103   CO2 mmol/L 24.0 22.0  --  21.0*   BUN mg/dL 20 23  --  36*   CREATININE mg/dL 1.07* 1.17* 1.60* 1.61*   GLUCOSE mg/dL 196* 150*  --  173*   CALCIUM mg/dL 8.7 8.2*  --  9.1   ALT (SGPT) U/L  --   --   --  12   AST (SGOT) U/L  --   --   --  14   TROPONIN T ng/mL  --   --   --  <0.010     Estimated Creatinine Clearance: 52 mL/min (A) (by C-G formula based on SCr of 1.07 mg/dL  (H)).    Microbiology Results Abnormal     Procedure Component Value - Date/Time    COVID PRE-OP / PRE-PROCEDURE SCREENING ORDER (NO ISOLATION) - Swab, Nasopharynx [[id number]] Collected: 11/08/20 1032    Lab Status: Final result Specimen: Swab from Nasopharynx Updated: 11/08/20 1949    Narrative:      The following orders were created for panel order COVID PRE-OP / PRE-PROCEDURE SCREENING ORDER (NO ISOLATION) - Swab, Nasopharynx.  Procedure                               Abnormality         Status                     ---------                               -----------         ------                     COVID-19,[facility], NP ...[[id number]]                      Final result                 Please view results for these tests on the individual orders.    COVID-19,[facility], NP SWAB IN [facility]EX[facility] VIRAL TRANSPORT MEDIA 24-30 HR TAT - Swab, Nasopharynx [[id number]] Collected: 11/08/20 1032    Lab Status: Final result Specimen: Swab from Nasopharynx Updated: 11/08/20 1949     SARS-CoV-2 GINNA Not Detected          Imaging Results (Last 24 Hours)     ** No results found for the last 24 hours. **          Results for orders placed during the hospital encounter of 11/08/20   Adult Transthoracic Echo Complete W/ Cont if Necessary Per Protocol (With Agitated Saline)    Narrative · Estimated left ventricular EF = 68% Estimated left ventricular EF was in   agreement with the calculated left ventricular EF. Left ventricular   ejection fraction appears to be 66 - 70%. Left ventricular systolic   function is normal.  · Left ventricular wall thickness is consistent with mild concentric   hypertrophy.  · Mild to moderate pulmonic valve regurgitation is present.  · Mild mitral annular calcification is present.  · Saline test results are negative.  · Estimated right ventricular systolic pressure from tricuspid   regurgitation is normal (<35 mmHg).  · Left ventricular diastolic function was normal.  · Normal right ventricular cavity size, wall  thickness, systolic function   and septal motion noted.  · The aortic valve exhibits mild sclerosis.  · No evidence of pulmonary hypertension is present.  · There is no evidence of pericardial effusion.  · No evidence of a patent foramen ovale.          I have reviewed the medications:  Scheduled Meds:amLODIPine, 2.5 mg, Oral, Q24H  aspirin, 325 mg, Oral, Daily    Or  aspirin, 300 mg, Rectal, Daily  atorvastatin, 40 mg, Oral, Nightly  cefTRIAXone, 1 g, Intravenous, Q24H  ferrous sulfate, 325 mg, Oral, Daily With Breakfast  insulin lispro, 0-7 Units, Subcutaneous, TID AC  losartan, 50 mg, Oral, Q24H  metoprolol tartrate, 50 mg, Oral, Q12H  pantoprazole, 40 mg, Oral, Daily  sertraline, 100 mg, Oral, Daily  sodium chloride, 10 mL, Intravenous, Q12H  vitamin B-12, 500 mcg, Oral, Daily  cholecalciferol, 5,000 Units, Oral, Daily      Continuous Infusions:   PRN Meds:.dextrose  •  dextrose  •  glucagon (human recombinant)  •  magnesium sulfate **OR** magnesium sulfate **OR** magnesium sulfate  •  potassium chloride **OR** potassium chloride **OR** potassium chloride  •  sodium chloride    Assessment/Plan   Assessment & Plan     Active Hospital Problems    Diagnosis  POA   • **TIA (transient ischemic attack) [G45.9]  Yes   • Essential hypertension [I10]  Yes   • Type 2 diabetes mellitus (CMS/HCC) [E11.9]  Yes      Resolved Hospital Problems   No resolved problems to display.        Brief Hospital Course to date:  Zita Ramirez is a 70 y.o. female with a past medical history of T2DM, and hypertension who was admitted for generalized weakness, dysarthria, and witnessed fall.     Generalized weakness  -PT and OT recommending inpatient rehab facility,  helping to facilitate  -CTA head and neck, CT perfusion, MRI brain unremarkable, MRI showed old insult near left posterior vertex which patient is aware of   -Generalized weakness likely secondary to deconditioning -continue PT/OT while inpatient - she will need IRF on  discharge  -Continue aspirin and statin  -Echocardiogram with normal EF and carotid duplex without significant stenosis of carotid arteries bilaterally  -Vitamin D low, will start on supplement  -Vitamin B12 and iron also low, will start supplements  -TSH within normal limits    Hypertension-resume amlodipine 2.5 mg qd and losartan 50 mg q24h, will decrease dose of metoprolol to 50 mg q12h, check orthostatic BP     MEMO-improving, continue IV fluids     UTI with urinary incontinence-continue ceftriaxone IV x3 days     T2DM- A1c 6.5, continue ssi     DVT Prophylaxis:  SCD        Disposition: I expect the patient to be discharged tomorrow pending arrangement of IRF.     CODE STATUS:   Code Status and Medical Interventions:   Ordered at: 11/08/20 1547     Limited Support to NOT Include:    Intubation     Level Of Support Discussed With:    Health Care Surrogate     Code Status:    No CPR     Medical Interventions (Level of Support Prior to Arrest):    Limited       Itzel Chavez MD  11/10/20

## 2020-11-10 NOTE — DISCHARGE PLACEMENT REQUEST
"Sang Pearl (70 y.o. Female)     We will provide updated rehab notes tomorrow. Thank you  Diana RAMOSN, RN, [facility]  [phone]    Date of Birth Social Security Number Address Home Phone MRN    1950  [id number]  [facility] [phone] [id number]    Worship Marital Status          None        Admission Date Admission Type Admitting Provider Attending Provider Department, Room/Bed    20 Emergency Itzel Chavez MD Anciro, Audree, MD [facility] 3F, S305/    Discharge Date Discharge Disposition Discharge Destination                       Attending Provider: Itezl Chavez MD    Allergies: Penicillins    Isolation: None   Infection: None   Code Status: No CPR    Ht: 167.6 cm (66\")   Wt: 77.1 kg (170 lb)    Admission Cmt: None   Principal Problem: TIA (transient ischemic attack) [G45.9]                 Active Insurance as of 2020     Primary Coverage     Payor Plan Insurance Group Employer/Plan Group    AETNA MEDICARE REPLACEMENT AETNA MEDICARE REPLACEMENT [id number]     Payor Plan Address Payor Plan Phone Number Payor Plan Fax Number Effective Dates    [address] [phone]  2020 - None Entered    [place] [address]       Subscriber Name Subscriber Birth Date Member ID       SNAG PEARL 1950 [id number]                 Emergency Contacts      (Rel.) Home Phone Work Phone Mobile Phone    Vinh Pearl (Spouse) [phone] -- --            Insurance Information                AETNA MEDICARE REPLACEMENT/AETNA MEDICARE REPLACEMENT Phone: [phone]    Subscriber: Sang Pearl Subscriber#: [id number]    Group#: [id number] Precert#:              History & Physical      Verito Lainez MD at [address] [place] [address] Medicine Services  HISTORY AND PHYSICAL    Patient Name: Sang Pearl  : 1950  MRN: [id number]  Primary Care Physician: System, Provider Not In  Date of admission: " 11/8/2020      Subjective   Subjective     Chief Complaint: Generalized weakness and dysarthria    HPI:  Zita Ramirez is a 70 y.o. female with past medical history of hypertension, type 2 diabetes, prior CVA with unknown deficits.  Patient presents to the ED s/p fall, with generalized weakness and dysarthria.  Per spouse, patient stood up to go to the bathoo, she however, complained of dizziness and lightheadedness before falling, spouse sat her on her on a chair and noticed that she was weak and speech was slurred.  EMS was called, on their arrival the patient was hypotensive with SBP in the 90s. On arrival to the ED she was hemodynamically stable with improved BP, remained dysarthric.  Initial lab work included a CMP and CBC that were significant for Cr of 1.60, hemoglobin 10.6.  Stroke work-up was initiated, she did get a CT head CTA head and neck, and CT cerebral perfusion all unrevealing.  Stroke navigators were consulted, hospital medicine was then asked to admit.  At the time of my evaluation she remains lethargic abut arouses easily, speech is slurred, she denies any fevers or chills, no n/v.      Current COVID Risks are:  [] Fever []  Cough [] Shortness of breath [] Fatigue [] Change in taste or smell    [] Exposure to COVID positive patient  [] High risk facility   [x]  NONE    Review of Systems   Gen- No fevers, chills  CV- No chest pain, palpitations  Resp- No cough, dyspnea  GI- No N/V/D, abd pain    All other systems reviewed and are negative.     Personal History     Past Medical History:   Diagnosis Date   • Diabetes mellitus (CMS/HCC)    • Hypertension    • Stroke (CMS/HCC)        Past Surgical History:   Procedure Laterality Date   • HYSTERECTOMY     • TUBAL ABDOMINAL LIGATION         Family History: family history is not on file. Otherwise pertinent FHx was reviewed and unremarkable.     Social History:  reports that she has quit smoking. She has never used smokeless tobacco. She reports previous  alcohol use. She reports previous drug use.  Social History     Social History Narrative   • Not on file       Medications:  Available home medication information reviewed.  (Not in a hospital admission)      Allergies   Allergen Reactions   • Penicillins Unknown - Low Severity     Pt states I dont know       Objective   Objective     Vital Signs:   Temp:  [97.8 °F (36.6 °C)] 97.8 °F (36.6 °C)  Heart Rate:  [87-95] 89  Resp:  [16] 16  BP: (122-142)/() 128/54   Total (NIH Stroke Scale): 6    Physical Exam   Constitutional: Ill-appearing lady, lethargic   Eyes: PERRLA, sclerae anicteric, no conjunctival injection  HENT: NCAT, mucous membranes moist  Neck: Supple, no thyromegaly, no lymphadenopathy, trachea midline  Respiratory: Clear to auscultation bilaterally, nonlabored respirations   Cardiovascular: RRR, no murmurs, rubs, or gallops, palpable pedal pulses bilaterally  Gastrointestinal: Positive bowel sounds, soft, nontender, nondistended  Musculoskeletal: No bilateral ankle edema, no clubbing or cyanosis to extremities  Psychiatric: Flat affect, cooperative  Neurologic: Generalized weakness, dysarthric speech  Skin: No rashes    Results Reviewed:  I have personally reviewed most recent indicated data and agree with findings including:  [x]  Laboratory  [x]  Radiology  []  EKG/Telemetry  []  Pathology  []  Cardiac/Vascular Studies  []  Old records  []  Other:  Most pertinent findings include:    Results from last 7 days   Lab Units 11/08/20  0521   WBC 10*3/mm3 8.13   HEMOGLOBIN g/dL 10.6*   HEMATOCRIT % 35.5   PLATELETS 10*3/mm3 208     Results from last 7 days   Lab Units 11/08/20  0533 11/08/20  0521   SODIUM mmol/L  --  137   POTASSIUM mmol/L  --  4.2   CHLORIDE mmol/L  --  103   CO2 mmol/L  --  21.0*   BUN mg/dL  --  36*   CREATININE mg/dL 1.60* 1.61*   GLUCOSE mg/dL  --  173*   CALCIUM mg/dL  --  9.1   ALT (SGPT) U/L  --  12   AST (SGOT) U/L  --  14   TROPONIN T ng/mL  --  <0.010     Estimated  Creatinine Clearance: 34.8 mL/min (A) (by C-G formula based on SCr of 1.6 mg/dL (H)).  Brief Urine Lab Results  (Last result in the past 365 days)      Color   Clarity   Blood   Leuk Est   Nitrite   Protein   CREAT   Urine HCG        11/08/20 0639 Yellow Clear Negative Moderate (2+) Negative Negative             Imaging Results (Last 24 Hours)     Procedure Component Value Units Date/Time    CT Angiogram Head [[id number]] Collected: 11/08/20 0624     Updated: 11/08/20 0624    Narrative:      CTA Head, CTA Neck     INDICATION:    Weakness and confusion. History of stroke.    Head and neck CTA:    Neck - Patent cervical CCA/ICA/vertebral arteries. No apparent dissection. Tortuosity of the proximal internal carotid arteries bilaterally left greater than right. At least moderate atherosclerotic change of the carotid bulbs bilaterally.    Head -Mild to moderate diffuse atherosclerotic change of the cavernous and supraclinoid internal carotid arteries bilaterally. No hemodynamically critical stenosis or vessel cut off. No apparent aneurysm. No dural sinus occlusion. No large completed  infarct.    This is a preliminary wet read by Dr. Kelby Seals at 0614 hours. Final interpretation will be provided by neuroradiology. Please refer to final interpretation for detailed findings and any further recommendations.    CT Angiogram Neck [[id number]] Collected: 11/08/20 0624     Updated: 11/08/20 0624    Narrative:      CTA Head, CTA Neck     INDICATION:    Weakness and confusion. History of stroke.    Head and neck CTA:    Neck - Patent cervical CCA/ICA/vertebral arteries. No apparent dissection. Tortuosity of the proximal internal carotid arteries bilaterally left greater than right. At least moderate atherosclerotic change of the carotid bulbs bilaterally.    Head -Mild to moderate diffuse atherosclerotic change of the cavernous and supraclinoid internal carotid arteries bilaterally. No hemodynamically critical stenosis or vessel  cut off. No apparent aneurysm. No dural sinus occlusion. No large completed  infarct.    This is a preliminary wet read by Dr. Kelby Seals at 0614 hours. Final interpretation will be provided by neuroradiology. Please refer to final interpretation for detailed findings and any further recommendations.    XR Chest 1 View [[id number]] Collected: 11/08/20 0614     Updated: 11/08/20 0616    Narrative:      CR Chest 1 Vw    INDICATION:   Weakness and shortness of air.     COMPARISON:    None available.    FINDINGS:  Single portable AP view(s) of the chest.  The heart is enlarged. The lungs are emphysematous. No distinct evidence of volume overload. Coarsened interstitial markings are favored to represent chronic interstitial fibrosis and scarring although we have no  comparisons for this patient and acute inflammatory or infectious infiltrates are technically within the differential. No effusion dense consolidation or pneumothorax.      Impression:        1. Cardiomegaly and probable chronic interstitial changes.    Signer Name: Kelby Seals MD   Signed: 11/8/2020 6:14 AM   Workstation Name: [facility]    [facility]    CT Cerebral Perfusion With & Without Contrast [[id number]] Collected: 11/08/20 0605     Updated: 11/08/20 0607    Narrative:      CT CEREBRAL PERFUSION W WO CONTRAST    HISTORY:   70-year-old female with weakness confusion and history of stroke.    TECHNIQUE:   Axial CT images of the brain without and with intravenous contrast using cerebral perfusion protocol. Post-processing parametric maps were created using RAPID software and reviewed. Radiation dose reduction techniques included automated exposure control  or exposure modulation based on body size. CT and nuclear cardiology exams in the last 12 months: 0.    COMPARISON:   None available    FINDINGS:   Arterial input function is optimal.     Perfusion maps are symmetric without evidence of cerebral ischemia or core  infarction.      Impression:      Normal brain perfusion CT.          Signer Name: Kelby Seals MD   Signed: 11/8/2020 6:05 AM   Workstation Name: [facility]    [facility]    CT Head Without Contrast Stroke Protocol [[id number]] Collected: 11/08/20 0541     Updated: 11/08/20 0543    Narrative:      CT Head WO Code Stroke    HISTORY:   70-year-old female with weakness and confusion. History of stroke. Acute stroke suspected.    TECHNIQUE:   Axial unenhanced head CT. Radiation dose reduction techniques included automated exposure control or exposure modulation based on body size. Count of known CT and cardiac nuc med studies performed in previous 12 months: 0.     Time of scan: 0527 hours    COMPARISON:   None.    FINDINGS:   No intracranial hemorrhage, mass, or infarct. No hydrocephalus or extra-axial fluid collection. There are senescent changes, including volume loss and nonspecific white matter change, but no acute abnormality is seen. The skull base, calvarium, and  extracranial soft tissues are normal.      Impression:      Senescent changes without acute abnormality.      NOTIFICATION: Critical Value/emergent results were called by telephone at the time of interpretation on 11/8/2020 5:39 AM to Janki CT technologist who verbally acknowledged these results per protocol.    Signer Name: Kelby Seals MD   Signed: 11/8/2020 5:41 AM   Workstation Name: [facility]    [facility]             Assessment/Plan   Assessment & Plan     Active Hospital Problems    Diagnosis POA   • **TIA (transient ischemic attack) [G45.9] Yes   • Essential hypertension [I10] Yes   • Type 2 diabetes mellitus (CMS/HCC) [E11.9] Yes     70-year-old female past medical history of hypertension, and type 2 diabetes who presented to the ED s/p fall with generalized weakness and dysarthria, to the general admitted for suspected TIA.    Plan  Generalized weakness with dysarthria and fall    -Suspected to be TIA/CVA, however patient with dizziness and lightheadedness prior to her fall, found to be hypotensive with the EMS.  As such, her symptoms could likely be just post syncopal  -Stroke work-up initiated per protocol CT head that was negative, CTA head and neck, and CT cerebral perfusion all unrevealing.   -Follow-up MRI, echo, lipid panel  -Neurology consulted and aware, continue aspirin, statin  -PT/OT/SLP to evaluate    MEMO  -Baseline Cr is unknown, however, Cr here is 1.60, likely prerenal, continue gentle hydration.    Suspected UTI  - patient has a hx of recurrent UTI, UA is however unimpressive, she is s/p a dose of rocephin in the ED,we will hold on further abx at this time    Hypertension  -Patient initially hypotensive, BP currently improved, allow permissive hypertension for now.  -Start gentle hydration normal saline at 75 mL/h  -Obtain orthostatic vital signs after MRI    Type 2 diabetes, control is unknown  -Follow-up A1c, initiate SSI for now.    Follow-up COVID-19 screening test.    DVT prophylaxis: Mechanical    CODE STATUS: DNR/DNI  There are no questions and answers to display.       Admission Status:  I believe this patient meets OBSERVATION status, however if further evaluation or treatment plans warrant, status may change.  Based upon current information, I predict patient's care encounter to be less than or equal to 2 midnights.    Verito Lainez MD  11/08/20    Electronically signed by Verito Lainez MD at 11/08/20 0828         Current Facility-Administered Medications   Medication Dose Route Frequency Provider Last Rate Last Dose   • amLODIPine (NORVASC) tablet 2.5 mg  2.5 mg Oral Q24H Itzel Chavez MD   2.5 mg at 11/10/20 1110   • aspirin tablet 325 mg  325 mg Oral Daily Suzanne Pryor APRN   325 mg at 11/10/20 0826    Or   • aspirin suppository 300 mg  300 mg Rectal Daily Suzanne Pryor APRN       • atorvastatin (LIPITOR) tablet 40 mg  40 mg Oral Nightly  Bryn Tobias MD   40 mg at 11/09/20 2111   • cefTRIAXone (ROCEPHIN) 1 g/100 mL 0.9% NS (MBP)  1 g Intravenous Q24H Itzel Chavez MD   Stopped at 11/10/20 1111   • dextrose (D50W) 25 g/ 50mL Intravenous Solution 25 g  25 g Intravenous Q15 Min PRN Verito Lainez MD       • dextrose (GLUTOSE) oral gel 15 g  15 g Oral Q15 Min PRN Verito Lainez MD       • ferrous sulfate tablet 325 mg  325 mg Oral Daily With Breakfast Itzel Chavez MD   325 mg at 11/10/20 1110   • glucagon (human recombinant) (GLUCAGEN DIAGNOSTIC) injection 1 mg  1 mg Subcutaneous Q15 Min PRN Verito Lainez MD       • insulin lispro (humaLOG) injection 0-7 Units  0-7 Units Subcutaneous TID AC Verito Lainez MD   3 Units at 11/10/20 1225   • losartan (COZAAR) tablet 50 mg  50 mg Oral Q24H Itzel Chavez MD   50 mg at 11/10/20 1110   • Magnesium Sulfate 2 gram Bolus, followed by 8 gram infusion (total Mg dose 10 grams)- Mg less than or equal to 1mg/dL  2 g Intravenous PRN Verito Lainez MD        Or   • Magnesium Sulfate 2 gram / 50mL Infusion (GIVE X 3 BAGS TO EQUAL 6GM TOTAL DOSE) - Mg 1.1 - 1.5 mg/dl  2 g Intravenous PRN Verito Lainez MD   Stopped at 11/09/20 0018    Or   • Magnesium Sulfate 4 gram infusion- Mg 1.6-1.9 mg/dL  4 g Intravenous PRN Verito Lainez MD       • metoprolol tartrate (LOPRESSOR) tablet 50 mg  50 mg Oral Q12H Itzel Chavez MD   50 mg at 11/10/20 1110   • pantoprazole (PROTONIX) EC tablet 40 mg  40 mg Oral Daily Verito Lainez MD   40 mg at 11/10/20 0826   • potassium chloride (MICRO-K) CR capsule 40 mEq  40 mEq Oral PRN Verito Lainez MD        Or   • potassium chloride (KLOR-CON) packet 40 mEq  40 mEq Oral PRN Verito Lainez MD        Or   • potassium chloride 10 mEq in 100 mL IVPB  10 mEq Intravenous Q1H PRN Verito Lainez MD       • sertraline (ZOLOFT) tablet 100 mg  100 mg Oral Daily Verito Lainez MD   100 mg at 11/10/20 0826   • sodium chloride 0.9 % flush 10 mL  10 mL Intravenous PRN Pacitti,  David Holly DO       • sodium chloride 0.9 % flush 10 mL  10 mL Intravenous Q12H PryorSuzanne amezcua APRN   10 mL at 11/10/20 0827   • vitamin B-12 (CYANOCOBALAMIN) tablet 500 mcg  500 mcg Oral Daily Itzel Chavez MD   500 mcg at 11/10/20 1110   • vitamin D3 capsule 5,000 Units  5,000 Units Oral Daily Itzel Chavez MD   5,000 Units at 11/10/20 0826        Physician Progress Notes (most recent note)      Bryn Tobias MD at 11/10/20 1415          Neurology       Patient Care Team:  System, Provider Not In as PCP - General    Chief complaint: Orthostatic syncope    History: Patient's blood pressure is now up to 165/87 on no medications.    She is able to stand with significantly less difficulty.    Overnight there were no significant orthostatic drops.      Past Medical History:   Diagnosis Date   • Diabetes mellitus (CMS/[facility], [facility])    • Hypertension    • Stroke (CMS/[facility], located within [facility])        Vital Signs   Vitals:    11/10/20 0647 11/10/20 0900 11/10/20 1133 11/10/20 1300   BP: 171/82  165/87    BP Location: Right arm  Right arm    Patient Position: Lying  Lying    Pulse: 77 94 84 82   Resp: 18  18    Temp: 98.2 °F (36.8 °C)  98 °F (36.7 °C)    TempSrc: Oral  Oral    SpO2: 93%  96%    Weight:       Height:           Physical Exam:   General: Pleasant and alert              Neuro: Oriented to person place and time    Speech is normal.    Moves all extremities well.        Results Review:  Carotid duplex shows 0 to 49% stenosis bilaterally.  Results from last 7 days   Lab Units 11/08/20  0521   WBC 10*3/mm3 8.13   HEMOGLOBIN g/dL 10.6*   HEMATOCRIT % 35.5   PLATELETS 10*3/mm3 208     Results from last 7 days   Lab Units 11/10/20  0601 11/09/20  0529 11/08/20  0533 11/08/20  0521   SODIUM mmol/L 139 138  --  137   POTASSIUM mmol/L 5.1 4.6  --  4.2   CHLORIDE mmol/L 107 109*  --  103   CO2 mmol/L 24.0 22.0  --  21.0*   BUN mg/dL 20 23  --  36*   CREATININE mg/dL 1.07* 1.17* 1.60* 1.61*   CALCIUM mg/dL 8.7 8.2*  --  9.1    BILIRUBIN mg/dL  --   --   --  0.2   ALK PHOS U/L  --   --   --  85   ALT (SGPT) U/L  --   --   --  12   AST (SGOT) U/L  --   --   --  14   GLUCOSE mg/dL 196* 150*  --  173*       Imaging Results (Last 24 Hours)     ** No results found for the last 24 hours. **          Assessment:  Diabetic peripheral neuropathy.    Orthostatic hypotension, multifactorial    Hypertension.    Asymptomatic noncritical carotid disease bilaterally    Plan:  Patient would clearly benefit from inpatient rehab.    Comment:  Okay to discharge when bed available and blood pressure is under control         I discussed the patients findings and my recommendations with patient, family and primary care team    Bryn Tobias MD  11/10/20  14:15 EST          Electronically signed by Bryn Tobias MD at 11/10/20 1418          Consult Notes (most recent note)      Nadia Wylie MD at 20 0611          Stroke Consult Note    Patient Name: Zita Ramirez   MRN: [id number]  Age: 70 y.o.  Sex: female  : 1950    Primary Care Physician: No primary care provider on file.  Referring Physician:  No ref. provider found    TIME STROKE TEAM CALLED: 0515 EST     TIME PATIENT SEEN: 0515 EST    Handedness: right   Race:     Chief Complaint/Reason for Consultation: drowsiness, generalized weakness, dysarthria    HPI:   Zita Ramirez is a 70 y.o. right handed  female with known medical diagnoses of prior CVA (deficits unknown), HTN, DM. She presents to [facility] ED via [facility] for evaluation of generalized weakness and dysarthria.  Per the family patient was last in her usual state of health at midnight before going to sleep. The patient awoke about 4:00 this morning while she was sleeping on the couch, was very weak. She had trouble coming to a sitting then standing position. She was off balance, dizzy and lightheaded. Subsequently she fell to the floor. No injury was sustained.  The patient denies any unilateral  weakness, numbness, visual disturbance, headache The patient has had a stroke in the past but she cannot remember when exactly or what the cause of the stroke was. She takes a  daily.  Of note, EMS reports her BP on their arrival was in the 90's/50's. She denies having felt feverish, sick, nauseated or having any pain. On my exam, she is drowsy but arousable to light verbal stimuli. She has some slight delay in speech at times and has mild dysarthria. She is alert and oriented. Moving all extremities but weak. No obvious dysmetria. No diminished sensation to light touch. Did not walk patient at this time       Last Known Normal Date/Time: 0000 EST     Review of Systems   Constitutional: Negative for chills, diaphoresis, fatigue and fever.   Respiratory: Negative for cough and shortness of breath.    Cardiovascular: Negative for chest pain and leg swelling.   Gastrointestinal: Negative for abdominal pain and nausea.   Musculoskeletal: Positive for gait problem.   Neurological: Positive for dizziness, speech difficulty and weakness. Negative for facial asymmetry, numbness and headaches.        Generalized weakness   All other systems reviewed and are negative.       Temp:  [97.8 °F (36.6 °C)] 97.8 °F (36.6 °C)  Heart Rate:  [95] 95  Resp:  [16] 16  BP: (122)/(66) 122/66    Neurological Exam  Mental Status  Drowsy. Oriented only to person, place, time and situation. Mild dysarthria present. Expressive aphasia present. Language: Speech is slightly delayed at times .    Cranial Nerves  CN II: Visual fields full to confrontation.  CN III, IV, VI: Extraocular movements intact bilaterally. Normal lids and orbits bilaterally. Pupils equal round and reactive to light bilaterally.  CN V: Facial sensation is normal.  CN VII: Full and symmetric facial movement.  CN VIII: Hearing is normal to speech.  CN IX, X: Palate elevates symmetrically. Normal gag reflex.  CN XI: Shoulder shrug strength is normal.  CN XII: Tongue  midline without atrophy or fasciculations.    Motor    Patient has 4/5 strength in all extremities. She has a mild drift in bilateral upper extremities and right lower extremity .    Sensory  Light touch is normal in upper and lower extremities.     Reflexes                                           Right                      Left  Patellar                                2+                         2+  Plantar                           Downgoing                Downgoing    Coordination  Right: Finger-to-nose normal.  Left: Finger-to-nose normal.    Gait  Not assessed.      Physical Exam  Vitals signs and nursing note reviewed.   Constitutional:       Appearance: Normal appearance.      Comments: Drowsy    HENT:      Head: Normocephalic.      Nose: Nose normal.      Mouth/Throat:      Mouth: Mucous membranes are dry.   Eyes:      General: Lids are normal.      Extraocular Movements: Extraocular movements intact.      Pupils: Pupils are equal, round, and reactive to light.   Neck:      Musculoskeletal: Normal range of motion and neck supple.   Cardiovascular:      Rate and Rhythm: Normal rate and regular rhythm.   Pulmonary:      Effort: Pulmonary effort is normal.   Skin:     General: Skin is warm and dry.   Neurological:      Cranial Nerves: Dysarthria present.      Motor: Weakness present.      Gait: Gait abnormal.      Deep Tendon Reflexes:      Reflex Scores:       Patellar reflexes are 2+ on the right side and 2+ on the left side.  Psychiatric:         Mood and Affect: Mood normal.         Behavior: Behavior normal.         Acute Stroke Data    Alteplase (tPA) Inclusion / Exclusion Criteria    Time: 06:11 EST  Person Administering Scale: TAN Varghese    Inclusion Criteria  [x]   18 years of age or greater   []   Onset of symptoms < 4.5 hours before beginning treatment (stroke onset = time patient was last seen well or without symptoms).   []   Diagnosis of acute ischemic stroke causing measurable  disabling deficit (Complete Hemianopia, Any Aphasia, Visual or Sensory Extinction, Any weakness limiting sustained effort against gravity)   []   Any remaining deficit considered potentially disabling in view of patient and practitioner   Exclusion criteria (Do not proceed with Alteplase if any are checked under exclusion criteria)  [x]   Onset unknown or GREATER than 4.5 hours   []   ICH on CT/MRI   []   CT demonstrates hypodensity representing acute or subacute infarct   []   Significant head trauma or prior stroke in the previous 3 months   []   Symptoms suggestive of subarachnoid hemorrhage   []   History of un-ruptured intracranial aneurysm GREATER than 10 mm   []   Recent intracranial or intraspinal surgery within the last 3 months   []   Arterial puncture at a non-compressible site in the previous 7 days   []   Active internal bleeding   []   Acute bleeding tendency   []   Platelet count LESS than 100,000 for known hematological diseases such as leukemia, thrombocytopenia or chronic cirrhosis   []   Current use of anticoagulant with INR GREATER than 1.7 or PT GREATER than 15 seconds, aPTT GREATER than 40 seconds   []   Heparin received within 48 hours, resulting in abnormally elevated aPTT GREATER than upper limit of normal   []   Current use of direct thrombin inhibitors or direct factor Xa inhibitors in the past 48 hours   []   Elevated blood pressure refractory to treatment (systolic GREATER than 185 mm/Hg or diastolic  GREATER than 110 mm/Hg   []   Suspected infective endocarditis and aortic arch dissection   []   Current use of therapeutic treatment dose of low-molecular-weight heparin (LMWH) within the previous 24 hours   []   Structural GI malignancy or bleed   Relative exclusion for all patients  []   Only minor non-disabling symptoms   []   Pregnancy   []   Seizure at onset with postictal residual neurological impairments   []   Major surgery or previous trauma within past 14 days   []   History of  previous spontaneous ICH, intracranial neoplasm, or AV malformation   []   Postpartum (within previous 14 days)   []   Recent GI or urinary tract hemorrhage (within previous 21 days)   []   Recent acute MI (within previous 3 months)   []   History of un-ruptured intracranial aneurysm LESS than 10 mm   []   History of ruptured intracranial aneurysm   []   Blood glucose LESS than 50 mg/dL (2.7 mmol/L)   []   Dural puncture within the last 7 days   []   Known GREATER than 10 cerebral microbleeds   Additional exclusions for patients with symptoms onset between 3 and 4.5 hours.  []   Age > 80.   []   On any anticoagulants regardless of INR  >>> Warfarin (Coumadin), Heparin, Enoxaparin (Lovenox), fondaparinux (Arixtra), bivalirudin (Angiomax), Argatroban, dabigatran (Pradaxa), rivaroxaban (Xarelto), or apixaban (Eliquis)   []   Severe stroke (NIHSS > 25).   []   History of BOTH diabetes and previous ischemic stroke.   []   The risks and benefits have been discussed with the patient or family related to the administration of IV Alteplase for stroke symptoms.   []   I have discussed and reviewed the patient's case and imaging with the attending prior to IV Alteplase.   Not given Time Alteplase administered       No past medical history on file.  No past surgical history on file.  No family history on file.  Social History     Socioeconomic History   • Marital status:      Spouse name: Not on file   • Number of children: Not on file   • Years of education: Not on file   • Highest education level: Not on file     Allergies   Allergen Reactions   • Penicillins Unknown - Low Severity     Pt states I dont know     Prior to Admission medications    Not on File       Hospital Meds:  Scheduled-    Infusions-     PRNs- •  sodium chloride    Functional Status Prior to Current Stroke/Leonarda Score: 0    NIH Stroke Scale  Time: 06:11 EST  Person Administering Scale: TAN Varghese    Interval: baseline  1a. Level of  Consciousness: 0-->Alert, keenly responsive  1b. LOC Questions: 1-->Answers one question correctly  1c. LOC Commands: 0-->Performs both tasks correctly  2. Best Gaze: 0-->Normal  3. Visual: 0-->No visual loss  4. Facial Palsy: 0-->Normal symmetrical movements  5a. Motor Arm, Left: 1-->Drift, limb holds 90 (or 45) degrees, but drifts down before full 10 seconds, does not hit bed or other support  5b. Motor Arm, Right: 1-->Drift, limb holds 90 (or 45) degrees, but drifts down before full 10 secs, does not hit bed or other support  6a. Motor Leg, Left: 0-->No drift, leg holds 30 degree position for full 5 secs  6b. Motor Leg, Right: 1-->Drift, leg falls by the end of the 5-sec period but does not hit bed  7. Limb Ataxia: 0-->Absent  8. Sensory: 0-->Normal, no sensory loss  9. Best Language: 1-->Mild-to-moderate aphasia, some obvious loss of fluency or facility of comprehension, without significant limitation on ideas expressed or form of expression. Reduction of speech and/or comprehension, however, makes conversation. . . (see row details)  10. Dysarthria: 1-->Mild-to-moderate dysarthria, patient slurs at least some words and, at worst, can be understood with some difficulty  11. Extinction and Inattention (formerly Neglect): 0-->No abnormality    Total (NIH Stroke Scale): 6      Results Reviewed:  I have personally reviewed current lab, radiology, and data and agree with results.  Lab Results (last 24 hours)     Procedure Component Value Units Date/Time    CBC & Differential [[id number]]  (Abnormal) Collected: 11/08/20 0521    Specimen: Blood Updated: 11/08/20 0557    Narrative:      The following orders were created for panel order CBC & Differential.  Procedure                               Abnormality         Status                     ---------                               -----------         ------                     CBC Auto Differential[[id number]]        Abnormal            Final result                 Please  view results for these tests on the individual orders.    CBC Auto Differential [[id number]]  (Abnormal) Collected: 11/08/20 0521    Specimen: Blood Updated: 11/08/20 0557     WBC 8.13 10*3/mm3      RBC 3.89 10*6/mm3      Hemoglobin 10.6 g/dL      Hematocrit 35.5 %      MCV 91.3 fL      MCH 27.2 pg      MCHC 29.9 g/dL      RDW 14.2 %      RDW-SD 47.3 fl      MPV 10.3 fL      Platelets 208 10*3/mm3      Neutrophil % 80.2 %      Lymphocyte % 9.6 %      Monocyte % 6.2 %      Eosinophil % 3.0 %      Basophil % 0.4 %      Immature Grans % 0.6 %      Neutrophils, Absolute 6.53 10*3/mm3      Lymphocytes, Absolute 0.78 10*3/mm3      Monocytes, Absolute 0.50 10*3/mm3      Eosinophils, Absolute 0.24 10*3/mm3      Basophils, Absolute 0.03 10*3/mm3      Immature Grans, Absolute 0.05 10*3/mm3      nRBC 0.0 /100 WBC     POC Creatinine [[id number]]  (Abnormal) Collected: 11/08/20 0533    Specimen: Blood Updated: 11/08/20 0552     Creatinine 1.60 mg/dL      Comment: Serial Number: [id number]:  [id number]       Light Blue Top [[id number]] Collected: 11/08/20 0521    Specimen: Blood Updated: 11/08/20 0550    Lavender Top [[id number]] Collected: 11/08/20 0521    Specimen: Blood Updated: 11/08/20 0549    Gold Top - SST [[id number]] Collected: 11/08/20 0521    Specimen: Blood Updated: 11/08/20 0537    [place] Draw [[id number]] Collected: 11/08/20 0521    Specimen: Blood Updated: 11/08/20 0537    Narrative:      The following orders were created for panel order [place] Draw.  Procedure                               Abnormality         Status                     ---------                               -----------         ------                     Light Blue Top[[id number]]                                   In process                 Green Top (Gel)[[id number]]                                  In process                 Lavender Top[[id number]]                                     In process                 Gold Top - SST[[id number]]                                    In process                   Please view results for these tests on the individual orders.    Comprehensive Metabolic Panel [[id number]] Collected: 11/08/20 0521    Specimen: Blood Updated: 11/08/20 0537    Troponin [[id number]] Collected: 11/08/20 0521    Specimen: Blood Updated: 11/08/20 0537    Magnesium [[id number]] Collected: 11/08/20 0521    Specimen: Blood Updated: 11/08/20 0537    Green Top (Gel) [[id number]] Collected: 11/08/20 0521    Specimen: Blood Updated: 11/08/20 0537        Imaging Results (Last 24 Hours)     Procedure Component Value Units Date/Time    XR Chest 1 View [[id number]] Resulted: 11/08/20 0558     Updated: 11/08/20 0608    CT Cerebral Perfusion With & Without Contrast [[id number]] Collected: 11/08/20 0605     Updated: 11/08/20 0607    Narrative:      CT CEREBRAL PERFUSION W WO CONTRAST    HISTORY:   70-year-old female with weakness confusion and history of stroke.    TECHNIQUE:   Axial CT images of the brain without and with intravenous contrast using cerebral perfusion protocol. Post-processing parametric maps were created using RAPID software and reviewed. Radiation dose reduction techniques included automated exposure control  or exposure modulation based on body size. CT and nuclear cardiology exams in the last 12 months: 0.    COMPARISON:   None available    FINDINGS:   Arterial input function is optimal.     Perfusion maps are symmetric without evidence of cerebral ischemia or core infarction.      Impression:      Normal brain perfusion CT.          Signer Name: Kelby Seals MD   Signed: 11/8/2020 6:05 AM   Workstation Name: [email or web address]    [facility]    CT Angiogram Head [[id number]] Resulted: 11/08/20 0536     Updated: 11/08/20 0551    CT Angiogram Neck [[id number]] Resulted: 11/08/20 0536     Updated: 11/08/20 0551    CT Head Without Contrast Stroke Protocol [[id number]] Collected: 11/08/20 0541     Updated: 11/08/20 0543    Narrative:      CT  Head WO Code Stroke    HISTORY:   70-year-old female with weakness and confusion. History of stroke. Acute stroke suspected.    TECHNIQUE:   Axial unenhanced head CT. Radiation dose reduction techniques included automated exposure control or exposure modulation based on body size. Count of known CT and cardiac nuc med studies performed in previous 12 months: 0.     Time of scan: 0527 hours    COMPARISON:   None.    FINDINGS:   No intracranial hemorrhage, mass, or infarct. No hydrocephalus or extra-axial fluid collection. There are senescent changes, including volume loss and nonspecific white matter change, but no acute abnormality is seen. The skull base, calvarium, and  extracranial soft tissues are normal.      Impression:      Senescent changes without acute abnormality.      NOTIFICATION: Critical Value/emergent results were called by telephone at the time of interpretation on 11/8/2020 5:39 AM to Janki CT technologist who verbally acknowledged these results per protocol.    Signer Name: Kelby Seals MD   Signed: 11/8/2020 5:41 AM   [email or web address]    [facility]             Assessment/Plan:    Zita Soto is a 70-year-old female with known medical diagnoses of hypertension, diabetes, and previous CVA.  She presents to [facility] ED with new onset generalized weakness, gait instability resulting in a fall, dysarthria and drowsiness.    1.  Weakness  -CVA versus metabolic versus infectious process  -Per EMS patient was hypotensive on arrival but has since improved and is perfusing with adequate blood pressure  -Initiate TIA/ischemic stroke standing order set  -Patient not a candidate for TPA due to symptom onset being greater than 4.5 hours prior to arrival  -CT head without contrast negative for acute intracranial abnormality  -CTA head and neck and CT cerebral perfusion reviewed with on-call stroke interventional list and no large vessel occlusion or reversible ischemia  -MRI  brain without ordered  -Check echo, lipid panel, hemoglobin A1c  -Patient takes aspirin 325 at home, continue  -Start high intensity statin for secondary stroke prevention  -Consider initiating Plavix 75 mg  -Normal saline at 75 cc/ hour  -UA/UCx    2.  Hypertension  -Initially hypotensive but now slightly hypertensive  -Allow for permissive hypertension    3.  Diabetes mellitus  -Management per hospitalist team  -Check hemoglobin A1c    4. Activity   -bedrest until after MRI  -PT/OT consulted for later today or tomorrow     5.Diet   -NPO until dysphagia screening   -healthy heart, consistent carb diet     Discussed this plan with patient, , nursing staff, on call interventionalist and ED physician. Thank you for this consult.         Suzanne Pryor, APRN  November 8, 2020  06:11 EST      Addendum-  Patient is a 70-year-old female with a history of hypertension, diabetes mellitus type 2, prior stroke who presented with dysarthria and generalized weakness.  As per  they are currently residing with their daughter and had been sleeping on a mattress in her living room.  Earlier today patient stood up and fell down as her legs gave out.  She did not lose consciousness but felt weak all over and also had mild slurring of speech with no word finding problems.  Her blood pressure monitored by the EMS was 90/50.  It has been running low ever since she got here.  She had a CT scan of the head that was unremarkable and a CT perfusion study that did not show any perfusion deficits.  CT angiogram of the head showed mild right A1 stenosis and CT angiogram of the neck showed plaques in bilateral carotid bifurcations with about 60% stenosis in the distal left common carotid artery as well as calcifications of the both carotid siphons.  MRI of the brain that I personally reviewed showed an old left posterior parietal infarct but no acute abnormalities.  Patient has been taking aspirin 325 mg at home.  She has a  normal white count with a GFR is reduced at 32.  UA also shows a mild UTI and she has been started on Rocephin.  Of note-when I went in to see the patient in the ER she was sitting on a bedside commode but could not get up due to weakness of her legs and had to be held up by 2 people.      Neurology Exam:    General apperance: as above    Mental status: Alert, awake and oriented to time place and person.    Recent and Remote memory:Intact    Language and Speech: Intact- No dysarthria.    Fluency, Naming , Repitition and Comprehension:  Intact    Cranial Nerves:   CN II: Visual fields are full. Intact. Fundi - Normal, No papillederma. Pupils - EVERT.   CN III, IV and VI: Extraocular movements are intact. Normal saccades.   CN V: Facial sensation is intact.   CN VII: Muscles of facial expression reveal no asymmetry. Intact.   CN VIII: Hearing is intact. Whispered voice intact.   CN IX and X: Palate elevates symmetrically. Intact  CN XI: Shoulder shrug is intact.   CN XII: Tongue is midline without evidence of atrophy or fasciculation.     Motor:  Right UE muscle strength 5/5. Normal tone.     Left UE muscle strength 5/5. Normal tone.      Right LE muscle strength4/5. Normal tone.     Left LE muscle strength 4/5. Normal tone.      Sensory: Normal light touch, vibration and pinprick sensation bilaterally.    DTRs: 2+ bilaterally in upper and lower extremities.    Babinski: Negative bilaterally.    Co-ordination: Normal finger-to-nose, heel to shin B/L.    Rhomberg: Negative.    Gait: deferred     Cardiovascular: Regular rate and rhythm without murmur, gallop or rub.    70-year-old female female with a past medical history of hypertension, prior stroke, diabetes mellitus type 2 presents with an episode of falling secondary to her legs giving out.  Blood pressure 90/50    1.  Orthostatic hypotension  Based on her symptoms I feel patient could have  autonomic dysfunction as a result of her diabetes further worsened by her  UTI (for which she is on Rocephin)  -MRI brain did not show any acute abnormalities.  Echocardiogram is still pending  -Whenever she feels stronger her orthostatics need to be checked and if positive she needs to be started on medication such as midodrine or fludrocortisone  -A1c is still pending  -She should also keep her self well-hydrated at home and start wearing above-knee moderate compression stockings for increased venous return    2.  Extracranial stenosis   I will add Plavix to her aspirin.After 3 months her Plavix can be stopped   We will also start her on Lipitor 40 mg daily.  Lipid panel still pending    Electronically signed by Nadia Wylie MD at 20 [id number]          Physical Therapy Notes (most recent note)      Celeste Morrison PT at 20 0908  Version 1 of 1         Patient Name: Zita Ramirez  : 1950    MRN: [id number]                              Today's Date: 2020       Admit Date: 2020    Visit Dx:     ICD-10-CM ICD-9-CM   1. Generalized weakness  R53.1 780.79   2. Difficulty with speech  R47.9 784.59   3. Renal insufficiency  N28.9 593.9   4. Urinary tract infection without hematuria, site unspecified  N39.0 599.0   5. Cognitive communication deficit  R41.841 799.52   6. Dysarthria  R47.1 784.51   7. Impaired mobility and ADLs  Z74.09 V49.89    Z78.9      Patient Active Problem List   Diagnosis   • TIA (transient ischemic attack)   • Essential hypertension   • Type 2 diabetes mellitus (CMS/HCC)     Past Medical History:   Diagnosis Date   • Diabetes mellitus (CMS/HCC)    • Hypertension    • Stroke (CMS/HCC)      Past Surgical History:   Procedure Laterality Date   • HYSTERECTOMY     • TUBAL ABDOMINAL LIGATION       General Information     Row Name 20 0458          Physical Therapy Time and Intention    Document Type  evaluation  -CD     Mode of Treatment  physical therapy  -CD     Row Name 20 9012          General Information    Patient Profile Reviewed  yes   -CD     Prior Level of Function  independent:;all household mobility;community mobility;gait;transfer;ADL's;bed mobility  -CD     Existing Precautions/Restrictions  fall GENERALIZED WEAKNESS AND FUNCTIONAL DECLINE. INCONTINENT - WEAR DEPENDS FOR MOBILITY.  -CD     Barriers to Rehab  none identified  -CD     Row Name 11/09/20 1358          Living Environment    Lives With  spouse  -CD     Row Name 11/09/20 1358          Home Main Entrance    Number of Stairs, Main Entrance  four  -CD     Stair Railings, Main Entrance  railing on left side (ascending)  -CD     Row Name 11/09/20 1358          Stairs Within Home, Primary    Number of Stairs, Within Home, Primary  other (see comments) 15  -CD     Stair Railings, Within Home, Primary  railing on left side (ascending)  -CD     Stairs Comment, Within Home, Primary  BEDROOM IS ON 2ND FLOOR.  -CD     Row Name 11/09/20 1358          Cognition    Orientation Status (Cognition)  oriented x 4  -CD     Row Name 11/09/20 1358          Safety Issues, Functional Mobility    Safety Issues Affecting Function (Mobility)  awareness of need for assistance;insight into deficits/self-awareness;safety precaution awareness;safety precautions follow-through/compliance  -CD     Impairments Affecting Function (Mobility)  balance;coordination;endurance/activity tolerance;strength  -CD     Comment, Safety Issues/Impairments (Mobility)  PT IS SLOW TO RESPOND BUT FOLLOWS COMMANDS. NOTED SLURRED SPEECH BUT SPOUSE REPORTS SPEECH IS BASELINE.  -CD       User Key  (r) = Recorded By, (t) = Taken By, (c) = Cosigned By    Initials Name Provider Type    CD Celeste Morrison, PT Physical Therapist        Mobility     Row Name 11/09/20 1400          Bed Mobility    Bed Mobility  supine-sit  -CD     Supine-Sit [place] (Bed Mobility)  minimum assist (75% patient effort);verbal cues  -CD     Assistive Device (Bed Mobility)  head of bed elevated  -CD     Comment (Bed Mobility)  INCREASED TIME AND EFFORT.  -CD      Row Name 11/09/20 1400          Transfers    Comment (Transfers)  CUES FOR HAND PLACEMENT.  -CD     Row Name 11/09/20 1400          Bed-Chair Transfer    Bed-Chair [place] (Transfers)  minimum assist (75% patient effort)  -CD     Assistive Device (Bed-Chair Transfers)  walker, front-wheeled  -CD     Row Name 11/09/20 1400          Sit-Stand Transfer    Sit-Stand [place] (Transfers)  contact guard;verbal cues  -CD     Assistive Device (Sit-Stand Transfers)  walker, front-wheeled  -CD     Row Name 11/09/20 1400          Gait/Stairs (Locomotion)    Comment (Gait/Stairs)  PT DECLINED DUE TO INCONTINENCE. RECOMMEND BRIEF FOR MOBILITY NEXT VISIT.  -CD       User Key  (r) = Recorded By, (t) = Taken By, (c) = Cosigned By    Initials Name Provider Type    Celeste Reed, PT Physical Therapist        Obj/Interventions     Row Name 11/09/20 1403          Range of Motion Comprehensive    General Range of Motion  bilateral lower extremity ROM WFL  -CD     Row Name 11/09/20 1403          Strength Comprehensive (MMT)    General Manual Muscle Testing (MMT) Assessment  lower extremity strength deficits identified  -CD     Comment, General Manual Muscle Testing (MMT) Assessment  B HIP FLEX 3+/5, B KNEE EXT/DF 4/5.  -CD     Row Name 11/09/20 1403          Motor Skills    Motor Skills  functional endurance  -CD     Functional Endurance  LIMITED BY FATIGUE/WEAKNESS.  -CD     Row Name 11/09/20 1403          Balance    Balance Assessment  sitting static balance  -CD     Static Sitting Balance  WFL  -CD     Dynamic Sitting Balance  mild impairment HAS DIFFICULTY WITH RECIPROCAL SCOOTING.  -CD     Static Standing Balance  mild impairment;supported  -CD     Dynamic Standing Balance  mild impairment;supported  -CD       User Key  (r) = Recorded By, (t) = Taken By, (c) = Cosigned By    Initials Name Provider Type    Celeste Reed, PT Physical Therapist        Goals/Plan     Row Name 11/09/20 1414          Bed Mobility Goal  1 (PT)    Activity/Assistive Device (Bed Mobility Goal 1, PT)  sit to supine/supine to sit  -CD     [place] Level/Cues Needed (Bed Mobility Goal 1, PT)  independent  -CD     Time Frame (Bed Mobility Goal 1, PT)  long term goal (LTG);2 weeks  -CD     Row Name 11/09/20 1414          Transfer Goal 1 (PT)    Activity/Assistive Device (Transfer Goal 1, PT)  sit-to-stand/stand-to-sit;bed-to-chair/chair-to-bed  -CD     [place] Level/Cues Needed (Transfer Goal 1, PT)  independent  -CD     Time Frame (Transfer Goal 1, PT)  long term goal (LTG);2 weeks  -CD     Row Name 11/09/20 1414          Gait Training Goal 1 (PT)    Activity/Assistive Device (Gait Training Goal 1, PT)  gait (walking locomotion);walker, rolling  -CD     [place] Level (Gait Training Goal 1, PT)  independent  -CD     Distance (Gait Training Goal 1, PT)  400 FEET.  -CD     Time Frame (Gait Training Goal 1, PT)  long term goal (LTG);2 weeks  -CD     Row Name 11/09/20 1414          Stairs Goal 1 (PT)    Activity/Assistive Device (Stairs Goal 1, PT)  ascending stairs;descending stairs;using handrail, left  -CD     [place] Level/Cues Needed (Stairs Goal 1, PT)  contact guard assist  -CD     Time Frame (Stairs Goal 1, PT)  long term goal (LTG);2 weeks  -CD       User Key  (r) = Recorded By, (t) = Taken By, (c) = Cosigned By    Initials Name Provider Type    CD Celeste Morrison PT Physical Therapist        Clinical Impression     Row Name 11/09/20 1407 11/09/20 1407       Pain Scale: Numbers Pre/Post-Treatment    Pretreatment Pain Rating  0/10 - no pain  -CD  0/10 - no pain  -CD    Posttreatment Pain Rating  0/10 - no pain  -CD  0/10 - no pain  -CD    Row Name 11/09/20 1401          Plan of Care Review    Plan of Care Reviewed With  patient;spouse  -CD     Outcome Summary  PT PRESENTS WITH EVOLVING SYMPTOMS TO INCLUDE IMPAIRED BALANCE, GENERALIZED WEAKNESS, AND DECLINE IN FUNCTIONAL MOBILITY. ORTHOSTATIC BLOOD PRESSURE STABLE AT TIME OF EVAL. PT  FOLLOWS COMMANDS BUT IS DELAYED AND DEMONSTRATES SLURRED SPEECH. SPOUSE REPORTS SPEECH IS BASELINE. RECOMMEND IRF AT D/C FOR RETURN TO MAX LEVEL OF FUNCTION PRIOR TO HOME.  -CD     Row Name 11/09/20 1407          Therapy Assessment/Plan (PT)    Patient/Family Therapy Goals Statement (PT)  DID NOT STATE.  -CD     Rehab Potential (PT)  good, to achieve stated therapy goals  -CD     Criteria for Skilled Interventions Met (PT)  yes  -CD     Row Name 11/09/20 1407          Vital Signs    Pre Systolic BP Rehab  121  -CD     Pre Treatment Diastolic BP  55  -CD     Intra Systolic BP Rehab  131  -CD     Intra Treatment Diastolic BP  55  -CD     Post Systolic BP Rehab  112  -CD     Post Treatment Diastolic BP  58  -CD     Pre SpO2 (%)  94  -CD     O2 Delivery Pre Treatment  supplemental O2  -CD     Intra SpO2 (%)  88  -CD     O2 Delivery Intra Treatment  room air  -CD     Post SpO2 (%)  95  -CD     O2 Delivery Post Treatment  supplemental O2  -CD     Pre Patient Position  Supine  -CD     Intra Patient Position  Standing  -CD     Post Patient Position  Sitting  -CD     Row Name 11/09/20 1407          Positioning and Restraints    Pre-Treatment Position  in bed  -CD     Post Treatment Position  chair  -CD     In Chair  call light within reach;encouraged to call for assist;exit alarm on;with family/caregiver;legs elevated;notified nsg  -CD       User Key  (r) = Recorded By, (t) = Taken By, (c) = Cosigned By    Initials Name Provider Type    CD Celeste Morrison, PT Physical Therapist        Outcome Measures     Row Name 11/09/20 1416          How much help from another person do you currently need...    Turning from your back to your side while in flat bed without using bedrails?  3  -CD     Moving from lying on back to sitting on the side of a flat bed without bedrails?  3  -CD     Moving to and from a bed to a chair (including a wheelchair)?  3  -CD     Standing up from a chair using your arms (e.g., wheelchair, bedside chair)?   3  -CD     Climbing 3-5 steps with a railing?  2  -CD     To walk in hospital room?  3  -CD     AM-PAC 6 Clicks Score (PT)  17  -CD     Row Name 11/09/20 1416          Modified [place] Scale    Modified [place] Scale  4 - Moderately severe disability.  Unable to walk without assistance, and unable to attend to own bodily needs without assistance.  -CD     Row Name 11/09/20 1416          Functional Assessment    Outcome Measure Options  AM-PAC 6 Clicks Basic Mobility (PT);Modified [place]  -CD       User Key  (r) = Recorded By, (t) = Taken By, (c) = Cosigned By    Initials Name Provider Type    Celeste Reed PT Physical Therapist        Physical Therapy Education                 Title: PT OT SLP Therapies (Done)     Topic: Physical Therapy (Done)     Point: Mobility training (Done)     Learning Progress Summary           Patient Acceptance, E, VU,NR by CD at 11/9/2020 1417    Comment: BENEFITS OF OOB ACTIVITY,SAFETY WITH MOBILITY, PROGRESSION OF POC, D/C PLANNING,   Significant Other Acceptance, E, VU,NR by CD at 11/9/2020 1417    Comment: BENEFITS OF OOB ACTIVITY,SAFETY WITH MOBILITY, PROGRESSION OF POC, D/C PLANNING,                   Point: Home exercise program (Done)     Learning Progress Summary           Patient Acceptance, E, VU,NR by CD at 11/9/2020 1417    Comment: BENEFITS OF OOB ACTIVITY,SAFETY WITH MOBILITY, PROGRESSION OF POC, D/C PLANNING,   Significant Other Acceptance, E, VU,NR by CD at 11/9/2020 1417    Comment: BENEFITS OF OOB ACTIVITY,SAFETY WITH MOBILITY, PROGRESSION OF POC, D/C PLANNING,                   Point: Body mechanics (Done)     Learning Progress Summary           Patient Acceptance, E, VU,NR by CD at 11/9/2020 1417    Comment: BENEFITS OF OOB ACTIVITY,SAFETY WITH MOBILITY, PROGRESSION OF POC, D/C PLANNING,   Significant Other Acceptance, E, VU,NR by CD at 11/9/2020 1417    Comment: BENEFITS OF OOB ACTIVITY,SAFETY WITH MOBILITY, PROGRESSION OF POC, D/C PLANNING,                    Point: Precautions (Done)     Learning Progress Summary           Patient Acceptance, E, VU,NR by CD at 11/9/2020 1417    Comment: BENEFITS OF OOB ACTIVITY,SAFETY WITH MOBILITY, PROGRESSION OF POC, D/C PLANNING,   Significant Other Acceptance, E, VU,NR by CD at 11/9/2020 1417    Comment: BENEFITS OF OOB ACTIVITY,SAFETY WITH MOBILITY, PROGRESSION OF POC, D/C PLANNING,                               User Key     Initials Effective Dates Name Provider Type Discipline    CD 06/19/15 -  Celeste Morrison PT Physical Therapist PT              PT Recommendation and Plan  Planned Therapy Interventions (PT): balance training, bed mobility training, gait training, home exercise program, transfer training, strengthening, stair training, patient/family education  Plan of Care Reviewed With: patient, spouse  Outcome Summary: PT PRESENTS WITH EVOLVING SYMPTOMS TO INCLUDE IMPAIRED BALANCE, GENERALIZED WEAKNESS, AND DECLINE IN FUNCTIONAL MOBILITY. ORTHOSTATIC BLOOD PRESSURE STABLE AT TIME OF EVAL. PT FOLLOWS COMMANDS BUT IS DELAYED AND DEMONSTRATES SLURRED SPEECH. SPOUSE REPORTS SPEECH IS BASELINE. RECOMMEND IRF AT D/C FOR RETURN TO MAX LEVEL OF FUNCTION PRIOR TO HOME.     Time Calculation:   PT Charges     Row Name 11/09/20 1420             Time Calculation    Start Time  0908  -CD      PT Received On  11/09/20  -CD      PT Goal Re-Cert Due Date  11/19/20  -CD         Time Calculation- PT    Total Timed Code Minutes- PT  17 minute(s)  -CD         Timed Charges    52254 - PT Therapeutic Activity Minutes  17  -CD        User Key  (r) = Recorded By, (t) = Taken By, (c) = Cosigned By    Initials Name Provider Type    CD Celeste Morrison PT Physical Therapist        Therapy Charges for Today     Code Description Service Date Service Provider Modifiers Qty    42408151002 HC PT THERAPEUTIC ACT EA 15 MIN 11/9/2020 Celeste Morrison, PT GP 1    26509739036 HC PT EVAL LOW COMPLEXITY 4 11/9/2020 Celeste Morrison, PT GP 1          PT G-Codes  Outcome  Measure Options: AM-PAC 6 Clicks Basic Mobility (PT), Modified [place]  AM-PAC 6 Clicks Score (PT): 17  Modified [place] Scale: 4 - Moderately severe disability.  Unable to walk without assistance, and unable to attend to own bodily needs without assistance.    Celeste Morrison, PT  2020      Electronically signed by Celeste Morrison, PT at 20 1422          Occupational Therapy Notes (most recent note)      Megan Woodard, OT at 20 1128          Patient Name: Zita Ramirez  : 1950    MRN: [id number]                              Today's Date: 2020       Admit Date: 2020    Visit Dx:     ICD-10-CM ICD-9-CM   1. Generalized weakness  R53.1 780.79   2. Difficulty with speech  R47.9 784.59   3. Renal insufficiency  N28.9 593.9   4. Urinary tract infection without hematuria, site unspecified  N39.0 599.0   5. Cognitive communication deficit  R41.841 799.52   6. Dysarthria  R47.1 784.51   7. Impaired mobility and ADLs  Z74.09 V49.89    Z78.9      Patient Active Problem List   Diagnosis   • TIA (transient ischemic attack)   • Essential hypertension   • Type 2 diabetes mellitus (CMS/HCC)     Past Medical History:   Diagnosis Date   • Diabetes mellitus (CMS/HCC)    • Hypertension    • Stroke (CMS/HCC)      Past Surgical History:   Procedure Laterality Date   • HYSTERECTOMY     • TUBAL ABDOMINAL LIGATION       General Information     Row Name 20 1026          OT Time and Intention    Document Type  evaluation  -SD     Mode of Treatment  occupational therapy  -SD     Row Name 20 1026          General Information    Patient Profile Reviewed  yes  -SD     Prior Level of Function  independent:;all household mobility;community mobility;ADL's  -SD     Existing Precautions/Restrictions  fall  -SD     Barriers to Rehab  none identified  -SD     Row Name 20 1026          Living Environment    Lives With  spouse  -SD     Row Name 20 1026          Home Main Entrance    Number of  Stairs, Main Entrance  four  -SD     Row Name 11/09/20 1026          Stairs Within Home, Primary    Number of Stairs, Within Home, Primary  other (see comments) pt. estimated 15  -SD     Stair Railings, Within Home, Primary  railings safe and in good condition  -SD     Stairs Comment, Within Home, Primary  pt. stated that her bedroon is on the 2nd floor  -SD     Row Name 11/09/20 1026          Cognition    Orientation Status (Cognition)  oriented x 4  -SD     Row Name 11/09/20 1026          Safety Issues, Functional Mobility    Safety Issues Affecting Function (Mobility)  awareness of need for assistance;insight into deficits/self-awareness;safety precautions follow-through/compliance;safety precaution awareness  -SD     Impairments Affecting Function (Mobility)  balance;coordination;endurance/activity tolerance;strength  -SD       User Key  (r) = Recorded By, (t) = Taken By, (c) = Cosigned By    Initials Name Provider Type    Megan Maharaj OT Occupational Therapist        Mobility/ADL's     Row Name 11/09/20 1115          Bed Mobility    Comment (Bed Mobility)  pt. UIC; pt. stated that getting out of the bed required more effort than usual  -SD     Row Name 11/09/20 1115          Transfers    Transfers  sit-stand transfer;toilet transfer  -SD     Sit-Stand [place] (Transfers)  minimum assist (75% patient effort);verbal cues  -SD     [place] Level (Toilet Transfer)  minimum assist (75% patient effort);verbal cues  -SD     Assistive Device (Toilet Transfer)  commode;grab bars/safety frame;raised toilet seat;walker, front-wheeled  -SD     Row Name 11/09/20 1115          Sit-Stand Transfer    Assistive Device (Sit-Stand Transfers)  walker, front-wheeled  -SD     Row Name 11/09/20 1115          Toilet Transfer    Type (Toilet Transfer)  sit-stand;stand-sit  -SD     Row Name 11/09/20 1115          Functional Mobility    Functional Mobility- Ind. Level  contact guard assist;verbal cues required  -SD      Functional Mobility- Device  rolling walker  -SD     Functional Mobility-Distance (Feet)  10  -SD     Functional Mobility- Safety Issues  balance decreased during turns;step length decreased  -SD     Row Name 11/09/20 1115          Activities of Daily Living    BADL Assessment/Intervention  upper body dressing;lower body dressing;grooming;toileting  -SD     Row Name 11/09/20 1115          Upper Body Dressing Assessment/Training    [place] Level (Upper Body Dressing)  doff;don;pajama/robe;minimum assist (75% patient effort)  -SD     Position (Upper Body Dressing)  supported sitting  -SD     Row Name 11/09/20 1115          Lower Body Dressing Assessment/Training    [place] Level (Lower Body Dressing)  doff;don;pants/bottoms;moderate assist (50% patient effort) underwear  -SD     Position (Lower Body Dressing)  supported sitting;supported standing  -SD     [facility] Name 11/09/20 1115          Grooming Assessment/Training    [place] Level (Grooming)  wash face, hands;set up  -SD     Position (Grooming)  supported sitting  -SD     [facility] Name 11/09/20 1115          Toileting Assessment/Training    [place] Level (Toileting)  perform perineal hygiene;set up;adjust/manage clothing;change pad/brief;moderate assist (50% patient effort)  -SD     Assistive Devices (Toileting)  commode;raised toilet seat;grab bar/safety frame  -SD     Position (Toileting)  supported sitting;supported standing  -SD       User Key  (r) = Recorded By, (t) = Taken By, (c) = Cosigned By    Initials Name Provider Type    SD Megan Woodard OT Occupational Therapist        Obj/Interventions     Row Name 11/09/20 1118          Sensory Assessment (Somatosensory)    Sensory Assessment (Somatosensory)  sensation intact  -SD     [facility] Name 11/09/20 1118          Vision Assessment/Intervention    Visual Impairment/Limitations  corrective lenses full-time;WFL  -SD     [facility] Name 11/09/20 1118          Range of Motion Comprehensive     General Range of Motion  bilateral upper extremity ROM WFL  -SD     Row Name 11/09/20 1118          Strength Comprehensive (MMT)    General Manual Muscle Testing (MMT) Assessment  upper extremity strength deficits identified  -SD     Comment, General Manual Muscle Testing (MMT) Assessment  B UE Strength: 4/5 grossly  -SD     Row Name 11/09/20 1118          Balance    Balance Assessment  sitting static balance;sitting dynamic balance;sit to stand dynamic balance;standing static balance;standing dynamic balance  -SD     Static Sitting Balance  WFL  -SD     Dynamic Sitting Balance  mild impairment  -SD     Sit to Stand Dynamic Balance  mild impairment  -SD     Static Standing Balance  mild impairment  -SD     Dynamic Standing Balance  mild impairment  -SD     Balance Interventions  occupation based/functional task;supported;standing;sitting;UE activity with balance activity  -SD       User Key  (r) = Recorded By, (t) = Taken By, (c) = Cosigned By    Initials Name Provider Type    Megan Maharaj OT Occupational Therapist        Goals/Plan     Row Name 11/09/20 1125          Bed Mobility Goal 1 (OT)    Activity/Assistive Device (Bed Mobility Goal 1, OT)  bed mobility activities, all  -SD     [place] Level/Cues Needed (Bed Mobility Goal 1, OT)  modified independence  -SD     Time Frame (Bed Mobility Goal 1, OT)  by discharge  -SD     Progress/Outcomes (Bed Mobility Goal 1, OT)  goal ongoing  -SD     Row Name 11/09/20 1125          Transfer Goal 1 (OT)    Activity/Assistive Device (Transfer Goal 1, OT)  sit-to-stand/stand-to-sit;bed-to-chair/chair-to-bed;toilet;shower chair;walker, rolling  -SD     [place] Level/Cues Needed (Transfer Goal 1, OT)  standby assist  -SD     Time Frame (Transfer Goal 1, OT)  by discharge  -SD     Progress/Outcome (Transfer Goal 1, OT)  goal ongoing  -SD     Row Name 11/09/20 1125          Toileting Goal 1 (OT)    Activity/Device (Toileting Goal 1, OT)  toileting skills,  all;grab bar/safety frame;commode;raised toilet seat  -SD     [place] Level/Cues Needed (Toileting Goal 1, OT)  modified independence  -SD     Time Frame (Toileting Goal 1, OT)  by discharge  -SD     Progress/Outcome (Toileting Goal 1, OT)  goal ongoing  -SD     Row Name 11/09/20 1125          Therapy Assessment/Plan (OT)    Planned Therapy Interventions (OT)  activity tolerance training;IADL retraining;functional balance retraining;patient/caregiver education/training;transfer/mobility retraining;strengthening exercise;occupation/activity based interventions  -SD       User Key  (r) = Recorded By, (t) = Taken By, (c) = Cosigned By    Initials Name Provider Type    Megan Maharaj, OT Occupational Therapist        Clinical Impression     Row Name 11/09/20 1120          Pain Assessment    Additional Documentation  Pain Scale: Numbers Pre/Post-Treatment (Group)  -SD     Row Name 11/09/20 1120          Pain Scale: Numbers Pre/Post-Treatment    Pretreatment Pain Rating  0/10 - no pain  -SD     Posttreatment Pain Rating  0/10 - no pain  -SD     Pain Intervention(s)  Repositioned;Ambulation/increased activity;Emotional support  -SD     Row Name 11/09/20 1120          Plan of Care Review    Plan of Care Reviewed With  patient;spouse  -SD     Progress  no change  -SD     Outcome Summary  OT IE completed. Pt. sitting up in b/s chair w/ present. Pt. reporting that she has been incontinent of urine during this hospital stay. STS from chair: min A using RW, LBD: mod A, toileting: min A, toilet t/f: CGA using RW, ambulating in room & bathroom: CGA using RW, standing balance: CGA. Pt. is appropriate for OT skilled services to address decreased ADL & functional mobility independence, decreased strength, and decreased occupational endurance. Recommend IPRF upon d/c.  -SD     Row Name 11/09/20 1120          Therapy Assessment/Plan (OT)    Patient/Family Therapy Goal Statement (OT)  return to OF  -SD     Rehab  Potential (OT)  good, to achieve stated therapy goals  -SD     Criteria for Skilled Therapeutic Interventions Met (OT)  yes;meets criteria;skilled treatment is necessary  -SD     Therapy Frequency (OT)  daily  -SD     Row Name 11/09/20 1120          Therapy Plan Review/Discharge Plan (OT)    Anticipated Discharge Disposition (OT)  inpatient rehabilitation facility  -SD     Row Name 11/09/20 1120          Vital Signs    Pre Systolic BP Rehab  112  -SD     Pre Treatment Diastolic BP  58  -SD     Post Systolic BP Rehab  131  -SD     Post Treatment Diastolic BP  58  -SD     Posttreatment Heart Rate (beats/min)  79  -SD     Intra SpO2 (%)  94  -SD     O2 Delivery Intra Treatment  room air  -SD     Post SpO2 (%)  100  -SD     O2 Delivery Post Treatment  supplemental O2  -SD     Pre Patient Position  Sitting  -SD     Intra Patient Position  Standing  -SD     Post Patient Position  Sitting  -SD     Row Name 11/09/20 1120          Positioning and Restraints    Pre-Treatment Position  sitting in chair/recliner  -SD     Post Treatment Position  chair  -SD     In Chair  notified nsg;reclined;call light within reach;encouraged to call for assist;exit alarm on;with family/caregiver;legs elevated  -SD       User Key  (r) = Recorded By, (t) = Taken By, (c) = Cosigned By    Initials Name Provider Type    Megan Maharaj, OT Occupational Therapist        Outcome Measures     Row Name 11/09/20 1026          How much help from another is currently needed...    Bathing (including washing, rinsing, and drying)  2  -SD     Toileting (which includes using toilet bed pan or urinal)  3  -SD     Putting on and taking off regular upper body clothing  3  -SD     Taking care of personal grooming (such as brushing teeth)  3  -SD     Eating meals  4  -SD     Row Name 11/09/20 1026          Functional Assessment    Outcome Measure Options  AM-PAC 6 Clicks Daily Activity (OT)  -SD       User Key  (r) = Recorded By, (t) = Taken By, (c) =  Cosigned By    Initials Name Provider Type    Megan Maharaj OT Occupational Therapist        Occupational Therapy Education                 Title: PT OT SLP Therapies (Done)     Topic: Occupational Therapy (Done)     Point: ADL training (Done)     Description:   Instruct learner(s) on proper safety adaptation and remediation techniques during self care or transfers.   Instruct in proper use of assistive devices.              Learning Progress Summary           Patient Acceptance, E, VU,NR by SD at 11/9/2020 1127    Comment: Pt. & spouse educated on role of OT, and both are agreeable with OT POC.   Significant Other Acceptance, E, VU,NR by SD at 11/9/2020 1127    Comment: Pt. & spouse educated on role of OT, and both are agreeable with OT POC.                   Point: Home exercise program (Done)     Description:   Instruct learner(s) on appropriate technique for monitoring, assisting and/or progressing therapeutic exercises/activities.              Learning Progress Summary           Patient Acceptance, E, VU,NR by SD at 11/9/2020 1127    Comment: Pt. & spouse educated on role of OT, and both are agreeable with OT POC.   Significant Other Acceptance, E, VU,NR by SD at 11/9/2020 1127    Comment: Pt. & spouse educated on role of OT, and both are agreeable with OT POC.                   Point: Precautions (Done)     Description:   Instruct learner(s) on prescribed precautions during self-care and functional transfers.              Learning Progress Summary           Patient Acceptance, E, VU,NR by SD at 11/9/2020 1127    Comment: Pt. & spouse educated on role of OT, and both are agreeable with OT POC.   Significant Other Acceptance, E, VU,NR by SD at 11/9/2020 1127    Comment: Pt. & spouse educated on role of OT, and both are agreeable with OT POC.                   Point: Body mechanics (Done)     Description:   Instruct learner(s) on proper positioning and spine alignment during self-care, functional  mobility activities and/or exercises.              Learning Progress Summary           Patient Acceptance, E, VU,NR by SD at 11/9/2020 1127    Comment: Pt. & spouse educated on role of OT, and both are agreeable with OT POC.   Significant Other Acceptance, E, VU,NR by SD at 11/9/2020 1127    Comment: Pt. & spouse educated on role of OT, and both are agreeable with OT POC.                               User Key     Initials Effective Dates Name Provider Type Discipline    SD 06/08/18 -  Megan Woodard OT Occupational Therapist OT              OT Recommendation and Plan  Planned Therapy Interventions (OT): activity tolerance training, IADL retraining, functional balance retraining, patient/caregiver education/training, transfer/mobility retraining, strengthening exercise, occupation/activity based interventions  Therapy Frequency (OT): daily  Plan of Care Review  Plan of Care Reviewed With: patient, spouse  Progress: no change  Outcome Summary: OT IE completed. Pt. sitting up in b/s chair w/ present. Pt. reporting that she has been incontinent of urine during this hospital stay. STS from chair: min A using RW, LBD: mod A, toileting: min A, toilet t/f: CGA using RW, ambulating in room & bathroom: CGA using RW, standing balance: CGA. Pt. is appropriate for OT skilled services to address decreased ADL & functional mobility independence, decreased strength, and decreased occupational endurance. Recommend IPRF upon d/c.     Time Calculation:   Time Calculation- OT     Row Name 11/09/20 1128             Time Calculation- OT    OT Received On  11/09/20  -SD      OT Goal Re-Cert Due Date  11/19/20  -SD        User Key  (r) = Recorded By, (t) = Taken By, (c) = Cosigned By    Initials Name Provider Type    SD Megan Woodadr OT Occupational Therapist        Therapy Charges for Today     Code Description Service Date Service Provider Modifiers Qty    43933129843 HC OT EVAL LOW COMPLEXITY 4 11/9/2020  Megan Woodard, OT GO 1               Megan Woodard OT  2020    Electronically signed by Megan Woodard OT at 20 1129          Speech Language Pathology Notes (most recent note)      Carolee Bird MS CCC-SLP at 11/10/20 1602          Acute Care - Speech Language Pathology Treatment Note   [facility]     Patient Name: Zita Ramirez  : 1950  MRN: [id number]  Today's Date: 11/10/2020               Admit Date: 2020     Visit Dx:    ICD-10-CM ICD-9-CM   1. Generalized weakness  R53.1 780.79   2. Difficulty with speech  R47.9 784.59   3. Renal insufficiency  N28.9 593.9   4. Urinary tract infection without hematuria, site unspecified  N39.0 599.0   5. Cognitive communication deficit  R41.841 799.52   6. Dysarthria  R47.1 784.51   7. Impaired mobility and ADLs  Z74.09 V49.89    Z78.9      Patient Active Problem List   Diagnosis   • TIA (transient ischemic attack)   • Essential hypertension   • Type 2 diabetes mellitus (CMS/HCC)     Past Medical History:   Diagnosis Date   • Diabetes mellitus (CMS/HCC)    • Hypertension    • Stroke (CMS/HCC)      Past Surgical History:   Procedure Laterality Date   • HYSTERECTOMY     • TUBAL ABDOMINAL LIGATION          SLP EVALUATION (last 72 hours)      SLP SLC Evaluation     Row Name 11/10/20 1500       Communication Assessment/Intervention    Document Type  therapy note (daily note)  -CJ    Subjective Information  no complaints  -CJ    Patient Observations  alert;cooperative  -CJ    Patient/Family/Caregiver Comments/Observations  spouse present at bedside  -CJ    Care Plan Review  care plan/treatment goals reviewed;patient/other agree to care plan  -CJ    Care Plan Review, Other Participant(s)  spouse  -CJ    Patient Effort  good  -CJ    Symptoms Noted During/After Treatment  none  -CJ       General Information    Patient Profile Reviewed  --    Pertinent History Of Current Problem  --    Precautions/Limitations, Vision  --     Precautions/Limitations, Hearing  --    Prior Level of Function-Communication  --    Plans/Goals Discussed with  --    Barriers to Rehab  --    Patient's Goals for Discharge  --       Pain    Additional Documentation  Pain Scale: FACES Pre/Post-Treatment (Group)  -       Pain Scale: FACES Pre/Post-Treatment    Pain: FACES Scale, Pretreatment  0-->no hurt  -    Posttreatment Pain Rating  0-->no hurt  -CJ       Comprehension Assessment/Intervention    Comprehension Assessment/Intervention  --       Auditory Comprehension Assessment/Intervention    Auditory Comprehension (Communication)  --    Able to Follow Commands (Communication)  --    Narrative Discourse  --       Reading Comprehension Assessment/Intervention    Reading Comprehension (Communication)  --    Functional Reading Tasks  --       Expression Assessment/Intervention    Expression Assessment/Intervention  --       Verbal Expression Assessment/Intervention    Verbal Expression  --    Automatic Speech (Communication)  --    Repetition  --    Phrase Completion  --    Responsive Naming  --    Confrontational Naming  --    Spontaneous/Functional Words  --    Sentence Formulation  --       Oral Motor Structure and Function    Oral Motor Structure and Function  --       Motor Speech Assessment/Intervention    Motor Speech Function  --    Characteristics Consistent with Dysarthria  --       Cursory Voice Assessment/Intervention    Quality and Resonance (Voice)  --       Speaking Valve    Respiratory Status  --       Cognitive Assessment Intervention- SLP    Cognitive Function (Cognition)  --    Orientation Status (Cognition)  --    Memory (Cognitive)  --    Attention (Cognitive)  --    Thought Organization (Cognitive)  --    Reasoning (Cognitive)  --    Problem Solving (Cognitive)  --    Functional Math (Cognitive)  --    Executive Function (Cognition)  --    Pragmatics (Communication)  --       SLP Clinical Impressions    Daily Summary of Progress (SLP)   progress toward functional goals is good  -    SLP Diagnosis  --    Rehab Potential/Prognosis  excellent  -    SLC Criteria for Skilled Therapy Interventions Met  yes  -    Functional Impact  functional impact in social situations;functional impact in ADLs;difficulty in expressing complex messages;restrictions in personal and social life  -    Plan for Continued Treatment (SLP)  Pt actively particiapted in cog-comm tx this pm at bedside. Provided handouts for continued exercises to address deficits for continued carryover. She is eager to improve. SLP will continue to f/u to address deficits.  -       Recommendations    Therapy Frequency (SLP SLC)  5 days per week  -    Predicted Duration Therapy Intervention (Days)  until discharge  -    Anticipated Discharge Disposition (SLP)  home with assist;anticipate therapy at next level of care  -       SLP Discharge Summary    Discharge Destination  --      User Key  (r) = Recorded By, (t) = Taken By, (c) = Cosigned By    Initials Name Effective Dates     Carolee Bird, MS CCC-SLP 02/11/20 -     Alyce Chase MS CCC-SLP 08/09/20 -              EDUCATION  The patient has been educated in the following areas:     Cognitive Impairment Communication Impairment.    SLP Recommendation and Plan           SLC Criteria for Skilled Therapy Interventions Met: yes  Anticipated Discharge Disposition (SLP): home with assist, anticipate therapy at next level of care        Predicted Duration Therapy Intervention (Days): until discharge  Daily Summary of Progress (SLP): progress toward functional goals is good  Plan for Continued Treatment (SLP): Pt actively particiapted in cog-comm tx this pm at bedside. Provided handouts for continued exercises to address deficits for continued carryover. She is eager to improve. SLP will continue to f/u to address deficits.             Plan of Care Reviewed With: patient, spouse      SLP GOALS     Row Name 11/10/20 1500  11/09/20 1000          Word Retrieval Skills Goal 1 (SLP)    Improve Word Retrieval Skills By Goal 1 (SLP)  low frequency;responsive naming task;supplying an antonym;supplying a synonym;completing a divergent task;80%;with minimal cues (75-90%)  -CJ  low frequency;responsive naming task;supplying an antonym;supplying a synonym;completing a divergent task;80%;with minimal cues (75-90%)  -CH     Time Frame (Word Retrieval Goal 1, SLP)  by discharge  -CJ  by discharge  -CH     Progress (Word Retrieval Skills Goal 1, SLP)  70%;with moderate cues (50-74%)  -CJ  --     Progress/Outcomes (Word Retrieval Goal 1, SLP)  continuing progress toward goal  -CJ  --        Connected Speech to Express Thoughts Goal 1 (SLP)    Improve Narrative Discourse to Express Thoughts By Goal 1 (SLP)  explaining a proverb or idiom;giving multiple definitions of a word;80%;with minimal cues (75-90%)  -CJ  explaining a proverb or idiom;giving multiple definitions of a word;80%;with minimal cues (75-90%)  -CH     Time Frame (Connected Speech Goal 1, SLP)  by discharge  -CJ  by discharge  -CH     Progress (Connected Speech Goal 1, SLP)  80%;with minimal cues (75-90%)  -CJ  --     Progress/Outcomes (Connected Speech Goal 1, SLP)  continuing progress toward goal  -CJ  --        Articulation Goal 1 (SLP)    Improve Articulation Goal 1 (SLP)  by over-articulating in connected speech;80%;with minimal cues (75-90%)  -CJ  by over-articulating in connected speech;80%;with minimal cues (75-90%)  -CH     Time Frame (Articulation Goal 1, SLP)  by discharge  -CJ  by discharge  -CH     Progress (Articulation Goal 1, SLP)  80%;with minimal cues (75-90%)  -CJ  --     Progress/Outcomes (Articulation Goal 1, SLP)  continuing progress toward goal  -CJ  --        Memory Skills Goal 1 (SLP)    Improve Memory Skills Through Goal 1 (SLP)  recalling unrelated word lists immediately;recalling unrelated word lists with an imposed delay;recall details from a word  list;80%;with minimal cues (75-90%)  -CJ  recalling unrelated word lists immediately;recalling unrelated word lists with an imposed delay;recall details from a word list;80%;with minimal cues (75-90%)  -CH     Time Frame (Memory Skills Goal 1, SLP)  by discharge  -CJ  by discharge  -CH     Progress (Memory Skills Goal 1, SLP)  80%;with minimal cues (75-90%)  -CJ  --     Progress/Outcomes (Memory Skills Goal 1, SLP)  continuing progress toward goal  -CJ  --     Comment (Memory Skills Goal 1, SLP)  more difficulty w/ unrelated words after imposed delay.  -CJ  --        Organizational Skills Goal 2 (SLP)    Improve Thought Organization Through Goal 2 (SLP)  completing a divergent naming task;completing mental manipulation task;80%;with minimal cues (75-90%)  -CJ  completing a divergent naming task;completing mental manipulation task;80%;with minimal cues (75-90%)  -CH     Time Frame (Thought Organization Skills Goal 2, SLP)  by discharge  -CJ  by discharge  -CH     Progress (Thought Organization Skills Goal 2, SLP)  60%;with minimal cues (75-90%)  -CJ  --     Progress/Outcomes (Thought Organization Skills Goal 2, SLP)  continuing progress toward goal  -CJ  --     Comment (Thought Organization Skills Goal 2, SLP)  difficulty w/ divergent naming w/ abstract categories and mental manipulation tasks.  -CJ  --        Additional Goal 1 (SLP)    Additional Goal 1, SLP  LTG: Pt. will improve cognitive linguistic skills to WFL w/o verbal cues in order to improve safety and independence at the next level of care.   -CJ  LTG: Pt. will improve cognitive linguistic skills to WFL w/o verbal cues in order to improve safety and independence at the next level of care.   -     Time Frame (Additional Goal 1, SLP)  by discharge  -CJ  by discharge  -CH       User Key  (r) = Recorded By, (t) = Taken By, (c) = Cosigned By    Initials Name Provider Type    Carolee Mckeon MS CCC-SLP Speech and Language Pathologist    DESTINI Mcneil,  MS Alyce CCC-SLP Speech and Language Pathologist                  Time Calculation:     Time Calculation- SLP     Row Name 11/10/20 1601             Time Calculation- SLP    SLP Start Time  1500  -      SLP Received On  11/10/20  -        User Key  (r) = Recorded By, (t) = Taken By, (c) = Cosigned By    Initials Name Provider Type    Carolee Mckeon MS CCC-ARNIE Speech and Language Pathologist          Therapy Charges for Today     Code Description Service Date Service Provider Modifiers Qty    [id number]  ST TREATMENT SPEECH 3 11/10/2020 Carolee Bird MS CCC-SLP GN 1        Patient was not wearing a face mask and did not exhibit coughing during this therapy encounter.  Procedure performed was not aerosolizing, involved close contact (within 6 feet for at least 15 minutes or longer), and did not involve contact with infectious secretions or specimens.  Therapist used appropriate personal protective equipment including gloves, standard procedure mask and eye protection.  Appropriate PPE was worn during the entire therapy session.  Hand hygiene was completed before and after therapy session.                MS ALCON Bonds  11/10/2020    Electronically signed by Carolee Bird MS CCC-ARNIE at 11/10/20 160

## 2020-11-10 NOTE — PROGRESS NOTES
Neurology       Patient Care Team:  System, Provider Not In as PCP - General    Chief complaint: Orthostatic syncope    History: Patient's blood pressure is now up to 165/87 on no medications.    She is able to stand with significantly less difficulty.    Overnight there were no significant orthostatic drops.      Past Medical History:   Diagnosis Date   • Diabetes mellitus (CMS/[facility])    • Hypertension    • Stroke (CMS/[facility])        Vital Signs   Vitals:    11/10/20 0647 11/10/20 0900 11/10/20 1133 11/10/20 1300   BP: 171/82  165/87    BP Location: Right arm  Right arm    Patient Position: Lying  Lying    Pulse: 77 94 84 82   Resp: 18  18    Temp: 98.2 °F (36.8 °C)  98 °F (36.7 °C)    TempSrc: Oral  Oral    SpO2: 93%  96%    Weight:       Height:           Physical Exam:   General: Pleasant and alert              Neuro: Oriented to person place and time    Speech is normal.    Moves all extremities well.        Results Review:  Carotid duplex shows 0 to 49% stenosis bilaterally.  Results from last 7 days   Lab Units 11/08/20  0521   WBC 10*3/mm3 8.13   HEMOGLOBIN g/dL 10.6*   HEMATOCRIT % 35.5   PLATELETS 10*3/mm3 208     Results from last 7 days   Lab Units 11/10/20  0601 11/09/20  0529 11/08/20  0533 11/08/20  0521   SODIUM mmol/L 139 138  --  137   POTASSIUM mmol/L 5.1 4.6  --  4.2   CHLORIDE mmol/L 107 109*  --  103   CO2 mmol/L 24.0 22.0  --  21.0*   BUN mg/dL 20 23  --  36*   CREATININE mg/dL 1.07* 1.17* 1.60* 1.61*   CALCIUM mg/dL 8.7 8.2*  --  9.1   BILIRUBIN mg/dL  --   --   --  0.2   ALK PHOS U/L  --   --   --  85   ALT (SGPT) U/L  --   --   --  12   AST (SGOT) U/L  --   --   --  14   GLUCOSE mg/dL 196* 150*  --  173*       Imaging Results (Last 24 Hours)     ** No results found for the last 24 hours. **          Assessment:  Diabetic peripheral neuropathy.    Orthostatic hypotension, multifactorial    Hypertension.    Asymptomatic noncritical carotid disease bilaterally    Plan:  Patient would clearly  benefit from inpatient rehab.    Comment:  Okay to discharge when bed available and blood pressure is under control         I discussed the patients findings and my recommendations with patient, family and primary care team    Bryn Tobias MD  11/10/20  14:15 EST

## 2020-11-10 NOTE — PROGRESS NOTES
Continued Stay Note   [place]     Patient Name: Zita Ramirez  MRN: [id number]  Today's Date: 11/10/2020    Admit Date: 11/8/2020    Discharge Plan     Row Name 11/10/20 1618       Plan    Plan  Rehab    Plan Comments  I have spoken with patient and her daughter, Rossy who was at bedside. The [facility] for rehab was unable to accept. They have requested a referral to [facility]. Direct line tele [phone], fax [phone]. I spoke with kane Meeks. This is acute rehab only. I have sent a request for rehab (PT and OT) to work with patient tomorrow. If patient needing subacute rehab, referral will need to be made. Jeanna tells me they will get back tomorrow with us once they receive the updated notes from tomorrow.         Final Discharge Disposition Code  62 - inpatient rehab facility        Discharge Codes    No documentation.             Diana Maldonado RN

## 2020-11-10 NOTE — PLAN OF CARE
Problem: Adult Inpatient Plan of Care  Goal: Plan of Care Review  Outcome: Ongoing, Progressing  Flowsheets (Taken 11/10/2020 1600)  Plan of Care Reviewed With:   patient   spouse   SLP treatment completed. Will continue to address cog-comm deficits. Please see note for further details and recommendations.

## 2020-11-11 LAB
GLUCOSE BLDC GLUCOMTR-MCNC: 168 MG/DL (ref 70–130)
GLUCOSE BLDC GLUCOMTR-MCNC: 176 MG/DL (ref 70–130)
GLUCOSE BLDC GLUCOMTR-MCNC: 226 MG/DL (ref 70–130)
GLUCOSE BLDC GLUCOMTR-MCNC: 241 MG/DL (ref 70–130)

## 2020-11-11 PROCEDURE — 82962 GLUCOSE BLOOD TEST: CPT

## 2020-11-11 PROCEDURE — 97530 THERAPEUTIC ACTIVITIES: CPT

## 2020-11-11 PROCEDURE — 96366 THER/PROPH/DIAG IV INF ADDON: CPT

## 2020-11-11 PROCEDURE — 97116 GAIT TRAINING THERAPY: CPT

## 2020-11-11 PROCEDURE — 92507 TX SP LANG VOICE COMM INDIV: CPT

## 2020-11-11 PROCEDURE — G0378 HOSPITAL OBSERVATION PER HR: HCPCS

## 2020-11-11 PROCEDURE — 25010000002 CEFTRIAXONE PER 250 MG: Performed by: INTERNAL MEDICINE

## 2020-11-11 PROCEDURE — 63710000001 INSULIN LISPRO (HUMAN) PER 5 UNITS: Performed by: INTERNAL MEDICINE

## 2020-11-11 PROCEDURE — 99225 PR SBSQ OBSERVATION CARE/DAY 25 MINUTES: CPT | Performed by: INTERNAL MEDICINE

## 2020-11-11 PROCEDURE — 99213 OFFICE O/P EST LOW 20 MIN: CPT | Performed by: PSYCHIATRY & NEUROLOGY

## 2020-11-11 RX ORDER — LOSARTAN POTASSIUM 50 MG/1
50 TABLET ORAL
Status: DISCONTINUED | OUTPATIENT
Start: 2020-11-12 | End: 2020-11-12 | Stop reason: HOSPADM

## 2020-11-11 RX ORDER — AMLODIPINE BESYLATE 5 MG/1
5 TABLET ORAL
Status: DISCONTINUED | OUTPATIENT
Start: 2020-11-12 | End: 2020-11-12 | Stop reason: HOSPADM

## 2020-11-11 RX ORDER — LOSARTAN POTASSIUM 50 MG/1
100 TABLET ORAL
Status: DISCONTINUED | OUTPATIENT
Start: 2020-11-12 | End: 2020-11-11

## 2020-11-11 RX ADMIN — CEFTRIAXONE SODIUM 1 G: 1 INJECTION, POWDER, FOR SOLUTION INTRAMUSCULAR; INTRAVENOUS at 09:16

## 2020-11-11 RX ADMIN — METOPROLOL TARTRATE 50 MG: 50 TABLET, FILM COATED ORAL at 20:39

## 2020-11-11 RX ADMIN — ASPIRIN 325 MG ORAL TABLET 325 MG: 325 PILL ORAL at 09:13

## 2020-11-11 RX ADMIN — CYANOCOBALAMIN TAB 1000 MCG 500 MCG: 1000 TAB at 09:37

## 2020-11-11 RX ADMIN — METOPROLOL TARTRATE 50 MG: 50 TABLET, FILM COATED ORAL at 09:14

## 2020-11-11 RX ADMIN — FERROUS SULFATE TAB 325 MG (65 MG ELEMENTAL FE) 325 MG: 325 (65 FE) TAB at 09:13

## 2020-11-11 RX ADMIN — AMLODIPINE BESYLATE 2.5 MG: 2.5 TABLET ORAL at 09:13

## 2020-11-11 RX ADMIN — SODIUM CHLORIDE, PRESERVATIVE FREE 10 ML: 5 INJECTION INTRAVENOUS at 09:15

## 2020-11-11 RX ADMIN — Medication 5000 UNITS: at 09:34

## 2020-11-11 RX ADMIN — LOSARTAN POTASSIUM 50 MG: 50 TABLET, FILM COATED ORAL at 09:14

## 2020-11-11 RX ADMIN — SODIUM CHLORIDE, PRESERVATIVE FREE 10 ML: 5 INJECTION INTRAVENOUS at 20:39

## 2020-11-11 RX ADMIN — SERTRALINE HYDROCHLORIDE 100 MG: 100 TABLET ORAL at 09:34

## 2020-11-11 RX ADMIN — INSULIN LISPRO 2 UNITS: 100 INJECTION, SOLUTION INTRAVENOUS; SUBCUTANEOUS at 09:15

## 2020-11-11 RX ADMIN — PANTOPRAZOLE SODIUM 40 MG: 40 TABLET, DELAYED RELEASE ORAL at 09:15

## 2020-11-11 RX ADMIN — INSULIN LISPRO 3 UNITS: 100 INJECTION, SOLUTION INTRAVENOUS; SUBCUTANEOUS at 12:34

## 2020-11-11 RX ADMIN — ATORVASTATIN CALCIUM 40 MG: 40 TABLET, FILM COATED ORAL at 20:39

## 2020-11-11 RX ADMIN — INSULIN LISPRO 2 UNITS: 100 INJECTION, SOLUTION INTRAVENOUS; SUBCUTANEOUS at 17:44

## 2020-11-11 NOTE — PLAN OF CARE
Problem: Adult Inpatient Plan of Care  Goal: Plan of Care Review  Recent Flowsheet Documentation  Taken 11/11/2020 1022 by Celeste Morrison, PT  Progress: improving  Plan of Care Reviewed With: patient  Outcome Summary: PT AMBUALTED 300 FEET WITH R WALKER AND CGA. NEEDS CUES FOR SAFETY/WALKER PLACEMENT DURING TRANSFERS, ADL'S AND GAIT IN [place]. PT INCONTINENT AND REQUIRES BRIEF FOR MOBILITY. RECOMMEND IRF PRIOR TO HOME .

## 2020-11-11 NOTE — PROGRESS NOTES
Continued Stay Note   [place]     Patient Name: Zita Ramirez  MRN: [id number]  Today's Date: 11/11/2020    Admit Date: 11/8/2020    Discharge Plan     Row Name 11/11/20 0955       Plan    Plan  Rehab    Patient/Family in Agreement with Plan  other (see comments)    Plan Comments  Received call from [facility] ([phone]) and they are awaiting updated PT/OT from today and will start precert. CM will fax updated notes to [phone]. Sophie Rossi RN,  ext. [phone]        Discharge Codes    No documentation.             Sophie Rossi RN

## 2020-11-11 NOTE — THERAPY TREATMENT NOTE
Acute Care - Speech Language Pathology Treatment Note  [facility]     Patient Name: Zita Ramirez  : 1950  MRN: [id number]  Today's Date: 2020               Admit Date: 2020     Visit Dx:    ICD-10-CM ICD-9-CM   1. Generalized weakness  R53.1 780.79   2. Difficulty with speech  R47.9 784.59   3. Renal insufficiency  N28.9 593.9   4. Urinary tract infection without hematuria, site unspecified  N39.0 599.0   5. Cognitive communication deficit  R41.841 799.52   6. Dysarthria  R47.1 784.51   7. Impaired mobility and ADLs  Z74.09 V49.89    Z78.9      Patient Active Problem List   Diagnosis   • TIA (transient ischemic attack)   • Essential hypertension   • Type 2 diabetes mellitus (CMS/HCC)     Past Medical History:   Diagnosis Date   • Diabetes mellitus (CMS/HCC)    • Hypertension    • Stroke (CMS/HCC)      Past Surgical History:   Procedure Laterality Date   • HYSTERECTOMY     • TUBAL ABDOMINAL LIGATION          SLP EVALUATION (last 72 hours)      SLP SLC Evaluation     Row Name 20 1445 11/10/20 1500 20 1000             Communication Assessment/Intervention    Document Type  therapy note (daily note)  (Pended)   -  therapy note (daily note)  -  evaluation  -      Subjective Information  no complaints  (Pended)   -  no complaints  -  no complaints  -      Patient Observations  alert;cooperative;agree to therapy  (Pended)   -  alert;cooperative  -  alert;cooperative;agree to therapy  -      Patient/Family/Caregiver Comments/Observations  No family present  (Pended)   -  spouse present at bedside  -  Spouse present  -      Care Plan Review  care plan/treatment goals reviewed;risks/benefits reviewed;current/potential barriers reviewed;patient/other agree to care plan  (Pended)   -  care plan/treatment goals reviewed;patient/other agree to care plan  -  --      Care Plan Review, Other Participant(s)  --  spouse  -  --      Patient Effort  excellent  (Pended)   -   good  -CJ  good  -      Symptoms Noted During/After Treatment  none  (Pended)   -MW  none  -CJ  --         General Information    Patient Profile Reviewed  yes  (Pended)   -  --  yes  -      Pertinent History Of Current Problem  --  --  Dysarthria, general wakness, dizziness, light headedness. SLC evluation completed per stroke protocol.  MRI, no acute findings.   -      Precautions/Limitations, Vision  --  --  WFL;for purposes of eval  -CH      Precautions/Limitations, Hearing  --  --  WFL;for purposes of eval  -      Prior Level of Function-Communication  --  --  L  -      Plans/Goals Discussed with  --  --  patient;spouse/S.O.;agreed upon  -      Barriers to Rehab  --  --  none identified  -      Patient's Goals for Discharge  --  --  return to home;return to all previous roles/activities  -         Pain    Additional Documentation  --  Pain Scale: FACES Pre/Post-Treatment (Group)  -  Pain Scale: FACES Pre/Post-Treatment (Group)  -         Pain Scale: FACES Pre/Post-Treatment    Pain: FACES Scale, Pretreatment  --  0-->no hurt  -  0-->no hurt  -      Posttreatment Pain Rating  --  0-->no hurt  -CJ  0-->no hurt  -         Comprehension Assessment/Intervention    Comprehension Assessment/Intervention  --  --  Auditory Comprehension;Reading Comprehension  -         Auditory Comprehension Assessment/Intervention    Auditory Comprehension (Communication)  --  --  [facility]  -      Able to Follow Commands (Communication)  --  --  3-step;[facility]  -      Narrative Discourse  --  --  conversational level;WFL  -         Reading Comprehension Assessment/Intervention    Reading Comprehension (Communication)  --  --  WFL  -      Functional Reading Tasks  --  --  WFL  -         Expression Assessment/Intervention    Expression Assessment/Intervention  --  --  verbal expression  -         Verbal Expression Assessment/Intervention    Verbal Expression  --  --  mild impairment  -      Automatic  Speech (Communication)  --  --  response to greeting;[facility]  -      Repetition  --  --  sentences;[facility]  -      Phrase Completion  --  --  unpredictable;[facility]  -      Responsive Naming  --  --  complex;mild impairment  -CH      Confrontational Naming  --  --  low frequency;L  -      Spontaneous/Functional Words  --  --  [facility]  -      Sentence Formulation  --  --  complex;mild impairment  -CH         Oral Motor Structure and Function    Oral Motor Structure and Function  --  --  WFL  -         Motor Speech Assessment/Intervention    Motor Speech Function  --  --  mild impairment  -CH      Characteristics Consistent with Dysarthria  --  --  slow rate;decreased articulation  -         Cursory Voice Assessment/Intervention    Quality and Resonance (Voice)  --  --  [facility]  -         Speaking Valve    Respiratory Status  --  --  [facility]  -         Cognitive Assessment Intervention- SLP    Cognitive Function (Cognition)  --  --  mild impairment  -      Orientation Status (Cognition)  --  --  awareness of basic personal information;person;place;time;situation;[facility]  -      Memory (Cognitive)  --  --  simple;functional;immediate;long-term;WFL;short-term;delayed;mild impairment  -CH      Attention (Cognitive)  --  --  selective;sustained;attention to detail;[facility]  -      Thought Organization (Cognitive)  --  --  concrete divergent;abstract divergent;concrete convergent;abstract convergent;mild impairment  -CH      Reasoning (Cognitive)  --  --  simple;deductive;L  -      Problem Solving (Cognitive)  --  --  simple;temporal;[facility]  -      Functional Math (Cognitive)  --  --  simple;word problems;[facility]  -      Executive Function (Cognition)  --  --  [facility]  -      Pragmatics (Communication)  --  --  L  -         SLP Clinical Impressions    Daily Summary of Progress (SLP)  progress toward functional goals is good  (Pended)   -  progress toward functional goals is good  -  --      SLP Diagnosis  --  --  Patient  presents with a mild cognitive linguistic impairment as evidenced by difficulty with recall, thought organization,  word finding and mildly dysarthric speech.   -      Rehab Potential/Prognosis  excellent  (Pended)   -MW  excellent  -CJ  excellent  -      SLC Criteria for Skilled Therapy Interventions Met  yes  (Pended)   -MW  yes  -CJ  yes  -      Functional Impact  functional impact in social situations;functional impact in ADLs;difficulty in expressing complex messages;restrictions in personal and social life  (Pended)   -MW  functional impact in social situations;functional impact in ADLs;difficulty in expressing complex messages;restrictions in personal and social life  -CJ  functional impact in social situations;functional impact in ADLs;difficulty in expressing complex messages;restrictions in personal and social life  -      Plan for Continued Treatment (SLP)  Pt seen this pm for cog-communication tx. Pt alert and willingly participated in treatment. Pt eager to improve, worked on divergent naming, delayed recall, providing synonyms/antonyms, and giving multiple definitions of a word. Will cont to follow, address deficits, and prov. additional reinforcement/stratgeies as needed.  (Pended)   -  Pt actively particiapted in cog-comm tx this pm at bedside. Provided handouts for continued exercises to address deficits for continued carryover. She is eager to improve. SLP will continue to f/u to address deficits.  -  --         Recommendations    Therapy Frequency (SLP SLC)  5 days per week  (Pended)   -  5 days per week  -CJ  5 days per week  -      Predicted Duration Therapy Intervention (Days)  until discharge  (Pended)   -MW  until discharge  -CJ  until discharge  -      Anticipated Discharge Disposition (SLP)  home with assist;anticipate therapy at next level of care  (Pended)   -MW  home with assist;anticipate therapy at next level of care  -CJ  home with assist  -         Communication  Treatment Objective and Progress Goals (SLP)    Verbal Expression Treatment Objectives  Verbal Expression Treatment Objectives (Group)  (Pended)   -MW  --  --      Motor Speech/Dysarthria Treatment Objectives  Motor Speech/Dysarthria Treatment Objectives (Group)  (Pended)   -MW  --  --      Cognitive Linguistic Treatment Objectives  Cognitive Linguistic Treatment Objectives (Group)  (Pended)   -MW  --  --      Additional Goals  Additional Goals (Group)  (Pended)   -MW  --  --         Verbal Expression Treatment Objectives    Word Retrieval Skills Selection  word retrieval, SLP goal 1  (Pended)   -MW  --  --      Connected Speech Selection  connected speech, SLP goal 1  (Pended)   -  --  --         Word Retrieval Skills Goal 1 (SLP)    Improve Word Retrieval Skills By Goal 1 (SLP)  low frequency;responsive naming task;supplying an antonym;supplying a synonym;completing a divergent task;80%;with minimal cues (75-90%)  (Pended)   -  --  --      Time Frame (Word Retrieval Goal 1, SLP)  by discharge  (Pended)   -  --  --      Progress (Word Retrieval Skills Goal 1, SLP)  90%;with minimal cues (75-90%)  (Pended)   -MW  --  --      Progress/Outcomes (Word Retrieval Goal 1, SLP)  continuing progress toward goal  (Pended)   -  --  --      Comment (Word Retrieval Goal 1, SLP)  focused on supplying synonyms and antonyms for a list of given words  (Pended)   -  --  --         Connected Speech to Express Thoughts Goal 1 (SLP)    Improve Narrative Discourse to Express Thoughts By Goal 1 (SLP)  explaining a proverb or idiom;giving multiple definitions of a word;80%;with minimal cues (75-90%)  (Pended)   -MW  --  --      Time Frame (Connected Speech Goal 1, SLP)  by discharge  (Pended)   -MW  --  --      Progress/Outcomes (Connected Speech Goal 1, SLP)  goal ongoing  (Pended)   -MW  --  --         Motor Speech/Dysarthria Treatment Objectives    Articulation Selection  articulation, SLP goal 1  (Pended)   -  --  --          Articulation Goal 1 (SLP)    Improve Articulation Goal 1 (SLP)  by over-articulating in connected speech;80%;with minimal cues (75-90%)  (Pended)   -MW  --  --      Time Frame (Articulation Goal 1, SLP)  by discharge  (Pended)   -MW  --  --      Progress (Articulation Goal 1, SLP)  80%;with minimal cues (75-90%)  (Pended)   -MW  --  --      Progress/Outcomes (Articulation Goal 1, SLP)  continuing progress toward goal  (Pended)   -MW  --  --         Cognitive Linguistic Treatment Objectives    Memory Skills Selection  memory skills, SLP goal 1  (Pended)   -MW  --  --      Organizational Skills Selection  organizational skills, SLP goal 2  (Pended)   -MW  --  --         Memory Skills Goal 1 (SLP)    Improve Memory Skills Through Goal 1 (SLP)  recalling unrelated word lists immediately;recalling unrelated word lists with an imposed delay;recall details from a word list;80%;with minimal cues (75-90%)  (Pended)   -MW  --  --      Time Frame (Memory Skills Goal 1, SLP)  by discharge  (Pended)   -MW  --  --      Progress (Memory Skills Goal 1, SLP)  80%;with minimal cues (75-90%)  (Pended)   -MW  --  --      Progress/Outcomes (Memory Skills Goal 1, SLP)  continuing progress toward goal  (Pended)   -MW  --  --      Comment (Memory Skills Goal 1, SLP)  had to repeat word list a few times bnefore pt could remember all 5 words, however, was able to recall all of them even after 5 min time delay  (Pended)   -MW  --  --         Organizational Skills Goal 2 (SLP)    Improve Thought Organization Through Goal 2 (SLP)  completing a divergent naming task;completing mental manipulation task;80%;with minimal cues (75-90%)  (Pended)   -MW  --  --      Time Frame (Thought Organization Skills Goal 2, SLP)  by discharge  (Pended)   -MW  --  --      Progress (Thought Organization Skills Goal 2, SLP)  60%;with minimal cues (75-90%)  (Pended)   -MW  --  --      Progress/Outcomes (Thought Organization Skills Goal 2, SLP)  continuing  progress toward goal  (Pended)   -  --  --      Comment (Thought Organization Skills Goal 2, SLP)  some difficulty w/ divergent naming task, however, able to come up with 11 words in one minute  (Pended)   -  --  --         Additional Goals    Additional Goal Selection  additional goal, SLP goal 1  (Pended)   -  --  --         Additional Goal 1 (SLP)    Additional Goal 1, SLP  LTG: Pt. will improve cognitive linguistic skills to WFL w/o verbal cues in order to improve safety and independence at the next level of care.   (Pended)   -  --  --      Time Frame (Additional Goal 1, SLP)  by discharge  (Pended)   -  --  --      Progress/Outcomes (Additional Goal 1, SLP)  continuing progress toward goal  (Pended)   -  --  --         SLP Discharge Summary    Discharge Destination  --  --  unknown;anticipated therapy at next level of care  -        User Key  (r) = Recorded By, (t) = Taken By, (c) = Cosigned By    Initials Name Effective Dates    Carolee Mckeon, MS CCC-SLP 02/11/20 -     Alyce Chase, MS CCC-SLP 08/09/20 -     Phillip Palomares, Speech Therapy Student 08/19/20 -              EDUCATION  The patient has been educated in the following areas:     Cognitive Impairment Communication Impairment.    SLP Recommendation and Plan           SLC Criteria for Skilled Therapy Interventions Met: (P) yes  Anticipated Discharge Disposition (SLP): (P) home with assist, anticipate therapy at next level of care        Predicted Duration Therapy Intervention (Days): (P) until discharge  Daily Summary of Progress (SLP): (P) progress toward functional goals is good  Plan for Continued Treatment (SLP): (P) Pt seen this pm for cog-communication tx. Pt alert and willingly participated in treatment. Pt eager to improve, worked on divergent naming, delayed recall, providing synonyms/antonyms, and giving multiple definitions of a word. Will cont to follow, address deficits, and prov. additional  reinforcement/stratgeies as needed.             Plan of Care Reviewed With: (P) patient      SLP GOALS     Row Name 11/11/20 1445 11/10/20 1500 11/09/20 1000       Word Retrieval Skills Goal 1 (SLP)    Improve Word Retrieval Skills By Goal 1 (SLP)  low frequency;responsive naming task;supplying an antonym;supplying a synonym;completing a divergent task;80%;with minimal cues (75-90%)  (Pended)   -MW  low frequency;responsive naming task;supplying an antonym;supplying a synonym;completing a divergent task;80%;with minimal cues (75-90%)  -CJ  low frequency;responsive naming task;supplying an antonym;supplying a synonym;completing a divergent task;80%;with minimal cues (75-90%)  -CH    Time Frame (Word Retrieval Goal 1, SLP)  by discharge  (Pended)   -MW  by discharge  -CJ  by discharge  -CH    Progress (Word Retrieval Skills Goal 1, SLP)  90%;with minimal cues (75-90%)  (Pended)   -MW  70%;with moderate cues (50-74%)  -CJ  --    Progress/Outcomes (Word Retrieval Goal 1, SLP)  continuing progress toward goal  (Pended)   -MW  continuing progress toward goal  -CJ  --    Comment (Word Retrieval Goal 1, SLP)  focused on supplying synonyms and antonyms for a list of given words  (Pended)   -MW  --  --       Connected Speech to Express Thoughts Goal 1 (SLP)    Improve Narrative Discourse to Express Thoughts By Goal 1 (SLP)  explaining a proverb or idiom;giving multiple definitions of a word;80%;with minimal cues (75-90%)  (Pended)   -MW  explaining a proverb or idiom;giving multiple definitions of a word;80%;with minimal cues (75-90%)  -CJ  explaining a proverb or idiom;giving multiple definitions of a word;80%;with minimal cues (75-90%)  -CH    Time Frame (Connected Speech Goal 1, SLP)  by discharge  (Pended)   -MW  by discharge  -CJ  by discharge  -CH    Progress (Connected Speech Goal 1, SLP)  --  80%;with minimal cues (75-90%)  -CJ  --    Progress/Outcomes (Connected Speech Goal 1, SLP)  goal ongoing  (Pended)   -MW   continuing progress toward goal  -CJ  --       Articulation Goal 1 (SLP)    Improve Articulation Goal 1 (SLP)  by over-articulating in connected speech;80%;with minimal cues (75-90%)  (Pended)   -MW  by over-articulating in connected speech;80%;with minimal cues (75-90%)  -CJ  by over-articulating in connected speech;80%;with minimal cues (75-90%)  -CH    Time Frame (Articulation Goal 1, SLP)  by discharge  (Pended)   -MW  by discharge  -CJ  by discharge  -CH    Progress (Articulation Goal 1, SLP)  80%;with minimal cues (75-90%)  (Pended)   -MW  80%;with minimal cues (75-90%)  -CJ  --    Progress/Outcomes (Articulation Goal 1, SLP)  continuing progress toward goal  (Pended)   -MW  continuing progress toward goal  -CJ  --       Memory Skills Goal 1 (SLP)    Improve Memory Skills Through Goal 1 (SLP)  recalling unrelated word lists immediately;recalling unrelated word lists with an imposed delay;recall details from a word list;80%;with minimal cues (75-90%)  (Pended)   -MW  recalling unrelated word lists immediately;recalling unrelated word lists with an imposed delay;recall details from a word list;80%;with minimal cues (75-90%)  -CJ  recalling unrelated word lists immediately;recalling unrelated word lists with an imposed delay;recall details from a word list;80%;with minimal cues (75-90%)  -CH    Time Frame (Memory Skills Goal 1, SLP)  by discharge  (Pended)   -MW  by discharge  -CJ  by discharge  -CH    Progress (Memory Skills Goal 1, SLP)  80%;with minimal cues (75-90%)  (Pended)   -MW  80%;with minimal cues (75-90%)  -CJ  --    Progress/Outcomes (Memory Skills Goal 1, SLP)  continuing progress toward goal  (Pended)   -MW  continuing progress toward goal  -CJ  --    Comment (Memory Skills Goal 1, SLP)  had to repeat word list a few times bnefore pt could remember all 5 words, however, was able to recall all of them even after 5 min time delay  (Pended)   -MW  more difficulty w/ unrelated words after imposed  delay.  -CJ  --       Organizational Skills Goal 2 (SLP)    Improve Thought Organization Through Goal 2 (SLP)  completing a divergent naming task;completing mental manipulation task;80%;with minimal cues (75-90%)  (Pended)   -MW  completing a divergent naming task;completing mental manipulation task;80%;with minimal cues (75-90%)  -CJ  completing a divergent naming task;completing mental manipulation task;80%;with minimal cues (75-90%)  -    Time Frame (Thought Organization Skills Goal 2, SLP)  by discharge  (Pended)   -MW  by discharge  -CJ  by discharge  -    Progress (Thought Organization Skills Goal 2, SLP)  60%;with minimal cues (75-90%)  (Pended)   -MW  60%;with minimal cues (75-90%)  -CJ  --    Progress/Outcomes (Thought Organization Skills Goal 2, SLP)  continuing progress toward goal  (Pended)   -MW  continuing progress toward goal  -CJ  --    Comment (Thought Organization Skills Goal 2, SLP)  some difficulty w/ divergent naming task, however, able to come up with 11 words in one minute  (Pended)   -  difficulty w/ divergent naming w/ abstract categories and mental manipulation tasks.  -CJ  --       Additional Goal 1 (SLP)    Additional Goal 1, SLP  LTG: Pt. will improve cognitive linguistic skills to WFL w/o verbal cues in order to improve safety and independence at the next level of care.   (Pended)   -MW  LTG: Pt. will improve cognitive linguistic skills to WFL w/o verbal cues in order to improve safety and independence at the next level of care.   -CJ  LTG: Pt. will improve cognitive linguistic skills to WFL w/o verbal cues in order to improve safety and independence at the next level of care.   -    Time Frame (Additional Goal 1, SLP)  by discharge  (Pended)   -MW  by discharge  -CJ  by discharge  -    Progress/Outcomes (Additional Goal 1, SLP)  continuing progress toward goal  (Pended)   -MW  --  --      User Key  (r) = Recorded By, (t) = Taken By, (c) = Cosigned By    Initials Name Provider  Type    Carolee Mckeon, MS CCC-SLP Speech and Language Pathologist    Alyce Chase, MS CCC-SLP Speech and Language Pathologist    Phillip Palomares, Speech Therapy Student Speech Therapy Student                  Time Calculation:     Time Calculation- SLP     Row Name 11/11/20 1536             Time Calculation- SLP    SLP Start Time  1445  (Pended)   -MW      SLP Received On  11/11/20  (Pended)   -        User Key  (r) = Recorded By, (t) = Taken By, (c) = Cosigned By    Initials Name Provider Type    Phillip Palomares, Speech Therapy Student Speech Therapy Student          Therapy Charges for Today     Code Description Service Date Service Provider Modifiers Qty    [id number]  ST TREATMENT SPEECH 4 11/11/2020 Phillip Cuevas, Speech Therapy Student GN 1                Patient was not wearing a face mask and did not exhibit coughing during this therapy encounter.  Procedure performed was not aerosolizing, involved close contact (within 6 feet for at least 15 minutes or longer), and did not involve contact with infectious secretions or specimens.  Therapist used appropriate personal protective equipment including gloves, standard procedure mask, eye protection and gown.  Appropriate PPE was worn during the entire therapy session.  Hand hygiene was completed before and after therapy session.       Phillip Cuevas Speech Therapy Student  11/11/2020

## 2020-11-11 NOTE — DISCHARGE PLACEMENT REQUEST
"From Sophie Rossi RN,  [phone]      Sang Pearl (70 y.o. Female)     Date of Birth Social Security Number Address Home Phone MRN    1950  [id number]  [facility] [address] [phone] [id number]    Worship Marital Status          None        Admission Date Admission Type Admitting Provider Attending Provider Department, Room/Bed    20 Emergency Itzel Chavez MD Anciro, Audree, MD [facility] 3F, S305/1    Discharge Date Discharge Disposition Discharge Destination                       Attending Provider: Itzel Chavez MD    Allergies: Penicillins    Isolation: None   Infection: None   Code Status: No CPR    Ht: 167.6 cm (66\")   Wt: 77.1 kg (170 lb)    Admission Cmt: None   Principal Problem: TIA (transient ischemic attack) [G45.9]                 Active Insurance as of 2020     Primary Coverage     Payor Plan Insurance Group Employer/Plan Group    AETNA MEDICARE REPLACEMENT AETNA MEDICARE REPLACEMENT [id number]     Payor Plan Address Payor Plan Phone Number Payor Plan Fax Number Effective Dates    [address] [phone]  2020 - None Entered    [place] [address]       Subscriber Name Subscriber Birth Date Member ID       SANG PEARL 1950 [id number]                 Emergency Contacts      (Rel.) Home Phone Work Phone Mobile Phone    Vinh eParl (Spouse) [phone] -- --               Physical Therapy Notes (most recent note)      Celeste Morrison, PT at 20 0928  Version 1 of 1         Patient Name: Sang Pearl  : 1950    MRN: [id number]                              Today's Date: 2020       Admit Date: 2020    Visit Dx:     ICD-10-CM ICD-9-CM   1. Generalized weakness  R53.1 780.79   2. Difficulty with speech  R47.9 784.59   3. Renal insufficiency  N28.9 593.9   4. Urinary tract infection without hematuria, site unspecified  N39.0 599.0   5. Cognitive communication deficit  R41.841 799.52   6. Dysarthria  " R47.1 784.51   7. Impaired mobility and ADLs  Z74.09 V49.89    Z78.9      Patient Active Problem List   Diagnosis   • TIA (transient ischemic attack)   • Essential hypertension   • Type 2 diabetes mellitus (CMS/HCC)     Past Medical History:   Diagnosis Date   • Diabetes mellitus (CMS/HCC)    • Hypertension    • Stroke (CMS/HCC)      Past Surgical History:   Procedure Laterality Date   • HYSTERECTOMY     • TUBAL ABDOMINAL LIGATION       General Information     Row Name 11/11/20 1014          Physical Therapy Time and Intention    Document Type  therapy note (daily note)  -CD     Mode of Treatment  physical therapy  -CD     Row Name 11/11/20 1014          General Information    Patient Profile Reviewed  yes  -CD     Existing Precautions/Restrictions  -- Wear brief for mobility due to incontinent.  -CD     Barriers to Rehab  none identified  -CD     Row Name 11/11/20 1014          Cognition    Orientation Status (Cognition)  oriented x 4  -CD     Row Name 11/11/20 1014          Safety Issues, Functional Mobility    Safety Issues Affecting Function (Mobility)  positioning of assistive device;safety precautions follow-through/compliance;safety precaution awareness;insight into deficits/self-awareness  -CD     Impairments Affecting Function (Mobility)  balance;coordination;endurance/activity tolerance;strength;shortness of breath  -CD     Comment, Safety Issues/Impairments (Mobility)  NOTED IMPROVED MENTATION SINCE LAST VISIT. SLURRED SPEECH BUT INTELLIGIBLE.  -CD       User Key  (r) = Recorded By, (t) = Taken By, (c) = Cosigned By    Initials Name Provider Type    CD Celeste Morrison PT Physical Therapist        Mobility     Row Name 11/11/20 1016          Bed Mobility    Bed Mobility  supine-sit  -CD     Supine-Sit [place] (Bed Mobility)  modified independence  -CD     Assistive Device (Bed Mobility)  bed rails;head of bed elevated  -CD     Comment (Bed Mobility)  INCREASED TIME AND EFFORT. (BED SATURATED WITH  URINE UPON ARRIVAL DESPITE PURE WICK. CHANGED GOWN AND ISSUED BRIEF WITH PAD PRIOR TO MOBILITY.  -CD     Row Name 11/11/20 1016          Transfers    Comment (Transfers)  CUES FOR HAND PLACEMENT. STS FROM EOB AND TOILET. CUES FOR SAFETY AND WALKER PLACEMENT WHEN APPROACHING CHAIR TO SIT.  -CD     Row Name 11/11/20 1016          Sit-Stand Transfer    Sit-Stand [place] (Transfers)  contact guard;verbal cues  -CD     Assistive Device (Sit-Stand Transfers)  walker, front-wheeled  -CD     Row Name 11/11/20 1016          Gait/Stairs (Locomotion)    Todd Level (Gait)  contact guard  -CD     Assistive Device (Gait)  walker, front-wheeled  -CD     Distance in Feet (Gait)  300 FEET.  -CD     Deviations/Abnormal Patterns (Gait)  joanne decreased;stride length decreased;weight shifting decreased  -CD     Bilateral Gait Deviations  heel strike decreased;forward flexed posture  -CD     Comment (Gait/Stairs)  CUES FOR UPRIGHT POSTURE AND WALKER PLACEMENT.  -CD       User Key  (r) = Recorded By, (t) = Taken By, (c) = Cosigned By    Initials Name Provider Type    CD Celeste Morrison, PT Physical Therapist        Obj/Interventions     Row Name 11/11/20 1020          Balance    Balance Assessment  sitting dynamic balance;standing dynamic balance  -CD     Dynamic Sitting Balance  unsupported;mild impairment PERFORMED HYGIENE AFTER TOILETING SITTING ON COMMODE.  -CD     Dynamic Standing Balance  mild impairment;supported STOOD AT SINK TO WASH /DRY HANDS WITH CGA.  -CD     Comment, Balance  CUES FOR SAFETY WITH ADL'S AT SINK.  -CD       User Key  (r) = Recorded By, (t) = Taken By, (c) = Cosigned By    Initials Name Provider Type    CD Celeste Morrison, PT Physical Therapist        Goals/Plan    No documentation.       Clinical Impression     Row Name 11/11/20 1022          Pain Scale: Numbers Pre/Post-Treatment    Pretreatment Pain Rating  0/10 - no pain  -CD     Posttreatment Pain Rating  0/10 - no pain  -CD     Row Name  11/11/20 1022          Plan of Care Review    Plan of Care Reviewed With  patient  -CD     Progress  improving  -CD     Outcome Summary  PT AMBUALTED 300 FEET WITH R WALKER AND CGA. NEEDS CUES FOR SAFETY/WALKER PLACEMENT DURING TRANSFERS, ADL'S AND GAIT IN [place]. PT INCONTINENT AND REQUIRES BRIEF FOR MOBILITY. RECOMMEND IRF PRIOR TO HOME .  -CD     Row Name 11/11/20 1022          Therapy Assessment/Plan (PT)    Criteria for Skilled Interventions Met (PT)  yes  -CD     Row Name 11/11/20 1022          Vital Signs    Pre Systolic BP Rehab  177  -CD     Pre Treatment Diastolic BP  98  -CD     Post Systolic BP Rehab  177  -CD     Post Treatment Diastolic BP  98  -CD     Posttreatment Heart Rate (beats/min)  83  -CD     Pre SpO2 (%)  94  -CD     O2 Delivery Pre Treatment  supplemental O2  -CD     Intra SpO2 (%)  87  -CD     O2 Delivery Intra Treatment  room air  -CD     Post SpO2 (%)  96  -CD     O2 Delivery Post Treatment  supplemental O2  -CD     Pre Patient Position  Supine  -CD     Intra Patient Position  Standing  -CD     Row Name 11/11/20 1022          Positioning and Restraints    Pre-Treatment Position  in bed  -CD     Post Treatment Position  chair  -CD     In Chair  call light within reach;encouraged to call for assist;exit alarm on;legs elevated;notified nsg NSG SUMMONED TO ROOM AT BEGINNING OF VISIT DUE TO  L UE EDEMATOUS AT IV SITE AND DISCONNECTED IV.  -CD       User Key  (r) = Recorded By, (t) = Taken By, (c) = Cosigned By    Initials Name Provider Type    CD Celeste Morrison, PT Physical Therapist        Outcome Measures     Row Name 11/11/20 1025          How much help from another person do you currently need...    Turning from your back to your side while in flat bed without using bedrails?  4  -CD     Moving from lying on back to sitting on the side of a flat bed without bedrails?  4  -CD     Standing up from a chair using your arms (e.g., wheelchair, bedside chair)?  3  -CD     Climbing 3-5 steps with a  railing?  2  -CD     To walk in hospital room?  3  -CD     Row Name 11/11/20 1025          Modified [place] Scale    Modified [place] Scale  3 - Moderate disability.  Requiring some help, but able to walk without assistance.  -CD     Row Name 11/11/20 1025          Functional Assessment    Outcome Measure Options  AM-PAC 6 Clicks Basic Mobility (PT);Modified [place]  -CD       User Key  (r) = Recorded By, (t) = Taken By, (c) = Cosigned By    Initials Name Provider Type    CD Celeste Morrison PT Physical Therapist        Physical Therapy Education                 Title: PT OT SLP Therapies (Done)     Topic: Physical Therapy (Done)     Point: Mobility training (Done)     Learning Progress Summary           Patient Acceptance, E, VU,NR by CD at 11/11/2020 1025    Comment: SEE FLOWSHEET.    Acceptance, E, VU,NR by CD at 11/9/2020 1417    Comment: BENEFITS OF OOB ACTIVITY,SAFETY WITH MOBILITY, PROGRESSION OF POC, D/C PLANNING,   Significant Other Acceptance, E, VU,NR by CD at 11/9/2020 1417    Comment: BENEFITS OF OOB ACTIVITY,SAFETY WITH MOBILITY, PROGRESSION OF POC, D/C PLANNING,                   Point: Home exercise program (Done)     Learning Progress Summary           Patient Acceptance, E, VU,NR by CD at 11/11/2020 1025    Comment: SEE FLOWSHEET.    Acceptance, E, VU,NR by CD at 11/9/2020 1417    Comment: BENEFITS OF OOB ACTIVITY,SAFETY WITH MOBILITY, PROGRESSION OF POC, D/C PLANNING,   Significant Other Acceptance, E, VU,NR by CD at 11/9/2020 1417    Comment: BENEFITS OF OOB ACTIVITY,SAFETY WITH MOBILITY, PROGRESSION OF POC, D/C PLANNING,                   Point: Body mechanics (Done)     Learning Progress Summary           Patient Acceptance, E, VU,NR by CD at 11/11/2020 1025    Comment: SEE FLOWSHEET.    Acceptance, E, VU,NR by CD at 11/9/2020 1417    Comment: BENEFITS OF OOB ACTIVITY,SAFETY WITH MOBILITY, PROGRESSION OF POC, D/C PLANNING,   Significant Other Acceptance, E, VU,NR by CD at 11/9/2020 1417    Comment:  BENEFITS OF OOB ACTIVITY,SAFETY WITH MOBILITY, PROGRESSION OF POC, D/C PLANNING,                   Point: Precautions (Done)     Learning Progress Summary           Patient Acceptance, E, VU,NR by CD at 11/11/2020 1025    Comment: SEE FLOWSHEET.    Acceptance, E, VU,NR by CD at 11/9/2020 1417    Comment: BENEFITS OF OOB ACTIVITY,SAFETY WITH MOBILITY, PROGRESSION OF POC, D/C PLANNING,   Significant Other Acceptance, E, VU,NR by CD at 11/9/2020 1417    Comment: BENEFITS OF OOB ACTIVITY,SAFETY WITH MOBILITY, PROGRESSION OF POC, D/C PLANNING,                               User Key     Initials Effective Dates Name Provider Type Discipline    CD 06/19/15 -  Celeste Morrison, PT Physical Therapist PT              PT Recommendation and Plan  Planned Therapy Interventions (PT): balance training, bed mobility training, gait training, home exercise program, transfer training, strengthening, stair training, patient/family education  Plan of Care Reviewed With: patient  Progress: improving  Outcome Summary: PT AMBUALTED 300 FEET WITH R WALKER AND CGA. NEEDS CUES FOR SAFETY/WALKER PLACEMENT DURING TRANSFERS, ADL'S AND GAIT IN [place]. PT INCONTINENT AND REQUIRES BRIEF FOR MOBILITY. RECOMMEND IRF PRIOR TO HOME .     Time Calculation:   PT Charges     Row Name 11/11/20 1028             Time Calculation    Start Time  0925  -CD      PT Received On  11/11/20  -CD      PT Goal Re-Cert Due Date  11/19/20  -CD         Time Calculation- PT    Total Timed Code Minutes- PT  45 minute(s)  -CD         Timed Charges    30028 - Gait Training Minutes   25  -CD      42873 - PT Therapeutic Activity Minutes  20  -CD        User Key  (r) = Recorded By, (t) = Taken By, (c) = Cosigned By    Initials Name Provider Type    CD Celeste Morrison PT Physical Therapist        Therapy Charges for Today     Code Description Service Date Service Provider Modifiers Qty    66687732304 HC GAIT TRAINING EA 15 MIN 11/11/2020 Celeste Morrison, PT GP 2    76516320159 HC PT  THERAPEUTIC ACT EA 15 MIN 11/11/2020 Celeste Morrison, PT GP 1          PT G-Codes  Outcome Measure Options: AM-PAC 6 Clicks Basic Mobility (PT), Modified [place]  AM-PAC 6 Clicks Score (PT): 17  Modified Leonarda Scale: 3 - Moderate disability.  Requiring some help, but able to walk without assistance.    Celeste Morrison, PT  11/11/2020      Electronically signed by Celeste Morrison, PT at 11/11/20 1037

## 2020-11-11 NOTE — PROGRESS NOTES
Neurology       Patient Care Team:  System, Provider Not In as PCP - General    Chief complaint: Orthostatic symptoms    History: Patient was able to walk 300 feet without difficulty.  She did tend to lean a bit to the left.      Past Medical History:   Diagnosis Date   • Diabetes mellitus (CMS/[facility])    • Hypertension    • Stroke (CMS/[facility])        Vital Signs   Vitals:    11/10/20 2317 11/11/20 0312 11/11/20 0718 11/11/20 0900   BP: 165/100 162/85 177/93    BP Location: Right arm Right arm Right arm    Patient Position: Lying Lying Lying    Pulse: 83 77 80 73   Resp: 16 16 16    Temp: 98.4 °F (36.9 °C) 98.1 °F (36.7 °C) 98.2 °F (36.8 °C)    TempSrc: Oral Oral Oral    SpO2: 95% 95% 95%    Weight:       Height:           Physical Exam:   General: Pleasant and alert              Neuro: Blood pressure is 177/93.    Oriented to person place and time.        Results Review:  No new results  Results from last 7 days   Lab Units 11/08/20  0521   WBC 10*3/mm3 8.13   HEMOGLOBIN g/dL 10.6*   HEMATOCRIT % 35.5   PLATELETS 10*3/mm3 208     Results from last 7 days   Lab Units 11/10/20  0601 11/09/20  0529 11/08/20  0533 11/08/20  0521   SODIUM mmol/L 139 138  --  137   POTASSIUM mmol/L 5.1 4.6  --  4.2   CHLORIDE mmol/L 107 109*  --  103   CO2 mmol/L 24.0 22.0  --  21.0*   BUN mg/dL 20 23  --  36*   CREATININE mg/dL 1.07* 1.17* 1.60* 1.61*   CALCIUM mg/dL 8.7 8.2*  --  9.1   BILIRUBIN mg/dL  --   --   --  0.2   ALK PHOS U/L  --   --   --  85   ALT (SGPT) U/L  --   --   --  12   AST (SGOT) U/L  --   --   --  14   GLUCOSE mg/dL 196* 150*  --  173*       Imaging Results (Last 24 Hours)     ** No results found for the last 24 hours. **          Assessment:  Orthostatic hypotension, resolved.    Generalized weakness secondary to inactivity.    Peripheral neuropathy.        Plan:  Patient is encouraged to regardless of her disposition to maintain an activity program where she is walking 10 minutes a day every day.    Comment:  I think  the left side drift is compatible with the patient's a left hemisphere infarct.         I discussed the patients findings and my recommendations with patient and family    Bryn Tobias MD  11/11/20  14:38 EST

## 2020-11-11 NOTE — THERAPY TREATMENT NOTE
Patient Name: Zita Ramirez  : 1950    MRN: [id number]                              Today's Date: 2020       Admit Date: 2020    Visit Dx:     ICD-10-CM ICD-9-CM   1. Generalized weakness  R53.1 780.79   2. Difficulty with speech  R47.9 784.59   3. Renal insufficiency  N28.9 593.9   4. Urinary tract infection without hematuria, site unspecified  N39.0 599.0   5. Cognitive communication deficit  R41.841 799.52   6. Dysarthria  R47.1 784.51   7. Impaired mobility and ADLs  Z74.09 V49.89    Z78.9      Patient Active Problem List   Diagnosis   • TIA (transient ischemic attack)   • Essential hypertension   • Type 2 diabetes mellitus (CMS/HCC)     Past Medical History:   Diagnosis Date   • Diabetes mellitus (CMS/HCC)    • Hypertension    • Stroke (CMS/HCC)      Past Surgical History:   Procedure Laterality Date   • HYSTERECTOMY     • TUBAL ABDOMINAL LIGATION       General Information     Row Name 20 1142          OT Time and Intention    Document Type  therapy note (daily note)  -AN     Mode of Treatment  occupational therapy  -AN     Row Name 20 1142          General Information    Existing Precautions/Restrictions  fall pt incontinent  -AN     Row Name 20 1142          Cognition    Orientation Status (Cognition)  oriented x 4  -AN       User Key  (r) = Recorded By, (t) = Taken By, (c) = Cosigned By    Initials Name Provider Type    AN Gertrude Wang, OT Occupational Therapist        Mobility/ADL's     Row Name 20 1142          Transfers    Comment (Transfers)  simulated walk in shower, min assist  -AN     Sit-Stand [place] (Transfers)  contact guard  -AN     Row Name 20 1142          Sit-Stand Transfer    Assistive Device (Sit-Stand Transfers)  walker, front-wheeled  -AN     Row Name 20 1142          Activities of Daily Living    BADL Assessment/Intervention  lower body dressing;grooming;toileting  -AN     Row Name 20 1142          Lower Body Dressing  Assessment/Training    [place] Level (Lower Body Dressing)  doff;don;socks;set up  -AN     Comment (Lower Body Dressing)  simulated don pants with CGA, RW  -AN     Row Name 11/11/20 1142          Toileting Assessment/Training    Comment (Toileting)  simulated min assist for toilet hygeine, clothing mgmt; pt currently with urinary incontinenct  -AN       User Key  (r) = Recorded By, (t) = Taken By, (c) = Cosigned By    Initials Name Provider Type    Gertrude Kim OT Occupational Therapist        Obj/Interventions     Row Name 11/11/20 1144          Balance    Static Sitting Balance  WFL  -AN     Dynamic Sitting Balance  mild impairment  -AN     Static Standing Balance  mild impairment  -AN     Dynamic Standing Balance  mild impairment  -AN     Row Name 11/11/20 1144          Therapeutic Exercise    Therapeutic Exercise  -- 1 set UE ex, 10 reps, education on benefit  -AN       User Key  (r) = Recorded By, (t) = Taken By, (c) = Cosigned By    Initials Name Provider Type    Gertrude Kim OT Occupational Therapist        Goals/Plan    No documentation.       Clinical Impression     Row Name 11/11/20 1145          Pain Scale: Numbers Pre/Post-Treatment    Pretreatment Pain Rating  0/10 - no pain  -AN     Posttreatment Pain Rating  0/10 - no pain  -AN     Row Name 11/11/20 1145          Plan of Care Review    Progress  improving  -AN     Outcome Summary  Pt  simulated donning pants/undergarments with min assist this date, supv sitting to don/doff socks. Pt CGA to stand and simulate CGa/min assist with shower txfrs. Pt would benefit from IRF at discharge.  -AN     Row Name 11/11/20 1145          Therapy Plan Review/Discharge Plan (OT)    Anticipated Discharge Disposition (OT)  inpatient rehabilitation facility  -AN     Row Name 11/11/20 1145          Positioning and Restraints    Pre-Treatment Position  sitting in chair/recliner  -AN     In Chair  reclined;with family/caregiver;call light within  reach;encouraged to call for assist;exit alarm on  -AN       User Key  (r) = Recorded By, (t) = Taken By, (c) = Cosigned By    Initials Name Provider Type    Gertrude Kim OT Occupational Therapist        Outcome Measures     Row Name 11/11/20 1146          How much help from another is currently needed...    Putting on and taking off regular lower body clothing?  3  -AN     Bathing (including washing, rinsing, and drying)  3  -AN     Toileting (which includes using toilet bed pan or urinal)  3  -AN     Putting on and taking off regular upper body clothing  3  -AN     Taking care of personal grooming (such as brushing teeth)  3  -AN     Eating meals  4  -AN     AM-PAC 6 Clicks Score (OT)  19  -AN     Row Name 11/11/20 1146          Modified [place] Scale    Modified [place] Scale  3 - Moderate disability.  Requiring some help, but able to walk without assistance.  -AN       User Key  (r) = Recorded By, (t) = Taken By, (c) = Cosigned By    Initials Name Provider Type    Gertrude Kim OT Occupational Therapist        Occupational Therapy Education                 Title: PT OT SLP Therapies (Done)     Topic: Occupational Therapy (Done)     Point: ADL training (Done)     Description:   Instruct learner(s) on proper safety adaptation and remediation techniques during self care or transfers.   Instruct in proper use of assistive devices.              Learning Progress Summary           Patient Acceptance, E, VU,NR by AN at 11/11/2020 1123    Comment: Progression of care, improvements in ADL I, benefits of daily therpeutic ex with activities.    Acceptance, E, VU,NR by SD at 11/9/2020 1127    Comment: Pt. & spouse educated on role of OT, and both are agreeable with OT POC.   Family Acceptance, E, VU,NR by AN at 11/11/2020 1123    Comment: Progression of care, improvements in ADL I, benefits of daily therpeutic ex with activities.   Significant Other Acceptance, E, VU,NR by SD at 11/9/2020 1127    Comment: Pt. &  spouse educated on role of OT, and both are agreeable with OT POC.                   Point: Home exercise program (Done)     Description:   Instruct learner(s) on appropriate technique for monitoring, assisting and/or progressing therapeutic exercises/activities.              Learning Progress Summary           Patient Acceptance, E, VU,NR by SD at 11/9/2020 1127    Comment: Pt. & spouse educated on role of OT, and both are agreeable with OT POC.   Significant Other Acceptance, E, VU,NR by SD at 11/9/2020 1127    Comment: Pt. & spouse educated on role of OT, and both are agreeable with OT POC.                   Point: Precautions (Done)     Description:   Instruct learner(s) on prescribed precautions during self-care and functional transfers.              Learning Progress Summary           Patient Acceptance, E, VU,NR by SD at 11/9/2020 1127    Comment: Pt. & spouse educated on role of OT, and both are agreeable with OT POC.   Significant Other Acceptance, E, VU,NR by SD at 11/9/2020 1127    Comment: Pt. & spouse educated on role of OT, and both are agreeable with OT POC.                   Point: Body mechanics (Done)     Description:   Instruct learner(s) on proper positioning and spine alignment during self-care, functional mobility activities and/or exercises.              Learning Progress Summary           Patient Acceptance, E, VU,NR by SD at 11/9/2020 1127    Comment: Pt. & spouse educated on role of OT, and both are agreeable with OT POC.   Significant Other Acceptance, E, VU,NR by SD at 11/9/2020 1127    Comment: Pt. & spouse educated on role of OT, and both are agreeable with OT POC.                               User Key     Initials Effective Dates Name Provider Type Discipline    SD 06/08/18 -  Megan Woodard OT Occupational Therapist OT    AN 06/22/15 -  Gertrude Wang OT Occupational Therapist OT              OT Recommendation and Plan     Plan of Care Review  Progress: improving  Outcome  Summary: Pt  simulated donning pants/undergarments with min assist this date, supv sitting to don/doff socks. Pt CGA to stand and simulate CGa/min assist with shower txfrs. Pt would benefit from IRF at discharge.     Time Calculation:   Time Calculation- OT     Row Name 11/11/20 1148 11/11/20 1028          Time Calculation- OT    OT Start Time  1123  -AN  --     Total Timed Code Minutes- OT  15 minute(s)  -AN  --     OT Received On  11/11/20  -AN  --     OT Goal Re-Cert Due Date  11/19/20  -AN  --        Timed Charges    07621 - Gait Training Minutes   --  25  -CD       User Key  (r) = Recorded By, (t) = Taken By, (c) = Cosigned By    Initials Name Provider Type    CD Celeste Morrison, PT Physical Therapist    Gertrude Kim, OT Occupational Therapist        Therapy Charges for Today     Code Description Service Date Service Provider Modifiers Qty    26057641826 HC OT THERAPEUTIC ACT EA 15 MIN 11/11/2020 Gertrude Wang OT GO 1               Gertrude Wang OT  11/11/2020

## 2020-11-11 NOTE — THERAPY TREATMENT NOTE
Patient Name: Zita Ramirez  : 1950    MRN: [id number]                              Today's Date: 2020       Admit Date: 2020    Visit Dx:     ICD-10-CM ICD-9-CM   1. Generalized weakness  R53.1 780.79   2. Difficulty with speech  R47.9 784.59   3. Renal insufficiency  N28.9 593.9   4. Urinary tract infection without hematuria, site unspecified  N39.0 599.0   5. Cognitive communication deficit  R41.841 799.52   6. Dysarthria  R47.1 784.51   7. Impaired mobility and ADLs  Z74.09 V49.89    Z78.9      Patient Active Problem List   Diagnosis   • TIA (transient ischemic attack)   • Essential hypertension   • Type 2 diabetes mellitus (CMS/HCC)     Past Medical History:   Diagnosis Date   • Diabetes mellitus (CMS/HCC)    • Hypertension    • Stroke (CMS/HCC)      Past Surgical History:   Procedure Laterality Date   • HYSTERECTOMY     • TUBAL ABDOMINAL LIGATION       General Information     Row Name 20 1014          Physical Therapy Time and Intention    Document Type  therapy note (daily note)  -CD     Mode of Treatment  physical therapy  -CD     Row Name 20 1014          General Information    Patient Profile Reviewed  yes  -CD     Existing Precautions/Restrictions  -- Wear brief for mobility due to incontinent.  -CD     Barriers to Rehab  none identified  -CD     Row Name 20 1014          Cognition    Orientation Status (Cognition)  oriented x 4  -CD     Row Name 20 1014          Safety Issues, Functional Mobility    Safety Issues Affecting Function (Mobility)  positioning of assistive device;safety precautions follow-through/compliance;safety precaution awareness;insight into deficits/self-awareness  -CD     Impairments Affecting Function (Mobility)  balance;coordination;endurance/activity tolerance;strength;shortness of breath  -CD     Comment, Safety Issues/Impairments (Mobility)  NOTED IMPROVED MENTATION SINCE LAST VISIT. SLURRED SPEECH BUT INTELLIGIBLE.  -CD       User Key  (r)  = Recorded By, (t) = Taken By, (c) = Cosigned By    Initials Name Provider Type    CD Celeste Morrison, PT Physical Therapist        Mobility     Row Name 11/11/20 1016          Bed Mobility    Bed Mobility  supine-sit  -CD     Supine-Sit [place] (Bed Mobility)  modified independence  -CD     Assistive Device (Bed Mobility)  bed rails;head of bed elevated  -CD     Comment (Bed Mobility)  INCREASED TIME AND EFFORT. (BED SATURATED WITH URINE UPON ARRIVAL DESPITE PURE WICK. CHANGED GOWN AND ISSUED BRIEF WITH PAD PRIOR TO MOBILITY.  -CD     Row Name 11/11/20 1016          Transfers    Comment (Transfers)  CUES FOR HAND PLACEMENT. STS FROM EOB AND TOILET. CUES FOR SAFETY AND WALKER PLACEMENT WHEN APPROACHING CHAIR TO SIT.  -CD     Row Name 11/11/20 1016          Sit-Stand Transfer    Sit-Stand [place] (Transfers)  contact guard;verbal cues  -CD     Assistive Device (Sit-Stand Transfers)  walker, front-wheeled  -CD     Row Name 11/11/20 1016          Gait/Stairs (Locomotion)    [place] Level (Gait)  contact guard  -CD     Assistive Device (Gait)  walker, front-wheeled  -CD     Distance in Feet (Gait)  300 FEET.  -CD     Deviations/Abnormal Patterns (Gait)  joanne decreased;stride length decreased;weight shifting decreased  -CD     Bilateral Gait Deviations  heel strike decreased;forward flexed posture  -CD     Comment (Gait/Stairs)  CUES FOR UPRIGHT POSTURE AND WALKER PLACEMENT.  -CD       User Key  (r) = Recorded By, (t) = Taken By, (c) = Cosigned By    Initials Name Provider Type     Celeste Morrison PT Physical Therapist        Obj/Interventions     Row Name 11/11/20 1020          Balance    Balance Assessment  sitting dynamic balance;standing dynamic balance  -CD     Dynamic Sitting Balance  unsupported;mild impairment PERFORMED HYGIENE AFTER TOILETING SITTING ON COMMODE.  -CD     Dynamic Standing Balance  mild impairment;supported STOOD AT SINK TO WASH /DRY HANDS WITH CGA.  -CD     Comment, Balance   CUES FOR SAFETY WITH ADL'S AT SINK.  -CD       User Key  (r) = Recorded By, (t) = Taken By, (c) = Cosigned By    Initials Name Provider Type    CD Celeste Morrison PT Physical Therapist        Goals/Plan    No documentation.       Clinical Impression     Row Name 11/11/20 1022          Pain Scale: Numbers Pre/Post-Treatment    Pretreatment Pain Rating  0/10 - no pain  -CD     Posttreatment Pain Rating  0/10 - no pain  -CD     Row Name 11/11/20 1022          Plan of Care Review    Plan of Care Reviewed With  patient  -CD     Progress  improving  -CD     Outcome Summary  PT AMBUALTED 300 FEET WITH R WALKER AND CGA. NEEDS CUES FOR SAFETY/WALKER PLACEMENT DURING TRANSFERS, ADL'S AND GAIT IN [place]. PT INCONTINENT AND REQUIRES BRIEF FOR MOBILITY. RECOMMEND IRF PRIOR TO HOME .  -CD     Row Name 11/11/20 1022          Therapy Assessment/Plan (PT)    Criteria for Skilled Interventions Met (PT)  yes  -CD     Row Name 11/11/20 1022          Vital Signs    Pre Systolic BP Rehab  177  -CD     Pre Treatment Diastolic BP  98  -CD     Post Systolic BP Rehab  177  -CD     Post Treatment Diastolic BP  98  -CD     Posttreatment Heart Rate (beats/min)  83  -CD     Pre SpO2 (%)  94  -CD     O2 Delivery Pre Treatment  supplemental O2  -CD     Intra SpO2 (%)  87  -CD     O2 Delivery Intra Treatment  room air  -CD     Post SpO2 (%)  96  -CD     O2 Delivery Post Treatment  supplemental O2  -CD     Pre Patient Position  Supine  -CD     Intra Patient Position  Standing  -CD     Row Name 11/11/20 1022          Positioning and Restraints    Pre-Treatment Position  in bed  -CD     Post Treatment Position  chair  -CD     In Chair  call light within reach;encouraged to call for assist;exit alarm on;legs elevated;notified nsg NSG SUMMONED TO ROOM AT BEGINNING OF VISIT DUE TO  L UE EDEMATOUS AT IV SITE AND DISCONNECTED IV.  -CD       User Key  (r) = Recorded By, (t) = Taken By, (c) = Cosigned By    Initials Name Provider Type    CD Celeste Morrison, PT  Physical Therapist        Outcome Measures     Row Name 11/11/20 1025          How much help from another person do you currently need...    Turning from your back to your side while in flat bed without using bedrails?  4  -CD     Moving from lying on back to sitting on the side of a flat bed without bedrails?  4  -CD     Standing up from a chair using your arms (e.g., wheelchair, bedside chair)?  3  -CD     Climbing 3-5 steps with a railing?  2  -CD     To walk in hospital room?  3  -CD     Row Name 11/11/20 1025          Modified [place] Scale    Modified [place] Scale  3 - Moderate disability.  Requiring some help, but able to walk without assistance.  -CD     Row Name 11/11/20 1025          Functional Assessment    Outcome Measure Options  AM-PAC 6 Clicks Basic Mobility (PT);Modified [place]  -CD       User Key  (r) = Recorded By, (t) = Taken By, (c) = Cosigned By    Initials Name Provider Type    Celeste Reed PT Physical Therapist        Physical Therapy Education                 Title: PT OT SLP Therapies (Done)     Topic: Physical Therapy (Done)     Point: Mobility training (Done)     Learning Progress Summary           Patient Acceptance, E, VU,NR by CD at 11/11/2020 1025    Comment: SEE FLOWSHEET.    Acceptance, E, VU,NR by CD at 11/9/2020 1417    Comment: BENEFITS OF OOB ACTIVITY,SAFETY WITH MOBILITY, PROGRESSION OF POC, D/C PLANNING,   Significant Other Acceptance, E, VU,NR by CD at 11/9/2020 1417    Comment: BENEFITS OF OOB ACTIVITY,SAFETY WITH MOBILITY, PROGRESSION OF POC, D/C PLANNING,                   Point: Home exercise program (Done)     Learning Progress Summary           Patient Acceptance, E, VU,NR by CD at 11/11/2020 1025    Comment: SEE FLOWSHEET.    Acceptance, E, VU,NR by CD at 11/9/2020 1417    Comment: BENEFITS OF OOB ACTIVITY,SAFETY WITH MOBILITY, PROGRESSION OF POC, D/C PLANNING,   Significant Other Acceptance, E, VU,NR by CD at 11/9/2020 1417    Comment: BENEFITS OF OOB  ACTIVITY,SAFETY WITH MOBILITY, PROGRESSION OF POC, D/C PLANNING,                   Point: Body mechanics (Done)     Learning Progress Summary           Patient Acceptance, E, VU,NR by CD at 11/11/2020 1025    Comment: SEE FLOWSHEET.    Acceptance, E, VU,NR by CD at 11/9/2020 1417    Comment: BENEFITS OF OOB ACTIVITY,SAFETY WITH MOBILITY, PROGRESSION OF POC, D/C PLANNING,   Significant Other Acceptance, E, VU,NR by CD at 11/9/2020 1417    Comment: BENEFITS OF OOB ACTIVITY,SAFETY WITH MOBILITY, PROGRESSION OF POC, D/C PLANNING,                   Point: Precautions (Done)     Learning Progress Summary           Patient Acceptance, E, VU,NR by CD at 11/11/2020 1025    Comment: SEE FLOWSHEET.    Acceptance, E, VU,NR by CD at 11/9/2020 1417    Comment: BENEFITS OF OOB ACTIVITY,SAFETY WITH MOBILITY, PROGRESSION OF POC, D/C PLANNING,   Significant Other Acceptance, E, VU,NR by CD at 11/9/2020 1417    Comment: BENEFITS OF OOB ACTIVITY,SAFETY WITH MOBILITY, PROGRESSION OF POC, D/C PLANNING,                               User Key     Initials Effective Dates Name Provider Type Discipline    CD 06/19/15 -  Celeste Morrison, PT Physical Therapist PT              PT Recommendation and Plan  Planned Therapy Interventions (PT): balance training, bed mobility training, gait training, home exercise program, transfer training, strengthening, stair training, patient/family education  Plan of Care Reviewed With: patient  Progress: improving  Outcome Summary: PT AMBUALTED 300 FEET WITH R WALKER AND CGA. NEEDS CUES FOR SAFETY/WALKER PLACEMENT DURING TRANSFERS, ADL'S AND GAIT IN [place]. PT INCONTINENT AND REQUIRES BRIEF FOR MOBILITY. RECOMMEND IRF PRIOR TO HOME .     Time Calculation:   PT Charges     Row Name 11/11/20 1028             Time Calculation    Start Time  0925  -      PT Received On  11/11/20  -CD      PT Goal Re-Cert Due Date  11/19/20  -CD         Time Calculation- PT    Total Timed Code Minutes- PT  45 minute(s)  -CD          Timed Charges    19058 - Gait Training Minutes   25  -CD      47071 - PT Therapeutic Activity Minutes  20  -CD        User Key  (r) = Recorded By, (t) = Taken By, (c) = Cosigned By    Initials Name Provider Type    CD Celeste Morrison, PT Physical Therapist        Therapy Charges for Today     Code Description Service Date Service Provider Modifiers Qty    95782922453 HC GAIT TRAINING EA 15 MIN 11/11/2020 Celeste Morrison, PT GP 2    [id number]  PT THERAPEUTIC ACT EA 15 MIN 11/11/2020 Celeste Morrison, PT GP 1          PT G-Codes  Outcome Measure Options: AM-PAC 6 Clicks Basic Mobility (PT), Modified [place]  AM-PAC 6 Clicks Score (PT): 17  Modified [place] Scale: 3 - Moderate disability.  Requiring some help, but able to walk without assistance.    Celeste Morrison, PT  11/11/2020

## 2020-11-11 NOTE — PROGRESS NOTES
[facility]  PROGRESS NOTE    Patient Name: Zita Ramirez  : 1950  MRN: [id number]    Date of Admission: 2020  Primary Care Physician: System, Provider Not In    Subjective   Subjective     CC:  Follow-up for acute encephalopathy/generalized weakness    HPI:  No acute events overnight.  Patient not have any more episodes of encephalopathy.  Still feeling weak overall.  Denies any slurred speech or changes in speech.    Review of Systems  Gen- No fevers, chills  CV- No chest pain, palpitations  Resp- No cough, dyspnea  GI- No N/V/D, abd pain     Objective   Objective     Vital Signs:   Temp:  [98 °F (36.7 °C)-98.4 °F (36.9 °C)] 98.2 °F (36.8 °C)  Heart Rate:  [70-84] 73  Resp:  [16-18] 16  BP: (162-177)/() 177/93  Total (NIH Stroke Scale): 3     Physical Exam:  Constitutional: No acute distress, awake, alert  HENT: NCAT, mucous membranes moist  Respiratory: Clear to auscultation bilaterally, respiratory effort normal   Cardiovascular: RRR, no murmurs  Gastrointestinal: Positive bowel sounds, soft, nontender, nondistended  Psychiatric: Appropriate affect, cooperative  Neurologic: Oriented x 3, moving all extremities equally, cranial Nerves grossly intact to confrontation, speech clear  Skin: No rashes on exposed skin    Results Reviewed:  Results from last 7 days   Lab Units 20  0521   WBC 10*3/mm3 8.13   HEMOGLOBIN g/dL 10.6*   HEMATOCRIT % 35.5   PLATELETS 10*3/mm3 208     Results from last 7 days   Lab Units 11/10/20  0601 20  0529 20  0533 20  0521   SODIUM mmol/L 139 138  --  137   POTASSIUM mmol/L 5.1 4.6  --  4.2   CHLORIDE mmol/L 107 109*  --  103   CO2 mmol/L 24.0 22.0  --  21.0*   BUN mg/dL 20 23  --  36*   CREATININE mg/dL 1.07* 1.17* 1.60* 1.61*   GLUCOSE mg/dL 196* 150*  --  173*   CALCIUM mg/dL 8.7 8.2*  --  9.1   ALT (SGPT) U/L  --   --   --  12   AST (SGOT) U/L  --   --   --  14   TROPONIN T ng/mL  --   --   --  <0.010      Estimated Creatinine Clearance: 52 mL/min (A) (by C-G formula based on SCr of 1.07 mg/dL (H)).    Microbiology Results Abnormal     Procedure Component Value - Date/Time    COVID PRE-OP / PRE-PROCEDURE SCREENING ORDER (NO ISOLATION) - Swab, Nasopharynx [[id number]] Collected: 11/08/20 1032    Lab Status: Final result Specimen: Swab from Nasopharynx Updated: 11/08/20 1949    Narrative:      The following orders were created for panel order COVID PRE-OP / PRE-PROCEDURE SCREENING ORDER (NO ISOLATION) - Swab, Nasopharynx.  Procedure                               Abnormality         Status                     ---------                               -----------         ------                     COVID-19,[facility], NP ...[[id number]]                      Final result                 Please view results for these tests on the individual orders.    COVID-19,[facility], NP SWAB IN [facility] VIRAL TRANSPORT MEDIA 24-30 HR TAT - Swab, Nasopharynx [[id number]] Collected: 11/08/20 1032    Lab Status: Final result Specimen: Swab from Nasopharynx Updated: 11/08/20 1949     SARS-CoV-2 GINNA Not Detected          Imaging Results (Last 24 Hours)     ** No results found for the last 24 hours. **          Results for orders placed during the hospital encounter of 11/08/20   Adult Transthoracic Echo Complete W/ Cont if Necessary Per Protocol (With Agitated Saline)    Narrative · Estimated left ventricular EF = 68% Estimated left ventricular EF was in   agreement with the calculated left ventricular EF. Left ventricular   ejection fraction appears to be 66 - 70%. Left ventricular systolic   function is normal.  · Left ventricular wall thickness is consistent with mild concentric   hypertrophy.  · Mild to moderate pulmonic valve regurgitation is present.  · Mild mitral annular calcification is present.  · Saline test results are negative.  · Estimated right ventricular systolic pressure from tricuspid   regurgitation is normal (<35 mmHg).  ·  Left ventricular diastolic function was normal.  · Normal right ventricular cavity size, wall thickness, systolic function   and septal motion noted.  · The aortic valve exhibits mild sclerosis.  · No evidence of pulmonary hypertension is present.  · There is no evidence of pericardial effusion.  · No evidence of a patent foramen ovale.          I have reviewed the medications:  Scheduled Meds:amLODIPine, 2.5 mg, Oral, Q24H  aspirin, 325 mg, Oral, Daily    Or  aspirin, 300 mg, Rectal, Daily  atorvastatin, 40 mg, Oral, Nightly  ferrous sulfate, 325 mg, Oral, Daily With Breakfast  insulin lispro, 0-7 Units, Subcutaneous, TID AC  losartan, 50 mg, Oral, Q24H  metoprolol tartrate, 50 mg, Oral, Q12H  pantoprazole, 40 mg, Oral, Daily  sertraline, 100 mg, Oral, Daily  sodium chloride, 10 mL, Intravenous, Q12H  vitamin B-12, 500 mcg, Oral, Daily  cholecalciferol, 5,000 Units, Oral, Daily      Continuous Infusions:   PRN Meds:.dextrose  •  dextrose  •  glucagon (human recombinant)  •  magnesium sulfate **OR** magnesium sulfate **OR** magnesium sulfate  •  potassium chloride **OR** potassium chloride **OR** potassium chloride  •  sodium chloride    Assessment/Plan   Assessment & Plan     Active Hospital Problems    Diagnosis  POA   • **TIA (transient ischemic attack) [G45.9]  Yes   • Essential hypertension [I10]  Yes   • Type 2 diabetes mellitus (CMS/HCC) [E11.9]  Yes      Resolved Hospital Problems   No resolved problems to display.        Brief Hospital Course to date:  Zita Ramirez is a 70 y.o. female with a past medical history of T2DM, and hypertension who was admitted for generalized weakness, dysarthria, and witnessed fall.     Generalized weakness  -PT and OT recommending inpatient rehab facility,  helping to facilitate  -CTA head and neck, CT perfusion, MRI brain unremarkable, MRI showed old insult near left posterior vertex which patient is aware of  and has no residual deficits from  -Generalized weakness  likely secondary to deconditioning -continue PT/OT while inpatient - she will need IRF on discharge  -Continue aspirin and statin  -Echocardiogram with normal EF and carotid duplex without significant stenosis of carotid arteries bilaterally  -Vitamin D low, continue supplement  -Vitamin B12 and iron also low, continue supplements  -Will need repeat vitamin D and B12 and iron levels in 4 to 6 weeks  -TSH within normal limits     Hypertension-increase amlodipine to 5 mg daily and continue losartan 50 mg q24h, continue decreased dose of metoprolol to 50 mg q12h, check orthostatic BP     MEMO-improved     UTI with urinary incontinence-completed treatment with ceftriaxone x3days     T2DM- A1c 6.5, continue ssi     DVT Prophylaxis:  SCD        Disposition: I expect the patient to be discharged pending arrangement of IRF.      CODE STATUS:   Code Status and Medical Interventions:   Ordered at: 11/08/20 1547     Limited Support to NOT Include:    Intubation     Level Of Support Discussed With:    Health Care Surrogate     Code Status:    No CPR     Medical Interventions (Level of Support Prior to Arrest):    Limited       Itzel Chavez MD  11/11/20

## 2020-11-11 NOTE — PROGRESS NOTES
Continued Stay Note  [facility]     Patient Name: Zita Ramirez  MRN: [id number]  Today's Date: 11/11/2020    Admit Date: 11/8/2020    Discharge Plan     Row Name 11/11/20 1318       Plan    Plan  Rehab    Patient/Family in Agreement with Plan  other (see comments)    Plan Comments  Efaxed OT/PT notes to Bailey at [facility]. Discussed with pt that it is highly likely that her insurance could deny rehab due to she did walk 300 ft today. Will await to hear from Bailey at [facility].        Discharge Codes    No documentation.             Sophie Rossi RN

## 2020-11-11 NOTE — PLAN OF CARE
Problem: Adult Inpatient Plan of Care  Goal: Plan of Care Review  Outcome: Ongoing, Progressing  Flowsheets (Taken 11/11/2020 1523)  Plan of Care Reviewed With: patient (Pended)   SLP treatment completed. Will continue to address cog-communication. Please see note for further details and recommendations.

## 2020-11-11 NOTE — PLAN OF CARE
Goal Outcome Evaluation:   Pt rested during the night. VSS. BP improving. 2L NC and NSR on monitor. NIH 4. D/C soon pending placement.   Problem: Adult Inpatient Plan of Care  Goal: Plan of Care Review  Outcome: Ongoing, Progressing  Flowsheets (Taken 11/11/2020 0414)  Progress: no change  Plan of Care Reviewed With: patient     Plan of Care Reviewed With: patient  Progress: no change

## 2020-11-12 VITALS
WEIGHT: 170 LBS | DIASTOLIC BLOOD PRESSURE: 96 MMHG | TEMPERATURE: 98.1 F | SYSTOLIC BLOOD PRESSURE: 133 MMHG | HEART RATE: 75 BPM | HEIGHT: 66 IN | BODY MASS INDEX: 27.32 KG/M2 | RESPIRATION RATE: 16 BRPM | OXYGEN SATURATION: 95 %

## 2020-11-12 LAB
GLUCOSE BLDC GLUCOMTR-MCNC: 173 MG/DL (ref 70–130)
GLUCOSE BLDC GLUCOMTR-MCNC: 263 MG/DL (ref 70–130)

## 2020-11-12 PROCEDURE — G0008 ADMIN INFLUENZA VIRUS VAC: HCPCS | Performed by: INTERNAL MEDICINE

## 2020-11-12 PROCEDURE — G0378 HOSPITAL OBSERVATION PER HR: HCPCS

## 2020-11-12 PROCEDURE — 25010000002 INFLUENZA VAC SUBUNIT QUAD 0.5 ML SUSPENSION PREFILLED SYRINGE: Performed by: INTERNAL MEDICINE

## 2020-11-12 PROCEDURE — 97530 THERAPEUTIC ACTIVITIES: CPT

## 2020-11-12 PROCEDURE — 82962 GLUCOSE BLOOD TEST: CPT

## 2020-11-12 PROCEDURE — 99217 PR OBSERVATION CARE DISCHARGE MANAGEMENT: CPT | Performed by: NURSE PRACTITIONER

## 2020-11-12 PROCEDURE — 90674 CCIIV4 VAC NO PRSV 0.5 ML IM: CPT | Performed by: INTERNAL MEDICINE

## 2020-11-12 PROCEDURE — 63710000001 INSULIN LISPRO (HUMAN) PER 5 UNITS: Performed by: INTERNAL MEDICINE

## 2020-11-12 PROCEDURE — 99213 OFFICE O/P EST LOW 20 MIN: CPT | Performed by: PSYCHIATRY & NEUROLOGY

## 2020-11-12 RX ORDER — HYDROCHLOROTHIAZIDE 25 MG/1
25 TABLET ORAL DAILY
Status: DISCONTINUED | OUTPATIENT
Start: 2020-11-12 | End: 2020-11-12 | Stop reason: HOSPADM

## 2020-11-12 RX ORDER — METOPROLOL TARTRATE 50 MG/1
50 TABLET, FILM COATED ORAL EVERY 12 HOURS SCHEDULED
Qty: 60 TABLET | Refills: 0 | Status: SHIPPED | OUTPATIENT
Start: 2020-11-12

## 2020-11-12 RX ORDER — AMLODIPINE BESYLATE 5 MG/1
5 TABLET ORAL
Qty: 30 TABLET | Refills: 0 | Status: SHIPPED | OUTPATIENT
Start: 2020-11-13

## 2020-11-12 RX ORDER — ASPIRIN 325 MG
325 TABLET ORAL DAILY
Start: 2020-11-13

## 2020-11-12 RX ORDER — LOSARTAN POTASSIUM 50 MG/1
50 TABLET ORAL
Qty: 30 TABLET | Refills: 0 | Status: SHIPPED | OUTPATIENT
Start: 2020-11-13

## 2020-11-12 RX ORDER — AMLODIPINE BESYLATE 5 MG/1
5 TABLET ORAL
Qty: 30 TABLET | Refills: 0 | Status: SHIPPED | OUTPATIENT
Start: 2020-11-13 | End: 2020-11-12

## 2020-11-12 RX ORDER — METOPROLOL TARTRATE 50 MG/1
50 TABLET, FILM COATED ORAL EVERY 12 HOURS SCHEDULED
Qty: 60 TABLET | Refills: 0 | Status: SHIPPED | OUTPATIENT
Start: 2020-11-12 | End: 2020-11-12

## 2020-11-12 RX ORDER — LOSARTAN POTASSIUM 50 MG/1
50 TABLET ORAL
Qty: 30 TABLET | Refills: 0 | Status: SHIPPED | OUTPATIENT
Start: 2020-11-13 | End: 2020-11-12

## 2020-11-12 RX ORDER — FERROUS SULFATE 325(65) MG
325 TABLET ORAL
Qty: 30 TABLET | Refills: 0 | Status: SHIPPED | OUTPATIENT
Start: 2020-11-13 | End: 2020-11-12

## 2020-11-12 RX ORDER — FERROUS SULFATE 325(65) MG
325 TABLET ORAL
Qty: 30 TABLET | Refills: 0 | Status: SHIPPED | OUTPATIENT
Start: 2020-11-13

## 2020-11-12 RX ADMIN — CYANOCOBALAMIN TAB 1000 MCG 500 MCG: 1000 TAB at 08:43

## 2020-11-12 RX ADMIN — Medication 5000 UNITS: at 08:42

## 2020-11-12 RX ADMIN — METOPROLOL TARTRATE 50 MG: 50 TABLET, FILM COATED ORAL at 08:42

## 2020-11-12 RX ADMIN — INSULIN LISPRO 2 UNITS: 100 INJECTION, SOLUTION INTRAVENOUS; SUBCUTANEOUS at 08:42

## 2020-11-12 RX ADMIN — AMLODIPINE BESYLATE 5 MG: 5 TABLET ORAL at 08:42

## 2020-11-12 RX ADMIN — HYDROCHLOROTHIAZIDE 25 MG: 25 TABLET ORAL at 14:13

## 2020-11-12 RX ADMIN — ASPIRIN 325 MG ORAL TABLET 325 MG: 325 PILL ORAL at 08:42

## 2020-11-12 RX ADMIN — SERTRALINE HYDROCHLORIDE 100 MG: 100 TABLET ORAL at 08:42

## 2020-11-12 RX ADMIN — LOSARTAN POTASSIUM 50 MG: 50 TABLET, FILM COATED ORAL at 08:42

## 2020-11-12 RX ADMIN — FERROUS SULFATE TAB 325 MG (65 MG ELEMENTAL FE) 325 MG: 325 (65 FE) TAB at 08:42

## 2020-11-12 RX ADMIN — PANTOPRAZOLE SODIUM 40 MG: 40 TABLET, DELAYED RELEASE ORAL at 08:42

## 2020-11-12 RX ADMIN — INFLUENZA A VIRUS A/NEBRASKA/14/2019 (H1N1) ANTIGEN (MDCK CELL DERIVED, PROPIOLACTONE INACTIVATED), INFLUENZA A VIRUS A/DELAWARE/39/2019 (H3N2) ANTIGEN (MDCK CELL DERIVED, PROPIOLACTONE INACTIVATED), INFLUENZA B VIRUS B/SINGAPORE/INFTT-16-0610/2016 ANTIGEN (MDCK CELL DERIVED, PROPIOLACTONE INACTIVATED), INFLUENZA B VIRUS B/DARWIN/7/2019 ANTIGEN (MDCK CELL DERIVED, PROPIOLACTONE INACTIVATED) 0.5 ML: 15; 15; 15; 15 INJECTION, SUSPENSION INTRAMUSCULAR at 16:39

## 2020-11-12 RX ADMIN — INSULIN LISPRO 4 UNITS: 100 INJECTION, SOLUTION INTRAVENOUS; SUBCUTANEOUS at 12:21

## 2020-11-12 NOTE — THERAPY TREATMENT NOTE
Patient Name: Zita Ramirez  : 1950    MRN: [id number]                              Today's Date: 2020       Admit Date: 2020    Visit Dx:     ICD-10-CM ICD-9-CM   1. Generalized weakness  R53.1 780.79   2. Difficulty with speech  R47.9 784.59   3. Renal insufficiency  N28.9 593.9   4. Urinary tract infection without hematuria, site unspecified  N39.0 599.0   5. Cognitive communication deficit  R41.841 799.52   6. Dysarthria  R47.1 784.51   7. Impaired mobility and ADLs  Z74.09 V49.89    Z78.9      Patient Active Problem List   Diagnosis   • TIA (transient ischemic attack)   • Essential hypertension   • Type 2 diabetes mellitus (CMS/HCC)     Past Medical History:   Diagnosis Date   • Diabetes mellitus (CMS/HCC)    • Hypertension    • Stroke (CMS/HCC)      Past Surgical History:   Procedure Laterality Date   • HYSTERECTOMY     • TUBAL ABDOMINAL LIGATION       General Information     Row Name 20 1130          OT Time and Intention    Document Type  therapy note (daily note)  -AN     Mode of Treatment  occupational therapy  -AN     Row Name 20 1130          Cognition    Orientation Status (Cognition)  oriented x 4  -AN     Row Name 20 1130          Safety Issues, Functional Mobility    Impairments Affecting Function (Mobility)  balance;strength  -AN       User Key  (r) = Recorded By, (t) = Taken By, (c) = Cosigned By    Initials Name Provider Type    AN Gertrude Wang, OT Occupational Therapist        Mobility/ADL's     Row Name 20 1130          Bed Mobility    Comment (Bed Mobility)  pt OOB  -AN     Row Name 20 1130          Transfers    [place] Level (Toilet Transfer)  standby assist  -AN     Assistive Device (Toilet Transfer)  walker, front-wheeled;raised toilet seat  -AN     Row Name 20 1130          Functional Mobility    Functional Mobility- Ind. Level  supervision required  -AN     Functional Mobility- Device  rolling walker  -AN     Functional  Mobility-Distance (Feet)  20  -AN     Functional Mobility- Comment  in room  -AN     Row Name 11/12/20 1130          Activities of Daily Living    BADL Assessment/Intervention  upper body dressing;toileting;bathing  -AN     Row Name 11/12/20 1130          Upper Body Dressing Assessment/Training    [place] Level (Upper Body Dressing)  don;doff;pajama/robe;set up;supervision  -AN     Row Name 11/12/20 1130          Lower Body Dressing Assessment/Training    [place] Level (Lower Body Dressing)  don;supervision undergarment  -AN     Position (Lower Body Dressing)  supported sitting;supported standing  -AN     Row Name 11/12/20 1130          Grooming Assessment/Training    [place] Level (Grooming)  hair care, combing/brushing;wash face, hands;standby assist  -AN     Position (Grooming)  sink side;supported standing  -AN     Row Name 11/12/20 1130          Toileting Assessment/Training    [place] Level (Toileting)  toileting skills;adjust/manage clothing;perform perineal hygiene;supervision  -AN     Assistive Devices (Toileting)  grab bar/safety frame;raised toilet seat  -AN     Row Name 11/12/20 1130          Bathing Assessment/Intervention    [place] Level (Bathing)  perineal area;supervision  -AN     Position (Bathing)  supported sitting  -AN       User Key  (r) = Recorded By, (t) = Taken By, (c) = Cosigned By    Initials Name Provider Type    Gertrude Kim OT Occupational Therapist        Obj/Interventions     Row Name 11/12/20 1133          Balance    Static Sitting Balance  WFL  -AN     Dynamic Sitting Balance  WFL  -AN     Static Standing Balance  mild impairment  -AN     Dynamic Standing Balance  mild impairment  -AN       User Key  (r) = Recorded By, (t) = Taken By, (c) = Cosigned By    Initials Name Provider Type    Gertrude Kim OT Occupational Therapist        Goals/Plan    No documentation.       Clinical Impression     Row Name 11/12/20 1137          Pain Scale: Numbers  Pre/Post-Treatment    Pretreatment Pain Rating  0/10 - no pain  -AN     Posttreatment Pain Rating  0/10 - no pain  -AN     Row Name 11/12/20 1137          Plan of Care Review    Progress  improving  -AN     Outcome Summary  Pt supv with toileting, toilet txfr, grooming sinkside and donning undergarment this date. Pt supv with functional mobility in room and chair txfrs. Pt improving and wants to return home. Pt will need spouse present to assist with mobility and ADLs and oupt services.  -AN     Row Name 11/12/20 1137          Therapy Plan Review/Discharge Plan (OT)    Equipment Needs Upon Discharge (OT)  walker, rolling;shower chair  -AN     Row Name 11/12/20 1137          Vital Signs    Pre Systolic BP Rehab  159  -AN     Pre Treatment Diastolic BP  101  -AN     Post Systolic BP Rehab  162  -AN     Post Treatment Diastolic BP  96  -AN     Row Name 11/12/20 1137          Positioning and Restraints    Pre-Treatment Position  bathroom  -AN     Post Treatment Position  chair  -AN     In Chair  reclined;call light within reach;encouraged to call for assist;with family/caregiver  -AN       User Key  (r) = Recorded By, (t) = Taken By, (c) = Cosigned By    Initials Name Provider Type    AN Gertrude Wang, OT Occupational Therapist        Outcome Measures     Row Name 11/12/20 1139          How much help from another is currently needed...    Putting on and taking off regular lower body clothing?  3  -AN     Bathing (including washing, rinsing, and drying)  3  -AN     Toileting (which includes using toilet bed pan or urinal)  3  -AN     Putting on and taking off regular upper body clothing  3  -AN     Taking care of personal grooming (such as brushing teeth)  4  -AN     Eating meals  4  -AN     AM-PAC 6 Clicks Score (OT)  20  -AN     Row Name 11/12/20 1139          Modified [place] Scale    Modified [place] Scale  3 - Moderate disability.  Requiring some help, but able to walk without assistance.  -AN       User Key  (r) =  Recorded By, (t) = Taken By, (c) = Cosigned By    Initials Name Provider Type    Gertrude Kim OT Occupational Therapist        Occupational Therapy Education                 Title: PT OT SLP Therapies (Done)     Topic: Occupational Therapy (Done)     Point: ADL training (Done)     Description:   Instruct learner(s) on proper safety adaptation and remediation techniques during self care or transfers.   Instruct in proper use of assistive devices.              Learning Progress Summary           Patient Acceptance, E, VU,NR by AN at 11/12/2020 1102    Comment: Educated on current progression and safety with ADLs.    Acceptance, E, VU,NR by AN at 11/11/2020 1123    Comment: Progression of care, improvements in ADL I, benefits of daily therpeutic ex with activities.    Acceptance, E, VU,NR by SD at 11/9/2020 1127    Comment: Pt. & spouse educated on role of OT, and both are agreeable with OT POC.   Family Acceptance, E, VU,NR by AN at 11/11/2020 1123    Comment: Progression of care, improvements in ADL I, benefits of daily therpeutic ex with activities.   Significant Other Acceptance, E, VU,NR by SD at 11/9/2020 1127    Comment: Pt. & spouse educated on role of OT, and both are agreeable with OT POC.                   Point: Home exercise program (Done)     Description:   Instruct learner(s) on appropriate technique for monitoring, assisting and/or progressing therapeutic exercises/activities.              Learning Progress Summary           Patient Acceptance, E, VU,NR by SD at 11/9/2020 1127    Comment: Pt. & spouse educated on role of OT, and both are agreeable with OT POC.   Significant Other Acceptance, E, VU,NR by SD at 11/9/2020 1127    Comment: Pt. & spouse educated on role of OT, and both are agreeable with OT POC.                   Point: Precautions (Done)     Description:   Instruct learner(s) on prescribed precautions during self-care and functional transfers.              Learning Progress Summary            Patient Acceptance, E, VU,NR by SD at 11/9/2020 1127    Comment: Pt. & spouse educated on role of OT, and both are agreeable with OT POC.   Significant Other Acceptance, E, VU,NR by SD at 11/9/2020 1127    Comment: Pt. & spouse educated on role of OT, and both are agreeable with OT POC.                   Point: Body mechanics (Done)     Description:   Instruct learner(s) on proper positioning and spine alignment during self-care, functional mobility activities and/or exercises.              Learning Progress Summary           Patient Acceptance, E, VU,NR by SD at 11/9/2020 1127    Comment: Pt. & spouse educated on role of OT, and both are agreeable with OT POC.   Significant Other Acceptance, E, VU,NR by SD at 11/9/2020 1127    Comment: Pt. & spouse educated on role of OT, and both are agreeable with OT POC.                               User Key     Initials Effective Dates Name Provider Type Discipline    SD 06/08/18 -  Megan Woodard OT Occupational Therapist OT     06/22/15 -  Gertrude Wang, OT Occupational Therapist OT              OT Recommendation and Plan     Plan of Care Review  Progress: improving  Outcome Summary: Pt supv with toileting, toilet txfr, grooming sinkside and donning undergarment this date. Pt supv with functional mobility in room and chair txfrs. Pt improving and wants to return home. Pt will need spouse present to assist with mobility and ADLs and oupt services.     Time Calculation:   Time Calculation- OT     Row Name 11/12/20 1140             Time Calculation- OT    OT Start Time  1102  -AN      Total Timed Code Minutes- OT  24 minute(s)  -AN      OT Received On  11/12/20  -AN      OT Goal Re-Cert Due Date  11/19/20  -AN        User Key  (r) = Recorded By, (t) = Taken By, (c) = Cosigned By    Initials Name Provider Type    Gertrude Kim OT Occupational Therapist        Therapy Charges for Today     Code Description Service Date Service Provider Modifiers Qty     39555324498 HC OT THERAPEUTIC ACT EA 15 MIN 11/11/2020 Gertrude Wang, OT GO 1    78390389261 HC OT THERAPEUTIC ACT EA 15 MIN 11/12/2020 Gertrude Wang OT GO 2               Gertrude Wang, OT  11/12/2020

## 2020-11-12 NOTE — THERAPY TREATMENT NOTE
Patient Name: Zita Ramirez  : 1950    MRN: [id number]                              Today's Date: 2020       Admit Date: 2020    Visit Dx:     ICD-10-CM ICD-9-CM   1. Generalized weakness  R53.1 780.79   2. Difficulty with speech  R47.9 784.59   3. Renal insufficiency  N28.9 593.9   4. Urinary tract infection without hematuria, site unspecified  N39.0 599.0   5. Cognitive communication deficit  R41.841 799.52   6. Dysarthria  R47.1 784.51   7. Impaired mobility and ADLs  Z74.09 V49.89    Z78.9    8. TIA (transient ischemic attack)  G45.9 435.9   9. Essential hypertension  I10 401.9     Patient Active Problem List   Diagnosis   • TIA (transient ischemic attack)   • Essential hypertension   • Type 2 diabetes mellitus (CMS/HCC)     Past Medical History:   Diagnosis Date   • Diabetes mellitus (CMS/HCC)    • Hypertension    • Stroke (CMS/HCC)      Past Surgical History:   Procedure Laterality Date   • HYSTERECTOMY     • TUBAL ABDOMINAL LIGATION       General Information     Row Name 20 1538          Physical Therapy Time and Intention    Document Type  therapy note (daily note)  -TIFFANIE     Mode of Treatment  physical therapy  -TIFFANIE     Row Name 20 1538          General Information    Patient Profile Reviewed  yes  -TIFFANIE     Prior Level of Function  independent:;gait;transfer;bed mobility;ADL's  -TIFFANIE     Existing Precautions/Restrictions  fall  -TIFFANIE     Barriers to Rehab  none identified  -TIFFANIE     Row Name 20 1538          Living Environment    Lives With  spouse  -TIFFANIE     Row Name 20 1538          Home Main Entrance    Number of Stairs, Main Entrance  four  -TIFFANIE     Row Name 20 1538          Cognition    Orientation Status (Cognition)  oriented x 4  -TIFFANIE     Row Name 20 1538          Safety Issues, Functional Mobility    Safety Issues Affecting Function (Mobility)  safety precautions follow-through/compliance;insight into deficits/self-awareness;safety precaution awareness;awareness  of need for assistance  -TIFFANIE     Impairments Affecting Function (Mobility)  balance;endurance/activity tolerance;strength  -TIFFANIE       User Key  (r) = Recorded By, (t) = Taken By, (c) = Cosigned By    Initials Name Provider Type    Ny Martinez PT Physical Therapist        Mobility     Row Name 11/12/20 1539          Bed Mobility    Comment (Bed Mobility)  patient is OOB and returns to the chair  -TIFFANIE     Row Name 11/12/20 1539          Bed-Chair Transfer    Bed-Chair [place] (Transfers)  minimum assist (75% patient effort)  -TIFFANIE     Assistive Device (Bed-Chair Transfers)  walker, front-wheeled  -TIFFANIE     Row Name 11/12/20 1539          Sit-Stand Transfer    Sit-Stand [place] (Transfers)  contact guard  -TIFFANIE     Assistive Device (Sit-Stand Transfers)  walker, front-wheeled  -TIFFANIE     Row Name 11/12/20 1539          Gait/Stairs (Locomotion)    [place] Level (Gait)  contact guard  -TIFFANIE     Assistive Device (Gait)  walker, front-wheeled  -TIFFANIE     Distance in Feet (Gait)  350  -TIFFANIE     Deviations/Abnormal Patterns (Gait)  stride length decreased;weight shifting decreased  -TIFFANIE     Bilateral Gait Deviations  forward flexed posture  -TIFFANIE     Comment (Gait/Stairs)  patient still feels a little unbalanced with ambulation using walker.  -TIFFANIE       User Key  (r) = Recorded By, (t) = Taken By, (c) = Cosigned By    Initials Name Provider Type    Ny Martinez PT Physical Therapist        Obj/Interventions     Row Name 11/12/20 1543          Motor Skills    Therapeutic Exercise  hip;ankle;knee  -     Row Name 11/12/20 1543          Hip (Therapeutic Exercise)    Hip (Therapeutic Exercise)  AROM (active range of motion)  -TIFFANIE     Hip AROM (Therapeutic Exercise)  bilateral;flexion;extension;sitting;10 repetitions  -     Row Name 11/12/20 1543          Knee (Therapeutic Exercise)    Knee (Therapeutic Exercise)  AROM (active range of motion)  -TIFFANIE     Knee AROM (Therapeutic Exercise)  bilateral;SAQ (short arc  quad);sitting;10 repetitions  -TIFFANIE     Row Name 11/12/20 1549          Ankle (Therapeutic Exercise)    Ankle (Therapeutic Exercise)  AROM (active range of motion)  -TIFFANIE     Ankle AROM (Therapeutic Exercise)  bilateral;dorsiflexion;plantarflexion;10 repetitions;sitting  -TIFFANIE     Row Name 11/12/20 1546          Balance    Balance Assessment  sitting static balance;sitting dynamic balance;standing static balance;standing dynamic balance  -TIFFANIE     Static Sitting Balance  WNL  -TIFFANIE     Dynamic Sitting Balance  WNL  -TIFFANIE     Static Standing Balance  mild impairment  -TIFFANIE     Dynamic Standing Balance  mild impairment  -TIFFANIE     Balance Interventions  sit to stand;weight shifting activity;dynamic reaching  -TIFFANIE     Comment, Balance  patient has some LOB reaching in standing  -TIFFANIE       User Key  (r) = Recorded By, (t) = Taken By, (c) = Cosigned By    Initials Name Provider Type    Ny Martinez, PT Physical Therapist        Goals/Plan    No documentation.       Clinical Impression     Row Name 11/12/20 154          Pain Scale: Numbers Pre/Post-Treatment    Pretreatment Pain Rating  0/10 - no pain  -TIFFANIE     Posttreatment Pain Rating  0/10 - no pain  -TIFFANIE     Row Name 11/12/20 1545          Plan of Care Review    Plan of Care Reviewed With  patient  -TIFFANIE     Progress  improving  -TIFFANIE     Outcome Summary  patient increased ambulation to 350 ft with walker. She has increased BP with activity RN notified and will recheck BP, continue to progress activity level  -TIFFANIE     Row Name 11/12/20 1543          Therapy Assessment/Plan (PT)    Patient/Family Therapy Goals Statement (PT)  wants to go home with OP Therapy  -TIFFANIE     Rehab Potential (PT)  good, to achieve stated therapy goals  -TIFFANIE     Criteria for Skilled Interventions Met (PT)  yes  -TIFFANIE     Row Name 11/12/20 6533          Vital Signs    Pre Systolic BP Rehab  150  -TIFFANIE     Pre Treatment Diastolic BP  80  -TIFFANIE     Post Systolic BP Rehab  168  -TIFFANIE     Post Treatment Diastolic BP  96  -TIFFANIE      Pre SpO2 (%)  95  -TIFFANIE     O2 Delivery Pre Treatment  room air  -TIFFANIE     Post SpO2 (%)  95  -TIFFANIE     O2 Delivery Post Treatment  room air  -TIFFANIE     Pre Patient Position  Sitting  -TIFFANIE     Intra Patient Position  Standing  -TIFFANIE     Post Patient Position  Sitting  -TIFFANIE     Row Name 11/12/20 1544          Positioning and Restraints    Pre-Treatment Position  sitting in chair/recliner  -TIFFANIE     Post Treatment Position  chair  -TIFFANIE     In Chair  notified nsg;reclined;sitting;call light within reach;exit alarm on  -TIFFANIE       User Key  (r) = Recorded By, (t) = Taken By, (c) = Cosigned By    Initials Name Provider Type    Ny Martinez PT Physical Therapist        Outcome Measures     Row Name 11/12/20 1548          How much help from another person do you currently need...    Turning from your back to your side while in flat bed without using bedrails?  4  -TIFFANIE     Moving from lying on back to sitting on the side of a flat bed without bedrails?  4  -TIFFANIE     Moving to and from a bed to a chair (including a wheelchair)?  3  -TIFFANIE     Standing up from a chair using your arms (e.g., wheelchair, bedside chair)?  3  -TIFFANIE     Climbing 3-5 steps with a railing?  2  -TIFFANIE     To walk in hospital room?  3  -TIFFANIE     AM-PAC 6 Clicks Score (PT)  19  -TIFFANIE       User Key  (r) = Recorded By, (t) = Taken By, (c) = Cosigned By    Initials Name Provider Type    Ny Martinez PT Physical Therapist        Physical Therapy Education                 Title: PT OT SLP Therapies (In Progress)     Topic: Physical Therapy (In Progress)     Point: Mobility training (In Progress)     Learning Progress Summary           Patient Acceptance, E, NR by TIFFANIE at 11/12/2020 1500    Acceptance, E, VU,NR by CD at 11/11/2020 1025    Comment: SEE FLOWSHEET.    Acceptance, E, VU,NR by CD at 11/9/2020 1417    Comment: BENEFITS OF OOB ACTIVITY,SAFETY WITH MOBILITY, PROGRESSION OF POC, D/C PLANNING,   Significant Other Acceptance, E, VU,NR by CD at 11/9/2020 1417     Comment: BENEFITS OF OOB ACTIVITY,SAFETY WITH MOBILITY, PROGRESSION OF POC, D/C PLANNING,                   Point: Home exercise program (In Progress)     Learning Progress Summary           Patient Acceptance, E, NR by TIFFANIE at 11/12/2020 1500    Acceptance, E, VU,NR by CD at 11/11/2020 1025    Comment: SEE FLOWSHEET.    Acceptance, E, VU,NR by CD at 11/9/2020 1417    Comment: BENEFITS OF OOB ACTIVITY,SAFETY WITH MOBILITY, PROGRESSION OF POC, D/C PLANNING,   Significant Other Acceptance, E, VU,NR by CD at 11/9/2020 1417    Comment: BENEFITS OF OOB ACTIVITY,SAFETY WITH MOBILITY, PROGRESSION OF POC, D/C PLANNING,                   Point: Body mechanics (In Progress)     Learning Progress Summary           Patient Acceptance, E, NR by TIFFANIE at 11/12/2020 1500    Acceptance, E, VU,NR by CD at 11/11/2020 1025    Comment: SEE FLOWSHEET.    Acceptance, E, VU,NR by CD at 11/9/2020 1417    Comment: BENEFITS OF OOB ACTIVITY,SAFETY WITH MOBILITY, PROGRESSION OF POC, D/C PLANNING,   Significant Other Acceptance, E, VU,NR by CD at 11/9/2020 1417    Comment: BENEFITS OF OOB ACTIVITY,SAFETY WITH MOBILITY, PROGRESSION OF POC, D/C PLANNING,                   Point: Precautions (In Progress)     Learning Progress Summary           Patient Acceptance, E, NR by TIFFANIE at 11/12/2020 1500    Acceptance, E, VU,NR by CD at 11/11/2020 1025    Comment: SEE FLOWSHEET.    Acceptance, E, VU,NR by CD at 11/9/2020 1417    Comment: BENEFITS OF OOB ACTIVITY,SAFETY WITH MOBILITY, PROGRESSION OF POC, D/C PLANNING,   Significant Other Acceptance, E, VU,NR by CD at 11/9/2020 1417    Comment: BENEFITS OF OOB ACTIVITY,SAFETY WITH MOBILITY, PROGRESSION OF POC, D/C PLANNING,                               User Key     Initials Effective Dates Name Provider Type Discipline    TIFFANIE 06/19/15 -  Ny Small, PT Physical Therapist PT    CD 06/19/15 -  Celeste Morrison PT Physical Therapist PT              PT Recommendation and Plan  Planned Therapy Interventions  (PT): balance training, bed mobility training, gait training, transfer training, strengthening  Plan of Care Reviewed With: patient  Progress: improving  Outcome Summary: patient increased ambulation to 350 ft with walker. She has increased BP with activity RN notified and will recheck BP, continue to progress activity level     Time Calculation:   PT Charges     Row Name 11/12/20 1549             Time Calculation    Start Time  1500  -TIFFANIE      PT Received On  11/12/20  -TIFFANIE      PT Goal Re-Cert Due Date  11/19/20  -TIFFANIE         Time Calculation- PT    Total Timed Code Minutes- PT  25 minute(s)  -TIFFANIE         Timed Charges    05433 - PT Therapeutic Activity Minutes  25  -TIFFANIE        User Key  (r) = Recorded By, (t) = Taken By, (c) = Cosigned By    Initials Name Provider Type    Ny Martinez, PT Physical Therapist        Therapy Charges for Today     Code Description Service Date Service Provider Modifiers Qty    86323783393  PT THERAPEUTIC ACT EA 15 MIN 11/12/2020 yN Small PT GP 2          PT G-Codes  Outcome Measure Options: AM-PAC 6 Clicks Basic Mobility (PT), Modified [place]  AM-PAC 6 Clicks Score (PT): 19  AM-PAC 6 Clicks Score (OT): 20  Modified [place] Scale: 3 - Moderate disability.  Requiring some help, but able to walk without assistance.    Ny Small PT  11/12/2020

## 2020-11-12 NOTE — PROGRESS NOTES
Neurology       Patient Care Team:  System, Provider Not In as PCP - General    Chief complaint: I want to go home    History: Patient has been able to ambulate without difficulty.    Her blood pressure remains on the high end at 167 systolic with numbers as high as 182 overnight.      Past Medical History:   Diagnosis Date   • Diabetes mellitus (CMS/[facility])    • Hypertension    • Stroke (CMS/[facility])        Vital Signs   Vitals:    11/12/20 0325 11/12/20 0738 11/12/20 0842 11/12/20 1100   BP: 173/95 (!) 182/91 (!) 159/101 (!) 182/90   BP Location: Left arm Left arm  Right arm   Patient Position: Lying Lying  Sitting   Pulse: 70 83 79 72   Resp: 16 16  16   Temp: 98.2 °F (36.8 °C) 97.7 °F (36.5 °C)  97.8 °F (36.6 °C)   TempSrc: Oral Oral  Oral   SpO2:       Weight:       Height:           Physical Exam:   General: Awake and alert              Neuro: Oriented to person place and time.    Speech is normal.        Results Review:  No new results  Results from last 7 days   Lab Units 11/08/20  0521   WBC 10*3/mm3 8.13   HEMOGLOBIN g/dL 10.6*   HEMATOCRIT % 35.5   PLATELETS 10*3/mm3 208     Results from last 7 days   Lab Units 11/10/20  0601 11/09/20  0529 11/08/20  0533 11/08/20  0521   SODIUM mmol/L 139 138  --  137   POTASSIUM mmol/L 5.1 4.6  --  4.2   CHLORIDE mmol/L 107 109*  --  103   CO2 mmol/L 24.0 22.0  --  21.0*   BUN mg/dL 20 23  --  36*   CREATININE mg/dL 1.07* 1.17* 1.60* 1.61*   CALCIUM mg/dL 8.7 8.2*  --  9.1   BILIRUBIN mg/dL  --   --   --  0.2   ALK PHOS U/L  --   --   --  85   ALT (SGPT) U/L  --   --   --  12   AST (SGOT) U/L  --   --   --  14   GLUCOSE mg/dL 196* 150*  --  173*       Imaging Results (Last 24 Hours)     ** No results found for the last 24 hours. **          Assessment:  Orthostatic hypotension resolved.    Hypertension    Diabetic neuropathy    Plan:  She can go home anytime blood pressure is adequately controlled with outpatient home health therapy.    Comment:  Okay to discharge from my  standpoint         I discussed the patients findings and my recommendations with patient, family and primary care team    Bryn Tobias MD  11/12/20  12:49 EST

## 2020-11-12 NOTE — PROGRESS NOTES
Continued Stay Note  [facility]     Patient Name: Zita Ramirez  MRN: [id number]  Today's Date: 11/12/2020    Admit Date: 11/8/2020    Discharge Plan     Row Name 11/12/20 1245       Plan    Plan Comments  Met with pt at . She states she thinks she will go home. She understands that it is likely insurance could deny rehab and doesn't want CM to f/u on referral. Called Bailey at [facility] to cancel referral. Provided pt with an outpatient PT order for Eval and Treat and she will take it back to [place] and arrange her own outpatient PT. Pt declined a walker at this time. CM will follow. Sophie Rossi RN,  ext. [phone]: Also provided pt with an outpatient SLP order to eval and treat. She will take orders with her and make appt's at her local outpatient therapy centers in [place]. [place]    Final Discharge Disposition Code  01 - home or self-care        Discharge Codes    No documentation.             Sophie Rossi RN

## 2020-11-12 NOTE — DISCHARGE SUMMARY
[facility] Medicine Services  DISCHARGE SUMMARY    Patient Name: Zita Ramirez  : 1950  MRN: [id number]    Date of Admission: 2020  5:15 AM  Date of Discharge:  2020  Primary Care Physician: System, Provider Not In    Consults     Date and Time Order Name Status Description    2020 1444 Inpatient Neurology Consult Stroke Completed           Hospital Course     Presenting Problem:   TIA (transient ischemic attack) [G45.9]  TIA (transient ischemic attack) [G45.9]    Active Hospital Problems    Diagnosis  POA   • **TIA (transient ischemic attack) [G45.9]  Yes   • Essential hypertension [I10]  Yes   • Type 2 diabetes mellitus (CMS/HCC) [E11.9]  Yes      Resolved Hospital Problems   No resolved problems to display.          Hospital Course:  Zita Ramirez is a 70 y.o. female with a past medical history of T2DM, and hypertension who was admitted for generalized weakness, dysarthria, and witnessed fall.     Generalized weakness  -PT and OT recommending inpatient rehab facility,  helping to facilitate  -CTA head and neck, CT perfusion, MRI brain unremarkable, MRI showed old insult near left posterior vertex which patient is aware of  and has no residual deficits from  -Generalized weakness likely secondary to deconditioning -continue PT/OT while inpatient - she will need IRF on discharge  -Continue aspirin and statin  -Echocardiogram with normal EF and carotid duplex without significant stenosis of carotid arteries bilaterally  -Vitamin D low, continue supplement  -Vitamin B12 and iron also low, continue supplements  -Will need repeat vitamin D and B12 and iron levels in 4 to 6 weeks  -TSH within normal limits     Hypertension-increase amlodipine to 5 mg daily and continue losartan 50 mg q24h, continue decreased dose of metoprolol to 50 mg q12h, check orthostatic BP     MEMO-improved     UTI with urinary incontinence-completed treatment with ceftriaxone x3days     T2DM-  A1c 6.5, continue ssi     Today patient seen resting up in chair awake and alert.  No acute distress.  Tolerating diet.  No pain, nausea or vomiting.  At bedside.  She is currently afebrile.  Blood pressure still elevated but just started higher dose of Norvasc this morning and have been resumed her home HCTZ which she has not received currently.  She will get this prior to discharge.  Cleared for discharge home by neurology some improvement in blood pressure.  Encouraged patient to monitor blood pressure and keep log for PCP follow-up early next week.    Addendum: 1600 p.m.  --Called stating she has received her HCTZ.  After ambulating in halls BP has improved to 179/89.  This was also after ambulating in halls.  She should begin trending further down, however is already improving.  She is asking for discharge home.  All arrangements have been made for outpatient PT prescription given to patient at bedside.  Will encourage monitoring of blood pressure.  Will send new prescriptions to pharmacy.  Follow-up with PCP as noted.      Discharge Follow Up Recommendations for outpatient labs/diagnostics:  Patient cleared for discharge today by all services.  Discharging on increased dose of amlodipine and lower dose of metoprolol.  Otherwise continuing home dose of losartan HCTZ.    Working with PT/OT while here.  Does not meet criteria for inpatient rehab.  Patient wishes for outpatient physical therapy which she will see in [place] on discharge.  She is provided prescription for physical therapy at bedside.    --Follow-up with PCP early next week ([place] arrange [place].  Take glucose and blood pressure logs to follow-up appointment)  --PCP to for to her local neurologist as indicated.    Day of Discharge     HPI:   Patient seen resting up in chair awake and alert.   at bedside.  States that she feels better today.  Slept okay.  Denies pain, nausea,.  Tolerating solid diet with  no dysphagia.  Reports that she lives in [facility] plans for discharge home today with outpatient physical therapy.   at bedside agrees.  Awaiting some improvement in blood pressure with resuming her home HCTZ and increased dose of Norvasc today.  Once improved will discharge.    Review of Systems  Gen- No fevers, chills  CV- No chest pain, palpitations  Resp- No cough, dyspnea  GI- No N/V/D, abd pain      Vital Signs:   Temp:  [97.7 °F (36.5 °C)-98.3 °F (36.8 °C)] 97.8 °F (36.6 °C)  Heart Rate:  [69-83] 72  Resp:  [16-18] 16  BP: (159-186)/() 179/89 (after walking… Just started increased dose of Norvasc this morning and resume her HCTZ this afternoon)    Physical Exam:  Constitutional: No acute distress, awake, alert.  Seen resting up in chair with  at bedside.    HENT: NCAT, mucous membranes moist  Respiratory: Clear to auscultation bilaterally A/P, respiratory effort normal with oxygen saturations WNL.  Cardiovascular: RRR, no murmurs  Gastrointestinal: Positive bowel sounds, soft, nontender, nondistended  Psychiatric: Appropriate affect, cooperative and calm  Neurologic: Awake, alert, and oriented x 3, moving all extremities equally,  and pushes equal and strong cranial Nerves grossly intact to confrontation, speech with very mild surrounding lisp but otherwise clear and easy to understand.  Follows commands.  Skin: No rashes on exposed skin       Pertinent  and/or Most Recent Results     Results from last 7 days   Lab Units 11/10/20  0601 11/09/20  0529 11/08/20  0533 11/08/20  0521   WBC 10*3/mm3  --   --   --  8.13   HEMOGLOBIN g/dL  --   --   --  10.6*   HEMATOCRIT %  --   --   --  35.5   PLATELETS 10*3/mm3  --   --   --  208   SODIUM mmol/L 139 138  --  137   POTASSIUM mmol/L 5.1 4.6  --  4.2   CHLORIDE mmol/L 107 109*  --  103   CO2 mmol/L 24.0 22.0  --  21.0*   BUN mg/dL 20 23  --  36*   CREATININE mg/dL 1.07* 1.17* 1.60* 1.61*   GLUCOSE mg/dL 196* 150*  --  173*   CALCIUM  mg/dL 8.7 8.2*  --  9.1     Results from last 7 days   Lab Units 11/08/20  0521   BILIRUBIN mg/dL 0.2   ALK PHOS U/L 85   ALT (SGPT) U/L 12   AST (SGOT) U/L 14     Results from last 7 days   Lab Units 11/09/20  0529   CHOLESTEROL mg/dL 91   TRIGLYCERIDES mg/dL 107   HDL CHOL mg/dL 31*     Results from last 7 days   Lab Units 11/09/20  0529 11/08/20  0521   TSH uIU/mL 0.746  --    HEMOGLOBIN A1C % 6.50*  --    TROPONIN T ng/mL  --  <0.010       Brief Urine Lab Results  (Last result in the past 365 days)      Color   Clarity   Blood   Leuk Est   Nitrite   Protein   CREAT   Urine HCG        11/08/20 0639 Yellow Clear Negative Moderate (2+) Negative Negative               Microbiology Results Abnormal     Procedure Component Value - Date/Time    COVID PRE-OP / PRE-PROCEDURE SCREENING ORDER (NO ISOLATION) - Swab, Nasopharynx [[id number]] Collected: 11/08/20 1032    Lab Status: Final result Specimen: Swab from Nasopharynx Updated: 11/08/20 1949    Narrative:      The following orders were created for panel order COVID PRE-OP / PRE-PROCEDURE SCREENING ORDER (NO ISOLATION) - Swab, Nasopharynx.  Procedure                               Abnormality         Status                     ---------                               -----------         ------                     COVID-19,[facility], NP ...[[id number]]                      Final result                 Please view results for these tests on the individual orders.    COVID-19,[facility], NP SWAB IN LEXAR VIRAL TRANSPORT MEDIA 24-30 HR TAT - Swab, Nasopharynx [[id number]] Collected: 11/08/20 1032    Lab Status: Final result Specimen: Swab from Nasopharynx Updated: 11/08/20 1949     SARS-CoV-2 GINNA Not Detected          Imaging Results (All)     Procedure Component Value Units Date/Time    MRI Brain Without Contrast [[id number]] Collected: 11/08/20 0928     Updated: 11/09/20 1046    Narrative:      EXAMINATION: MRI BRAIN WO CONTRAST - 11/08/2020     INDICATION: Altered mental  status, generalized weakness, difficulty with  ambulation; R53.1-Weakness; R47.9-Unspecified speech disturbances;  N28.9-Disorder of kidney and ureter, unspecified; N39.0-Urinary tract  infection, site not specified. Hypertension, generalized weakness and  dysphasia.     TECHNIQUE: Routine multiplanar imaging is obtained of the brain without  the administration of gadolinium contrast.     COMPARISON: None.     FINDINGS: There is no area of restricted diffusion to suggest evidence  of an acute ischemic insult. Abnormal signal intensity seen diffusely  throughout the periventricular and subcortical white matter does suggest  chronic small vessel ischemic change. Old insult identified near the  left posterior vertex. Pituitary and sella are unremarkable.  Craniovertebral junction is preserved. No hemorrhage or hydrocephalus.  No mass, mass effect, or midline shift. No abnormal extra-axial fluid  collection identified. Visualized paranasal sinuses are clear. The  mastoid air cells are patent. Globes and of its are intact. No  cerebellar pontine angle mass is identified.       Impression:      Atrophy and chronic changes seen within the brain with no  acute intracranial abnormality identified. Old insult identified near  the left posterior vertex.      DICTATED:   11/08/2020  EDITED/ls :   11/08/2020      This report was finalized on 11/9/2020 10:43 AM by Dr. Renetta Houston MD.       CT Angiogram Head [[id number]] Collected: 11/08/20 0624     Updated: 11/08/20 0914    Narrative:      CTA Head, CTA Neck     INDICATION:    Weakness and confusion. History of stroke.    Head and neck CTA:    Neck - Patent cervical CCA/ICA/vertebral arteries. No apparent dissection. Tortuosity of the proximal internal carotid arteries bilaterally left greater than right. At least moderate atherosclerotic change of the carotid bulbs bilaterally.    Head -Mild to moderate diffuse atherosclerotic change of the cavernous and supraclinoid  internal carotid arteries bilaterally. No hemodynamically critical stenosis or vessel cut off. No apparent aneurysm. No dural sinus occlusion. No large completed  infarct.    This is a preliminary wet read by Dr. Kelby Seals at 0614 hours. Final interpretation will be provided by neuroradiology. Please refer to final interpretation for detailed findings and any further recommendations.    Final interpretation    CTA Head, CTA Neck    CTA Head, CTA Neck    INDICATION:   Weakness and confusion. History of CVA. Acute stroke suspected.    TECHNIQUE:   CT angiogram of the head and neck during intravenous contrast administration. Contrast dose recorded in the patient's chart. 3-D reconstructions were obtained and reviewed.  Evaluation for a significant carotid arterial stenosis is based on the NASCET  criteria. Radiation dose reduction techniques included automated exposure control or exposure modulation based on body size. Count of known CT and cardiac nuc med studies performed in previous 12 months: 0. Precontrast imaging is also obtained.     COMPARISON:   There is an earlier noncontrast head CT, and CT perfusion    FINDINGS:   CTA neck:  There are mild vascular calcifications at the aortic arch. There is no hemodynamically significant stenosis suspected great vessel origins from the arch. There are prominent calcifications at the origin of the right subclavian with  hemodynamically significant stenosis likely though not optimally evaluated.    Right carotid system: There is calcified plaque at the right carotid bifurcation with about 20% diameter stenosis NASCET criteria. Proximal right internal carotid artery is also tortuous with some kinking and mild long segment narrowing. There is  calcified plaque at the right carotid siphon with likely moderate to severe stenosis.    Left carotid system: There is calcified plaque at the left carotid bifurcation with about 60% diameter stenosis of the distal left common carotid  artery. By NASCET criteria there is about 35% diameter stenosis at the proximal right internal carotid  artery. There is also a very tortuous appearance to the cervical left internal carotid artery with kinking and subsequent poststenotic dilatation with ectasia up to about 6.5 mm in diameter. There is also severe calcified plaque at the siphon with likely  moderate to severe stenosis.    The left vertebral artery is dominant. Both vertebral arteries are patent with supply the basilar.    CTA head:  There is no intracranial vascular cut off. There is in general some distal intracranial vascular irregularity which is most likely manifestation of intracranial atherosclerotic disease. There is likely mild narrowing at the origin of the right  A1 vessel but otherwise no focal central stenosis is suspected. There is a small anterior communicating artery present. The dural venous sinuses are patent. There is no significant sized posterior communicator artery on either side. No intracranial  aneurysm is suspected on this limited CT angiogram performed with rapid technology for stroke evaluation.    There is streak artifact from dental metal. There are emphysematous changes at lung apices.      Impression:        1. There is about 20% diameter stenosis at the right carotid bifurcation by NASCET criteria and 35% diameter stenosis at the left carotid bifurcation by NASCET criteria. There is also about 60% diameter stenosis of the distal left common carotid artery.  Both cervical internal carotid arteries are tortuous and kinked. This is associated with particular post kinking/stenotic ectasia on the left.  2. There is extensive atherosclerotic calcification of the carotid siphons bilaterally with likely moderate to severe stenosis.  3. Calcified plaque at the origin of the right subclavian artery likely results in hemodynamically significant stenosis.  4. Both vertebral arteries are patent with the left being dominant and  both supply the basilar.  5. No intracranial vascular cut off. Mild stenosis at the origin of the right A1 vessel. Likely mild intracranial atherosclerotic disease more distally.    Signer Name: Sonya Trujillo MD   Signed: 11/8/2020 9:12 AM   Workstation Name: [email or web address]    [facility]    CT Angiogram Neck [[id number]] Collected: 11/08/20 0624     Updated: 11/08/20 0914    Narrative:      CTA Head, CTA Neck     INDICATION:    Weakness and confusion. History of stroke.    Head and neck CTA:    Neck - Patent cervical CCA/ICA/vertebral arteries. No apparent dissection. Tortuosity of the proximal internal carotid arteries bilaterally left greater than right. At least moderate atherosclerotic change of the carotid bulbs bilaterally.    Head -Mild to moderate diffuse atherosclerotic change of the cavernous and supraclinoid internal carotid arteries bilaterally. No hemodynamically critical stenosis or vessel cut off. No apparent aneurysm. No dural sinus occlusion. No large completed  infarct.    This is a preliminary wet read by Dr. Kelby Seals at 0614 hours. Final interpretation will be provided by neuroradiology. Please refer to final interpretation for detailed findings and any further recommendations.    Final interpretation    CTA Head, CTA Neck    CTA Head, CTA Neck    INDICATION:   Weakness and confusion. History of CVA. Acute stroke suspected.    TECHNIQUE:   CT angiogram of the head and neck during intravenous contrast administration. Contrast dose recorded in the patient's chart. 3-D reconstructions were obtained and reviewed.  Evaluation for a significant carotid arterial stenosis is based on the NASCET  criteria. Radiation dose reduction techniques included automated exposure control or exposure modulation based on body size. Count of known CT and cardiac nuc med studies performed in previous 12 months: 0. Precontrast imaging is also obtained.     COMPARISON:   There is an earlier  noncontrast head CT, and CT perfusion    FINDINGS:   CTA neck:  There are mild vascular calcifications at the aortic arch. There is no hemodynamically significant stenosis suspected great vessel origins from the arch. There are prominent calcifications at the origin of the right subclavian with  hemodynamically significant stenosis likely though not optimally evaluated.    Right carotid system: There is calcified plaque at the right carotid bifurcation with about 20% diameter stenosis NASCET criteria. Proximal right internal carotid artery is also tortuous with some kinking and mild long segment narrowing. There is  calcified plaque at the right carotid siphon with likely moderate to severe stenosis.    Left carotid system: There is calcified plaque at the left carotid bifurcation with about 60% diameter stenosis of the distal left common carotid artery. By NASCET criteria there is about 35% diameter stenosis at the proximal right internal carotid  artery. There is also a very tortuous appearance to the cervical left internal carotid artery with kinking and subsequent poststenotic dilatation with ectasia up to about 6.5 mm in diameter. There is also severe calcified plaque at the siphon with likely  moderate to severe stenosis.    The left vertebral artery is dominant. Both vertebral arteries are patent with supply the basilar.    CTA head:  There is no intracranial vascular cut off. There is in general some distal intracranial vascular irregularity which is most likely manifestation of intracranial atherosclerotic disease. There is likely mild narrowing at the origin of the right  A1 vessel but otherwise no focal central stenosis is suspected. There is a small anterior communicating artery present. The dural venous sinuses are patent. There is no significant sized posterior communicator artery on either side. No intracranial  aneurysm is suspected on this limited CT angiogram performed with [employer] for  stroke evaluation.    There is streak artifact from dental metal. There are emphysematous changes at lung apices.      Impression:        1. There is about 20% diameter stenosis at the right carotid bifurcation by NASCET criteria and 35% diameter stenosis at the left carotid bifurcation by NASCET criteria. There is also about 60% diameter stenosis of the distal left common carotid artery.  Both cervical internal carotid arteries are tortuous and kinked. This is associated with particular post kinking/stenotic ectasia on the left.  2. There is extensive atherosclerotic calcification of the carotid siphons bilaterally with likely moderate to severe stenosis.  3. Calcified plaque at the origin of the right subclavian artery likely results in hemodynamically significant stenosis.  4. Both vertebral arteries are patent with the left being dominant and both supply the basilar.  5. No intracranial vascular cut off. Mild stenosis at the origin of the right A1 vessel. Likely mild intracranial atherosclerotic disease more distally.    Signer Name: Sonya Trujillo MD   Signed: 11/8/2020 9:12 AM   Workstation Name: [facility]    [facility]    XR Chest 1 View [[id number]] Collected: 11/08/20 0614     Updated: 11/08/20 0616    Narrative:      CR Chest 1 Vw    INDICATION:   Weakness and shortness of air.     COMPARISON:    None available.    FINDINGS:  Single portable AP view(s) of the chest.  The heart is enlarged. The lungs are emphysematous. No distinct evidence of volume overload. Coarsened interstitial markings are favored to represent chronic interstitial fibrosis and scarring although we have no  comparisons for this patient and acute inflammatory or infectious infiltrates are technically within the differential. No effusion dense consolidation or pneumothorax.      Impression:        1. Cardiomegaly and probable chronic interstitial changes.    Signer Name: Kelby Seals MD   Signed: 11/8/2020 6:14 AM    Workstation Name: [facility]    [facility]    CT Cerebral Perfusion With & Without Contrast [[id number]] Collected: 11/08/20 0605     Updated: 11/08/20 0607    Narrative:      CT CEREBRAL PERFUSION W WO CONTRAST    HISTORY:   70-year-old female with weakness confusion and history of stroke.    TECHNIQUE:   Axial CT images of the brain without and with intravenous contrast using cerebral perfusion protocol. Post-processing parametric maps were created using RAPID software and reviewed. Radiation dose reduction techniques included automated exposure control  or exposure modulation based on body size. CT and nuclear cardiology exams in the last 12 months: 0.    COMPARISON:   None available    FINDINGS:   Arterial input function is optimal.     Perfusion maps are symmetric without evidence of cerebral ischemia or core infarction.      Impression:      Normal brain perfusion CT.          Signer Name: Kelby Seals MD   Signed: 11/8/2020 6:05 AM   Workstation Name: [facility]    [facility]    CT Head Without Contrast Stroke Protocol [[id number]] Collected: 11/08/20 0541     Updated: 11/08/20 0543    Narrative:      CT Head WO Code Stroke    HISTORY:   70-year-old female with weakness and confusion. History of stroke. Acute stroke suspected.    TECHNIQUE:   Axial unenhanced head CT. Radiation dose reduction techniques included automated exposure control or exposure modulation based on body size. Count of known CT and cardiac nuc med studies performed in previous 12 months: 0.     Time of scan: 0527 hours    COMPARISON:   None.    FINDINGS:   No intracranial hemorrhage, mass, or infarct. No hydrocephalus or extra-axial fluid collection. There are senescent changes, including volume loss and nonspecific white matter change, but no acute abnormality is seen. The skull base, calvarium, and  extracranial soft tissues are normal.      Impression:      Senescent changes without  acute abnormality.      NOTIFICATION: Critical Value/emergent results were called by telephone at the time of interpretation on 11/8/2020 5:39 AM to Janki CT technologist who verbally acknowledged these results per protocol.    Signer Name: Kelby Seals MD   Signed: 11/8/2020 5:41 AM   Workstation Name: [email or web address]    [facility]          Results for orders placed during the hospital encounter of 11/08/20   Duplex Carotid Ultrasound CAR    Narrative · Right internal carotid artery stenosis of 0-49%.  · Left internal carotid artery stenosis of 0-49%.  · There is antegrade flow in the vertebral arteries bilaterally.          Results for orders placed during the hospital encounter of 11/08/20   Duplex Carotid Ultrasound CAR    Narrative · Right internal carotid artery stenosis of 0-49%.  · Left internal carotid artery stenosis of 0-49%.  · There is antegrade flow in the vertebral arteries bilaterally.          Results for orders placed during the hospital encounter of 11/08/20   Adult Transthoracic Echo Complete W/ Cont if Necessary Per Protocol (With Agitated Saline)    Narrative · Estimated left ventricular EF = 68% Estimated left ventricular EF was in   agreement with the calculated left ventricular EF. Left ventricular   ejection fraction appears to be 66 - 70%. Left ventricular systolic   function is normal.  · Left ventricular wall thickness is consistent with mild concentric   hypertrophy.  · Mild to moderate pulmonic valve regurgitation is present.  · Mild mitral annular calcification is present.  · Saline test results are negative.  · Estimated right ventricular systolic pressure from tricuspid   regurgitation is normal (<35 mmHg).  · Left ventricular diastolic function was normal.  · Normal right ventricular cavity size, wall thickness, systolic function   and septal motion noted.  · The aortic valve exhibits mild sclerosis.  · No evidence of pulmonary hypertension is present.  ·  There is no evidence of pericardial effusion.  · No evidence of a patent foramen ovale.          Plan for Follow-up of Pending Labs/Results:     Discharge Details        Discharge Medications      New Medications      Instructions Start Date   aspirin 325 MG tablet   325 mg, Oral, Daily   Start Date: November 13, 2020     cyanocobalamin 500 MCG tablet  Commonly known as: VITAMIN B-12   500 mcg, Oral, Daily   Start Date: November 13, 2020     ferrous sulfate 325 (65 FE) MG tablet   325 mg, Oral, Daily With Breakfast   Start Date: November 13, 2020     vitamin D3 125 MCG (5000 UT) capsule capsule   5,000 Units, Oral, Daily   Start Date: November 13, 2020        Changes to Medications      Instructions Start Date   amLODIPine 5 MG tablet  Commonly known as: NORVASC  What changed:   · medication strength  · how much to take  · when to take this   5 mg, Oral, Every 24 Hours Scheduled   Start Date: November 13, 2020     losartan 50 MG tablet  Commonly known as: COZAAR  What changed:   · how much to take  · when to take this   50 mg, Oral, Every 24 Hours Scheduled   Start Date: November 13, 2020     metoprolol tartrate 50 MG tablet  Commonly known as: LOPRESSOR  What changed:   · medication strength  · how much to take  · when to take this   50 mg, Oral, Every 12 Hours Scheduled         Continue These Medications      Instructions Start Date   Anoro Ellipta 62.5-25 MCG/INH aerosol powder  inhaler  Generic drug: umeclidinium-vilanterol   1 puff, Inhalation, Daily      atorvastatin 40 MG tablet  Commonly known as: LIPITOR   40 mg, Oral, Daily      glipizide 5 MG tablet  Commonly known as: GLUCOTROL   5 mg, Oral, 2 Times Daily Before Meals      hydroCHLOROthiazide 25 MG tablet  Commonly known as: HYDRODIURIL   25 mg, Oral, Daily      metFORMIN 500 MG tablet  Commonly known as: GLUCOPHAGE   500 mg, Oral, 2 Times Daily With Meals      pantoprazole 40 MG EC tablet  Commonly known as: PROTONIX   40 mg, Oral, Daily       pioglitazone 15 MG tablet  Commonly known as: ACTOS   15 mg, Oral, Daily      sertraline 100 MG tablet  Commonly known as: ZOLOFT   100 mg, Oral, Daily             Allergies   Allergen Reactions   • Penicillins Unknown - Low Severity     Pt states I dont know         Discharge Disposition:  Home or Self Care    Diet:  Hospital:  Diet Order   Procedures   • Diet Regular; Cardiac, Consistent Carbohydrate       Activity:  Activity Instructions     Activity as Tolerated      Measure Blood Pressure            Restrictions or Other Recommendations:  Monitor blood pressure daily and as needed and keep blood pressure log for PCP follow-up early next week    Monitor glucoses 3 times daily and as needed and keep log for PCP follow-up next week       CODE STATUS:    Code Status and Medical Interventions:   Ordered at: 11/08/20 1547     Limited Support to NOT Include:    Intubation     Level Of Support Discussed With:    Health Care Surrogate     Code Status:    No CPR     Medical Interventions (Level of Support Prior to Arrest):    Limited       No future appointments.    Additional Instructions for the Follow-ups that You Need to Schedule     Ambulatory Referral to Physical Therapy Evaluate and treat   As directed      Specialty needed: Evaluate and treat         Ambulatory Referral to Speech Therapy   As directed      Please Eval and Treat    Discharge Follow-up with PCP   As directed       Currently Documented PCP:    System, Provider Not In    PCP Phone Number:    None     Follow Up Details: Patient to make appointment to see PCP in [place] early next week.  (Keep blood pressure log to take to PCP follow-up)         Discharge Follow-up with Specialty: Follow up in neurology as needed with PCP in [place] to arrange.   As directed      Specialty: Follow up in neurology as needed with PCP in [place] to arrange.         Discharge Follow-up with Specified Provider: Patient provided prescription for outpatient  physical therapy evaluation and treat as she lives in [facility].   As directed      To: Patient provided prescription for outpatient physical therapy evaluation and treat as she lives in [facility].               Samira Marshall, APRN  11/12/20      Time Spent on Discharge:  I spent 40 minutes on this discharge activity which included: face-to-face encounter with the patient, reviewing the data in the system, coordination of the care with the nursing staff as well as consultants, documentation, and entering orders.

## 2020-11-12 NOTE — PLAN OF CARE
Problem: Adult Inpatient Plan of Care  Goal: Plan of Care Review  Recent Flowsheet Documentation  Taken 11/12/2020 7574 by Ny Small, PT  Progress: improving  Plan of Care Reviewed With: patient  Outcome Summary: patient increased ambulation to 350 ft with walker. She has increased BP with activity RN notified and will recheck BP, continue to progress activity level

## 2020-11-13 ENCOUNTER — READMISSION MANAGEMENT (OUTPATIENT)
Dept: CALL CENTER | Facility: HOSPITAL | Age: 70
End: 2020-11-13

## 2020-11-13 NOTE — OUTREACH NOTE
Prep Survey      Responses   [facility] facility patient discharged from?  [place]   Is LACE score < 7 ?  No   Eligibility  Readm Mgmt   Discharge diagnosis  TIA   Does the patient have one of the following disease processes/diagnoses(primary or secondary)?  Stroke (TIA)   Does the patient have Home health ordered?  No   Is there a DME ordered?  No   Comments regarding appointments  f/u apmts needed   Medication alerts for this patient  see AVS for changes   Prep survey completed?  Yes          Billie Barry RN

## 2020-11-17 ENCOUNTER — READMISSION MANAGEMENT (OUTPATIENT)
Dept: CALL CENTER | Facility: HOSPITAL | Age: 70
End: 2020-11-17

## 2020-11-17 LAB
BASE EXCESS BLDA CALC-SCNC: -4 MMOL/L (ref -5–5)
CA-I BLDA-SCNC: 1.17 MMOL/L (ref 1.2–1.32)
CO2 BLDA-SCNC: 23 MMOL/L (ref 24–29)
GLUCOSE BLDC GLUCOMTR-MCNC: 166 MG/DL (ref 70–130)
HCO3 BLDA-SCNC: 22.1 MMOL/L (ref 22–26)
HCT VFR BLDA CALC: 34 % (ref 38–51)
HGB BLDA-MCNC: 11.6 G/DL (ref 12–17)
PCO2 BLDA: 40.2 MM HG (ref 35–45)
PH BLDA: 7.35 PH UNITS (ref 7.35–7.6)
PO2 BLDA: 30 MMHG (ref 80–105)
POTASSIUM BLDA-SCNC: 4.1 MMOL/L (ref 3.5–4.9)
SAO2 % BLDA: 54 % (ref 95–98)
SODIUM BLD-SCNC: 137 MMOL/L (ref 138–146)

## 2020-11-17 NOTE — OUTREACH NOTE
Stroke Week 1 Survey      Responses   [facility] patient discharged from?  [place]   Does the patient have one of the following disease processes/diagnoses(primary or secondary)?  Stroke (TIA)   Week 1 attempt successful?  No   Unsuccessful attempts  Attempt 1          Neel Turner RN

## 2020-11-23 ENCOUNTER — READMISSION MANAGEMENT (OUTPATIENT)
Dept: CALL CENTER | Facility: HOSPITAL | Age: 70
End: 2020-11-23

## 2020-11-23 NOTE — OUTREACH NOTE
Stroke Week 1 Survey      Responses   [facility] patient discharged from?  [place]   Does the patient have one of the following disease processes/diagnoses(primary or secondary)?  Stroke (TIA)   Week 1 attempt successful?  No   Unsuccessful attempts  Attempt 2          Ester Rosado RN

## 2020-11-25 ENCOUNTER — READMISSION MANAGEMENT (OUTPATIENT)
Dept: CALL CENTER | Facility: HOSPITAL | Age: 70
End: 2020-11-25

## 2020-11-25 NOTE — OUTREACH NOTE
Stroke Week 2 Survey      Responses   [facility] patient discharged from?  [place]   Does the patient have one of the following disease processes/diagnoses(primary or secondary)?  Stroke (TIA)   Week 2 attempt successful?  Yes   Call start time  1052   Call end time  1055   Discharge diagnosis  TIA   Is patient permission given to speak with other caregiver?  No   Meds reviewed with patient/caregiver?  Yes   Is the patient taking all medications as directed (includes completed medication regime)?  Yes   Has the patient kept scheduled appointments due by today?  Yes   Does the patient require any assistance with activities of daily living such as eating, bathing, dressing, walking, etc.?  No   Does the patient have any residual symptoms from stroke/TIA?  Yes   Residual symptoms comments  Right leg weakness   Does the patient understand the diet ordered at discharge?  Yes   Did the patient receive a copy of their discharge instructions?  Yes   Nursing interventions  Reviewed instructions with patient   What is the patient's perception of their health status since discharge?  Improving   Is the patient able to teach back FAST for Stroke?  Yes   Is the patient/caregiver able to teach back the risk factors for a stroke?  High blood pressure-goal below 120/80, Diabetes, High Cholesterol, Carotid or other artery disease, History of TIAs   Is the patient/caregiver able to teach back signs and symptoms related to disease process for when to call PCP?  Yes   Is the patient/caregiver able to teach back signs and symptoms related to disease process for when to call 911?  Yes   If the patient is a current smoker, are they able to teach back resources for cessation?  Not a smoker   Is the patient/caregiver able to teach back the hierarchy of who to call/visit for symptoms/problems? PCP, Specialist, Home health nurse, Urgent Care, ED, 911  Yes   Additional teach back comments  Former smoker   Week 2 call completed?  Yes    Wrap up additional comments  Counseled patient on checking her glucose more often, states she does not remember to do it.            Carla Christianson RN

## 2021-01-16 ENCOUNTER — APPOINTMENT (OUTPATIENT)
Dept: GENERAL RADIOLOGY | Age: 71
DRG: 521 | End: 2021-01-16
Payer: MEDICARE

## 2021-01-16 ENCOUNTER — HOSPITAL ENCOUNTER (INPATIENT)
Age: 71
LOS: 6 days | Discharge: NURSING, REHAB OR LONG-TERM CARE | DRG: 521 | End: 2021-01-22
Attending: EMERGENCY MEDICINE | Admitting: INTERNAL MEDICINE
Payer: MEDICARE

## 2021-01-16 DIAGNOSIS — M25.521 RIGHT ELBOW PAIN: ICD-10-CM

## 2021-01-16 DIAGNOSIS — M25.551 RIGHT HIP PAIN: Primary | ICD-10-CM

## 2021-01-16 DIAGNOSIS — S72.91XA CLOSED FRACTURE OF RIGHT FEMUR, INITIAL ENCOUNTER (HCC): ICD-10-CM

## 2021-01-16 DIAGNOSIS — S72.001A CLOSED DISPLACED FRACTURE OF RIGHT FEMORAL NECK (HCC): ICD-10-CM

## 2021-01-16 LAB
ABO/RH: NORMAL
ANION GAP SERPL CALCULATED.3IONS-SCNC: 13 MMOL/L (ref 3–16)
ANTIBODY SCREEN: NORMAL
APTT: 30.8 SEC (ref 24.2–36.2)
BACTERIA: ABNORMAL /HPF
BASOPHILS ABSOLUTE: 0 K/UL (ref 0–0.2)
BASOPHILS RELATIVE PERCENT: 0.6 %
BILIRUBIN URINE: NEGATIVE
BLOOD, URINE: NEGATIVE
BUN BLDV-MCNC: 32 MG/DL (ref 7–20)
CALCIUM SERPL-MCNC: 9.1 MG/DL (ref 8.3–10.6)
CHLORIDE BLD-SCNC: 105 MMOL/L (ref 99–110)
CLARITY: CLEAR
CO2: 21 MMOL/L (ref 21–32)
COLOR: YELLOW
CREAT SERPL-MCNC: 1.5 MG/DL (ref 0.6–1.2)
EOSINOPHILS ABSOLUTE: 0.2 K/UL (ref 0–0.6)
EOSINOPHILS RELATIVE PERCENT: 3.5 %
EPITHELIAL CELLS, UA: ABNORMAL /HPF (ref 0–5)
GFR AFRICAN AMERICAN: 42
GFR NON-AFRICAN AMERICAN: 34
GLUCOSE BLD-MCNC: 118 MG/DL (ref 70–99)
GLUCOSE URINE: NEGATIVE MG/DL
HCT VFR BLD CALC: 38 % (ref 36–48)
HEMOGLOBIN: 11.8 G/DL (ref 12–16)
INR BLD: 0.91 (ref 0.86–1.14)
KETONES, URINE: NEGATIVE MG/DL
LEUKOCYTE ESTERASE, URINE: NEGATIVE
LYMPHOCYTES ABSOLUTE: 0.6 K/UL (ref 1–5.1)
LYMPHOCYTES RELATIVE PERCENT: 10.1 %
MAGNESIUM: 1.5 MG/DL (ref 1.8–2.4)
MCH RBC QN AUTO: 27.1 PG (ref 26–34)
MCHC RBC AUTO-ENTMCNC: 31.2 G/DL (ref 31–36)
MCV RBC AUTO: 87 FL (ref 80–100)
MICROSCOPIC EXAMINATION: YES
MONOCYTES ABSOLUTE: 0.4 K/UL (ref 0–1.3)
MONOCYTES RELATIVE PERCENT: 6.2 %
NEUTROPHILS ABSOLUTE: 5 K/UL (ref 1.7–7.7)
NEUTROPHILS RELATIVE PERCENT: 79.6 %
NITRITE, URINE: POSITIVE
PDW BLD-RTO: 15.4 % (ref 12.4–15.4)
PH UA: 6 (ref 5–8)
PHOSPHORUS: 4.6 MG/DL (ref 2.5–4.9)
PLATELET # BLD: 214 K/UL (ref 135–450)
PMV BLD AUTO: 8.2 FL (ref 5–10.5)
POTASSIUM REFLEX MAGNESIUM: 4.9 MMOL/L (ref 3.5–5.1)
PROTEIN UA: NEGATIVE MG/DL
PROTHROMBIN TIME: 10.6 SEC (ref 10–13.2)
RBC # BLD: 4.36 M/UL (ref 4–5.2)
RBC UA: ABNORMAL /HPF (ref 0–4)
SODIUM BLD-SCNC: 139 MMOL/L (ref 136–145)
SODIUM URINE: 93 MMOL/L
SPECIFIC GRAVITY UA: 1.02 (ref 1–1.03)
TROPONIN: <0.01 NG/ML
URINE TYPE: ABNORMAL
UROBILINOGEN, URINE: 0.2 E.U./DL
WBC # BLD: 6.3 K/UL (ref 4–11)
WBC UA: ABNORMAL /HPF (ref 0–5)

## 2021-01-16 PROCEDURE — 1200000000 HC SEMI PRIVATE

## 2021-01-16 PROCEDURE — 81001 URINALYSIS AUTO W/SCOPE: CPT

## 2021-01-16 PROCEDURE — 73502 X-RAY EXAM HIP UNI 2-3 VIEWS: CPT

## 2021-01-16 PROCEDURE — 84484 ASSAY OF TROPONIN QUANT: CPT

## 2021-01-16 PROCEDURE — 86850 RBC ANTIBODY SCREEN: CPT

## 2021-01-16 PROCEDURE — 85025 COMPLETE CBC W/AUTO DIFF WBC: CPT

## 2021-01-16 PROCEDURE — 84300 ASSAY OF URINE SODIUM: CPT

## 2021-01-16 PROCEDURE — 96374 THER/PROPH/DIAG INJ IV PUSH: CPT

## 2021-01-16 PROCEDURE — P9612 CATHETERIZE FOR URINE SPEC: HCPCS

## 2021-01-16 PROCEDURE — 2580000003 HC RX 258: Performed by: INTERNAL MEDICINE

## 2021-01-16 PROCEDURE — 82570 ASSAY OF URINE CREATININE: CPT

## 2021-01-16 PROCEDURE — 85730 THROMBOPLASTIN TIME PARTIAL: CPT

## 2021-01-16 PROCEDURE — 2580000003 HC RX 258: Performed by: STUDENT IN AN ORGANIZED HEALTH CARE EDUCATION/TRAINING PROGRAM

## 2021-01-16 PROCEDURE — 6360000002 HC RX W HCPCS: Performed by: STUDENT IN AN ORGANIZED HEALTH CARE EDUCATION/TRAINING PROGRAM

## 2021-01-16 PROCEDURE — 36415 COLL VENOUS BLD VENIPUNCTURE: CPT

## 2021-01-16 PROCEDURE — 84540 ASSAY OF URINE/UREA-N: CPT

## 2021-01-16 PROCEDURE — 86901 BLOOD TYPING SEROLOGIC RH(D): CPT

## 2021-01-16 PROCEDURE — 86900 BLOOD TYPING SEROLOGIC ABO: CPT

## 2021-01-16 PROCEDURE — 85610 PROTHROMBIN TIME: CPT

## 2021-01-16 PROCEDURE — 96375 TX/PRO/DX INJ NEW DRUG ADDON: CPT

## 2021-01-16 PROCEDURE — 80048 BASIC METABOLIC PNL TOTAL CA: CPT

## 2021-01-16 PROCEDURE — 83735 ASSAY OF MAGNESIUM: CPT

## 2021-01-16 PROCEDURE — 6360000002 HC RX W HCPCS: Performed by: INTERNAL MEDICINE

## 2021-01-16 PROCEDURE — 99285 EMERGENCY DEPT VISIT HI MDM: CPT

## 2021-01-16 PROCEDURE — 84100 ASSAY OF PHOSPHORUS: CPT

## 2021-01-16 PROCEDURE — 73080 X-RAY EXAM OF ELBOW: CPT

## 2021-01-16 PROCEDURE — 71045 X-RAY EXAM CHEST 1 VIEW: CPT

## 2021-01-16 RX ORDER — MAGNESIUM SULFATE IN WATER 40 MG/ML
2 INJECTION, SOLUTION INTRAVENOUS PRN
Status: DISCONTINUED | OUTPATIENT
Start: 2021-01-16 | End: 2021-01-22 | Stop reason: HOSPADM

## 2021-01-16 RX ORDER — UMECLIDINIUM BROMIDE AND VILANTEROL TRIFENATATE 62.5; 25 UG/1; UG/1
1 POWDER RESPIRATORY (INHALATION) DAILY
COMMUNITY

## 2021-01-16 RX ORDER — LOSARTAN POTASSIUM 50 MG/1
50 TABLET ORAL DAILY
COMMUNITY

## 2021-01-16 RX ORDER — MORPHINE SULFATE 2 MG/ML
4 INJECTION, SOLUTION INTRAMUSCULAR; INTRAVENOUS
Status: DISCONTINUED | OUTPATIENT
Start: 2021-01-16 | End: 2021-01-17

## 2021-01-16 RX ORDER — OXYCODONE HYDROCHLORIDE 5 MG/1
10 TABLET ORAL EVERY 4 HOURS PRN
Status: COMPLETED | OUTPATIENT
Start: 2021-01-16 | End: 2021-01-17

## 2021-01-16 RX ORDER — OXYCODONE HYDROCHLORIDE 5 MG/1
5 TABLET ORAL EVERY 4 HOURS PRN
Status: DISCONTINUED | OUTPATIENT
Start: 2021-01-16 | End: 2021-01-17

## 2021-01-16 RX ORDER — AMLODIPINE BESYLATE 5 MG/1
5 TABLET ORAL DAILY
Status: ON HOLD | COMMUNITY
End: 2021-01-22 | Stop reason: HOSPADM

## 2021-01-16 RX ORDER — ONDANSETRON 2 MG/ML
4 INJECTION INTRAMUSCULAR; INTRAVENOUS EVERY 6 HOURS PRN
Status: DISCONTINUED | OUTPATIENT
Start: 2021-01-16 | End: 2021-01-18

## 2021-01-16 RX ORDER — SERTRALINE HYDROCHLORIDE 100 MG/1
200 TABLET, FILM COATED ORAL DAILY
COMMUNITY

## 2021-01-16 RX ORDER — PANTOPRAZOLE SODIUM 40 MG/1
40 TABLET, DELAYED RELEASE ORAL DAILY
COMMUNITY

## 2021-01-16 RX ORDER — METOPROLOL TARTRATE 50 MG/1
50 TABLET, FILM COATED ORAL 2 TIMES DAILY
COMMUNITY

## 2021-01-16 RX ORDER — ACETAMINOPHEN 325 MG/1
650 TABLET ORAL EVERY 6 HOURS PRN
Status: DISCONTINUED | OUTPATIENT
Start: 2021-01-16 | End: 2021-01-17

## 2021-01-16 RX ORDER — FAMOTIDINE 20 MG/1
20 TABLET, FILM COATED ORAL DAILY PRN
Status: ACTIVE | OUTPATIENT
Start: 2021-01-16 | End: 2021-01-21

## 2021-01-16 RX ORDER — ALBUTEROL SULFATE 90 UG/1
2 AEROSOL, METERED RESPIRATORY (INHALATION) EVERY 6 HOURS PRN
COMMUNITY

## 2021-01-16 RX ORDER — DEXTROSE MONOHYDRATE 50 MG/ML
100 INJECTION, SOLUTION INTRAVENOUS PRN
Status: DISCONTINUED | OUTPATIENT
Start: 2021-01-16 | End: 2021-01-22 | Stop reason: HOSPADM

## 2021-01-16 RX ORDER — GLIPIZIDE 5 MG/1
10 TABLET ORAL DAILY
COMMUNITY

## 2021-01-16 RX ORDER — ACETAMINOPHEN 650 MG/1
650 SUPPOSITORY RECTAL EVERY 6 HOURS PRN
Status: DISCONTINUED | OUTPATIENT
Start: 2021-01-16 | End: 2021-01-17

## 2021-01-16 RX ORDER — SODIUM CHLORIDE 0.9 % (FLUSH) 0.9 %
10 SYRINGE (ML) INJECTION PRN
Status: DISCONTINUED | OUTPATIENT
Start: 2021-01-16 | End: 2021-01-22 | Stop reason: HOSPADM

## 2021-01-16 RX ORDER — PIOGLITAZONEHYDROCHLORIDE 15 MG/1
15 TABLET ORAL DAILY
COMMUNITY

## 2021-01-16 RX ORDER — SODIUM CHLORIDE 0.9 % (FLUSH) 0.9 %
10 SYRINGE (ML) INJECTION EVERY 12 HOURS SCHEDULED
Status: DISCONTINUED | OUTPATIENT
Start: 2021-01-16 | End: 2021-01-22 | Stop reason: HOSPADM

## 2021-01-16 RX ORDER — HYDROCHLOROTHIAZIDE 25 MG/1
25 TABLET ORAL DAILY
Status: ON HOLD | COMMUNITY
End: 2021-01-22 | Stop reason: HOSPADM

## 2021-01-16 RX ORDER — NICOTINE POLACRILEX 4 MG
15 LOZENGE BUCCAL PRN
Status: DISCONTINUED | OUTPATIENT
Start: 2021-01-16 | End: 2021-01-22 | Stop reason: HOSPADM

## 2021-01-16 RX ORDER — MORPHINE SULFATE 4 MG/ML
4 INJECTION, SOLUTION INTRAMUSCULAR; INTRAVENOUS EVERY 30 MIN PRN
Status: DISCONTINUED | OUTPATIENT
Start: 2021-01-16 | End: 2021-01-16

## 2021-01-16 RX ORDER — POTASSIUM CHLORIDE 7.45 MG/ML
10 INJECTION INTRAVENOUS PRN
Status: DISCONTINUED | OUTPATIENT
Start: 2021-01-16 | End: 2021-01-22 | Stop reason: HOSPADM

## 2021-01-16 RX ORDER — ONDANSETRON 2 MG/ML
4 INJECTION INTRAMUSCULAR; INTRAVENOUS ONCE
Status: COMPLETED | OUTPATIENT
Start: 2021-01-16 | End: 2021-01-16

## 2021-01-16 RX ORDER — SODIUM CHLORIDE 9 MG/ML
1000 INJECTION, SOLUTION INTRAVENOUS ONCE
Status: COMPLETED | OUTPATIENT
Start: 2021-01-16 | End: 2021-01-16

## 2021-01-16 RX ORDER — MORPHINE SULFATE 2 MG/ML
2 INJECTION, SOLUTION INTRAMUSCULAR; INTRAVENOUS
Status: DISCONTINUED | OUTPATIENT
Start: 2021-01-16 | End: 2021-01-17

## 2021-01-16 RX ORDER — SODIUM CHLORIDE, SODIUM LACTATE, POTASSIUM CHLORIDE, CALCIUM CHLORIDE 600; 310; 30; 20 MG/100ML; MG/100ML; MG/100ML; MG/100ML
1000 INJECTION, SOLUTION INTRAVENOUS ONCE
Status: COMPLETED | OUTPATIENT
Start: 2021-01-16 | End: 2021-01-16

## 2021-01-16 RX ORDER — DEXTROSE MONOHYDRATE 25 G/50ML
12.5 INJECTION, SOLUTION INTRAVENOUS PRN
Status: DISCONTINUED | OUTPATIENT
Start: 2021-01-16 | End: 2021-01-22 | Stop reason: HOSPADM

## 2021-01-16 RX ORDER — FERROUS SULFATE 325(65) MG
325 TABLET ORAL
COMMUNITY

## 2021-01-16 RX ORDER — PROMETHAZINE HYDROCHLORIDE 25 MG/1
12.5 TABLET ORAL EVERY 6 HOURS PRN
Status: DISCONTINUED | OUTPATIENT
Start: 2021-01-16 | End: 2021-01-18

## 2021-01-16 RX ORDER — INSULIN LISPRO 100 [IU]/ML
0-6 INJECTION, SOLUTION INTRAVENOUS; SUBCUTANEOUS EVERY 4 HOURS
Status: DISCONTINUED | OUTPATIENT
Start: 2021-01-16 | End: 2021-01-18

## 2021-01-16 RX ORDER — BUPROPION HYDROCHLORIDE 300 MG/1
300 TABLET ORAL EVERY MORNING
COMMUNITY

## 2021-01-16 RX ADMIN — SODIUM CHLORIDE 1000 ML: 9 INJECTION, SOLUTION INTRAVENOUS at 21:03

## 2021-01-16 RX ADMIN — HYDROMORPHONE HYDROCHLORIDE 0.5 MG: 1 INJECTION, SOLUTION INTRAMUSCULAR; INTRAVENOUS; SUBCUTANEOUS at 22:30

## 2021-01-16 RX ADMIN — ONDANSETRON 4 MG: 2 INJECTION INTRAMUSCULAR; INTRAVENOUS at 20:39

## 2021-01-16 RX ADMIN — SODIUM CHLORIDE, SODIUM LACTATE, POTASSIUM CHLORIDE, AND CALCIUM CHLORIDE 1000 ML: .6; .31; .03; .02 INJECTION, SOLUTION INTRAVENOUS at 21:00

## 2021-01-16 RX ADMIN — ENOXAPARIN SODIUM 40 MG: 40 INJECTION SUBCUTANEOUS at 22:30

## 2021-01-16 RX ADMIN — MORPHINE SULFATE 4 MG: 4 INJECTION, SOLUTION INTRAMUSCULAR; INTRAVENOUS at 20:38

## 2021-01-16 RX ADMIN — ONDANSETRON 4 MG: 2 INJECTION INTRAMUSCULAR; INTRAVENOUS at 22:30

## 2021-01-16 ASSESSMENT — PAIN DESCRIPTION - DESCRIPTORS: DESCRIPTORS: ACHING

## 2021-01-16 ASSESSMENT — PAIN DESCRIPTION - DIRECTION: RADIATING_TOWARDS: BACK

## 2021-01-16 ASSESSMENT — PAIN DESCRIPTION - ONSET: ONSET: ON-GOING

## 2021-01-16 ASSESSMENT — PAIN SCALES - GENERAL
PAINLEVEL_OUTOF10: 3
PAINLEVEL_OUTOF10: 8

## 2021-01-16 ASSESSMENT — PAIN DESCRIPTION - FREQUENCY: FREQUENCY: CONTINUOUS

## 2021-01-16 ASSESSMENT — PAIN DESCRIPTION - LOCATION
LOCATION: BACK
LOCATION: HIP;LEG

## 2021-01-16 ASSESSMENT — PAIN DESCRIPTION - PAIN TYPE: TYPE: ACUTE PAIN

## 2021-01-17 ENCOUNTER — APPOINTMENT (OUTPATIENT)
Dept: GENERAL RADIOLOGY | Age: 71
DRG: 521 | End: 2021-01-17
Payer: MEDICARE

## 2021-01-17 LAB
A/G RATIO: 1.4 (ref 1.1–2.2)
ALBUMIN SERPL-MCNC: 3.8 G/DL (ref 3.4–5)
ALP BLD-CCNC: 69 U/L (ref 40–129)
ALT SERPL-CCNC: 16 U/L (ref 10–40)
ANION GAP SERPL CALCULATED.3IONS-SCNC: 9 MMOL/L (ref 3–16)
AST SERPL-CCNC: 20 U/L (ref 15–37)
BASOPHILS ABSOLUTE: 0.1 K/UL (ref 0–0.2)
BASOPHILS RELATIVE PERCENT: 0.6 %
BILIRUB SERPL-MCNC: <0.2 MG/DL (ref 0–1)
BUN BLDV-MCNC: 32 MG/DL (ref 7–20)
CALCIUM SERPL-MCNC: 8.5 MG/DL (ref 8.3–10.6)
CHLORIDE BLD-SCNC: 106 MMOL/L (ref 99–110)
CO2: 22 MMOL/L (ref 21–32)
CREAT SERPL-MCNC: 1.4 MG/DL (ref 0.6–1.2)
CREATININE URINE: 75.5 MG/DL (ref 28–259)
D DIMER: 727 NG/ML DDU (ref 0–229)
EKG ATRIAL RATE: 77 BPM
EKG DIAGNOSIS: NORMAL
EKG P AXIS: 59 DEGREES
EKG P-R INTERVAL: 168 MS
EKG Q-T INTERVAL: 412 MS
EKG QRS DURATION: 84 MS
EKG QTC CALCULATION (BAZETT): 466 MS
EKG R AXIS: 23 DEGREES
EKG T AXIS: 74 DEGREES
EKG VENTRICULAR RATE: 77 BPM
EOSINOPHILS ABSOLUTE: 0.2 K/UL (ref 0–0.6)
EOSINOPHILS RELATIVE PERCENT: 2.2 %
GFR AFRICAN AMERICAN: 45
GFR NON-AFRICAN AMERICAN: 37
GLOBULIN: 2.8 G/DL
GLUCOSE BLD-MCNC: 107 MG/DL (ref 70–99)
GLUCOSE BLD-MCNC: 113 MG/DL (ref 70–99)
GLUCOSE BLD-MCNC: 130 MG/DL (ref 70–99)
GLUCOSE BLD-MCNC: 157 MG/DL (ref 70–99)
GLUCOSE BLD-MCNC: 176 MG/DL (ref 70–99)
GLUCOSE BLD-MCNC: 188 MG/DL (ref 70–99)
HCT VFR BLD CALC: 34.7 % (ref 36–48)
HEMOGLOBIN: 11.1 G/DL (ref 12–16)
LYMPHOCYTES ABSOLUTE: 0.6 K/UL (ref 1–5.1)
LYMPHOCYTES RELATIVE PERCENT: 7.8 %
MCH RBC QN AUTO: 27.4 PG (ref 26–34)
MCHC RBC AUTO-ENTMCNC: 31.9 G/DL (ref 31–36)
MCV RBC AUTO: 85.8 FL (ref 80–100)
MONOCYTES ABSOLUTE: 0.4 K/UL (ref 0–1.3)
MONOCYTES RELATIVE PERCENT: 5.3 %
NEUTROPHILS ABSOLUTE: 6.7 K/UL (ref 1.7–7.7)
NEUTROPHILS RELATIVE PERCENT: 84.1 %
PDW BLD-RTO: 15.8 % (ref 12.4–15.4)
PERFORMED ON: ABNORMAL
PLATELET # BLD: 189 K/UL (ref 135–450)
PMV BLD AUTO: 8.2 FL (ref 5–10.5)
POTASSIUM REFLEX MAGNESIUM: 5.2 MMOL/L (ref 3.5–5.1)
RBC # BLD: 4.04 M/UL (ref 4–5.2)
SARS-COV-2, NAAT: NOT DETECTED
SODIUM BLD-SCNC: 137 MMOL/L (ref 136–145)
TOTAL PROTEIN: 6.6 G/DL (ref 6.4–8.2)
TROPONIN: <0.01 NG/ML
UREA NITROGEN, UR: 473 MG/DL (ref 800–1666)
WBC # BLD: 7.9 K/UL (ref 4–11)

## 2021-01-17 PROCEDURE — 1200000000 HC SEMI PRIVATE

## 2021-01-17 PROCEDURE — 80053 COMPREHEN METABOLIC PANEL: CPT

## 2021-01-17 PROCEDURE — 6370000000 HC RX 637 (ALT 250 FOR IP): Performed by: INTERNAL MEDICINE

## 2021-01-17 PROCEDURE — 94640 AIRWAY INHALATION TREATMENT: CPT

## 2021-01-17 PROCEDURE — 85379 FIBRIN DEGRADATION QUANT: CPT

## 2021-01-17 PROCEDURE — 71045 X-RAY EXAM CHEST 1 VIEW: CPT

## 2021-01-17 PROCEDURE — 36415 COLL VENOUS BLD VENIPUNCTURE: CPT

## 2021-01-17 PROCEDURE — 6360000002 HC RX W HCPCS: Performed by: INTERNAL MEDICINE

## 2021-01-17 PROCEDURE — 84484 ASSAY OF TROPONIN QUANT: CPT

## 2021-01-17 PROCEDURE — 94761 N-INVAS EAR/PLS OXIMETRY MLT: CPT

## 2021-01-17 PROCEDURE — 2700000000 HC OXYGEN THERAPY PER DAY

## 2021-01-17 PROCEDURE — 2580000003 HC RX 258: Performed by: INTERNAL MEDICINE

## 2021-01-17 PROCEDURE — 6360000002 HC RX W HCPCS: Performed by: ORTHOPAEDIC SURGERY

## 2021-01-17 PROCEDURE — 93005 ELECTROCARDIOGRAM TRACING: CPT | Performed by: INTERNAL MEDICINE

## 2021-01-17 PROCEDURE — 94664 DEMO&/EVAL PT USE INHALER: CPT

## 2021-01-17 PROCEDURE — 93010 ELECTROCARDIOGRAM REPORT: CPT | Performed by: INTERNAL MEDICINE

## 2021-01-17 PROCEDURE — 94799 UNLISTED PULMONARY SVC/PX: CPT

## 2021-01-17 PROCEDURE — 85025 COMPLETE CBC W/AUTO DIFF WBC: CPT

## 2021-01-17 PROCEDURE — 83036 HEMOGLOBIN GLYCOSYLATED A1C: CPT

## 2021-01-17 RX ORDER — ACETAMINOPHEN 500 MG
1000 TABLET ORAL EVERY 8 HOURS
Status: DISCONTINUED | OUTPATIENT
Start: 2021-01-17 | End: 2021-01-18

## 2021-01-17 RX ORDER — SODIUM CHLORIDE 9 MG/ML
INJECTION, SOLUTION INTRAVENOUS CONTINUOUS
Status: DISCONTINUED | OUTPATIENT
Start: 2021-01-17 | End: 2021-01-18

## 2021-01-17 RX ORDER — OXYCODONE HYDROCHLORIDE 5 MG/1
10 TABLET ORAL EVERY 4 HOURS PRN
Status: DISCONTINUED | OUTPATIENT
Start: 2021-01-17 | End: 2021-01-18

## 2021-01-17 RX ORDER — METOPROLOL TARTRATE 50 MG/1
50 TABLET, FILM COATED ORAL 2 TIMES DAILY
Status: DISCONTINUED | OUTPATIENT
Start: 2021-01-17 | End: 2021-01-22 | Stop reason: HOSPADM

## 2021-01-17 RX ORDER — PANTOPRAZOLE SODIUM 40 MG/1
40 TABLET, DELAYED RELEASE ORAL DAILY
Status: DISCONTINUED | OUTPATIENT
Start: 2021-01-17 | End: 2021-01-22 | Stop reason: HOSPADM

## 2021-01-17 RX ORDER — AMLODIPINE BESYLATE 5 MG/1
5 TABLET ORAL DAILY
Status: DISCONTINUED | OUTPATIENT
Start: 2021-01-17 | End: 2021-01-22 | Stop reason: HOSPADM

## 2021-01-17 RX ORDER — OXYCODONE HYDROCHLORIDE 5 MG/1
5 TABLET ORAL EVERY 4 HOURS PRN
Status: DISCONTINUED | OUTPATIENT
Start: 2021-01-17 | End: 2021-01-18

## 2021-01-17 RX ORDER — SERTRALINE HYDROCHLORIDE 100 MG/1
200 TABLET, FILM COATED ORAL DAILY
Status: DISCONTINUED | OUTPATIENT
Start: 2021-01-17 | End: 2021-01-22 | Stop reason: HOSPADM

## 2021-01-17 RX ORDER — BUPROPION HYDROCHLORIDE 150 MG/1
300 TABLET ORAL EVERY MORNING
Status: DISCONTINUED | OUTPATIENT
Start: 2021-01-17 | End: 2021-01-22 | Stop reason: HOSPADM

## 2021-01-17 RX ORDER — ALBUTEROL SULFATE 2.5 MG/3ML
2.5 SOLUTION RESPIRATORY (INHALATION) EVERY 4 HOURS PRN
Status: DISCONTINUED | OUTPATIENT
Start: 2021-01-17 | End: 2021-01-17

## 2021-01-17 RX ADMIN — ENOXAPARIN SODIUM 40 MG: 40 INJECTION SUBCUTANEOUS at 10:27

## 2021-01-17 RX ADMIN — GLYCOPYRROLATE AND FORMOTEROL FUMARATE 2 PUFF: 9; 4.8 AEROSOL, METERED RESPIRATORY (INHALATION) at 23:06

## 2021-01-17 RX ADMIN — Medication 10 ML: at 08:21

## 2021-01-17 RX ADMIN — AMLODIPINE BESYLATE 5 MG: 5 TABLET ORAL at 08:20

## 2021-01-17 RX ADMIN — GLYCOPYRROLATE AND FORMOTEROL FUMARATE 2 PUFF: 9; 4.8 AEROSOL, METERED RESPIRATORY (INHALATION) at 08:05

## 2021-01-17 RX ADMIN — METOPROLOL TARTRATE 50 MG: 50 TABLET, FILM COATED ORAL at 08:20

## 2021-01-17 RX ADMIN — METOPROLOL TARTRATE 50 MG: 50 TABLET, FILM COATED ORAL at 19:45

## 2021-01-17 RX ADMIN — MAGNESIUM SULFATE HEPTAHYDRATE 3 G: 500 INJECTION, SOLUTION INTRAMUSCULAR; INTRAVENOUS at 01:25

## 2021-01-17 RX ADMIN — MORPHINE SULFATE 2 MG: 2 INJECTION, SOLUTION INTRAMUSCULAR; INTRAVENOUS at 19:45

## 2021-01-17 RX ADMIN — OXYCODONE 10 MG: 5 TABLET ORAL at 04:10

## 2021-01-17 RX ADMIN — MORPHINE SULFATE 4 MG: 2 INJECTION, SOLUTION INTRAMUSCULAR; INTRAVENOUS at 01:33

## 2021-01-17 RX ADMIN — BUPROPION HYDROCHLORIDE 300 MG: 150 TABLET, FILM COATED, EXTENDED RELEASE ORAL at 08:20

## 2021-01-17 RX ADMIN — OXYCODONE 10 MG: 5 TABLET ORAL at 08:20

## 2021-01-17 RX ADMIN — MORPHINE SULFATE 2 MG: 2 INJECTION, SOLUTION INTRAMUSCULAR; INTRAVENOUS at 10:23

## 2021-01-17 RX ADMIN — SERTRALINE HYDROCHLORIDE 200 MG: 100 TABLET ORAL at 08:20

## 2021-01-17 RX ADMIN — INSULIN LISPRO 1 UNITS: 100 INJECTION, SOLUTION INTRAVENOUS; SUBCUTANEOUS at 05:33

## 2021-01-17 RX ADMIN — Medication 10 ML: at 19:45

## 2021-01-17 RX ADMIN — OXYCODONE 10 MG: 5 TABLET ORAL at 12:21

## 2021-01-17 RX ADMIN — SODIUM CHLORIDE: 9 INJECTION, SOLUTION INTRAVENOUS at 08:34

## 2021-01-17 RX ADMIN — PANTOPRAZOLE SODIUM 40 MG: 40 TABLET, DELAYED RELEASE ORAL at 05:34

## 2021-01-17 ASSESSMENT — PAIN DESCRIPTION - PAIN TYPE
TYPE: ACUTE PAIN
TYPE: ACUTE PAIN

## 2021-01-17 ASSESSMENT — PAIN DESCRIPTION - FREQUENCY: FREQUENCY: CONTINUOUS

## 2021-01-17 ASSESSMENT — PAIN SCALES - GENERAL
PAINLEVEL_OUTOF10: 5
PAINLEVEL_OUTOF10: 6
PAINLEVEL_OUTOF10: 8
PAINLEVEL_OUTOF10: 7
PAINLEVEL_OUTOF10: 5

## 2021-01-17 ASSESSMENT — PAIN DESCRIPTION - ORIENTATION
ORIENTATION: RIGHT

## 2021-01-17 ASSESSMENT — PAIN DESCRIPTION - DIRECTION
RADIATING_TOWARDS: BACK

## 2021-01-17 ASSESSMENT — PAIN DESCRIPTION - DESCRIPTORS
DESCRIPTORS: ACHING;CRAMPING
DESCRIPTORS: ACHING;CONSTANT

## 2021-01-17 ASSESSMENT — PAIN DESCRIPTION - PROGRESSION
CLINICAL_PROGRESSION: GRADUALLY WORSENING
CLINICAL_PROGRESSION: NOT CHANGED

## 2021-01-17 ASSESSMENT — PAIN - FUNCTIONAL ASSESSMENT: PAIN_FUNCTIONAL_ASSESSMENT: PREVENTS OR INTERFERES WITH MANY ACTIVE NOT PASSIVE ACTIVITIES

## 2021-01-17 ASSESSMENT — PAIN DESCRIPTION - LOCATION
LOCATION: HIP;LEG
LOCATION: HIP;LEG

## 2021-01-17 ASSESSMENT — PAIN DESCRIPTION - ONSET: ONSET: ON-GOING

## 2021-01-17 NOTE — PROGRESS NOTES
Images reviewed  NPO after midnight  Will evaluate and discuss with patient tomorrow CAMMIE vs hemiarthroplasty. Sounds like she may benefit from CAMMIE given activity level, age, other comorbidities.     DO Laura Castillo

## 2021-01-17 NOTE — PLAN OF CARE
Problem: Falls - Risk of:  Goal: Will remain free from falls  Description: Will remain free from falls  1/17/2021 0735 by Elisa Hermosillo RN  Outcome: Ongoing  1/16/2021 2351 by Harini Kaiser RN  Outcome: Ongoing  Goal: Absence of physical injury  Description: Absence of physical injury  Outcome: Ongoing     Problem: Skin Integrity:  Goal: Will show no infection signs and symptoms  Description: Will show no infection signs and symptoms  Outcome: Ongoing  Goal: Absence of new skin breakdown  Description: Absence of new skin breakdown  Outcome: Ongoing  Goal: Demonstration of wound healing without infection will improve  Description: Demonstration of wound healing without infection will improve  Outcome: Ongoing  Goal: Risk for impaired skin integrity will decrease  Description: Risk for impaired skin integrity will decrease  Outcome: Ongoing     Problem: Pain:  Goal: Pain level will decrease  Description: Pain level will decrease  1/17/2021 0735 by Elisa Hermosillo RN  Outcome: Ongoing  1/16/2021 2351 by Harini Kaiser RN  Outcome: Ongoing  Goal: Control of acute pain  Description: Control of acute pain  1/17/2021 0735 by Elisa Hermosillo RN  Outcome: Ongoing  1/16/2021 2351 by Harini Kaiser RN  Outcome: Ongoing     Problem: Discharge Planning:  Goal: Discharged to appropriate level of care  Description: Discharged to appropriate level of care  Outcome: Ongoing     Problem:  Activity:  Goal: Ability to ambulate will improve  Description: Ability to ambulate will improve  Outcome: Ongoing  Goal: Ability to perform activities at highest level will improve  Description: Ability to perform activities at highest level will improve  Outcome: Ongoing     Problem: Self-Care:  Goal: Ability to meet self-care needs will improve  Description: Ability to meet self-care needs will improve  Outcome: Ongoing     Problem: Sensory:  Goal: Pain level will decrease  Description: Pain level will decrease 1/17/2021 0735 by Dontrell Mcdowell RN  Outcome: Ongoing  1/16/2021 2351 by Tien Kearns RN  Outcome: Ongoing     Problem: Tissue Perfusion:  Goal: Peripheral tissue perfusion will improve  Description: Peripheral tissue perfusion will improve  Outcome: Ongoing  Goal: Risk of venous thrombosis will decrease  Description: Risk of venous thrombosis will decrease  Outcome: Ongoing     Problem: Urinary Elimination:  Goal: Ability to reestablish a normal urinary elimination pattern will improve - after catheter removal  Description: Ability to reestablish a normal urinary elimination pattern will improve  Outcome: Ongoing

## 2021-01-17 NOTE — ED PROVIDER NOTES
[id number] [facility] RESIDENT NOTE       Date of evaluation: 1/16/2021    Chief Complaint     Hip Pain      History of Present Illness     Brittany Mckenna is a 72 y.o. female with history of hypertension, diabetes, hyperlipidemia presenting with hip pain. Patient reports she was walking at home when she had a mechanical fall and fell onto her right hip and elbow. Following this, she had pain in her right hip and could not get up. EMS was called and she presented to the emergency department. Patient denies any pain in the right knee or leg. She denies abdominal pain, chest pain, neck or back pain, head trauma or loss of consciousness. Does report pain in the right elbow, but still has full use of her right hand. She denies any sensation changes to the area of injury. Other than stated above, no additional aggravating or alleviating factors are noted. Review of Systems     A complete review of systems was performed and is negative except as otherwise noted in the HPI. Past Medical, Surgical, Family, and Social History     She has a past medical history of Hyperlipidemia, Hypertension, Thyroid disease, and Type II or unspecified type diabetes mellitus without mention of complication, not stated as uncontrolled ([facility] [place] [address] She has no past surgical history on file. Her family history is not on file. She reports that she has quit smoking. She has a 44.00 pack-year smoking history. She does not have any smokeless tobacco history on file. She reports that she does not drink alcohol or use drugs.     Medications     Previous Medications    ASPIRIN 81 MG CHEWABLE TABLET    Take 2 tablets by mouth daily    ATENOLOL (TENORMIN) 50 MG TABLET    Take 1 tablet by mouth daily    ATORVASTATIN (LIPITOR) 80 MG TABLET    Take 1 tablet by mouth nightly    CHLORTHALIDONE (HYGROTON) 25 MG TABLET    Take 1 tablet by mouth daily CLOBETASOL (TEMOVATE) 0.05 % CREAM    Apply topically 2 times daily. LEVOCETIRIZINE (XYZAL) 5 MG TABLET    Take 1 tablet by mouth nightly    LOSARTAN (COZAAR) 100 MG TABLET    Take 1 tablet by mouth daily    METFORMIN ER (GLUCOPHAGE-XR) 500 MG XR TABLET    Take 2 tablets by mouth daily (with breakfast)    NICOTINE (NICODERM CQ) 21 MG/24HR    Place 1 patch onto the skin daily    SERTRALINE (ZOLOFT) 100 MG TABLET    Take 1 tablet by mouth daily    TRIAMCINOLONE (KENALOG) 0.1 % CREAM    Apply topically 2 times daily to affected areas as needed until improved. ZOLPIDEM (AMBIEN) 5 MG TABLET    Take 1 tablet by mouth nightly as needed for Sleep       Allergies     She is allergic to percocet [oxycodone-acetaminophen] and sulfa antibiotics. Physical Exam     INITIAL VITALS: BP: (!) 168/89, Temp: 97.7 °F (36.5 °C), Pulse: 78, Resp: 18, SpO2: 94 %   General:Uncomfortable appearing but no acute distress. Eyes:  PERRL. No discharge from eyes   ENT:  No discharge from nose. OP clear. No evidence of head trauma. Neck:  Supple, trachea midline, No c-spine tenderness to palpation  Pulmonary:   Non-labored breathing. Breath sounds clear bilaterally  Cardiac:  Regular rate and rhythm. No murmurs  Abdomen:  Soft. Non-distended. Non-tender. Musculoskeletal:  Right leg appears foreshortened, externally rotated. 2+ DP pulses. Sensation intact. Knee flexion/extension intact but patient unable to flex at right hip. Left leg with normal strength and ROM. Vascular:  Extremities warm and perfused. Normal pulses in all 4 extremities  Skin:  Dry, no rashes  Extremities:  No peripheral edema  Neuro: Alert. Moves all four extremities to command. Sensation grossly intact to light touch. Speech and mentation normal. No focal deficit. Diagnostic Results     EKG   No EKG performed. RADIOLOGY:  XR CHEST PORTABLE   Final Result      Borderline cardiomegaly. No acute cardiopulmonary findings. XR ELBOW RIGHT (MIN 3 VIEWS)   Final Result      Negative study. XR HIP 2-3 VW W PELVIS RIGHT   Final Result      Acute displaced fracture of the right femoral neck. LABS:   Results for orders placed or performed during the hospital encounter of 01/16/21   CBC Auto Differential   Result Value Ref Range    WBC 6.3 4.0 - 11.0 K/uL    RBC 4.36 4.00 - 5.20 M/uL    Hemoglobin 11.8 (L) 12.0 - 16.0 g/dL    Hematocrit 38.0 36.0 - 48.0 %    MCV 87.0 80.0 - 100.0 fL    MCH 27.1 26.0 - 34.0 pg    MCHC 31.2 31.0 - 36.0 g/dL    RDW 15.4 12.4 - 15.4 %    Platelets 824 131 - 697 K/uL    MPV 8.2 5.0 - 10.5 fL    Neutrophils % 79.6 %    Lymphocytes % 10.1 %    Monocytes % 6.2 %    Eosinophils % 3.5 %    Basophils % 0.6 %    Neutrophils Absolute 5.0 1.7 - 7.7 K/uL    Lymphocytes Absolute 0.6 (L) 1.0 - 5.1 K/uL    Monocytes Absolute 0.4 0.0 - 1.3 K/uL    Eosinophils Absolute 0.2 0.0 - 0.6 K/uL    Basophils Absolute 0.0 0.0 - 0.2 K/uL   Basic Metabolic Panel w/ Reflex to MG   Result Value Ref Range    Sodium 139 136 - 145 mmol/L    Potassium reflex Magnesium 4.9 3.5 - 5.1 mmol/L    Chloride 105 99 - 110 mmol/L    CO2 21 21 - 32 mmol/L    Anion Gap 13 3 - 16    Glucose 118 (H) 70 - 99 mg/dL    BUN 32 (H) 7 - 20 mg/dL    CREATININE 1.5 (H) 0.6 - 1.2 mg/dL    GFR Non- 34 (A) >60    GFR  42 (A) >60    Calcium 9.1 8.3 - 10.6 mg/dL   Protime-INR   Result Value Ref Range    Protime 10.6 10.0 - 13.2 sec    INR 0.91 0.86 - 1.14   APTT   Result Value Ref Range    aPTT 30.8 24.2 - 36.2 sec       RECENT VITALS:  BP: (!) 168/89, Temp: 97.7 °F (36.5 °C), Pulse: 78,Resp: 18, SpO2: 94 %     Procedures     ED Course     Nursing Notes, Past Medical Hx, Past Surgical Hx, Social Hx, Allergies, and Family Hx were reviewed.     The patient was given the followingmedications:  Orders Placed This Encounter   Medications    morphine injection 4 mg    ondansetron (ZOFRAN) injection 4 mg Admit: At this time the patient has been admitted to medicine for further evaluation and management of right femoral neck fracture. The patient will continue to be monitored here in the emergency department until which time she is moved to her new treatment location.  Discussed with admitting team.                 Gela Ahumada MD  01/16/21 8094

## 2021-01-17 NOTE — PROGRESS NOTES
Uncertain at this time if the pt will go to surgery today or tomorrow (Ortho will be by today to talk with the pt) but the pt will need a covid test done for OR, I sent a perfect serve message to the medical MD about ordering a covid test for OR purposes. I will continue to follow.  Ana M Oropeza

## 2021-01-17 NOTE — CONSULTS
Chief Complaint    Hip Pain      History of Present Illness: Jan Aceves is a pleasant 72 y.o. female here today for evaluation of right femoral neck fracture. Jeanna Marte last night walking in the kitchen. Lives at home with her . Has DM and HTN. Denies other medical problems. Denies use of cane or walker to ambulate. Denies typical anticoag use. Lives independently. Medical History:    Patient's medications, allergies, past medical, surgical, social and family histories were reviewed and updated as appropriate.     Past Medical History:   Diagnosis Date    Hyperlipidemia     Hypertension     Thyroid disease     Type II or unspecified type diabetes mellitus without mention of complication, not stated as uncontrolled ([facility] [address]       Social History     Socioeconomic History    Marital status:      Spouse name: Not on file    Number of children: Not on file    Years of education: Not on file    Highest education level: Not on file   Occupational History    Not on file   Social Needs    Financial resource strain: Not on file    Food insecurity     Worry: Not on file     Inability: Not on file    Transportation needs     Medical: Not on file     Non-medical: Not on file   Tobacco Use    Smoking status: Former Smoker     Packs/day: 1.00     Years: 44.00     Pack years: 44.00   Substance and Sexual Activity    Alcohol use: No    Drug use: No    Sexual activity: Not on file   Lifestyle    Physical activity     Days per week: Not on file     Minutes per session: Not on file    Stress: Not on file   Relationships    Social connections     Talks on phone: Not on file     Gets together: Not on file     Attends Jainism service: Not on file     Active member of club or organization: Not on file     Attends meetings of clubs or organizations: Not on file     Relationship status: Not on file    Intimate partner violence     Fear of current or ex partner: Not on file     Emotionally abused: Not on of those things mentioned in the HPI and Past Medical History    Vital Signs:  Vitals:    01/17/21 0641   BP: 113/69   Pulse: 77   Resp: 16   Temp: 98.3 °F (36.8 °C)   SpO2: 91%       General Exam:   Constitutional: Patient is adequately groomed with no evidence of malnutrition     B/L UE    Motion noted along ain, pin, radial, median, ulnar nerves. Sensation noted to light touch C5-T1 dermatomes. Radial pulses noted. Pain free ROM    LLE  Motion noted along tib ant, EHL, gastroc, sensation noted L4-S1 dermatomes. DP pulses noted. No pain with log roll. Can perform straight leg raise. RLE    Motion noted along tib ant, EHL, gastroc, sensation noted L4-S1 dermatomes. DP pulses noted. Compartments soft, cap refill <2 sec. Pain over right hip. No knee or ankle pain or other sites of swelling. Pain with log roll in right hip. Radiology:     xr of right hip and pelvis reviewed which shows displaced right femoral neck fracture. Assessment :  Qi Cary is a pleasant 72 y.o. female who presents with right femoral neck fracture displaced. Treatment Plan:     Can eat for today. NPO after midnight  Consented for surgery tomorrow with Dr. Laura Bush for Right CAMMIE versus hip hemiarthroplasty  Discussed risk benefits and alternatives to operative versus non operative intervention. Believe given age and activity level she may benefit from a total hip arthroplasty. This will be performed tomorrow afternoon by my partner Dr. Laura Bush. Hold AM anticoags.          Kareen Wang,   [facility]

## 2021-01-17 NOTE — ED PROVIDER NOTES
ED Attending Attestation Note     Date of evaluation: 1/16/2021    This patient was seen by the resident. I have seen and examined the patient, agree with the workup, evaluation, management and diagnosis. The care plan has been discussed. My assessment reveals adult female with c/o mechanical fall while walking this evening, injuring the right hip. No LOC, no head trauma, denies neck pain. Right leg shortened and externally rotated and ROM limited at hip. Has some mild tenderness across the posterior brim of the pelvis but says that it is really not hurting her, and no significant findings on the pelvis XR. Hip XR shows right femoral neck fx. Will speak to ortho, admit.        Estiven Rome MD  01/16/21 2033

## 2021-01-17 NOTE — PROGRESS NOTES
SpO2 99%   BMI 26.63 kg/m²   SPO2 (COPD values may differ): 90-91% on room air or greater than 92% on FiO2 24- 28% = 1    Peak Flow (asthma only): not applicable = 0    RSI: 5-6 = Q4hr PRN (every four hours as needed) for dyspnea        Plan       Goals: medication delivery, mobilize retained secretions, volume expansion and improve oxygenation    Patient/caregiver was educated on the proper method of use for Respiratory Care Devices:  Yes      Level of patient/caregiver understanding able to:   ? Verbalize understanding   ? Demonstrate understanding       ? Teach back        ? Needs reinforcement       ? No available caregiver               ? Other:     Response to education:  Good     Is patient being placed on Home Treatment Regimen? Yes     Does the patient have everything they need prior to discharge? NA     Comments: pt admitted post fall. Plan of Care: continue bevespi mdi bid. Albuterol prn    Electronically signed by Patrick Caldwell RCP on 1/17/2021 at 3:02 AM    Respiratory Protocol Guidelines     1. Assessment and treatment by Respiratory Therapy will be initiated for medication and therapeutic interventions upon initiation of aerosolized medication. 2. Physician will be contacted for respiratory rate (RR) greater than 35 breaths per minute. Therapy will be held for heart rate (HR) greater than 140 beats per minute, pending direction from physician. 3. Bronchodilators will be administered via Metered Dose Inhaler (MDI) with spacer when the following criteria are met:  a. Alert and cooperative     b. HR < 140 bpm  c. RR < 30 bpm                d. Can demonstrate a 2-3 second inspiratory hold  4. Bronchodilators will be administered via Hand Held Nebulizer WILDER [facility]) to patients when ANY of the following criteria are met  a. Incognizant or uncooperative          b. Patients treated with HHN at Home        c. Unable to demonstrate proper use of MDI with spacer     d. RR > 30 bpm   5.  Bronchodilators will be delivered via Metered Dose Inhaler (MDI), HHN, Aerogen to intubated patients on mechanical ventilation. 6. Inhalation medication orders will be delivered and/or substituted as outlined below. Aerosolized Medications Ordering and Administration Guidelines:    1. All Medications will be ordered by a physician, and their frequency and/or modality will be adjusted as defined by the patients Respiratory Severity Index (RSI) score. 2. If the patient does not have documented COPD, consider discontinuing anticholinergics when RSI is less than 9.  3. If the bronchospasm worsens (increased RSI), then the bronchodilator frequency can be increased to a maximum of every 4 hours. If greater than every 4 hours is required, the physician will be contacted. 4. If the bronchospasm improves, the frequency of the bronchodilator can be decreased, based on the patient's RSI, but not less than home treatment regimen frequency. 5. Bronchodilator(s) will be discontinued if patient has a RSI less than 9 and has received no scheduled or as needed treatment for 72  Hrs. Patients Ordered on a Mucolytic Agent:    1. Must always be administered with a bronchodilator. 2. Discontinue if patient experiences worsened bronchospasm, or secretions have lessened to the point that the patient is able to clear them with a cough. Anti-inflammatory and Combination Medications:    1. If the patient lacks prior history of lung disease, is not using inhaled anti-inflammatory medication at home, and lacks wheezing by examination or by history for at least 24 hours, contact physician for possible discontinuation.

## 2021-01-17 NOTE — PROGRESS NOTES
RESPIRATORY THERAPY ASSESSMENT    Name:  Blake Sy  Medical Record Number:  [id number]  Age: 72 y.o. Gender: female  : 1950  Today's Date:  2021  Room:  5505505-01    Assessment     Is the patient being admitted for a COPD or Asthma exacerbation? No   (If yes the patient will be seen every 4 hours for the first 24 hours and then reassessed)    Patient Admission Diagnosis      Allergies  Allergies   Allergen Reactions    Percocet [Oxycodone-Acetaminophen] Nausea And Vomiting    Sulfa Antibiotics Itching, Nausea And Vomiting and Swelling       Minimum Predicted Vital Capacity:     884         Actual Vital Capacity:      1L             Pulmonary History: quit smoking 5 yrs ago  Home Oxygen Therapy: none  Home Respiratory Therapy: Albuterol Q6H PRN, Anoro ellipta daily   Current Respiratory Therapy:  Albuterol/atrovent QID, Bevespi MDI BID  Treatment Type: HHN  Medications: Albuterol/Ipratropium    Respiratory Severity Index(RSI)   Patients with orders for inhalation medications, oxygen, or any therapeutic treatment modality will be placed on Respiratory Protocol. They will be assessed with the first treatment and at least every 72 hours thereafter. The following severity scale will be used to determine frequency of treatment intervention. Smoking History: Pulmonary Disease or Smoking History, Greater than 15 pack year = 2    Social History  Social History     Tobacco Use    Smoking status: Former Smoker     Packs/day: 1.00     Years: 44.00     Pack years: 44.00   Substance Use Topics    Alcohol use: No    Drug use: No       Recent Surgical History: Surgery of Extremities = 1  Past Surgical History  No past surgical history on file.     Level of Consciousness: Alert, Oriented, and Cooperative = 0    Level of Activity: Walking with assistance = 1    Respiratory Pattern: Regular Pattern; RR 8-20 = 0    Breath Sounds: Diminished unilaterally = 1    Sputum   ,  ,    Cough: Strong, spontaneous, non-productive = 0    Vital Signs   /73   Pulse 76   Temp 98.7 °F (37.1 °C) (Oral)   Resp 16   Ht 5' 6\" (1.676 m)   Wt 165 lb (74.8 kg)   SpO2 94%   BMI 26.63 kg/m²   SPO2 (COPD values may differ): 86-87% on room air or greater than 92% on FiO2 35- 50% = 3    Peak Flow (asthma only): not applicable = 0    RSI: 7-8 = BID and Q4HPRN (every four hours as needed) for dyspnea        Plan       Goals: medication delivery, mobilize retained secretions, volume expansion and improve oxygenation    Patient/caregiver was educated on the proper method of use for Respiratory Care Devices:  Yes      Level of patient/caregiver understanding able to:   ? Verbalize understanding   ? Demonstrate understanding       ? Teach back        ? Needs reinforcement       ? No available caregiver               ? Other:     Response to education:  Very Good     Is patient being placed on Home Treatment Regimen? No     Does the patient have everything they need prior to discharge? NA     Comments: Chart reviewed    Plan of Care: Continue Albuterol/atrovent BID and Q4H PRN, Bevespi MDI BID    Electronically signed by Reina Jara RCP on 1/17/2021 at 11:46 AM    Respiratory Protocol Guidelines     1. Assessment and treatment by Respiratory Therapy will be initiated for medication and therapeutic interventions upon initiation of aerosolized medication. 2. Physician will be contacted for respiratory rate (RR) greater than 35 breaths per minute. Therapy will be held for heart rate (HR) greater than 140 beats per minute, pending direction from physician. 3. Bronchodilators will be administered via Metered Dose Inhaler (MDI) with spacer when the following criteria are met:  a. Alert and cooperative     b. HR < 140 bpm  c. RR < 30 bpm                d. Can demonstrate a 2-3 second inspiratory hold  4.  Bronchodilators will be administered via Hand Held Nebulizer WILDER [facility]) to patients when ANY of the following criteria are met  a. Incognizant or uncooperative          b. Patients treated with HHN at Home        c. Unable to demonstrate proper use of MDI with spacer     d. RR > 30 bpm   5. Bronchodilators will be delivered via Metered Dose Inhaler (MDI), HHN, Aerogen to intubated patients on mechanical ventilation. 6. Inhalation medication orders will be delivered and/or substituted as outlined below. Aerosolized Medications Ordering and Administration Guidelines:    1. All Medications will be ordered by a physician, and their frequency and/or modality will be adjusted as defined by the patients Respiratory Severity Index (RSI) score. 2. If the patient does not have documented COPD, consider discontinuing anticholinergics when RSI is less than 9.  3. If the bronchospasm worsens (increased RSI), then the bronchodilator frequency can be increased to a maximum of every 4 hours. If greater than every 4 hours is required, the physician will be contacted. 4. If the bronchospasm improves, the frequency of the bronchodilator can be decreased, based on the patient's RSI, but not less than home treatment regimen frequency. 5. Bronchodilator(s) will be discontinued if patient has a RSI less than 9 and has received no scheduled or as needed treatment for 72  Hrs. Patients Ordered on a Mucolytic Agent:    1. Must always be administered with a bronchodilator. 2. Discontinue if patient experiences worsened bronchospasm, or secretions have lessened to the point that the patient is able to clear them with a cough. Anti-inflammatory and Combination Medications:    1. If the patient lacks prior history of lung disease, is not using inhaled anti-inflammatory medication at home, and lacks wheezing by examination or by history for at least 24 hours, contact physician for possible discontinuation.

## 2021-01-17 NOTE — PROGRESS NOTES
Patient arrived to unit a/o x4. Vitals stable. 4 eyes performed with second RN. 1 occurrence of emesis shortly after arrival to unit. IV zofran given, MD notified. De [place] O in place. Fluids infusing.

## 2021-01-17 NOTE — PLAN OF CARE
Problem: Falls - Risk of:  Goal: Will remain free from falls  Outcome: Ongoing   Calls out appropriately. Bed locked in lowest position. Bed alarm on. Call light/belongings within reach. Problem: Pain:  Goal: Control of acute pain  Outcome: Ongoing   Pain treated with IV medication- nausea present, not given oral medication yet. Problem: Sensory:  Goal: Pain level will decrease  Outcome: Ongoing  Full sensation to all extremities including injured extremity. Pulses palpable.

## 2021-01-17 NOTE — H&P
Hospital Medicine History & Physical      PCP: Debby Luz    Date of Admission: 1/16/2021    Date of Service: Pt seen/examined on 1/16/2021  Pt seen/examined face to face on and admitted as inpatient with expected LOS greater than two midnights due to medical therapy    Chief Complaint:    Chief Complaint   Patient presents with    Hip Pain        History Of Present Illness:      72 y.o. female who presented to [facility] with past medical history of hypertension, hyperlipidemia, hypothyroidism, type 2 diabetes presented to the ED after a fall. Patient reported she was on the counter making peanut butter and jelly and then took a turn where her right foot got off of the slipper and slid to the floor landing on her right side on the floor. Patient since then been having severe sharp pain 9/10 exacerbated in any movement, alleviated with laying still no association of fever chills nausea vomiting chest pain palpitation shortness of breath abdominal or incontinence. Patient reports she has fell 3 times this year. Patient also reports being compliant with medications. CODE STATUS was cussed and patient is a full code      Past Medical History:          Diagnosis Date    Hyperlipidemia     Hypertension     Thyroid disease     Type II or unspecified type diabetes mellitus without mention of complication, not stated as uncontrolled ([facility] [address]        Past Surgical History:      No past surgical history on file. Hysterectomy    Medications Prior to Admission:      Prior to Admission medications    Medication Sig Start Date End Date Taking?  Authorizing Provider   umeclidinium-vilanterol (ANORO ELLIPTA) 62.5-25 MCG/INH AEPB inhaler Inhale 1 puff into the lungs daily   Yes Historical Provider, MD   albuterol sulfate HFA (VENTOLIN HFA) 108 (90 Base) MCG/ACT inhaler Inhale 2 puffs into the lungs every 6 hours as needed for Wheezing   Yes Historical Provider, MD   pantoprazole (PROTONIX) 40 MG tablet Take 40 mg by mouth daily   Yes Historical Provider, MD   glipiZIDE (GLUCOTROL) 5 MG tablet Take 10 mg by mouth daily   Yes Historical Provider, MD   ferrous sulfate (IRON 325) 325 (65 Fe) MG tablet Take 325 mg by mouth daily (with breakfast)   Yes Historical Provider, MD   amLODIPine (NORVASC) 5 MG tablet Take 5 mg by mouth daily   Yes Historical Provider, MD   Cyanocobalamin (B-12 PO) Take by mouth   Yes Historical Provider, MD   losartan (COZAAR) 50 MG tablet Take 50 mg by mouth daily   Yes Historical Provider, MD   metFORMIN (GLUCOPHAGE) 500 MG tablet Take 1,000 mg by mouth 2 times daily (with meals)   Yes Historical Provider, MD   sertraline (ZOLOFT) 100 MG tablet Take 200 mg by mouth daily   Yes Historical Provider, MD   buPROPion (WELLBUTRIN XL) 300 MG extended release tablet Take 300 mg by mouth every morning   Yes Historical Provider, MD   metoprolol tartrate (LOPRESSOR) 50 MG tablet Take 50 mg by mouth 2 times daily   Yes Historical Provider, MD   hydroCHLOROthiazide (HYDRODIURIL) 25 MG tablet Take 25 mg by mouth daily   Yes Historical Provider, MD   pioglitazone (ACTOS) 15 MG tablet Take 15 mg by mouth daily   Yes Historical Provider, MD   Cholecalciferol (VITAMIN D3) 125 MCG (5000 UT) TABS Take by mouth   Yes Historical Provider, MD       Allergies:  Percocet [oxycodone-acetaminophen] and Sulfa antibiotics    Social History:          TOBACCO:   reports that she has quit smoking. She has a 44.00 pack-year smoking history. She does not have any smokeless tobacco history on file. ETOH:   reports no history of alcohol use. E-Cigarettes/Vaping Use     Questions Responses    E-Cigarette/Vaping Use     Start Date     Passive Exposure     Quit Date     Counseling Given     Comments             Family History:      Reviewed in detail hypertension Father    No family history on file.     REVIEW OF SYSTEMS:     Constitutional:  No Fever, No Chills, No Night Sweats  ENT/Mouth:  No Nasal Congestion,  No Hoarseness, No new mouth lesion  Eyes:  No Eye Pain, No Redness, No Discharge  Cardiovascular:  No Chest Pain, No Orthopnea, No Palpitations  Respiratory:  No Cough, No Sputum, No Dyspnea  Gastrointestinal: No Vomiting, No Diarrhea, No abdominal pain  Genitourinary: No Urinary Frequency, No Hematuria, No Urinary pain  Musculoskeletal: + Worsening Arthralgias, + worsening Myalgias  Skin:  No new Skin Lesions, No new skin rash  Neuro:  No new weakness, No new numbness. Psych:  No suicial ideation, No Violence ideation    PHYSICAL EXAM PERFORMED:    BP (!) 123/56   Pulse 63   Temp 98.2 °F (36.8 °C) (Axillary)   Resp 18   Ht 5' 6\" (1.676 m)   Wt 165 lb (74.8 kg)   SpO2 99%   BMI 26.63 kg/m²     General appearance:  mild acute distress, appears older than stated age  HEENT:   atraumatic, sclera anicteric, Conjunctivae clear. Neck: Supple,Trachea midline, no goiter  Respiratory:minimal accessory muscle usage, Normal respiratory effort. Clear to auscultation, bilaterally without wheezing  Cardiovascular:  Regular rate and rhythm, capillary refill 2 seconds  Abdomen: Soft, non-tender, non-distended with normal bowel sounds. Musculoskeletal:  No clubbing, cyanosis. Right leg rotated externally, limited range of motion due to pain, pulses intact, no sensory deficit  Skin: turgor normal.  No new rashes or lesions. Neurologic: Alert and oriented x4, no new focal sensory/motor deficits. Labs:     Recent Labs     01/16/21 1958   WBC 6.3   HGB 11.8*   HCT 38.0        Recent Labs     01/16/21 1958      K 4.9      CO2 21   BUN 32*   CREATININE 1.5*   CALCIUM 9.1   PHOS 4.6     No results for input(s): AST, ALT, BILIDIR, BILITOT, ALKPHOS in the last 72 hours.   Recent Labs     01/16/21 1959   INR 0.91     Recent Labs     01/16/21 1958   TROPONINI <0.01       Urinalysis:      Lab Results   Component Value Date    NITRU POSITIVE 01/16/2021    WBCUA 3-5 01/16/2021    BACTERIA 4+ 01/16/2021    RBCUA None seen

## 2021-01-17 NOTE — PROGRESS NOTES
Set pt up to eat, and continued to encourage IS, had the pt perform on her IS and do some deep breathing. Pt states she is comfortable at this time, call light remains in place.  Joseph Alicia

## 2021-01-17 NOTE — PROGRESS NOTES
Ortho saw the pt and went over the plan for surgery with her for tomorrow, MD answered all of her questions, therefore consent was obtained for the surgery and placed in the pt's paper chart. Pt repositioned in bed but the pt refuses to turn on her side, educated pt on skin breakdown without repositioning, but pt doesn't want to move, I will order a special mattress and see if she will be willing to move onto a special mattress. Pt c/o pain after moving up in bed, gave ordered IV Morphine (see MAR). Call light in reach and bed alarm on. Pt has no further needs at this time.   Savannah Heimlich

## 2021-01-17 NOTE — PROGRESS NOTES
Hospitalist Progress Note      PCP: Natty Severino    Date of Admission: 1/16/2021      Subjective: seen and examined  Patient requiring up to 5 L of oxygen, and is having drowsiness with pain medication   She is denying any chest pain, she is denying difficulty breathing    Medications:  Reviewed    Infusion Medications    sodium chloride 100 mL/hr at 01/17/21 0834    dextrose       Scheduled Medications    glycopyrrolate-formoterol  2 puff Inhalation BID    buPROPion  300 mg Oral QAM    sertraline  200 mg Oral Daily    pantoprazole  40 mg Oral Daily    metoprolol tartrate  50 mg Oral BID    amLODIPine  5 mg Oral Daily    sodium chloride flush  10 mL Intravenous 2 times per day    enoxaparin  40 mg Subcutaneous Daily    insulin lispro  0-6 Units Subcutaneous Q4H     PRN Meds: albuterol, oxyCODONE, oxyCODONE, sodium chloride flush, potassium chloride, magnesium sulfate, promethazine **OR** ondansetron, famotidine, acetaminophen **OR** acetaminophen, glucose, dextrose, glucagon (rDNA), dextrose, morphine **OR** morphine      Intake/Output Summary (Last 24 hours) at 1/17/2021 1012  Last data filed at 1/17/2021 4528  Gross per 24 hour   Intake 60 ml   Output 200 ml   Net -140 ml       Physical Exam Performed:    /69   Pulse 77   Temp 98.3 °F (36.8 °C) (Oral)   Resp 16   Ht 5' 6\" (1.676 m)   Wt 165 lb (74.8 kg)   SpO2 91%   BMI 26.63 kg/m²     General appearance:  mild acute distress, appears older than stated age  HEENT:   atraumatic, sclera anicteric, Conjunctivae clear. Neck: Supple,Trachea midline, no goiter  Respiratory:minimal accessory muscle usage, Normal respiratory effort. Clear to auscultation, bilaterally without wheezing  Cardiovascular:  Regular rate and rhythm, capillary refill 2 seconds  Abdomen: Soft, non-tender, non-distended with normal bowel sounds. Musculoskeletal:  No clubbing, cyanosis.   Right leg rotated externally, limited range of motion due to pain, pulses intact, no sensory deficit  Skin: turgor normal.  No new rashes or lesions. Neurologic: Alert and oriented x4, no new focal sensory/motor deficits. Labs:   Recent Labs     01/16/21 1958 01/17/21  0609   WBC 6.3 7.9   HGB 11.8* 11.1*   HCT 38.0 34.7*    189     Recent Labs     01/16/21 1958 01/17/21  0609    137   K 4.9 5.2*    106   CO2 21 22   BUN 32* 32*   CREATININE 1.5* 1.4*   CALCIUM 9.1 8.5   PHOS 4.6  --      Recent Labs     01/17/21  0609   AST 20   ALT 16   BILITOT <0.2   ALKPHOS 69     Recent Labs     01/16/21 1959   INR 0.91     Recent Labs     01/16/21 1958 01/17/21  0609   TROPONINI <0.01 <0.01       Urinalysis:      Lab Results   Component Value Date    NITRU POSITIVE 01/16/2021    WBCUA 3-5 01/16/2021    BACTERIA 4+ 01/16/2021    RBCUA None seen 01/16/2021    BLOODU Negative 01/16/2021    SPECGRAV 1.025 01/16/2021    GLUCOSEU Negative 01/16/2021       Radiology:  XR CHEST PORTABLE   Final Result      Borderline cardiomegaly. No acute cardiopulmonary findings. XR ELBOW RIGHT (MIN 3 VIEWS)   Final Result      Negative study. XR HIP 2-3 VW W PELVIS RIGHT   Final Result      Acute displaced fracture of the right femoral neck. Assessment/Plan:    Active Hospital Problems    Diagnosis    [facility] right [facility] [S72.91XA]     1. Acute right femur fracture:  Pain medication, orthopedic consulted  surgery tomorrow     2. Fall:  Mechanical  PT/OT     3. Acute renal failure:  Creatinine currently 1.5, baseline 0.8  IVF and recheck     4. HypoMg:  Replaced    5. Acute hypoxic resp failure, etiology unclear, likely baseline COPD  And opioids   Breathing treatments  Wean down oxy as tolerated      Chronic medical conditions:  Type 2 diabetes: ISS  Hypertension: continue home med, held [address] and losartan to avoid hypotension periop.   Hyperlipidemia: continue home med      DVT Prophylaxis: lovenox  Diet: Diet NPO, After Midnight Exceptions are: [employer], Sips with Meds, Popsicles  DIET GENERAL;  Code Status: Full Code    PT/OT Eval Status: Hurshel Handler - inpatient     Virgen Crespo MD

## 2021-01-17 NOTE — ED NOTES
Pt placed on 02 via n/c at 2 lpm due to pox drop to 89% pos morphine.      Cory Heimlich, RN  01/16/21 2122

## 2021-01-17 NOTE — PROGRESS NOTES
Rapid Covid 19 test ordered for surgery purposes, Charge nurse made aware, she will be the one to collect.   Denton Martini

## 2021-01-17 NOTE — PROGRESS NOTES
The pt is a little drowsy but is awake and talks to me just fine, her oxygen sat's drop in the mid 80's at times, the pt's oxygen was increased to 5 liters and she is now 90% on her oxygen sat's. I encouraged the pt to take deep breaths and use her IS, which she did and it showed improvement. I asked her if she has any shortness of breath or chest pain, and the pt states she does not have shortness of breath or chest pain. The pt doesn't have any complaints except for some hip pain on and off. Pt denies the need for any pain medication at this time, I assisted the pt in repositioning in the bed a little bit, but the pt still refuses to turn, I told her that I ordered her a special mattress that will be here soon, but the pt hasn't decided yet if she wants to move to it or not. I did message the MD via [employer] about the pt's oxygen sat's and her oxygen requirements, no new orders as of yet. I will continue to follow.   Ana M Oropeza

## 2021-01-17 NOTE — ED NOTES
Bed: A04-04  Expected date:   Expected time:   Means of arrival:   Comments:  Rd Lebron RN  01/16/21 1911

## 2021-01-17 NOTE — ED NOTES
Pharmacy  Note  - Admission Medication History    List of [id number] medications is complete. I reviewed Rx fill history via \"Complete Dispense Report\" in [employer], and spoke to patient's  on the phone.       The following changes made to kyvbr-gq-hbessrtmd medication list:    ADDED:   1) albuterol   2) anoro ellipta   3) pantoprazole 40 mg   4) glipizide 5 mg   5) ferrous sulfate 325 mg  6) amlodipine 5 mg   7) metoprolol tartrate 50 mg   8) hydrochlorothiazide 25 mg   9) bupropion 300 mg   10) Vitamin B-12  11) Vitamin D-3 125 mcg  12) pioglitazone 15 mg       Dose or Frequency CHANGE:  1) aspirin 81 mg BID --> 325 mg daily   2) atorvastatin 80 mg --> 40 mg   3) metformin 1000 mg daily--> 1000 mg BID  4)sertraline 100 mg ---> 200 mg       REMOVED:  1) atenolol 50 mg   2) chlorthalidone 25 mg  3) clobetasol 0.05%  4) levocetirizine 5 mg   5) nicotine 21 mcg/24hr   6) triamcinolone 0.1%   7) triamcinolone 2 mg inj  8) zolpidem 5 mg       Please call with questions:  [phone] (9 [facility])    1/16/2021 9:03 PM  [address] Intern

## 2021-01-18 ENCOUNTER — APPOINTMENT (OUTPATIENT)
Dept: GENERAL RADIOLOGY | Age: 71
DRG: 521 | End: 2021-01-18
Payer: MEDICARE

## 2021-01-18 ENCOUNTER — ANESTHESIA (OUTPATIENT)
Dept: OPERATING ROOM | Age: 71
DRG: 521 | End: 2021-01-18
Payer: MEDICARE

## 2021-01-18 ENCOUNTER — ANESTHESIA EVENT (OUTPATIENT)
Dept: OPERATING ROOM | Age: 71
DRG: 521 | End: 2021-01-18
Payer: MEDICARE

## 2021-01-18 VITALS
SYSTOLIC BLOOD PRESSURE: 145 MMHG | TEMPERATURE: 98.2 F | RESPIRATION RATE: 11 BRPM | OXYGEN SATURATION: 100 % | DIASTOLIC BLOOD PRESSURE: 63 MMHG

## 2021-01-18 LAB
A/G RATIO: 1.6 (ref 1.1–2.2)
ALBUMIN SERPL-MCNC: 3.8 G/DL (ref 3.4–5)
ALP BLD-CCNC: 66 U/L (ref 40–129)
ALT SERPL-CCNC: 13 U/L (ref 10–40)
ANION GAP SERPL CALCULATED.3IONS-SCNC: 7 MMOL/L (ref 3–16)
AST SERPL-CCNC: 15 U/L (ref 15–37)
BASOPHILS ABSOLUTE: 0 K/UL (ref 0–0.2)
BASOPHILS RELATIVE PERCENT: 0.5 %
BILIRUB SERPL-MCNC: 0.3 MG/DL (ref 0–1)
BUN BLDV-MCNC: 26 MG/DL (ref 7–20)
CALCIUM SERPL-MCNC: 8.8 MG/DL (ref 8.3–10.6)
CHLORIDE BLD-SCNC: 105 MMOL/L (ref 99–110)
CO2: 23 MMOL/L (ref 21–32)
CREAT SERPL-MCNC: 1.2 MG/DL (ref 0.6–1.2)
EOSINOPHILS ABSOLUTE: 0.2 K/UL (ref 0–0.6)
EOSINOPHILS RELATIVE PERCENT: 2.3 %
ESTIMATED AVERAGE GLUCOSE: 154.2 MG/DL
GFR AFRICAN AMERICAN: 54
GFR NON-AFRICAN AMERICAN: 44
GLOBULIN: 2.4 G/DL
GLUCOSE BLD-MCNC: 131 MG/DL (ref 70–99)
GLUCOSE BLD-MCNC: 145 MG/DL (ref 70–99)
GLUCOSE BLD-MCNC: 146 MG/DL (ref 70–99)
GLUCOSE BLD-MCNC: 163 MG/DL (ref 70–99)
GLUCOSE BLD-MCNC: 176 MG/DL (ref 70–99)
GLUCOSE BLD-MCNC: 179 MG/DL (ref 70–99)
HBA1C MFR BLD: 7 %
HCT VFR BLD CALC: 27.8 % (ref 36–48)
HCT VFR BLD CALC: 32.1 % (ref 36–48)
HEMOGLOBIN: 10.3 G/DL (ref 12–16)
HEMOGLOBIN: 8.6 G/DL (ref 12–16)
LYMPHOCYTES ABSOLUTE: 0.6 K/UL (ref 1–5.1)
LYMPHOCYTES RELATIVE PERCENT: 7.4 %
MCH RBC QN AUTO: 27.5 PG (ref 26–34)
MCHC RBC AUTO-ENTMCNC: 32.2 G/DL (ref 31–36)
MCV RBC AUTO: 85.6 FL (ref 80–100)
MONOCYTES ABSOLUTE: 0.6 K/UL (ref 0–1.3)
MONOCYTES RELATIVE PERCENT: 7.6 %
NEUTROPHILS ABSOLUTE: 6.3 K/UL (ref 1.7–7.7)
NEUTROPHILS RELATIVE PERCENT: 82.2 %
PDW BLD-RTO: 15.4 % (ref 12.4–15.4)
PERFORMED ON: ABNORMAL
PLATELET # BLD: 155 K/UL (ref 135–450)
PMV BLD AUTO: 8.3 FL (ref 5–10.5)
POTASSIUM REFLEX MAGNESIUM: 4.8 MMOL/L (ref 3.5–5.1)
RBC # BLD: 3.75 M/UL (ref 4–5.2)
SODIUM BLD-SCNC: 135 MMOL/L (ref 136–145)
TOTAL PROTEIN: 6.2 G/DL (ref 6.4–8.2)
WBC # BLD: 7.6 K/UL (ref 4–11)

## 2021-01-18 PROCEDURE — 94640 AIRWAY INHALATION TREATMENT: CPT

## 2021-01-18 PROCEDURE — 3600000015 HC SURGERY LEVEL 5 ADDTL 15MIN: Performed by: ORTHOPAEDIC SURGERY

## 2021-01-18 PROCEDURE — 2500000003 HC RX 250 WO HCPCS: Performed by: NURSE ANESTHETIST, CERTIFIED REGISTERED

## 2021-01-18 PROCEDURE — 2580000003 HC RX 258: Performed by: NURSE ANESTHETIST, CERTIFIED REGISTERED

## 2021-01-18 PROCEDURE — 6360000002 HC RX W HCPCS: Performed by: NURSE ANESTHETIST, CERTIFIED REGISTERED

## 2021-01-18 PROCEDURE — 73501 X-RAY EXAM HIP UNI 1 VIEW: CPT

## 2021-01-18 PROCEDURE — 85018 HEMOGLOBIN: CPT

## 2021-01-18 PROCEDURE — 80053 COMPREHEN METABOLIC PANEL: CPT

## 2021-01-18 PROCEDURE — 2709999900 HC NON-CHARGEABLE SUPPLY: Performed by: ORTHOPAEDIC SURGERY

## 2021-01-18 PROCEDURE — 3600000005 HC SURGERY LEVEL 5 BASE: Performed by: ORTHOPAEDIC SURGERY

## 2021-01-18 PROCEDURE — 94761 N-INVAS EAR/PLS OXIMETRY MLT: CPT

## 2021-01-18 PROCEDURE — 6360000002 HC RX W HCPCS: Performed by: ORTHOPAEDIC SURGERY

## 2021-01-18 PROCEDURE — 85025 COMPLETE CBC W/AUTO DIFF WBC: CPT

## 2021-01-18 PROCEDURE — 3700000000 HC ANESTHESIA ATTENDED CARE: Performed by: ORTHOPAEDIC SURGERY

## 2021-01-18 PROCEDURE — 6370000000 HC RX 637 (ALT 250 FOR IP): Performed by: INTERNAL MEDICINE

## 2021-01-18 PROCEDURE — 7100000001 HC PACU RECOVERY - ADDTL 15 MIN: Performed by: ORTHOPAEDIC SURGERY

## 2021-01-18 PROCEDURE — 2580000003 HC RX 258: Performed by: INTERNAL MEDICINE

## 2021-01-18 PROCEDURE — C1776 JOINT DEVICE (IMPLANTABLE): HCPCS | Performed by: ORTHOPAEDIC SURGERY

## 2021-01-18 PROCEDURE — 36415 COLL VENOUS BLD VENIPUNCTURE: CPT

## 2021-01-18 PROCEDURE — 2580000003 HC RX 258: Performed by: ORTHOPAEDIC SURGERY

## 2021-01-18 PROCEDURE — 73502 X-RAY EXAM HIP UNI 2-3 VIEWS: CPT

## 2021-01-18 PROCEDURE — 3209999900 FLUORO FOR SURGICAL PROCEDURES

## 2021-01-18 PROCEDURE — 2720000010 HC SURG SUPPLY STERILE: Performed by: ORTHOPAEDIC SURGERY

## 2021-01-18 PROCEDURE — 6370000000 HC RX 637 (ALT 250 FOR IP): Performed by: ORTHOPAEDIC SURGERY

## 2021-01-18 PROCEDURE — C1713 ANCHOR/SCREW BN/BN,TIS/BN: HCPCS | Performed by: ORTHOPAEDIC SURGERY

## 2021-01-18 PROCEDURE — 2500000003 HC RX 250 WO HCPCS: Performed by: ORTHOPAEDIC SURGERY

## 2021-01-18 PROCEDURE — 2700000000 HC OXYGEN THERAPY PER DAY

## 2021-01-18 PROCEDURE — 85014 HEMATOCRIT: CPT

## 2021-01-18 PROCEDURE — 1200000000 HC SEMI PRIVATE

## 2021-01-18 PROCEDURE — 3700000001 HC ADD 15 MINUTES (ANESTHESIA): Performed by: ORTHOPAEDIC SURGERY

## 2021-01-18 PROCEDURE — 7100000000 HC PACU RECOVERY - FIRST 15 MIN: Performed by: ORTHOPAEDIC SURGERY

## 2021-01-18 PROCEDURE — 0SR9019 REPLACEMENT OF RIGHT HIP JOINT WITH METAL SYNTHETIC SUBSTITUTE, CEMENTED, OPEN APPROACH: ICD-10-PCS | Performed by: ORTHOPAEDIC SURGERY

## 2021-01-18 DEVICE — SCREW BNE L25MM DIA6.5MM HEX LO PROF TRIDENT II: Type: IMPLANTABLE DEVICE | Site: HIP | Status: FUNCTIONAL

## 2021-01-18 DEVICE — IMPLANTABLE DEVICE: Type: IMPLANTABLE DEVICE | Site: HIP | Status: FUNCTIONAL

## 2021-01-18 DEVICE — LINER ACET SZ E ID42MM HIP X3 CO CHROM CEMENTLESS MOD 2: Type: IMPLANTABLE DEVICE | Site: HIP | Status: FUNCTIONAL

## 2021-01-18 DEVICE — CEMENT BNE 20ML 41GM FULL DOSE PMMA W/ TOBRA M VISC RADPQ: Type: IMPLANTABLE DEVICE | Site: HIP | Status: FUNCTIONAL

## 2021-01-18 DEVICE — HEAD FEM DIA26MM -4MM OFFSET HIP CO CHROM V40 TAPR LO FRIC: Type: IMPLANTABLE DEVICE | Site: HIP | Status: FUNCTIONAL

## 2021-01-18 DEVICE — COMPONENT TOT HIP CAPPED CEM STEM H3STRYKER] STRYKER CORP]: Type: IMPLANTABLE DEVICE | Site: HIP | Status: FUNCTIONAL

## 2021-01-18 DEVICE — INSERT ACET OD48MM ID28MM X3 MOD 2 MOBILITY MDM: Type: IMPLANTABLE DEVICE | Site: HIP | Status: FUNCTIONAL

## 2021-01-18 DEVICE — SHELL ACET SZ E DIA54MM 5 CLUS H TRITANIUM PRESSFIT PRI: Type: IMPLANTABLE DEVICE | Site: HIP | Status: FUNCTIONAL

## 2021-01-18 DEVICE — STEM FEM SZ 5 L145MM NK L37MM +48MM OFFSET 127DEG STD HIP: Type: IMPLANTABLE DEVICE | Site: HIP | Status: FUNCTIONAL

## 2021-01-18 RX ORDER — ACETAMINOPHEN 500 MG
1000 TABLET ORAL EVERY 8 HOURS
Status: DISCONTINUED | OUTPATIENT
Start: 2021-01-18 | End: 2021-01-22 | Stop reason: HOSPADM

## 2021-01-18 RX ORDER — ONDANSETRON 2 MG/ML
INJECTION INTRAMUSCULAR; INTRAVENOUS PRN
Status: DISCONTINUED | OUTPATIENT
Start: 2021-01-18 | End: 2021-01-18 | Stop reason: SDUPTHER

## 2021-01-18 RX ORDER — GLYCOPYRROLATE 0.2 MG/ML
INJECTION INTRAMUSCULAR; INTRAVENOUS PRN
Status: DISCONTINUED | OUTPATIENT
Start: 2021-01-18 | End: 2021-01-18 | Stop reason: SDUPTHER

## 2021-01-18 RX ORDER — INSULIN LISPRO 100 [IU]/ML
0-3 INJECTION, SOLUTION INTRAVENOUS; SUBCUTANEOUS NIGHTLY
Status: DISCONTINUED | OUTPATIENT
Start: 2021-01-18 | End: 2021-01-22 | Stop reason: HOSPADM

## 2021-01-18 RX ORDER — SODIUM CHLORIDE 0.9 % (FLUSH) 0.9 %
10 SYRINGE (ML) INJECTION PRN
Status: DISCONTINUED | OUTPATIENT
Start: 2021-01-18 | End: 2021-01-22 | Stop reason: HOSPADM

## 2021-01-18 RX ORDER — FENTANYL CITRATE 50 UG/ML
50 INJECTION, SOLUTION INTRAMUSCULAR; INTRAVENOUS EVERY 5 MIN PRN
Status: DISCONTINUED | OUTPATIENT
Start: 2021-01-18 | End: 2021-01-18 | Stop reason: HOSPADM

## 2021-01-18 RX ORDER — MAGNESIUM HYDROXIDE 1200 MG/15ML
LIQUID ORAL CONTINUOUS PRN
Status: COMPLETED | OUTPATIENT
Start: 2021-01-18 | End: 2021-01-18

## 2021-01-18 RX ORDER — PROCHLORPERAZINE EDISYLATE 5 MG/ML
5 INJECTION INTRAMUSCULAR; INTRAVENOUS
Status: DISCONTINUED | OUTPATIENT
Start: 2021-01-18 | End: 2021-01-18 | Stop reason: HOSPADM

## 2021-01-18 RX ORDER — OXYCODONE HYDROCHLORIDE AND ACETAMINOPHEN 5; 325 MG/1; MG/1
1 TABLET ORAL PRN
Status: DISCONTINUED | OUTPATIENT
Start: 2021-01-18 | End: 2021-01-18 | Stop reason: HOSPADM

## 2021-01-18 RX ORDER — PROPOFOL 10 MG/ML
INJECTION, EMULSION INTRAVENOUS PRN
Status: DISCONTINUED | OUTPATIENT
Start: 2021-01-18 | End: 2021-01-18 | Stop reason: SDUPTHER

## 2021-01-18 RX ORDER — SODIUM CHLORIDE, SODIUM LACTATE, POTASSIUM CHLORIDE, CALCIUM CHLORIDE 600; 310; 30; 20 MG/100ML; MG/100ML; MG/100ML; MG/100ML
INJECTION, SOLUTION INTRAVENOUS CONTINUOUS PRN
Status: DISCONTINUED | OUTPATIENT
Start: 2021-01-18 | End: 2021-01-18 | Stop reason: SDUPTHER

## 2021-01-18 RX ORDER — DEXAMETHASONE SODIUM PHOSPHATE 4 MG/ML
INJECTION, SOLUTION INTRA-ARTICULAR; INTRALESIONAL; INTRAMUSCULAR; INTRAVENOUS; SOFT TISSUE PRN
Status: DISCONTINUED | OUTPATIENT
Start: 2021-01-18 | End: 2021-01-18 | Stop reason: SDUPTHER

## 2021-01-18 RX ORDER — SENNA AND DOCUSATE SODIUM 50; 8.6 MG/1; MG/1
1 TABLET, FILM COATED ORAL 2 TIMES DAILY
Status: DISCONTINUED | OUTPATIENT
Start: 2021-01-18 | End: 2021-01-22 | Stop reason: HOSPADM

## 2021-01-18 RX ORDER — INSULIN LISPRO 100 [IU]/ML
0-6 INJECTION, SOLUTION INTRAVENOUS; SUBCUTANEOUS
Status: DISCONTINUED | OUTPATIENT
Start: 2021-01-19 | End: 2021-01-22 | Stop reason: HOSPADM

## 2021-01-18 RX ORDER — ROCURONIUM BROMIDE 10 MG/ML
INJECTION, SOLUTION INTRAVENOUS PRN
Status: DISCONTINUED | OUTPATIENT
Start: 2021-01-18 | End: 2021-01-18 | Stop reason: SDUPTHER

## 2021-01-18 RX ORDER — ONDANSETRON 2 MG/ML
4 INJECTION INTRAMUSCULAR; INTRAVENOUS EVERY 6 HOURS PRN
Status: DISCONTINUED | OUTPATIENT
Start: 2021-01-18 | End: 2021-01-22 | Stop reason: HOSPADM

## 2021-01-18 RX ORDER — CEFAZOLIN SODIUM 1 G/3ML
INJECTION, POWDER, FOR SOLUTION INTRAMUSCULAR; INTRAVENOUS PRN
Status: DISCONTINUED | OUTPATIENT
Start: 2021-01-18 | End: 2021-01-18 | Stop reason: SDUPTHER

## 2021-01-18 RX ORDER — FENTANYL CITRATE 50 UG/ML
25 INJECTION, SOLUTION INTRAMUSCULAR; INTRAVENOUS EVERY 5 MIN PRN
Status: DISCONTINUED | OUTPATIENT
Start: 2021-01-18 | End: 2021-01-18 | Stop reason: HOSPADM

## 2021-01-18 RX ORDER — VANCOMYCIN HYDROCHLORIDE 1 G/20ML
INJECTION, POWDER, LYOPHILIZED, FOR SOLUTION INTRAVENOUS PRN
Status: DISCONTINUED | OUTPATIENT
Start: 2021-01-18 | End: 2021-01-18 | Stop reason: ALTCHOICE

## 2021-01-18 RX ORDER — ONDANSETRON 2 MG/ML
4 INJECTION INTRAMUSCULAR; INTRAVENOUS
Status: DISCONTINUED | OUTPATIENT
Start: 2021-01-18 | End: 2021-01-18 | Stop reason: HOSPADM

## 2021-01-18 RX ORDER — POLYETHYLENE GLYCOL 3350 17 G/17G
17 POWDER, FOR SOLUTION ORAL DAILY PRN
Status: DISCONTINUED | OUTPATIENT
Start: 2021-01-18 | End: 2021-01-22 | Stop reason: HOSPADM

## 2021-01-18 RX ORDER — OXYCODONE HYDROCHLORIDE 5 MG/1
5 TABLET ORAL EVERY 4 HOURS PRN
Status: DISCONTINUED | OUTPATIENT
Start: 2021-01-18 | End: 2021-01-22 | Stop reason: HOSPADM

## 2021-01-18 RX ORDER — PROMETHAZINE HYDROCHLORIDE 12.5 MG/1
12.5 TABLET ORAL EVERY 6 HOURS PRN
Status: DISCONTINUED | OUTPATIENT
Start: 2021-01-18 | End: 2021-01-22 | Stop reason: HOSPADM

## 2021-01-18 RX ORDER — OXYCODONE HYDROCHLORIDE AND ACETAMINOPHEN 5; 325 MG/1; MG/1
2 TABLET ORAL PRN
Status: DISCONTINUED | OUTPATIENT
Start: 2021-01-18 | End: 2021-01-18 | Stop reason: HOSPADM

## 2021-01-18 RX ORDER — GABAPENTIN 100 MG/1
100 CAPSULE ORAL 2 TIMES DAILY
Status: DISCONTINUED | OUTPATIENT
Start: 2021-01-18 | End: 2021-01-22 | Stop reason: HOSPADM

## 2021-01-18 RX ORDER — DIPHENHYDRAMINE HYDROCHLORIDE 50 MG/ML
12.5 INJECTION INTRAMUSCULAR; INTRAVENOUS
Status: DISCONTINUED | OUTPATIENT
Start: 2021-01-18 | End: 2021-01-18 | Stop reason: HOSPADM

## 2021-01-18 RX ORDER — FENTANYL CITRATE 50 UG/ML
INJECTION, SOLUTION INTRAMUSCULAR; INTRAVENOUS PRN
Status: DISCONTINUED | OUTPATIENT
Start: 2021-01-18 | End: 2021-01-18 | Stop reason: SDUPTHER

## 2021-01-18 RX ORDER — LIDOCAINE HYDROCHLORIDE 20 MG/ML
INJECTION, SOLUTION EPIDURAL; INFILTRATION; INTRACAUDAL; PERINEURAL PRN
Status: DISCONTINUED | OUTPATIENT
Start: 2021-01-18 | End: 2021-01-18 | Stop reason: SDUPTHER

## 2021-01-18 RX ORDER — MEPERIDINE HYDROCHLORIDE 25 MG/ML
12.5 INJECTION INTRAMUSCULAR; INTRAVENOUS; SUBCUTANEOUS EVERY 5 MIN PRN
Status: DISCONTINUED | OUTPATIENT
Start: 2021-01-18 | End: 2021-01-18 | Stop reason: HOSPADM

## 2021-01-18 RX ORDER — HYDRALAZINE HYDROCHLORIDE 20 MG/ML
5 INJECTION INTRAMUSCULAR; INTRAVENOUS EVERY 10 MIN PRN
Status: DISCONTINUED | OUTPATIENT
Start: 2021-01-18 | End: 2021-01-18 | Stop reason: HOSPADM

## 2021-01-18 RX ORDER — CEFAZOLIN SODIUM 2 G/50ML
2 SOLUTION INTRAVENOUS EVERY 8 HOURS
Status: COMPLETED | OUTPATIENT
Start: 2021-01-19 | End: 2021-01-19

## 2021-01-18 RX ORDER — SODIUM CHLORIDE 0.9 % (FLUSH) 0.9 %
10 SYRINGE (ML) INJECTION EVERY 12 HOURS SCHEDULED
Status: DISCONTINUED | OUTPATIENT
Start: 2021-01-18 | End: 2021-01-22 | Stop reason: HOSPADM

## 2021-01-18 RX ORDER — LABETALOL 20 MG/4 ML (5 MG/ML) INTRAVENOUS SYRINGE
5 EVERY 10 MIN PRN
Status: DISCONTINUED | OUTPATIENT
Start: 2021-01-18 | End: 2021-01-18 | Stop reason: HOSPADM

## 2021-01-18 RX ORDER — OXYCODONE HYDROCHLORIDE 5 MG/1
10 TABLET ORAL EVERY 4 HOURS PRN
Status: DISCONTINUED | OUTPATIENT
Start: 2021-01-18 | End: 2021-01-22 | Stop reason: HOSPADM

## 2021-01-18 RX ADMIN — ROCURONIUM BROMIDE 10 MG: 10 INJECTION INTRAVENOUS at 16:52

## 2021-01-18 RX ADMIN — SERTRALINE HYDROCHLORIDE 200 MG: 100 TABLET ORAL at 09:04

## 2021-01-18 RX ADMIN — FENTANYL CITRATE 50 MCG: 50 INJECTION INTRAMUSCULAR; INTRAVENOUS at 16:35

## 2021-01-18 RX ADMIN — PROPOFOL 120 MG: 10 INJECTION, EMULSION INTRAVENOUS at 16:35

## 2021-01-18 RX ADMIN — METOPROLOL TARTRATE 50 MG: 50 TABLET, FILM COATED ORAL at 23:01

## 2021-01-18 RX ADMIN — PHENYLEPHRINE HYDROCHLORIDE 160 MCG: 10 INJECTION, SOLUTION INTRAMUSCULAR; INTRAVENOUS; SUBCUTANEOUS at 18:32

## 2021-01-18 RX ADMIN — PHENYLEPHRINE HYDROCHLORIDE 80 MCG: 10 INJECTION, SOLUTION INTRAMUSCULAR; INTRAVENOUS; SUBCUTANEOUS at 16:49

## 2021-01-18 RX ADMIN — PHENYLEPHRINE HYDROCHLORIDE 80 MCG: 10 INJECTION, SOLUTION INTRAMUSCULAR; INTRAVENOUS; SUBCUTANEOUS at 19:03

## 2021-01-18 RX ADMIN — Medication 10 ML: at 09:04

## 2021-01-18 RX ADMIN — INSULIN LISPRO 1 UNITS: 100 INJECTION, SOLUTION INTRAVENOUS; SUBCUTANEOUS at 09:07

## 2021-01-18 RX ADMIN — ROCURONIUM BROMIDE 50 MG: 10 INJECTION INTRAVENOUS at 16:37

## 2021-01-18 RX ADMIN — BUPROPION HYDROCHLORIDE 300 MG: 150 TABLET, FILM COATED, EXTENDED RELEASE ORAL at 09:04

## 2021-01-18 RX ADMIN — SODIUM CHLORIDE, SODIUM LACTATE, POTASSIUM CHLORIDE, AND CALCIUM CHLORIDE: .6; .31; .03; .02 INJECTION, SOLUTION INTRAVENOUS at 17:44

## 2021-01-18 RX ADMIN — PHENYLEPHRINE HYDROCHLORIDE 80 MCG: 10 INJECTION, SOLUTION INTRAMUSCULAR; INTRAVENOUS; SUBCUTANEOUS at 17:43

## 2021-01-18 RX ADMIN — FENTANYL CITRATE 50 MCG: 50 INJECTION INTRAMUSCULAR; INTRAVENOUS at 16:30

## 2021-01-18 RX ADMIN — PANTOPRAZOLE SODIUM 40 MG: 40 TABLET, DELAYED RELEASE ORAL at 05:16

## 2021-01-18 RX ADMIN — ACETAMINOPHEN 1000 MG: 500 TABLET, COATED ORAL at 05:16

## 2021-01-18 RX ADMIN — INSULIN LISPRO 1 UNITS: 100 INJECTION, SOLUTION INTRAVENOUS; SUBCUTANEOUS at 00:33

## 2021-01-18 RX ADMIN — METOPROLOL TARTRATE 50 MG: 50 TABLET, FILM COATED ORAL at 09:04

## 2021-01-18 RX ADMIN — HYDROMORPHONE HYDROCHLORIDE 0.5 MG: 1 INJECTION, SOLUTION INTRAMUSCULAR; INTRAVENOUS; SUBCUTANEOUS at 00:26

## 2021-01-18 RX ADMIN — ACETAMINOPHEN 1000 MG: 500 TABLET, COATED ORAL at 15:12

## 2021-01-18 RX ADMIN — PHENYLEPHRINE HYDROCHLORIDE 160 MCG: 10 INJECTION, SOLUTION INTRAMUSCULAR; INTRAVENOUS; SUBCUTANEOUS at 18:40

## 2021-01-18 RX ADMIN — GLYCOPYRROLATE 0.2 MG: 0.2 INJECTION INTRAMUSCULAR; INTRAVENOUS at 17:49

## 2021-01-18 RX ADMIN — IPRATROPIUM BROMIDE AND ALBUTEROL 1 PUFF: 20; 100 SPRAY, METERED RESPIRATORY (INHALATION) at 12:51

## 2021-01-18 RX ADMIN — PHENYLEPHRINE HYDROCHLORIDE 80 MCG: 10 INJECTION, SOLUTION INTRAMUSCULAR; INTRAVENOUS; SUBCUTANEOUS at 16:50

## 2021-01-18 RX ADMIN — Medication 10 ML: at 23:01

## 2021-01-18 RX ADMIN — SUGAMMADEX 200 MG: 100 INJECTION, SOLUTION INTRAVENOUS at 18:59

## 2021-01-18 RX ADMIN — ROCURONIUM BROMIDE 30 MG: 10 INJECTION INTRAVENOUS at 17:45

## 2021-01-18 RX ADMIN — ONDANSETRON 4 MG: 2 INJECTION INTRAMUSCULAR; INTRAVENOUS at 16:43

## 2021-01-18 RX ADMIN — PHENYLEPHRINE HYDROCHLORIDE 80 MCG: 10 INJECTION, SOLUTION INTRAMUSCULAR; INTRAVENOUS; SUBCUTANEOUS at 16:56

## 2021-01-18 RX ADMIN — PHENYLEPHRINE HYDROCHLORIDE 80 MCG: 10 INJECTION, SOLUTION INTRAMUSCULAR; INTRAVENOUS; SUBCUTANEOUS at 18:44

## 2021-01-18 RX ADMIN — LIDOCAINE HYDROCHLORIDE 50 MG: 20 INJECTION, SOLUTION EPIDURAL; INFILTRATION; INTRACAUDAL; PERINEURAL at 16:35

## 2021-01-18 RX ADMIN — SODIUM CHLORIDE, SODIUM LACTATE, POTASSIUM CHLORIDE, AND CALCIUM CHLORIDE: .6; .31; .03; .02 INJECTION, SOLUTION INTRAVENOUS at 16:30

## 2021-01-18 RX ADMIN — PHENYLEPHRINE HYDROCHLORIDE 160 MCG: 10 INJECTION, SOLUTION INTRAMUSCULAR; INTRAVENOUS; SUBCUTANEOUS at 18:58

## 2021-01-18 RX ADMIN — PHENYLEPHRINE HYDROCHLORIDE 160 MCG: 10 INJECTION, SOLUTION INTRAMUSCULAR; INTRAVENOUS; SUBCUTANEOUS at 19:01

## 2021-01-18 RX ADMIN — INSULIN LISPRO 1 UNITS: 100 INJECTION, SOLUTION INTRAVENOUS; SUBCUTANEOUS at 23:01

## 2021-01-18 RX ADMIN — PHENYLEPHRINE HYDROCHLORIDE 80 MCG: 10 INJECTION, SOLUTION INTRAMUSCULAR; INTRAVENOUS; SUBCUTANEOUS at 17:49

## 2021-01-18 RX ADMIN — PHENYLEPHRINE HYDROCHLORIDE 80 MCG: 10 INJECTION, SOLUTION INTRAMUSCULAR; INTRAVENOUS; SUBCUTANEOUS at 18:52

## 2021-01-18 RX ADMIN — PHENYLEPHRINE HYDROCHLORIDE 200 MCG: 10 INJECTION, SOLUTION INTRAMUSCULAR; INTRAVENOUS; SUBCUTANEOUS at 18:16

## 2021-01-18 RX ADMIN — GABAPENTIN 100 MG: 100 CAPSULE ORAL at 23:01

## 2021-01-18 RX ADMIN — PHENYLEPHRINE HYDROCHLORIDE 160 MCG: 10 INJECTION, SOLUTION INTRAMUSCULAR; INTRAVENOUS; SUBCUTANEOUS at 18:38

## 2021-01-18 RX ADMIN — SODIUM CHLORIDE, SODIUM LACTATE, POTASSIUM CHLORIDE, AND CALCIUM CHLORIDE: .6; .31; .03; .02 INJECTION, SOLUTION INTRAVENOUS at 18:34

## 2021-01-18 RX ADMIN — ACETAMINOPHEN 1000 MG: 500 TABLET, COATED ORAL at 23:01

## 2021-01-18 RX ADMIN — GLYCOPYRROLATE AND FORMOTEROL FUMARATE 2 PUFF: 9; 4.8 AEROSOL, METERED RESPIRATORY (INHALATION) at 08:42

## 2021-01-18 RX ADMIN — DOCUSATE SODIUM 50 MG AND SENNOSIDES 8.6 MG 1 TABLET: 8.6; 5 TABLET, FILM COATED ORAL at 23:01

## 2021-01-18 RX ADMIN — AMLODIPINE BESYLATE 5 MG: 5 TABLET ORAL at 09:04

## 2021-01-18 RX ADMIN — PHENYLEPHRINE HYDROCHLORIDE 160 MCG: 10 INJECTION, SOLUTION INTRAMUSCULAR; INTRAVENOUS; SUBCUTANEOUS at 18:49

## 2021-01-18 RX ADMIN — PHENYLEPHRINE HYDROCHLORIDE 200 MCG: 10 INJECTION, SOLUTION INTRAMUSCULAR; INTRAVENOUS; SUBCUTANEOUS at 18:07

## 2021-01-18 RX ADMIN — HYDROMORPHONE HYDROCHLORIDE 0.5 MG: 1 INJECTION, SOLUTION INTRAMUSCULAR; INTRAVENOUS; SUBCUTANEOUS at 10:38

## 2021-01-18 RX ADMIN — INSULIN LISPRO 1 UNITS: 100 INJECTION, SOLUTION INTRAVENOUS; SUBCUTANEOUS at 13:10

## 2021-01-18 RX ADMIN — PHENYLEPHRINE HYDROCHLORIDE 200 MCG: 10 INJECTION, SOLUTION INTRAMUSCULAR; INTRAVENOUS; SUBCUTANEOUS at 18:10

## 2021-01-18 RX ADMIN — DEXAMETHASONE SODIUM PHOSPHATE 4 MG: 4 INJECTION, SOLUTION INTRAMUSCULAR; INTRAVENOUS at 16:43

## 2021-01-18 RX ADMIN — CEFAZOLIN SODIUM 2000 MG: 1 POWDER, FOR SOLUTION INTRAMUSCULAR; INTRAVENOUS at 16:42

## 2021-01-18 RX ADMIN — PHENYLEPHRINE HYDROCHLORIDE 160 MCG: 10 INJECTION, SOLUTION INTRAMUSCULAR; INTRAVENOUS; SUBCUTANEOUS at 18:55

## 2021-01-18 RX ADMIN — SODIUM CHLORIDE: 9 INJECTION, SOLUTION INTRAVENOUS at 05:20

## 2021-01-18 ASSESSMENT — PULMONARY FUNCTION TESTS
PIF_VALUE: 19
PIF_VALUE: 20
PIF_VALUE: 21
PIF_VALUE: 18
PIF_VALUE: 20
PIF_VALUE: 20
PIF_VALUE: 19
PIF_VALUE: 19
PIF_VALUE: 5
PIF_VALUE: 19
PIF_VALUE: 18
PIF_VALUE: 19
PIF_VALUE: 19
PIF_VALUE: 20
PIF_VALUE: 18
PIF_VALUE: 19
PIF_VALUE: 17
PIF_VALUE: 19
PIF_VALUE: 18
PIF_VALUE: 19
PIF_VALUE: 20
PIF_VALUE: 19
PIF_VALUE: 17
PIF_VALUE: 20
PIF_VALUE: 19
PIF_VALUE: 2
PIF_VALUE: 18
PIF_VALUE: 19
PIF_VALUE: 20
PIF_VALUE: 18
PIF_VALUE: 19
PIF_VALUE: 18
PIF_VALUE: 18
PIF_VALUE: 20
PIF_VALUE: 19
PIF_VALUE: 20
PIF_VALUE: 19
PIF_VALUE: 18
PIF_VALUE: 19
PIF_VALUE: 20
PIF_VALUE: 17
PIF_VALUE: 7
PIF_VALUE: 19
PIF_VALUE: 20
PIF_VALUE: 18
PIF_VALUE: 0
PIF_VALUE: 2
PIF_VALUE: 18
PIF_VALUE: 19
PIF_VALUE: 20
PIF_VALUE: 19
PIF_VALUE: 7
PIF_VALUE: 17
PIF_VALUE: 20
PIF_VALUE: 19
PIF_VALUE: 18
PIF_VALUE: 19
PIF_VALUE: 17
PIF_VALUE: 21
PIF_VALUE: 19
PIF_VALUE: 7
PIF_VALUE: 16
PIF_VALUE: 17
PIF_VALUE: 19
PIF_VALUE: 18
PIF_VALUE: 18
PIF_VALUE: 19
PIF_VALUE: 7
PIF_VALUE: 18
PIF_VALUE: 19
PIF_VALUE: 18
PIF_VALUE: 19
PIF_VALUE: 19
PIF_VALUE: 20
PIF_VALUE: 19
PIF_VALUE: 20

## 2021-01-18 ASSESSMENT — PAIN - FUNCTIONAL ASSESSMENT
PAIN_FUNCTIONAL_ASSESSMENT: PREVENTS OR INTERFERES WITH MANY ACTIVE NOT PASSIVE ACTIVITIES
PAIN_FUNCTIONAL_ASSESSMENT: PREVENTS OR INTERFERES WITH MANY ACTIVE NOT PASSIVE ACTIVITIES
PAIN_FUNCTIONAL_ASSESSMENT: PREVENTS OR INTERFERES SOME ACTIVE ACTIVITIES AND ADLS

## 2021-01-18 ASSESSMENT — ENCOUNTER SYMPTOMS
ALLERGIC/IMMUNOLOGIC NEGATIVE: 1
EYES NEGATIVE: 1
RESPIRATORY NEGATIVE: 1
GASTROINTESTINAL NEGATIVE: 1

## 2021-01-18 ASSESSMENT — PAIN DESCRIPTION - PAIN TYPE
TYPE: ACUTE PAIN
TYPE: ACUTE PAIN

## 2021-01-18 ASSESSMENT — PAIN SCALES - GENERAL
PAINLEVEL_OUTOF10: 0
PAINLEVEL_OUTOF10: 4
PAINLEVEL_OUTOF10: 0
PAINLEVEL_OUTOF10: 5
PAINLEVEL_OUTOF10: 0
PAINLEVEL_OUTOF10: 8

## 2021-01-18 ASSESSMENT — PAIN DESCRIPTION - FREQUENCY
FREQUENCY: CONTINUOUS
FREQUENCY: CONTINUOUS

## 2021-01-18 ASSESSMENT — COPD QUESTIONNAIRES: CAT_SEVERITY: MODERATE

## 2021-01-18 ASSESSMENT — LIFESTYLE VARIABLES: SMOKING_STATUS: 0

## 2021-01-18 ASSESSMENT — PAIN DESCRIPTION - DIRECTION: RADIATING_TOWARDS: BACK

## 2021-01-18 ASSESSMENT — PAIN DESCRIPTION - PROGRESSION: CLINICAL_PROGRESSION: NOT CHANGED

## 2021-01-18 ASSESSMENT — PAIN DESCRIPTION - ONSET: ONSET: ON-GOING

## 2021-01-18 ASSESSMENT — PAIN DESCRIPTION - LOCATION
LOCATION: HIP;LEG
LOCATION: HIP

## 2021-01-18 ASSESSMENT — PAIN DESCRIPTION - DESCRIPTORS: DESCRIPTORS: ACHING;CONSTANT;DISCOMFORT

## 2021-01-18 ASSESSMENT — PAIN DESCRIPTION - ORIENTATION: ORIENTATION: RIGHT

## 2021-01-18 NOTE — PROGRESS NOTES
Patient a/o x4 with intermittent confusion. I believe the confusion is a result of the IV morphine given to her for pain. MD notified, orders adjusted. Vitals stable. Resting well overnight. NPO at [address] [place] [address]. Plan for surgery Monday with Dr. Katerin Adamson.

## 2021-01-18 NOTE — ANESTHESIA PRE PROCEDURE
Department of Anesthesiology  Preprocedure Note       Name:  Jan Marsh   Age:  72 y.o.  :  1950                                          MRN:  [id number]         Date:  2021      Surgeon: Nitza Ordonez):  Suann Bence, MD    Procedure: Procedure(s):  RIGHT HIP JUANJO-ARTHROPLASTY, VS TOAL HIP ARTHROPLASTY ANTERIOR APPROACH    Medications prior to admission:   Prior to Admission medications    Medication Sig Start Date End Date Taking?  Authorizing Provider   umeclidinium-vilanterol (ANORO ELLIPTA) 62.5-25 MCG/INH AEPB inhaler Inhale 1 puff into the lungs daily   Yes Historical Provider, MD   albuterol sulfate HFA (VENTOLIN HFA) 108 (90 Base) MCG/ACT inhaler Inhale 2 puffs into the lungs every 6 hours as needed for Wheezing   Yes Historical Provider, MD   pantoprazole (PROTONIX) 40 MG tablet Take 40 mg by mouth daily   Yes Historical Provider, MD   glipiZIDE (GLUCOTROL) 5 MG tablet Take 10 mg by mouth daily   Yes Historical Provider, MD   ferrous sulfate (IRON 325) 325 (65 Fe) MG tablet Take 325 mg by mouth daily (with breakfast)   Yes Historical Provider, MD   amLODIPine (NORVASC) 5 MG tablet Take 5 mg by mouth daily   Yes Historical Provider, MD   Cyanocobalamin (B-12 PO) Take by mouth   Yes Historical Provider, MD   losartan (COZAAR) 50 MG tablet Take 50 mg by mouth daily   Yes Historical Provider, MD   metFORMIN (GLUCOPHAGE) 500 MG tablet Take 1,000 mg by mouth 2 times daily (with meals)   Yes Historical Provider, MD   sertraline (ZOLOFT) 100 MG tablet Take 200 mg by mouth daily   Yes Historical Provider, MD   buPROPion (WELLBUTRIN XL) 300 MG extended release tablet Take 300 mg by mouth every morning   Yes Historical Provider, MD   metoprolol tartrate (LOPRESSOR) 50 MG tablet Take 50 mg by mouth 2 times daily   Yes Historical Provider, MD   hydroCHLOROthiazide (HYDRODIURIL) 25 MG tablet Take 25 mg by mouth daily   Yes Historical Provider, MD pioglitazone (ACTOS) 15 MG tablet Take 15 mg by mouth daily   Yes Historical Provider, MD   Cholecalciferol (VITAMIN D3) 125 MCG (5000 UT) TABS Take by mouth   Yes Historical Provider, MD       Current medications:    Current Facility-Administered Medications   Medication Dose Route Frequency Provider Last Rate Last Admin    albuterol-ipratropium (COMBIVENT RESPIMAT)  MCG/ACT inhaler 1 puff  1 puff Inhalation Q6H Refnicci Rodriguez MD   1 puff at 01/18/21 1251    tranexamic acid (CYKLOKAPRON) 1,000 mg in dextrose 5 % 100 mL IVPB  1,000 mg Intravenous Once Jose Manuel Rashid MD        tranexamic acid (CYKLOKAPRON) 1,000 mg in dextrose 5 % 100 mL IVPB  1,000 mg Intravenous Once Jose Manuel Rashid MD        ortho mix (with morphine) injection   Injection On Call Jose Manuel Rashid MD        buPROPion [facility]) extended release tablet 300 mg  300 mg Oral QAM yesy North, DO   300 mg at 01/18/21 9610    sertraline (ZOLOFT) tablet 200 mg  200 mg Oral Daily yesy North, DO   200 mg at 01/18/21 0904    pantoprazole (PROTONIX) tablet 40 mg  40 mg Oral Daily [facility]   40 mg at 01/18/21 0516    metoprolol tartrate (LOPRESSOR) tablet 50 mg  50 mg Oral BID [facility]   50 mg at 01/18/21 0904    amLODIPine (NORVASC) tablet 5 mg  5 mg Oral Daily Aylin North, DO   5 mg at 01/18/21 0904    0.9 % sodium chloride infusion   Intravenous Continuous Bayron Doshi MD 50 mL/hr at 01/18/21 0520 New Bag at 01/18/21 0520    acetaminophen (TYLENOL) tablet 1,000 mg  1,000 mg Oral Q8H Jose Manuel Rashid MD   1,000 mg at 01/18/21 1512    oxyCODONE (ROXICODONE) immediate release tablet 5 mg  5 mg Oral Q4H PRN Jose Manuel Rashid MD        Or    oxyCODONE (ROXICODONE) immediate release tablet 10 mg  10 mg Oral Q4H PRN Jose Manuel Rashid MD  HYDROmorphone (DILAUDID) injection 0.5 mg  0.5 mg Intravenous Q4H PRN Gil Vera MD   0.5 mg at 01/18/21 1038    sodium chloride flush 0.9 % injection 10 mL  10 mL Intravenous 2 times per day Fide North, DO   10 mL at 01/18/21 0904    sodium chloride flush 0.9 % injection 10 mL  10 mL Intravenous PRN Jim North, DO        potassium chloride 10 mEq/100 mL IVPB (Peripheral Line)  10 mEq Intravenous PRN Jim North, DO        magnesium sulfate 2 g in 50 mL IVPB premix  2 g Intravenous PRN Fide North, DO        promethazine (PHENERGAN) tablet 12.5 mg  12.5 mg Oral Q6H PRN Aylin North, DO        Or    ondansetron (ZOFRAN) injection 4 mg  4 mg Intravenous Q6H PRN Aylin North, DO        famotidine (PEPCID) tablet 20 mg  20 mg Oral Daily PRN Fide North, DO        insulin lispro (1 Unit Dial) 0-6 Units  0-6 Units Subcutaneous Q4H Aylin North, DO   1 Units at 01/18/21 1310    glucose (GLUTOSE) 40 % oral gel 15 g  15 g Oral PRN Aylin North, DO        dextrose 50 % IV solution  12.5 g Intravenous PRN Aylin North, DO        glucagon (rDNA) injection 1 mg  1 mg Intramuscular PRN Aylin North, DO        dextrose 5 % solution  100 mL/hr Intravenous PRN Aylin North, DO           Allergies: Allergies   Allergen Reactions    Percocet [Oxycodone-Acetaminophen] Nausea And Vomiting    Sulfa Antibiotics Itching, Nausea And Vomiting and Swelling       Problem List:    Patient Active Problem List   Diagnosis Code    [facility] S72.91XA       Past Medical History:        Diagnosis Date    Hyperlipidemia     Hypertension     Thyroid disease     Type II or unspecified type diabetes mellitus without mention of complication, not stated as uncontrolled        Past Surgical History:  History reviewed. No pertinent surgical history.     Social History:    Social History     Tobacco Use    Smoking status: Former Smoker Packs/day: 1.00     Years: 44.00     Pack years: 44.00   Substance Use Topics    Alcohol use: No                                Counseling given: Not Answered      Vital Signs (Current):   Vitals:    01/18/21 0849 01/18/21 1038 01/18/21 1254 01/18/21 1443   BP:  (!) 144/77  119/73   Pulse:  78  76   Resp:  16 18 18   Temp:  98.1 °F (36.7 °C)  97.7 °F (36.5 °C)   TempSrc:  Oral  Oral   SpO2: 94% 94% 93% 91%   Weight:       Height:                                                  BP Readings from Last 3 Encounters:   01/18/21 119/73   02/11/14 (!) 180/93   12/10/13 177/86       NPO Status:                                                                                 BMI:   Wt Readings from Last 3 Encounters:   01/16/21 165 lb (74.8 kg)   02/11/14 163 lb 9.6 oz (74.2 kg)   12/10/13 163 lb 9.6 oz (74.2 kg)     Body mass index is 26.63 kg/m².     CBC:   Lab Results   Component Value Date    WBC 7.6 01/18/2021    RBC 3.75 01/18/2021    HGB 10.3 01/18/2021    HCT 32.1 01/18/2021    MCV 85.6 01/18/2021    RDW 15.4 01/18/2021     01/18/2021       CMP:   Lab Results   Component Value Date     01/18/2021    K 4.8 01/18/2021     01/18/2021    CO2 23 01/18/2021    BUN 26 01/18/2021    CREATININE 1.2 01/18/2021    GFRAA 54 01/18/2021    AGRATIO 1.6 01/18/2021    LABGLOM 44 01/18/2021    GLUCOSE 163 01/18/2021    PROT 6.2 01/18/2021    CALCIUM 8.8 01/18/2021    BILITOT 0.3 01/18/2021    ALKPHOS 66 01/18/2021    AST 15 01/18/2021    ALT 13 01/18/2021       POC Tests:   Recent Labs     01/18/21  1249   POCGLU 146*       Coags:   Lab Results   Component Value Date    PROTIME 10.6 01/16/2021    INR 0.91 01/16/2021    APTT 30.8 01/16/2021       HCG (If Applicable): No results found for: PREGTESTUR, PREGSERUM, HCG, HCGQUANT     ABGs: No results found for: PHART, PO2ART, FXI1DVE, WNI0QVQ, BEART, H2UNSPMN     Type & Screen (If Applicable):  No results found for: LABABO, LABRH Drug/Infectious Status (If Applicable):  No results found for: HIV, HEPCAB    COVID-19 Screening (If Applicable):   Lab Results   Component Value Date    COVID19 Not Detected 01/17/2021         Anesthesia Evaluation    Airway: Mallampati: II  TM distance: <3 FB   Neck ROM: full  Mouth opening: > = 3 FB Dental:          Pulmonary:   (+) COPD: moderate,  asthma:     (-) not a current smoker                           Cardiovascular:  Exercise tolerance: poor (<4 METS),   (+) hypertension:,     (-) dysrhythmias and  angina                Neuro/Psych:   (+) 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                 Abdominal:           Vascular:                                      Anesthesia Plan      general     ASA 3     (-npo MN  -denies chest pain or palpitations. Poor ex leonard. Former smoker, takes inhalers daily . Echo 2015   Conclusions      Summary   Normal left ventricle size and systolic function with an estimated ejection   fraction of 55%. No regional wall motion abnormalities are seen. Grade 1   diastolic dysfunction. Mild mitral annular calcification. Possibly mild thickening of the aortic valve. No significant valvular regurgitation or stenosis.)  Induction: intravenous. MIPS: Postoperative opioids intended. Anesthetic plan and risks discussed with patient. Plan discussed with CRNA.                 Kvng Grubbs MD   1/18/2021

## 2021-01-18 NOTE — PROGRESS NOTES
Hospitalist Progress Note      PCP: Yara Biggs    Date of Admission: 1/16/2021    CC hip pain, fall  Hospital course  Patient 60-year-old female with history of COPD, former tobacco abuse quit 5 years ago, hypertension, hyperlipidemia, hypothyroid, type 2 diabetes mellitus admitted after she had a mechanical fall at home. Imaging suggestive of right femoral neck fracture. Chart reviewed patient has hospitalization on 11/2020 for TIA. Subjective  Seen and examined today. Complaining of some pain at the fracture site. Denies any nausea, vomiting, fever, chills. 4 to 5 L of oxygen. Physical exam  General in mild distress due to pain  HEENT head atraumatic, eyes bilateral PERRLA, neck supple  Cardiac S1, S2 audible, regular rhythm and rate, no murmur noted  Respiratory bilateral clear to auscultate, no wheeze no rhonchi  Abdomen soft, nontender, bowel sounds present  MSK no edema bilaterally. Limited range of motion in right leg due to pain. Neuro alert oriented x3, no focal neural deficit noted    Assessment and plan  #Acute respiratory failure with hypoxia on 4 to 5 L of oxygen  Patient has history of COPD not in exacerbation. Could be a combination of atelectasis and pain medication. Encourage patient to use incentive spirometer. Echo 11/2020 EF 66 to 70%. No evidence of pulmonary hypertension. CT lung screen 2019 pulmonary nodules. If patient persistently requires oxygen may consider repeating CT chest.  Schedule breathing treatments every 6 hours. Patient is at [facility] at home. #Hypertension stable  Continue metoprolol. #Diabetes mellitus type 2  Hold glipizide, Metformin. Continue insulin sliding scale. HbA1c 7.0% on 1/17/2021    #Mechanical fall  PT/OT on board. #Acute right femur fracture  Plan for ORIF today. Pain control with as needed Dilaudid a  Nd oxycodone.       Medications:  Reviewed    Infusion Medications    sodium chloride 50 mL/hr at 01/18/21 0520    dextrose Scheduled Medications    albuterol-ipratropium  1 puff Inhalation Q6H    buPROPion  300 mg Oral QAM    sertraline  200 mg Oral Daily    pantoprazole  40 mg Oral Daily    metoprolol tartrate  50 mg Oral BID    amLODIPine  5 mg Oral Daily    acetaminophen  1,000 mg Oral Q8H    sodium chloride flush  10 mL Intravenous 2 times per day    insulin lispro  0-6 Units Subcutaneous Q4H     PRN Meds: oxyCODONE **OR** oxyCODONE, HYDROmorphone, sodium chloride flush, potassium chloride, magnesium sulfate, promethazine **OR** ondansetron, famotidine, glucose, dextrose, glucagon (rDNA), dextrose      Intake/Output Summary (Last 24 hours) at 1/18/2021 1346  Last data filed at 1/18/2021 1313  Gross per 24 hour   Intake 1700 ml   Output 1275 ml   Net 425 ml       Physical Exam Performed:    BP (!) 144/77 Comment: nurse notified  Pulse 78   Temp 98.1 °F (36.7 °C) (Oral)   Resp 18   Ht 5' 6\" (1.676 m)   Wt 165 lb (74.8 kg)   SpO2 93%   BMI 26.63 kg/m²     Labs:   Recent Labs     01/16/21 1958 01/17/21  0609 01/18/21  0546   WBC 6.3 7.9 7.6   HGB 11.8* 11.1* 10.3*   HCT 38.0 34.7* 32.1*    189 155     Recent Labs     01/16/21 1958 01/17/21  0609 01/18/21  0546    137 135*   K 4.9 5.2* 4.8    106 105   CO2 21 22 23   BUN 32* 32* 26*   CREATININE 1.5* 1.4* 1.2   CALCIUM 9.1 8.5 8.8   PHOS 4.6  --   --      Recent Labs     01/17/21  0609 01/18/21  0546   AST 20 15   ALT 16 13   BILITOT <0.2 0.3   ALKPHOS 69 66     Recent Labs     01/16/21 1959   INR 0.91     Recent Labs     01/16/21 1958 01/17/21  0609   TROPONINI <0.01 <0.01       Urinalysis:      Lab Results   Component Value Date    NITRU POSITIVE 01/16/2021    WBCUA 3-5 01/16/2021    BACTERIA 4+ 01/16/2021    RBCUA None seen 01/16/2021    BLOODU Negative 01/16/2021    SPECGRAV 1.025 01/16/2021    GLUCOSEU Negative 01/16/2021       Radiology:  XR CHEST PORTABLE   Final Result      Slightly increased streaky bilateral opacities, worse on the left.            XR CHEST PORTABLE   Final Result      Borderline cardiomegaly. No acute cardiopulmonary findings. XR ELBOW RIGHT (MIN 3 VIEWS)   Final Result      Negative study. XR HIP 2-3 VW W PELVIS RIGHT   Final Result      Acute displaced fracture of the right femoral neck. Assessment/Plan:    Active Hospital Problems    Diagnosis Date Noted    [facility] Armida Leong 01/16/2021         DVT Prophylaxis: SCD  Diet: Diet NPO, After Midnight Exceptions are: Ice Chips, Sips with Meds, Popsicles  Code Status: Full Code    PT/OT Eval Status: in progress    Dispo - inpatient. To OR today.     This chart was likely completed using voice recognition technology and may contain unintended grammatical , phraseology,and/or punctuation errors      Arabella Stoddard MD Bcc Histology Text: There were numerous aggregates of basaloid cells.

## 2021-01-18 NOTE — PLAN OF CARE
Problem: Falls - Risk of:  Goal: Will remain free from falls  Description: Will remain free from falls  1/18/2021 1059 by Jade Yeung RN  Outcome: Ongoing  Note: Patient will remain free from falls throughout shift. Currently on bedrest d/t R femur fx. Fall precautions in place. Non-skid socks on. Bed locked in lowest position with alarm on and 2/4 side rails up. Bedside table, belongings, and call light placed within reach. Patient calling out appropriately when needing assistance. Hourly rounding in anticipation of patient needs. Floor clean and free from clutter. Room door open. Will continue to monitor. Problem: Skin Integrity:  Goal: Absence of new skin breakdown  Description: Absence of new skin breakdown  Outcome: Ongoing  Note: No skin breakdown noted this shift thus far. Specialty mattress ordered but patient not wanting to move over to specialty mattress d/t pain from R femur fx. Plan is to switch out beds when patient is down for surgery. Patient resting on L side and refusing to turn. Patient educated on frequent turning to prevent skin breakdown but patient still refusing. Elbows and LLE elevated off of bed surface. Ferreira in place and ferreira care provided with green wipes. Will continue to monitor. Problem: Pain:  Goal: Pain level will decrease  Description: Pain level will decrease  1/18/2021 1059 by Jade Yeung RN  Outcome: Ongoing  Note: Patient endorsing R hip and leg pain, radiating towards lower back. Rating pain an 8/10. PRN dilaudid administered per the [facility]. Patient educated on medication, side effects, and verbalized understanding. Assisted with repositioning for comfort. Notified when next dose of pain medication can be administered. Patient currently resting in bed with eyes closed. Will continue to monitor and reassess.

## 2021-01-18 NOTE — BRIEF OP NOTE
Brief Postoperative Note      Patient:  Romeo Sharma  YOB: 1950  MRN: [id number]    Date of Procedure: 1/18/2021    Pre-Op Diagnosis: right femoral neck fracture    Post-Op Diagnosis: Same       Procedure(s):  RIGHT TOAL HIP ARTHROPLASTY ANTERIOR APPROACH    Surgeon(s):  Chelsy Spain MD    Assistant:  Surgical Assistant: Isidoro Peck    Anesthesia: General    Estimated Blood Loss (mL): 066     Complications: None    Specimens:   * No specimens in log *    Implants:  * No implants in log *      Drains:   Urethral Catheter Latex;Straight-tip 16 fr (Active)   Catheter Indications Perioperative use in selected surgeries including but not limited to urologic, pelvic or need for intraoperative monitoring of urinary output due to prolonged surgery, large volume infusion or need for diuretic therapy in surgery 01/18/21 1313   Site Assessment No urethral drainage 01/18/21 1313   Urine Color Yellow 01/18/21 1455   Urine Appearance Clear 01/18/21 1455   Output (mL) 115 mL 01/18/21 1455   Provider Notified to Remove No 01/18/21 1200       Findings: displaced femoral neck fracture    Electronically signed by Chelsy Spain MD on 1/18/2021 at 6:44 PM

## 2021-01-18 NOTE — PLAN OF CARE
Problem: Falls - Risk of:  Goal: Will remain free from falls  1/17/2021 2125 by Roxane Maldonado RN  Outcome: Ongoing   Bed locked in lowest position. Bed alarm on. Camera in place. Call light/within reach. Problem: Pain:  Goal: Control of acute pain  1/17/2021 2125 by Roxane Maldonado RN  Outcome: Ongoing   Pain treated with oral and IV medication. MD notified of confusion with morphine. Problem: Activity:  Goal: Ability to ambulate will improve  1/17/2021 2125 by Roxane Maldonado RN  Outcome: Ongoing   Very limited movement d/t fx to R femur. Problem: Sensory:  Goal: Pain level will decrease  1/17/2021 2125 by Roxane Maldonado RN  Outcome: Ongoing   Full sensation to all extremities. Severe pain to RLE. Problem: Tissue Perfusion:  Goal: Peripheral tissue perfusion will improve  1/17/2021 2125 by Roxane Maldonado RN  Outcome: Ongoing   Pulses palpable to all extremities.

## 2021-01-18 NOTE — PROGRESS NOTES
Anibal Mejia MD  [place] Office: [address] [facility], [phone]) [place]    Chief Complaint:  Right hip fracture    HPI:  72 y.o. female who presents with Right  hip fracture after a mechanical GLF. There were no preceding symptoms. She had a fall when she slipped in the kitchen. She now has severe, acute, sharp, aching, non-radiating Right hip pain, worse with attempted movement, and relieved at rest.     She denies numbness, tingling, fevers, chills, chest pain, shortness of breath or any other acute symptoms    Her health is otherwise significant for DM  She is a community ambulator, able to go to grocery store without AD  Best Contact #: Juan Ruiz () [phone]    Past Medical History:   Diagnosis Date    Hyperlipidemia     Hypertension     Thyroid disease     Type II or unspecified type diabetes mellitus without mention of complication, not stated as uncontrolled ([facility]        No past surgical history on file. Social History     Tobacco Use    Smoking status: Former Smoker     Packs/day: 1.00     Years: 44.00     Pack years: 44.00   Substance Use Topics    Alcohol use: No    Drug use: No       family history is not on file. Prior to Admission medications    Medication Sig Start Date End Date Taking?  Authorizing Provider   umeclidinium-vilanterol (ANORO ELLIPTA) 62.5-25 MCG/INH AEPB inhaler Inhale 1 puff into the lungs daily   Yes Historical Provider, MD   albuterol sulfate HFA (VENTOLIN HFA) 108 (90 Base) MCG/ACT inhaler Inhale 2 puffs into the lungs every 6 hours as needed for Wheezing   Yes Historical Provider, MD   pantoprazole (PROTONIX) 40 MG tablet Take 40 mg by mouth daily   Yes Historical Provider, MD   glipiZIDE (GLUCOTROL) 5 MG tablet Take 10 mg by mouth daily   Yes Historical Provider, MD   ferrous sulfate (IRON 325) 325 (65 Fe) MG tablet Take 325 mg by mouth daily (with breakfast)   Yes Historical Provider, MD   amLODIPine (NORVASC) 5 MG tablet Take 5 mg by mouth daily   Yes Historical Provider, MD   Cyanocobalamin (B-12 PO) Take by mouth   Yes Historical Provider, MD   losartan (COZAAR) 50 MG tablet Take 50 mg by mouth daily   Yes Historical Provider, MD   metFORMIN (GLUCOPHAGE) 500 MG tablet Take 1,000 mg by mouth 2 times daily (with meals)   Yes Historical Provider, MD   sertraline (ZOLOFT) 100 MG tablet Take 200 mg by mouth daily   Yes Historical Provider, MD   buPROPion (WELLBUTRIN XL) 300 MG extended release tablet Take 300 mg by mouth every morning   Yes Historical Provider, MD   metoprolol tartrate (LOPRESSOR) 50 MG tablet Take 50 mg by mouth 2 times daily   Yes Historical Provider, MD   hydroCHLOROthiazide (HYDRODIURIL) 25 MG tablet Take 25 mg by mouth daily   Yes Historical Provider, MD   pioglitazone (ACTOS) 15 MG tablet Take 15 mg by mouth daily   Yes Historical Provider, MD   Cholecalciferol (VITAMIN D3) 125 MCG (5000 UT) TABS Take by mouth   Yes Historical Provider, MD       Allergies   Allergen Reactions    Percocet [Oxycodone-Acetaminophen] Nausea And Vomiting    Sulfa Antibiotics Itching, Nausea And Vomiting and Swelling        Review of Systems   Constitutional: Negative. HENT: Negative. Eyes: Negative. Respiratory: Negative. Cardiovascular: Negative. Gastrointestinal: Negative. Endocrine: Negative. Genitourinary: Negative. Musculoskeletal: Positive for arthralgias. Skin: Negative. Allergic/Immunologic: Negative. Neurological: Negative. Hematological: Negative. Psychiatric/Behavioral: Negative. Physical Examination:  /74   Pulse 77   Temp 97.8 °F (36.6 °C) (Oral)   Resp 16   Ht 5' 6\" (1.676 m)   Wt 165 lb (74.8 kg)   SpO2 94%   BMI 26.63 kg/m²     Physical Exam  Vitals signs reviewed. Constitutional:       Appearance: Normal appearance. She is normal weight. HENT:      Mouth/Throat:      Mouth: Mucous membranes are moist.      Pharynx: Oropharynx is clear.    Cardiovascular: HGB 10.3 (L) 01/18/2021 05:46 AM    CREATININE 1.2 01/18/2021 05:46 AM     01/18/2021 05:46 AM    INR 0.91 01/16/2021 07:59 PM       MDM:  New acute problem requiring surgical intervention  Surgical discussion  Labs reviewed  Imaging and reports reviewed  Independent history and surgical discussion with family member. Right displaced femoral neck fracture    This fracture pattern would benefit from surgical stabilization to restore function and decrease complications from immobility. Given her prior activity level, I would recommend total hip arthroplasty. I counseled regarding risks, benefits and alternatives to the procedure, including bleeding, infection, damage to surrounding structures, need for future procedures, implant failure fracture, dislocation, leg length discrepancy and also discussed alternatives including non-operative management. The patient and family all agree to proceed with surgery.     - NPO   - Labs reviewed  - Hold anticogulation prior to surgery  - NWB on injured extremity    Plan for Total hip this afternoon        BERNADINE Boyer MD  [facility]  Office: [phone]  Cell: [phone]    01/18/21  10:35 AM

## 2021-01-19 ENCOUNTER — APPOINTMENT (OUTPATIENT)
Dept: CT IMAGING | Age: 71
DRG: 521 | End: 2021-01-19
Payer: MEDICARE

## 2021-01-19 LAB
ANION GAP SERPL CALCULATED.3IONS-SCNC: 9 MMOL/L (ref 3–16)
BUN BLDV-MCNC: 31 MG/DL (ref 7–20)
CALCIUM SERPL-MCNC: 8.6 MG/DL (ref 8.3–10.6)
CHLORIDE BLD-SCNC: 104 MMOL/L (ref 99–110)
CO2: 21 MMOL/L (ref 21–32)
CREAT SERPL-MCNC: 1.2 MG/DL (ref 0.6–1.2)
GFR AFRICAN AMERICAN: 54
GFR NON-AFRICAN AMERICAN: 44
GLUCOSE BLD-MCNC: 125 MG/DL (ref 70–99)
GLUCOSE BLD-MCNC: 193 MG/DL (ref 70–99)
GLUCOSE BLD-MCNC: 227 MG/DL (ref 70–99)
GLUCOSE BLD-MCNC: 259 MG/DL (ref 70–99)
HCT VFR BLD CALC: 25.9 % (ref 36–48)
HEMOGLOBIN: 8.3 G/DL (ref 12–16)
MCH RBC QN AUTO: 27.6 PG (ref 26–34)
MCHC RBC AUTO-ENTMCNC: 32.1 G/DL (ref 31–36)
MCV RBC AUTO: 86 FL (ref 80–100)
PDW BLD-RTO: 15.5 % (ref 12.4–15.4)
PERFORMED ON: ABNORMAL
PLATELET # BLD: 131 K/UL (ref 135–450)
PMV BLD AUTO: 8.7 FL (ref 5–10.5)
POTASSIUM SERPL-SCNC: 5.1 MMOL/L (ref 3.5–5.1)
PROCALCITONIN: 0.18 NG/ML (ref 0–0.15)
RBC # BLD: 3.01 M/UL (ref 4–5.2)
SODIUM BLD-SCNC: 134 MMOL/L (ref 136–145)
WBC # BLD: 7.4 K/UL (ref 4–11)

## 2021-01-19 PROCEDURE — 6370000000 HC RX 637 (ALT 250 FOR IP): Performed by: ORTHOPAEDIC SURGERY

## 2021-01-19 PROCEDURE — 97530 THERAPEUTIC ACTIVITIES: CPT

## 2021-01-19 PROCEDURE — 6360000002 HC RX W HCPCS: Performed by: ORTHOPAEDIC SURGERY

## 2021-01-19 PROCEDURE — 6370000000 HC RX 637 (ALT 250 FOR IP): Performed by: INTERNAL MEDICINE

## 2021-01-19 PROCEDURE — 94150 VITAL CAPACITY TEST: CPT

## 2021-01-19 PROCEDURE — 85027 COMPLETE CBC AUTOMATED: CPT

## 2021-01-19 PROCEDURE — 97535 SELF CARE MNGMENT TRAINING: CPT

## 2021-01-19 PROCEDURE — 80048 BASIC METABOLIC PNL TOTAL CA: CPT

## 2021-01-19 PROCEDURE — 94761 N-INVAS EAR/PLS OXIMETRY MLT: CPT

## 2021-01-19 PROCEDURE — 97161 PT EVAL LOW COMPLEX 20 MIN: CPT

## 2021-01-19 PROCEDURE — 86235 NUCLEAR ANTIGEN ANTIBODY: CPT

## 2021-01-19 PROCEDURE — 36415 COLL VENOUS BLD VENIPUNCTURE: CPT

## 2021-01-19 PROCEDURE — 2580000003 HC RX 258: Performed by: INTERNAL MEDICINE

## 2021-01-19 PROCEDURE — 97167 OT EVAL HIGH COMPLEX 60 MIN: CPT

## 2021-01-19 PROCEDURE — 71275 CT ANGIOGRAPHY CHEST: CPT

## 2021-01-19 PROCEDURE — 6360000004 HC RX CONTRAST MEDICATION: Performed by: INTERNAL MEDICINE

## 2021-01-19 PROCEDURE — 99223 1ST HOSP IP/OBS HIGH 75: CPT | Performed by: INTERNAL MEDICINE

## 2021-01-19 PROCEDURE — 86039 ANTINUCLEAR ANTIBODIES (ANA): CPT

## 2021-01-19 PROCEDURE — 86038 ANTINUCLEAR ANTIBODIES: CPT

## 2021-01-19 PROCEDURE — 83516 IMMUNOASSAY NONANTIBODY: CPT

## 2021-01-19 PROCEDURE — 84145 PROCALCITONIN (PCT): CPT

## 2021-01-19 PROCEDURE — 86225 DNA ANTIBODY NATIVE: CPT

## 2021-01-19 PROCEDURE — 1200000000 HC SEMI PRIVATE

## 2021-01-19 PROCEDURE — 2700000000 HC OXYGEN THERAPY PER DAY

## 2021-01-19 PROCEDURE — 94640 AIRWAY INHALATION TREATMENT: CPT

## 2021-01-19 RX ORDER — AMOXICILLIN 250 MG
2 CAPSULE ORAL DAILY PRN
Qty: 30 TABLET | Refills: 2 | Status: SHIPPED | OUTPATIENT
Start: 2021-01-19

## 2021-01-19 RX ORDER — 0.9 % SODIUM CHLORIDE 0.9 %
250 INTRAVENOUS SOLUTION INTRAVENOUS ONCE
Status: COMPLETED | OUTPATIENT
Start: 2021-01-19 | End: 2021-01-19

## 2021-01-19 RX ORDER — ASPIRIN 81 MG/1
81 TABLET ORAL 2 TIMES DAILY
Qty: 60 TABLET | Refills: 0 | Status: SHIPPED | OUTPATIENT
Start: 2021-01-19

## 2021-01-19 RX ORDER — OXYCODONE HYDROCHLORIDE 5 MG/1
5 TABLET ORAL EVERY 6 HOURS PRN
Qty: 28 TABLET | Refills: 0 | Status: SHIPPED | OUTPATIENT
Start: 2021-01-19 | End: 2021-01-26

## 2021-01-19 RX ADMIN — IPRATROPIUM BROMIDE AND ALBUTEROL 1 PUFF: 20; 100 SPRAY, METERED RESPIRATORY (INHALATION) at 02:36

## 2021-01-19 RX ADMIN — ACETAMINOPHEN 1000 MG: 500 TABLET, COATED ORAL at 20:48

## 2021-01-19 RX ADMIN — INSULIN LISPRO 2 UNITS: 100 INJECTION, SOLUTION INTRAVENOUS; SUBCUTANEOUS at 17:12

## 2021-01-19 RX ADMIN — GABAPENTIN 100 MG: 100 CAPSULE ORAL at 20:48

## 2021-01-19 RX ADMIN — DOCUSATE SODIUM 50 MG AND SENNOSIDES 8.6 MG 1 TABLET: 8.6; 5 TABLET, FILM COATED ORAL at 20:48

## 2021-01-19 RX ADMIN — SERTRALINE HYDROCHLORIDE 200 MG: 100 TABLET ORAL at 10:06

## 2021-01-19 RX ADMIN — INSULIN LISPRO 2 UNITS: 100 INJECTION, SOLUTION INTRAVENOUS; SUBCUTANEOUS at 20:55

## 2021-01-19 RX ADMIN — DOCUSATE SODIUM 50 MG AND SENNOSIDES 8.6 MG 1 TABLET: 8.6; 5 TABLET, FILM COATED ORAL at 10:06

## 2021-01-19 RX ADMIN — IPRATROPIUM BROMIDE AND ALBUTEROL 1 PUFF: 20; 100 SPRAY, METERED RESPIRATORY (INHALATION) at 08:04

## 2021-01-19 RX ADMIN — IPRATROPIUM BROMIDE AND ALBUTEROL 1 PUFF: 20; 100 SPRAY, METERED RESPIRATORY (INHALATION) at 23:25

## 2021-01-19 RX ADMIN — CEFAZOLIN SODIUM 2 G: 2 SOLUTION INTRAVENOUS at 09:49

## 2021-01-19 RX ADMIN — ENOXAPARIN SODIUM 40 MG: 40 INJECTION SUBCUTANEOUS at 10:06

## 2021-01-19 RX ADMIN — CEFAZOLIN SODIUM 2 G: 2 SOLUTION INTRAVENOUS at 00:52

## 2021-01-19 RX ADMIN — PANTOPRAZOLE SODIUM 40 MG: 40 TABLET, DELAYED RELEASE ORAL at 06:19

## 2021-01-19 RX ADMIN — SODIUM CHLORIDE 250 ML: 900 INJECTION, SOLUTION INTRAVENOUS at 09:50

## 2021-01-19 RX ADMIN — IOPAMIDOL 80 ML: 755 INJECTION, SOLUTION INTRAVENOUS at 11:47

## 2021-01-19 RX ADMIN — ACETAMINOPHEN 1000 MG: 500 TABLET, COATED ORAL at 06:19

## 2021-01-19 RX ADMIN — BUPROPION HYDROCHLORIDE 300 MG: 150 TABLET, FILM COATED, EXTENDED RELEASE ORAL at 10:06

## 2021-01-19 RX ADMIN — IPRATROPIUM BROMIDE AND ALBUTEROL 1 PUFF: 20; 100 SPRAY, METERED RESPIRATORY (INHALATION) at 15:38

## 2021-01-19 RX ADMIN — GABAPENTIN 100 MG: 100 CAPSULE ORAL at 10:06

## 2021-01-19 ASSESSMENT — PAIN SCALES - GENERAL
PAINLEVEL_OUTOF10: 7
PAINLEVEL_OUTOF10: 0

## 2021-01-19 NOTE — CARE COORDINATION
Case Management Assessment           Initial Evaluation                Date / Time of Evaluation: 1/19/2021 2:18 PM                 Assessment Completed by: Aly Chambers Day    Patient Name: Binnie Hodgkin Pyle     YOB: 1950  Diagnosis: Closed fracture of right femur, initial encounter [facility]) Agnieszka Wood     Date / Time: 1/16/2021  7:10 PM    Patient Admission Status: Inpatient    If patient is discharged prior to next notation, then this note serves as note for discharge by case management. Current PCP: [address] Patient: No    Chart Reviewed: Yes  Patient/ Family Interviewed: Yes    Initial assessment completed at bedside with: Patient     Hospitalization in the last 30 days: No    Emergency Contacts:  Extended Emergency Contact Information  Primary Emergency Contact: Wu Sharma  Address: [address] [place] [address], [address] [place] [address]  Home Phone: [phone]  Work Phone: [phone]  Relation: Spouse  Secondary Emergency Contact: ZacharyCrescencio  Home Phone: [phone]  Relation: Child    Advance Directives:   Code Status: Full Code    Healthcare Power of : No    Financial:  Payor: Reed Mcclain / Plan: Mini Sebastian PPO / Product Type: Medicare /     Pre-cert required for SNF: [facility] at this time. Pharmacy:    [facility] [address] [address]  Phone: [phone] Fax: [phone]      Potential assistance Purchasing Medications: Potential Assistance Purchasing Medications: No  Does Patient want to participate in local refill/ meds to beds program?: No    Meds To Beds General Rules:  1. Can ONLY be done Monday- Friday between 8:30am-5pm  2.  Prescription(s) must be in pharmacy by 3pm to be filled same day 3.Copy of patient's insurance/ prescription drug card and patient face sheet must be sent along with the prescription(s)  4. Cost of Rx cannot be added to hospital bill. If financial assistance is needed, please contact unit  or ;  or  CANNOT provide pharmacy voucher for patients co-pays  5.  Patients can then  the prescription on their way out of the hospital at discharge, or pharmacy can deliver to the bedside if staff is available. (payment due at time of pick-up or delivery - cash, check, or card accepted)     Able to afford home medications/ co-pay costs: Yes    ADLS:  Support Systems: Spouse/Significant Other    PT AM-PAC: 8 /24  OT AM-PAC: 12 /24    Housing:  Home Environment: From home w/spouse   Steps: yes     Plans to RETURN to current housing: N/A   Barrier(s) to RETURNING to current housing: No     Home Care Information:  Currently ACTIVE with [address] [address]: No  Home Care Agency: Not Applicable    Currently ACTIVE with [place] on Aging: No    Durable Medical Equipment:  DME Provider: None reprorted  Equipment: N/A     Home Oxygen and Respiratory Equipment:  Has HOME OXYGEN prior to admission: No    Dialysis:  Active with HD/PD prior to admission: No    DISCHARGE PLAN:  Disposition: SNF: Initial referral to [facility], ELISA to provide Snip[employer] and follow up with List     Transportation PLAN for discharge: EMS transportation     Factors facilitating achievement of predicted outcomes: Family support, Motivated, Cooperative and Pleasant    Barriers to discharge: Medical Clearance, Placement     Additional Case Management Notes: SW met with patient at bedside. Patient is from home with her . Patient reported she is independent at baseline and was not using any DME prior to admissions. She reported she has been to rehab at the [facility] before and would be willing to return there again. ELISA will also provide her an [facility] SNF list for review to look at alternative options. Patient agreeable. ELISA [facility]. ELISA to follow up today with SNF list.     The Plan for Transition of Care is related to the following treatment goals Closed fracture of right femur, initial encounter ([facility] [address] Cameron Greenberg      The Patient and/or patient representative Patient  was provided with a choice of provider and agrees with the discharge plan Yes    Freedom of choice list was provided with basic dialogue that supports the patient's individualized plan of care/goals and shares the quality data associated with the providers.  Yes    Care Transition patient: No    LATOYA Caamcho  [facility] [employer]  Case Management Department  Ph: [phone]

## 2021-01-19 NOTE — PROGRESS NOTES
PACU Transfer Note    Vitals:    01/18/21 2030   BP: 110/61   Pulse: 80   Resp: 14   Temp: 98 °F (36.7 °C)   SpO2: 95%       In: 2100 [I.V.:2100]  Out: 450 [Urine:150]    Pain assessment:  none  Pain Level: 0    Report given to Receiving unit RN.    1/18/2021 8:32 PM

## 2021-01-19 NOTE — PLAN OF CARE
Problem: Falls - Risk of:  Goal: Will remain free from falls  Description: Will remain free from falls  1/19/2021 1450 by Bhanu Cardozo RN  Outcome: Ongoing  1/19/2021 0244 by Himanshu Stockton RN  Outcome: Ongoing  Note: Hourly rounding on patient for needs. Non-skid socks on, bed in lowest position and locked. Bedside table, personal belongs, and nurse call light within reach. Instructed patient to use call light for assistance. Bed alarm on. Floor clear of clutter. Patient remains free of falls at this time. Will continue to monitor.        Problem: Skin Integrity:  Goal: Will show no infection signs and symptoms  Description: Will show no infection signs and symptoms  Outcome: Ongoing

## 2021-01-19 NOTE — PLAN OF CARE
Problem: Falls - Risk of:  Goal: Will remain free from falls  Description: Will remain free from falls  Outcome: Ongoing  Note: Hourly rounding on patient for needs. Non-skid socks on, bed in lowest position and locked. Bedside table, personal belongs, and nurse call light within reach. Instructed patient to use call light for assistance. Bed alarm on. Floor clear of clutter. Patient remains free of falls at this time. Will continue to monitor. Problem: Pain:  Goal: Pain level will decrease  Description: Pain level will decrease  Outcome: Ongoing  Note: Patient denies pain at this time, patient exhibits no objective characteristics of pain, will continue to monitor. Problem: Infection - Surgical Site:  Goal: Signs of wound healing will improve  Description: Signs of wound healing will improve  Outcome: Ongoing  Note: Unable to assess surgical site due to dressing, dressing is clean/dry/intact. No odor or drainage noted. Patient remains afebrile at this time. Will continue to monitor.

## 2021-01-19 NOTE — PROGRESS NOTES
Patient returned to room 5505 at 2100 from PACU. Patient is a/o x2 to self and time only, disoriented to place/situation. Patient denies complaints of nausea/pain. Non-skid socks on,. Oriented patient to room and call light. Bed locked and in lowest positioned. Bedside table, personal belongings, and nurse call light within reach. Instructed patient to utilize call light for assistance. Bed alarm on. Will continue to monitor.

## 2021-01-19 NOTE — PROGRESS NOTES
4 Eyes Admission Assessment     I agree as the admission nurse that 2 RN's have performed a thorough Head to Toe Skin Assessment on the patient. ALL assessment sites listed below have been assessed on admission. Areas assessed by both nurses:   [x]   Head, Face, and Ears   [x]   Shoulders, Back, and Chest  [x]   Arms, Elbows, and Hands   [x]   Coccyx, Sacrum, and Ischium  [x]   Legs, Feet, and Heels        Does the Patient have Skin Breakdown?   No         Ra Prevention initiated:  Yes   Wound Care Orders initiated:  NA      [facility] nurse consulted for Pressure Injury (Stage 3,4, Unstageable, DTI, NWPT, and Complex wounds) or Ra score 18 or lower:  NA      Nurse 1 eSignature: Electronically signed by Chacorta Humphries RN on 1/19/21 at 4:30 AM EST    **SHARE this note so that the co-signing nurse is able to place an eSignature**    Nurse 2 eSignature: Electronically signed by Nataly Stanford RN on 1/19/21 at 5:25 AM EST

## 2021-01-19 NOTE — PROGRESS NOTES
Occupational Therapy   Occupational Therapy Initial Assessment/Tx Note  Date: 2021   Patient Name: Mai Poon  MRN: [id number]     : 1950    Date of Service: 2021     Assessment: Pt unable to tolerate out of bed activity today due to orthostatic hypotension. She requires significant assist for bed mobility, sitting, ADLs. Cognition is impaired. Anticipate continued inpt therapy needed at d/c. Will continue to assess. Discharge Recommendations: Antonia Sharma scored a  on the AM-PAC ADL Inpatient form. Current research shows that an AM-PAC score of 17 or less is typically not associated with a discharge to the patient's home setting. Based on the patient's AM-PAC score and their current ADL deficits, it is recommended that the patient have 3-5 sessions per week of Occupational Therapy at d/c to increase the patient's independence. Please see assessment section for further patient specific details. OT Equipment Recommendations  Equipment Needed: No    Assessment   Performance deficits / Impairments: Decreased functional mobility ; Decreased endurance;Decreased high-level IADLs;Decreased balance;Decreased ADL status; Decreased cognition  Treatment Diagnosis: Decreased activity tolerance, impaired ADLs and mobility  Decision Making: High Complexity  REQUIRES OT FOLLOW UP: Yes  Activity Tolerance  Activity Tolerance: Treatment limited secondary to medical complications (free text)  Activity Tolerance: Upon first sitting up, pt less responsive, returned to supine with BP 90s/60s, pt quickly became more alert in supine, /62. Attempted sitting again, pt tolerated for longer period of time, but again became dizzy, less responsive. Unable to obtain seated BP, but upon return to supine BP 79/52; checked frequently as follows 77/49, 83/50, 93/58, 87/56. SpO2 88% on 3L O2, increased to 4L by OT with little improvement. RN aware, increased OT to 5L. Pt more responsive.   Safety Devices  Safety Devices in place: Yes  Type of devices: Call light within reach;Nurse notified; Bed alarm in place; Left in bed         Treatment Diagnosis: Decreased activity tolerance, impaired ADLs and mobility      Restrictions  Position Activity Restriction  Other position/activity restrictions: full weight bearing as tolerated    Subjective   General  Chart Reviewed: Yes  Patient assessed for rehabilitation services?: Yes  Additional Pertinent Hx: 72 y.o. F admitted 1/16 with R femoral neck fx s/p R CAMMIE anterior approach 1/18. PMHx includes DM, HTN  Family / Caregiver Present: No  Referring Practitioner: Daysi Toussaint  Subjective  Subjective: Pt in bed on entry. Frustrated by not being able to reach breakfast well. Cooperative overall, but drowsy and slow to respond at times. Patient Currently in Pain: Yes(8/10 right hip. RN aware)    Social/Functional History  Social/Functional History  Lives With: Spouse  Type of Home: House  Home Layout: Two level, Bed/Bath upstairs, 1/2 bath on main level  Home Access: Stairs to enter with rails  Entrance Stairs - Number of Steps: 5  Entrance Stairs - Rails: Left  Bathroom Shower/Tub: Walk-in shower  Bathroom Toilet: Handicap height  Bathroom Equipment: Grab bars in shower, Shower chair  ADL Assistance: Independent  Homemaking Assistance: (pt &  share duties)  Ambulation Assistance: Independent  Transfer Assistance: Independent  Active : No  Additional Comments: pt reports  cannot provide much physical assistance. Objective   Vision: Within Functional Limits  Hearing: Within functional limits    Orientation  Overall Orientation Status: Within Functional Limits     Balance  Sitting Balance: Maximum assistance(improving to min; fluctuating)  Standing Balance: Unable to assess(comment)(secondary to symptomatic orthostatic hypotension)  ADL  Feeding: Modified independent ;Setup; Increased time to complete;Verbal cueing  LE Dressing: Dependent/Total  Toileting: Dependent/Total        Bed mobility  Rolling to Left: Maximum assistance  Rolling to Right: Maximum assistance  Supine to Sit: Maximum assistance  Sit to Supine: Dependent/Total;2 Person assistance  Scooting: Dependent/Total(in supine; mod assist in sitting)  Comment: mod verbal cues for bed mobility        Cognition  Overall Cognitive Status: Exceptions  Cognition Comment: alert, oriented, delayed responses, having difficulty initiating and sequencing basic tasks, follows simple commands with repetition; became less alert and responsive with pre-syncope        Plan  If pt discharges prior to next tx, this note will serve as d/c summary. Continue per POC if pt does not d/c.     Plan  Times per week: 5-7x  Times per day: Daily  Current Treatment Recommendations: Strengthening, Balance Training, Functional Mobility Training, Endurance Training, Self-Care / ADL, Safety Education & Training, Equipment Evaluation, Education, & procurement, Patient/Caregiver Education & Training, Cognitive Reorientation, Pain Management      AM-PAC Score  AM-PAC Inpatient Daily Activity Raw Score: 12 (01/19/21 1115)  AM-PAC Inpatient ADL T-Scale Score : 30.6 (01/19/21 1115)  ADL Inpatient CMS 0-100% Score: 66.57 (01/19/21 1115)  ADL Inpatient CMS G-Code Modifier : CL (01/19/21 1115)    Goals  Short term goals  Time Frame for Short term goals: by D/C  Short term goal 1:  Increase sitting tolerance to 10 min with SBA - Not met  Short term goal 2: Damian Brody brief/pants SBA with AE as needed - Not met  Short term goal 3: Tolerate transfer to [facility] or [facility] - Not met  Patient Goals   Patient goals : no goal stated       Therapy Time   Individual Concurrent Group Co-treatment   Time In 0830         Time Out 0939         Minutes 69          Timed Code Tx Min: 54  Total Tx Time: 521 Kurt Simmons, OT

## 2021-01-19 NOTE — PROGRESS NOTES
Patient admitted to pacu post RIGHT TOAL HIP ARTHROPLASTY ANTERIOR APPROACH - Right with Dr. Gretchen Hernandez. Patient connected to bedside monitors; PT's O2 in the 70s and patient hypotensive. Patient appeared pale but not dusky or cyanotic. Non-rebreather on 15L placed and oxygen slowly normalized. Xrays done at bedside.

## 2021-01-19 NOTE — PROGRESS NOTES
Patient A&Ox4 with intermittent confusion. Patient endorses pain with movement but is fine at rest. Surgical dressing CDI. Patient tolerating diet and liquids. Patient able to void on her own. Fall precautions in place. Will continue to monitor.

## 2021-01-19 NOTE — PROGRESS NOTES
Cornell Solano MD  [place] Office: [address] [facility],[employer], [address]Jose Luis ([phone] [place]      S: Doc Lock remains stable. There were no acute events. Her pain is well controlled  She is tolerating a diet  She is voiding without issues   She denies fevers, chills, nausea, vomiting, chest pain or shortness of breath, or any new complaints. O:  /66   Pulse 82   Temp 97.4 °F (36.3 °C) (Oral)   Resp (!) 1   Ht 5' 6\" (1.676 m)   Wt 165 lb (74.8 kg)   SpO2 97%   BMI 26.63 kg/m²     She is in no acute distress. Her Respirations are nonlabored  RLE dressing c/d/i, SILT dp/spt/s/donnelly/sa, ehl/fhl/df/pf intact, dp palpable       I have reviewed her labwork and noted any abnormalities. Lab Results   Component Value Date/Time    CREATININE 1.2 01/19/2021 05:45 AM    HGB 8.3 (L) 01/19/2021 05:46 AM    INR 0.91 01/16/2021 07:59 PM       A/P:  72 y.o. female s/p right CAMMIE for FNF    POD# 1    WBAT on the operative extremity  Multimodal Pain control  DVT ppx: Mobilize, SCDs, lovenox in house, home on ASA 81mg BID  PT/OT, increase activity as tolerated  Encourage diet/fluids  Call for questions: [phone]    Disposition: Pending PT and medical stability  Will need follow up in my clinic in 10-14 days for wound check and x rays. [phone] to schedule        BERNADINE Desir MD  [facility]  Office: [phone]  Cell: [phone]    01/19/21  10:07 AM

## 2021-01-19 NOTE — ANESTHESIA POSTPROCEDURE EVALUATION
Department of Anesthesiology  Postprocedure Note    Patient: Rasta Christiansen  MRN: [id number]  YOB: 1950  Date of evaluation: 1/19/2021  Time:  12:28 AM     Procedure Summary     Date: 01/18/21 Room / Location: [address] [place] [address]    Anesthesia Start: 1630 Anesthesia Stop: 1926    Procedure: RIGHT TOAL HIP ARTHROPLASTY ANTERIOR APPROACH (Right ) Diagnosis: (right femoral neck fracture)    Surgeons: Dee Dee Oliveira MD Responsible Provider: Virgia Goodpasture, MD    Anesthesia Type: general ASA Status: 3          Anesthesia Type: general    Cordelia Phase I: Cordelia Score: 7    Cordelia Phase II:      Last vitals: Reviewed and per EMR flowsheets.        Anesthesia Post Evaluation    Patient location during evaluation: PACU  Patient participation: complete - patient participated  Level of consciousness: awake  Pain score: 2  Airway patency: patent  Nausea & Vomiting: no nausea and no vomiting  Complications: no  Cardiovascular status: hemodynamically stable  Respiratory status: acceptable  Hydration status: euvolemic

## 2021-01-19 NOTE — OP NOTE
Pramod Esposito MD  [place] Office: [address] [facility], [address], [address] [place]  [phone] Dino Sharma  1/16/2021      OPERATIVE REPORT    PRE-OP DIAGNOSIS: Right Femoral neck fracture       POST-OP DIAGNOSIS: Same    PROCEDURE: Right Total Hip Arthroplasty     SURGEON: Gil Vera MD    ASSISTANT:  Surgical Assistant: Sally Hair; Irving Young, for the purposes of retraction, assistance for reduction, and manipulation of the limb, and assistance with wound closure as needed. ANESTHESIA: General Anesthesia with intra-operative psoas compartment block    ANTIBIOTIC: CEFAZOLIN, DOSING PER NURSING CHART,  incisional 1g vancomycin    ESTIMATED BLOOD LOSS:  002CV    COMPLICATIONS:  None    IMPLANTS USED:   * No implants in log *   Jones Trident II 54mm OD shell  MDM liner 42mm E  X3 MDM insert 28/48 - 42E  LFIT V40 metal head 28 mm -4  Accolade C 127° Size 5    HISTORY OF PRESENT ILLNESS:  The patient is a(n) 72 y.o. female with a history of fall, brought in to the hospital and workup found right displaced femoral neck fracture. Given previous level of activity, recommendation for total hip arthroplasty was made to restore limb stability, allow immediate weight bearing and prevent complications of immobility. The patient was admitted to the hospitalist for medical management and optimized for the procedure.     RISKS OF SURGERY: An extensive conversation was held with the patient and/or family regarding the risks, benefits, potential complication and reasonable expectations of the planned procedure. Informed verbal consent was given under no distress. We had ample opportunities for questions regarding the usual outcomes. Risks specifically discussed, but not limited to the following, include possible: bleeding, infection, damage to blood vessels and/or nerves, blood clots, anesthesia complications, fracture, hardware failure, need for additional surgery, and post-operative stiffness, looseness, leg length discrepancy, and dislocation. In addition, in rare cases the patient may have loss of a limb or even death. No guarantee of any particular results were given. The patient voiced understanding that in some cases surgery can make them worse. In spite of this, the patient and/or family desired that we proceed with the operation and expressed clear understanding of these risks. Attention was turned to the femur. Leg was externally rotated and lowered. Release of capsule and external rotators was performed as needed for better access to the femur. Box osteotome was used to enter the femur. Sequential broaching was performed. The bone was quite soft, and to avoid fracture, decision was made to cement the femoral component. Broaching was performed until appropriate size was obtained, and confirmed on fluoroscopy. The soft bone did not allow adequate rotational stability to trial the femoral compontent. The femur was reduced without a head to confirm appropriate positioning and sizing on fluoroscopy. For cementing, the femoral canal was brushed, cement restrictor placed, canal irrigated, and suctioned dry. Cement was pressurized. Implant was placed with close attention to version and to avoid varus. Component was held in place until cement set. With the final stem in place, the trial headball was placed, and trialed. Stem sizing was appropriate, and length, offset and soft tissue tension were acceptable. The hip was very stable to external rotation and extension test. The hip was gently dislocated, and the final headball was impacted onto a clean, dry taper. Repeat trialing confirmed acceptable length, offset, tissue tension, and stability test. Fluoroscopy confirmed appropriate component position and sizing. Copious irrigation was performed. Hemostasis was obtained. 1g incisional vancomycin was placed. The TFL fascia was closed with a running, locking suture, followed by layered closure. At the end of the procedure the instrument count was correct. There were no complications. Transferred stable to the PACU. POSTOPERATIVE COURSE: Weight bearing as tolerated. PT for mobility. DVT and Antibiotic prophylaxis. Follow up in 1-2 weeks for wound check and X-Ray of the pelvis and operative hip.          Jose Huggins MD  [facility] Orthopedics and Sports Medicine  Office: [phone] Cell: [phone]    01/18/21  7:05 PM

## 2021-01-19 NOTE — PROGRESS NOTES
Hospitalist Progress Note      PCP: Jin Mina    Date of Admission: 1/16/2021    CC hip pain, fall  Hospital course  Patient 78-year-old female with history of COPD, former tobacco abuse quit 5 years ago, hypertension, hyperlipidemia, hypothyroid, type 2 diabetes mellitus admitted after she had a mechanical fall at home. Patient was noted to be hypoxic. Imaging suggestive of right femoral neck fracture. Chart reviewed patient has hospitalization on 11/2020 for TIA. S/p right hip arthroplasty on 1/18. Estimated blood loss 300 cc. Subjective  Patient was seen and examined after she finished up with her PT session. Patient was noted to be hypotensive, felt lightheaded. Dyspneic she was feeling generalized weak. Denies any chest pain, palpitation.     Medications:  Reviewed    Infusion Medications    dextrose       Scheduled Medications    albuterol-ipratropium  1 puff Inhalation Q6H    sodium chloride flush  10 mL Intravenous 2 times per day    sennosides-docusate sodium  1 tablet Oral BID    acetaminophen  1,000 mg Oral Q8H    gabapentin  100 mg Oral BID    enoxaparin  40 mg Subcutaneous Daily    insulin lispro  0-6 Units Subcutaneous TID WC    insulin lispro  0-3 Units Subcutaneous Nightly    buPROPion  300 mg Oral QAM    sertraline  200 mg Oral Daily    pantoprazole  40 mg Oral Daily    metoprolol tartrate  50 mg Oral BID    amLODIPine  5 mg Oral Daily    sodium chloride flush  10 mL Intravenous 2 times per day     PRN Meds: iopamidol, sodium chloride flush, oxyCODONE **OR** oxyCODONE, promethazine **OR** ondansetron, magnesium hydroxide, polyethylene glycol, sodium chloride flush, potassium chloride, magnesium sulfate, famotidine, glucose, dextrose, glucagon (rDNA), dextrose      Intake/Output Summary (Last 24 hours) at 1/19/2021 1303  Last data filed at 1/19/2021 0619  Gross per 24 hour   Intake 2710 ml   Output 1065 ml   Net 1645 ml       Physical Exam Performed:    BP (!) 90/49   Pulse 81   Temp 97.4 °F (36.3 °C) (Oral)   Resp 16   Ht 5' 6\" (1.676 m)   Wt 165 lb (74.8 kg)   SpO2 93%   BMI 26.63 kg/m²     General tired  HEENT head atraumatic, eyes bilateral PERRLA, neck supple  Cardiac S1, S2 audible, tachycardia. No murmur noted  Respiratory bilateral clear to auscultate, no wheeze no rhonchi  Abdomen soft, nontender, bowel sounds present  MSK no edema bilaterally. Right hip surgical site C/D/I  Neuro alert oriented x3, no focal neural deficit noted  Pulses palpable bilateral radial    Labs:   Recent Labs     01/17/21  0609 01/18/21  0546 01/18/21 1930 01/19/21  0546   WBC 7.9 7.6  --  7.4   HGB 11.1* 10.3* 8.6* 8.3*   HCT 34.7* 32.1* 27.8* 25.9*    155  --  131*     Recent Labs     01/16/21 1958 01/17/21  0609 01/18/21  0546 01/19/21  0545    137 135* 134*   K 4.9 5.2* 4.8 5.1    106 105 104   CO2 21 22 23 21   BUN 32* 32* 26* 31*   CREATININE 1.5* 1.4* 1.2 1.2   CALCIUM 9.1 8.5 8.8 8.6   PHOS 4.6  --   --   --      Recent Labs     01/17/21  0609 01/18/21  0546   AST 20 15   ALT 16 13   BILITOT <0.2 0.3   ALKPHOS 69 66     Recent Labs     01/16/21 1959   INR 0.91     Recent Labs     01/16/21 1958 01/17/21  0609   TROPONINI <0.01 <0.01       Urinalysis:      Lab Results   Component Value Date    NITRU POSITIVE 01/16/2021    WBCUA 3-5 01/16/2021    BACTERIA 4+ 01/16/2021    RBCUA None seen 01/16/2021    BLOODU Negative 01/16/2021    SPECGRAV 1.025 01/16/2021    GLUCOSEU Negative 01/16/2021       Radiology:  CTA PULMONARY W CONTRAST   Final Result   1. No evidence of acute or chronic pulmonary embolus      2. Central lobar emphysema and evidence of underlying interstitial lung disease   3. Basilar dependent atelectasis, subpleural consolidation, left greater than right. Infectious inflammatory etiologies not excluded   4. Abnormal right hilar and subcarinal lymphadenopathy.    5. Hiatal hernia      An addendum to this report will be made when prior CT lung mellitus type 2  Hold glipizide, Metformin. Continue insulin sliding scale. HbA1c 7.0% on 1/17/2021    #Mechanical fall  PT/OT on board. #Acute right femur fracture  S/p right hip total arthroplasty on 1/18  Pain control with oxycodone. DVT Prophylaxis: SCD  Diet: Dietary Nutrition Supplements: Standard High Calorie Oral Supplement  DIET CARB CONTROL;  Code Status: Full Code    PT/OT Eval Status: in progress    Dispo - inpatient.  Pulmonary eval. Likely d/c in 1-2 days    This chart was likely completed using voice recognition technology and may contain unintended grammatical , phraseology,and/or punctuation errors      Liv Johnson MD

## 2021-01-19 NOTE — CARE COORDINATION
SW returned to patient's bedside and provided [facility] SNF list and [facility] List for review. SW went over list and answered any questions.  SW to follow up with patient in the AM.       LATOYA Berman, [place]  Social Work/Case Management  [facility]   [phone]

## 2021-01-19 NOTE — CONSULTS
Pulmonology PGY-2 Resident History & Physical    PCP: Tremaine Mercer    Date of Admission: 1/16/2021    Date of Service: Pt seen/examined on 01/19/21 and Admitted to Inpatient with expected LOS greater than two midnights due to medical therapy. Chief Complaint/Reason for consult: Hypoxemia    History Of Present Illness:     Patient is a 72year old female with prior medical history of CHF, COPD (emphysema), iron deficiency anemia, T2DM, HTN, falls and vitamin B12 deficiency who presented 01/16/21 with hip pain. The patient states that she does not use home oxygen. She was noted to be hypoxemic in the ED and was placed on 2L nasal cannula. She then underwent an orthopedic repair procedure on 01/18. Before and after the procedure she was noted to have desaturations measured by SPO2 which improved with supplemental oxygen. The patient is currently maintaining SPO2 > 90% on 5L nasal. She denies feeling subjectively short of breath and states that her cough is at baseline. She quit smoking 5 years ago but has a prior history of 30 pack-years (1 PPD x 30 years). Past Medical History:          Diagnosis Date    Hyperlipidemia     Hypertension     Thyroid disease     Type II or unspecified type diabetes mellitus without mention of complication, not stated as uncontrolled      Past Surgical History:          Procedure Laterality Date    TOTAL HIP ARTHROPLASTY Right 1/18/2021    RIGHT TOAL HIP ARTHROPLASTY ANTERIOR APPROACH performed by Kamini Sarmiento MD at [address]       Medications Prior to Admission:      Prior to Admission medications    Medication Sig Start Date End Date Taking? Authorizing Provider   oxyCODONE (ROXICODONE) 5 MG immediate release tablet Take 1 tablet by mouth every 6 hours as needed for Pain (half tab for moderate pain, whole tab for severe pain) for up to 7 days. Intended supply: 7 days.  Take lowest dose possible to manage pain 1/19/21 1/26/21 Yes Kamini Sarmiento MD aspirin EC 81 MG EC tablet Take 1 tablet by mouth 2 times daily To prevent blood clots 1/19/21  Yes Monik Farnsworth MD   senna-docusate (PERICOLACE) 8.6-50 MG per tablet Take 2 tablets by mouth daily as needed for Constipation 1/19/21  Yes Monik Farnsworth MD   umeclidinium-vilanterol [facility] ELLIPTA) 62.5-25 MCG/INH AEPB inhaler Inhale 1 puff into the lungs daily   Yes Historical Provider, MD   albuterol sulfate HFA (VENTOLIN HFA) 108 (90 Base) MCG/ACT inhaler Inhale 2 puffs into the lungs every 6 hours as needed for Wheezing   Yes Historical Provider, MD   pantoprazole (PROTONIX) 40 MG tablet Take 40 mg by mouth daily   Yes Historical Provider, MD   glipiZIDE (GLUCOTROL) 5 MG tablet Take 10 mg by mouth daily   Yes Historical Provider, MD   ferrous sulfate (IRON 325) 325 (65 Fe) MG tablet Take 325 mg by mouth daily (with breakfast)   Yes Historical Provider, MD   amLODIPine (NORVASC) 5 MG tablet Take 5 mg by mouth daily   Yes Historical Provider, MD   Cyanocobalamin (B-12 PO) Take by mouth   Yes Historical Provider, MD   losartan (COZAAR) 50 MG tablet Take 50 mg by mouth daily   Yes Historical Provider, MD   metFORMIN (GLUCOPHAGE) 500 MG tablet Take 1,000 mg by mouth 2 times daily (with meals)   Yes Historical Provider, MD   sertraline (ZOLOFT) 100 MG tablet Take 200 mg by mouth daily   Yes Historical Provider, MD   buPROPion (WELLBUTRIN XL) 300 MG extended release tablet Take 300 mg by mouth every morning   Yes Historical Provider, MD   metoprolol tartrate (LOPRESSOR) 50 MG tablet Take 50 mg by mouth 2 times daily   Yes Historical Provider, MD   hydroCHLOROthiazide (HYDRODIURIL) 25 MG tablet Take 25 mg by mouth daily   Yes Historical Provider, MD   pioglitazone (ACTOS) 15 MG tablet Take 15 mg by mouth daily   Yes Historical Provider, MD   Cholecalciferol (VITAMIN D3) 125 MCG (5000 UT) TABS Take by mouth   Yes Historical Provider, MD     Allergies:  Percocet [oxycodone-acetaminophen] and Sulfa antibiotics    Social History:      The patient currently lives at home. TOBACCO:   reports that she has quit smoking. She has a 44.00 pack-year smoking history. She does not have any smokeless tobacco history on file. ETOH:  reports no history of alcohol use. Family History:     History reviewed. No pertinent family history. REVIEW OF SYSTEMS: Pertinent positives as noted in the HPI. All other systems reviewed and negative. Review of Systems   General: She is drowsy and has trouble staying awake. HEENT: No blurry or decreased vision. No changes in hearing, nasal discharge or sore throat. Cardiovascular: No CP, No palpitations. No cramping in legs or buttocks when walking. Respiratory: See HPI. No cough, hemoptysis, or wheezing. No history of asthma. Gastrointestinal: No abdominal pain, hematochezia, melana, or history of GI ulcers. Genito-Urinary: No dysuria or hematuria. No urgency or polyuria. Musculoskeletal: No complaints of joint pain, joint swelling or muscular weakness/soreness. Neurological: No dizziness or headaches. No numbness/tingling, speech problems or weakness. No history of a stroke or TIA. Psychological: No anxiety or depression  Hematological and Lymphatic: No abnormal bleeding or bruising, blood clots, jaundice. Endocrine: No malaise/lethargy, palpitations, polydipsia/polyuria, temperature intolerance or unexpected weight changes. Skin: No rashes or non-healing ulcers. PHYSICAL EXAM PERFORMED:    BP (!) 93/55   Pulse 82   Temp 97.4 °F (36.3 °C) (Oral)   Resp 18   Ht 5' 6\" (1.676 m)   Wt 165 lb (74.8 kg)   SpO2 92%   BMI 26.63 kg/m²     General appearance:  No apparent distress, appears stated age and cooperative. HEENT:  Normal cephalic,atraumatic without obvious deformity. Pupils equal, round, and reactive to light. Extra ocular muscles intact. Conjunctivae/corneas clear. Neck: Supple, with full range of motion. No jugular venous distention.  Trachea midline. Respiratory: Crackles appreciated posteriorly in the lower lung lobes. Cardiovascular:  Regular rate and rhythm with normal S1/S2 without murmurs, rubs or gallops. Abdomen: Soft, non-tender, non-distended with normal bowel sounds. Musculoskeletal:  No clubbing, cyanosis oredema bilaterally. Full range of motion without deformity. Skin: Skin color, texture, turgor normal.  Norashes or lesions. Neurologic:  Neurovascularly intact without any focal sensory/motor deficits. Cranialnerves: II-XII intact, grossly non-focal.  Psychiatric:  Alert and oriented, thought content appropriate,normal insight  Capillary Refill: Brisk,< 3 seconds   Peripheral Pulses: +2 palpable, equal bilaterally     Labs:     Recent Labs     01/17/21  0609 01/18/21  0546 01/18/21 1930 01/19/21  0546   WBC 7.9 7.6  --  7.4   HGB 11.1* 10.3* 8.6* 8.3*   HCT 34.7* 32.1* 27.8* 25.9*    155  --  131*     Recent Labs     01/16/21 1958 01/17/21  0609 01/18/21  0546 01/19/21  0545    137 135* 134*   K 4.9 5.2* 4.8 5.1    106 105 104   CO2 21 22 23 21   BUN 32* 32* 26* 31*   CREATININE 1.5* 1.4* 1.2 1.2   CALCIUM 9.1 8.5 8.8 8.6   PHOS 4.6  --   --   --      Recent Labs     01/17/21  0609 01/18/21  0546   AST 20 15   ALT 16 13   BILITOT <0.2 0.3   ALKPHOS 69 66     Recent Labs     01/16/21 1959   INR 0.91     Recent Labs     01/16/21 1958 01/17/21  0609   TROPONINI <0.01 <0.01     Urinalysis:      Lab Results   Component Value Date    NITRU POSITIVE 01/16/2021    WBCUA 3-5 01/16/2021    BACTERIA 4+ 01/16/2021    RBCUA None seen 01/16/2021    BLOODU Negative 01/16/2021    SPECGRAV 1.025 01/16/2021    GLUCOSEU Negative 01/16/2021     Radiology:     CTA PULMONARY W CONTRAST   Final Result   1. No evidence of acute or chronic pulmonary embolus      2. Central lobar emphysema and evidence of underlying interstitial lung disease   3. Basilar dependent atelectasis, subpleural consolidation, left greater than right. Infectious inflammatory etiologies not excluded   4. Abnormal right hilar and subcarinal lymphadenopathy. 5. Hiatal hernia      An addendum to this report will be made when prior CT lung screening is available for comparison. XR HIP 1 VW W PELVIS RIGHT   Final Result   Impression:    Status post right total hip arthroplasty. FLUORO FOR SURGICAL PROCEDURES   Final Result   Impression:   Intra-operative images as above. XR HIP RIGHT (2-3 VIEWS)   Final Result   Impression:   Intra-operative images as above. XR CHEST PORTABLE   Final Result      Slightly increased streaky bilateral opacities, worse on the left. XR CHEST PORTABLE   Final Result      Borderline cardiomegaly. No acute cardiopulmonary findings. XR ELBOW RIGHT (MIN 3 VIEWS)   Final Result      Negative study. XR HIP 2-3 VW W PELVIS RIGHT   Final Result      Acute displaced fracture of the right femoral neck. ASSESSMENT & PLAN:    74F w/ PMH of CHF, COPD (emphysema), iron deficiency anemia, T2DM, HTN, falls and vitamin B12 deficiency who presented 01/16/21 with hip pain. 1. Hypoxemia  - Etiology unclear. Post-op immobilization/sedation vs. COPD exacerbation.  - S/P right hhip arthroplasty (01/18/21). - Did not use supplemental oxygen prior to admission on 01/16.  - She had been using Anoro (LAMA/LABA) at home. - Prior PFTs showed obstruction w/o bronchodilator response, decreased DLCO, decreased MIP/MEP. 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   - CT-chest (05/22/19): RUL nodules (< 6 mm). Enlarged mediastinal LNs. Pulm artery enlargement. Mild emphysema. - CXR (01/16/21): No acute findings. - CXR (01/17/21): Slightly increased B/L opacities (left > right). - CTA-pulm (01/19/21): Emphysema w/ underlying ILD. Atelectasis. Subpleural consolidation (left > right). Right hilar/subcarinal lymphadenopathy. No PE.  - Procal 0.18 (01/19/21). - Plan: Continue supportive care. Combivent q6h. Expect that her oxygen requirement will improve post-operatively with PT/OT and mobilization. Wean supplemental oxygen with target SPO2 > 88%. Pulmonary nodules appear stable and non-pathologic (< 10 mm in short axis). Recommend repeat CT-chest imaging after discharge. Management per primary team:  2. HTN - Amlodipine, Lopressor. 3. T2DM - LDSSI. 4. Depression - Sertraline, Buproprion. 5. Pain - Tylenol, gabapentin. 6. GERD - Protonix. 7. Constipation - Senokot-S.  8. DVT PPX - Lovenox. 9. Diet - Carb control. Patient has been discussed with Dr. Jamaica O[place]iedo. Hillary Barkley, DO  Internal Medicine Resident, PGY-2  Perfect Serve    Pulm    Patient seen and examined. I agree with Dr. Lisa Garcia history, physical, lab findings, assessment and plan. When I saw Betzaida I was able to wean her oxygen to 3 L. She does not complain of any dyspnea, cough, wheezing, or chest tightness. I think her etiology of hypoxemia is postoperative atelectasis on the setting of severe COPD. With more time and increased activity this should improve. I do not suspect this should delay her discharge to SNF    In regards to her CT chest, I am not overly impressed with any significant ILD. She does have upper lobe dominant emphysema but this is unchanged from her CT at [facility] in 2019. Her paratracheal lymph node is unchanged. Most current CT is contrast so it is much easier to identify her right hilar lymph node compared to her low dose lung cancer screening. I informed Betzaida she should have a follow-up CT chest in 3 to 6 months.   She does follow with pulmonary at [address] [place] [address],  [address] MD

## 2021-01-19 NOTE — PROGRESS NOTES
Physical Therapy    Facility/Department: [facility] 5T ORTHO/NEURO  Initial Assessment/Treatment    NAME: Austyn Higginbotham  : 1950  MRN: [id number]    Date of Service: 2021    Discharge Recommendations: Mohan Sharma scored a 8/24 on the AM-PAC short mobility form. Current research shows that an AM-PAC score of 17 or less is typically not associated with a discharge to the patient's home setting. Based on the patient's AM-PAC score and their current functional mobility deficits, it is recommended that the patient have 3-5 sessions per week of Physical Therapy at d/c to increase the patient's independence. Please see assessment section for further patient specific details. If patient discharges prior to next session this note will serve as a discharge summary. Please see below for the latest assessment towards goals. Patient would benefit from continued therapy after discharge   PT Equipment Recommendations  Equipment Needed: (defer)    Assessment   Body structures, Functions, Activity limitations: Decreased functional mobility   Assessment: Pt functioning below baseline at this time. Safety concerns for pt to return home upon D/C. Recommend further inpt PT upon D/C. Will cont PT while here to maximize mobility. Treatment Diagnosis: impaired funcitonal mobility  Prognosis: Good  Decision Making: Low Complexity  PT Education: Goals;PT Role;Plan of Care;Precautions; Functional Mobility Training  REQUIRES PT FOLLOW UP: Yes  Activity Tolerance  Activity Tolerance: Patient limited by cognitive status;Treatment limited secondary to medical complications (free text)(limited by orthostatic hypotension.)       Patient Diagnosis(es): The primary encounter diagnosis was Right hip pain. Diagnoses of Right elbow pain, Closed displaced fracture of right femoral neck ([address] and Closed fracture of right femur, initial encounter [facility]) were also pertinent to this visit.      has a past medical history of Hyperlipidemia, Hypertension, Thyroid disease, and Type II or unspecified type diabetes mellitus without mention of complication, not stated as uncontrolled. has a past surgical history that includes Total hip arthroplasty (Right, 1/18/2021). Restrictions  Position Activity Restriction  Other position/activity restrictions: full weight bearing as tolerated  Vision/Hearing  Vision: Within Functional Limits  Hearing: Within functional limits     Subjective  General  Chart Reviewed: Yes  Patient assessed for rehabilitation services?: Yes  Additional Pertinent Hx: PMH: HTN, DM. Pt admitted s/p fall at home. Found to have right hip fx. s/p right THR 1/18. Family / Caregiver Present: No  Referring Practitioner: Katerin Adamson  Referral Date : 01/18/21  Follows Commands: Impaired(pt inconsistently following commands)  Subjective  Subjective: Pt supine in bed & agreeable to PT. Pain Screening  Patient Currently in Pain: Yes(8/10 right hip. RN aware)  Vital Signs  Patient Currently in Pain: Yes(8/10 right hip. RN aware)       Orientation  Orientation  Overall Orientation Status: Impaired  Orientation Level: Oriented to place;Oriented to person;Disoriented to time(confused conversation at times. slow processing noted.)  Social/Functional History  Social/Functional History  Lives With: Spouse  Type of Home: House  Home Layout: Two level, Bed/Bath upstairs, 1/2 bath on main level  Home Access: Stairs to enter with rails  Entrance Stairs - Number of Steps: 5  Entrance Stairs - Rails: Left  Bathroom Shower/Tub: Walk-in shower  Bathroom Toilet: Handicap height  Bathroom Equipment: Grab bars in shower, Shower chair  ADL Assistance: Independent  Homemaking Assistance: (pt &  share duties)  Ambulation Assistance: Independent  Transfer Assistance: Independent  Active : No  Additional Comments: pt reports  cannot provide much physical assistance.        Objective          AROM RLE (degrees)  RLE General AROM: limited in all planes d/t sx  AROM LLE (degrees)  LLE AROM : WNL  Strength RLE  Comment: NT  Strength LLE  Comment: pt not following commands for MMT. at least 3/5 throughout. Bed mobility  Supine to Sit: Maximum assistance(HOB elevated. verbal cues required.)  Sit to Supine: Dependent/Total;2 Person assistance(dep x 2)  Scooting: Dependent/Total(x 1 seated, x 2 supine)  Comment: supine<->sit performed x 2 with ~10 min rest in between. each time, while seated EOB, pt became less responsive & required increasing assist for sitting balance with time. could not get BP reading while seated. BP after returning supine 1st time:   . after returning supine 2nd time: 77/49. back to 90's after supine for ~10 min. RN aware. Transfers  Sit to Stand: (unable to attempt d/t pt being orthostatic.)        Balance  Sitting - Static: Poor(varied from mostly min to periods of max A)  Sitting - Dynamic: Poor  Comments: pt sat EOB for 2 min x 1, 3 min x 1. became orthostatic each time.   Exercises  Quad Sets: x 5 bilat (max cues)  Hip Flexion: x 10 right LE (max A)  Ankle Pumps: x 15 bilat (max cues)     Plan   Plan  Times per week: 7  Current Treatment Recommendations: Functional Mobility Training, Transfer Training, Gait Training, Strengthening, Safety Education & Training, Home Exercise Program, Endurance Training  Safety Devices  Type of devices: Call light within reach, Bed alarm in place, Left in bed, Nurse notified                                                       AM-PAC Score  -PAC Inpatient Mobility Raw Score : 8 (01/19/21 1241)  AM-PAC Inpatient T-Scale Score : 28.52 (01/19/21 1241)  Mobility Inpatient CMS 0-100% Score: 86.62 (01/19/21 1241)  Mobility Inpatient CMS G-Code Modifier : CM (01/19/21 1241)          Goals  Short term goals  Time Frame for Short term goals: D/C  Short term goal 1: supine<->sit mod A  Short term goal 2: pt will tolerate sit<->stand assessment  Short term goal 3: pt will tolerate bed<->chair assessment  Short

## 2021-01-20 LAB
ANTI-CENTROMERE B IGG: <0.2 AI (ref 0–0.9)
ANTI-CHROMATIN IGG: <0.2 AI (ref 0–0.9)
ANTI-DSDNA IGG: <1 IU/ML (ref 0–9)
ANTI-JO1 IGG: <0.2 AI (ref 0–0.9)
ANTI-NUCLEAR ANTIBODY (ANA): POSITIVE
ANTI-RIBOSOMAL P IGG: <0.2 AI (ref 0–0.9)
ANTI-RNP IGG: <0.2 AI (ref 0–0.9)
ANTI-SCL70 IGG: <0.2 AI (ref 0–0.9)
ANTI-SMITH IGG: <0.2 AI (ref 0–0.9)
ANTI-SMRNP IGG: <0.2 AI (ref 0–0.9)
ANTI-SS-A IGG: 4.9 AI (ref 0–0.9)
ANTI-SS-B IGG: <0.2 AI (ref 0–0.9)
C-REACTIVE PROTEIN: 98.2 MG/L (ref 0–5.1)
GLUCOSE BLD-MCNC: 195 MG/DL (ref 70–99)
GLUCOSE BLD-MCNC: 257 MG/DL (ref 70–99)
GLUCOSE BLD-MCNC: 274 MG/DL (ref 70–99)
GLUCOSE BLD-MCNC: 311 MG/DL (ref 70–99)
HCT VFR BLD CALC: 23.4 % (ref 36–48)
HEMOGLOBIN: 7.7 G/DL (ref 12–16)
MCH RBC QN AUTO: 27.9 PG (ref 26–34)
MCHC RBC AUTO-ENTMCNC: 33.1 G/DL (ref 31–36)
MCV RBC AUTO: 84.4 FL (ref 80–100)
PDW BLD-RTO: 15.2 % (ref 12.4–15.4)
PERFORMED ON: ABNORMAL
PLATELET # BLD: 128 K/UL (ref 135–450)
PMV BLD AUTO: 9.2 FL (ref 5–10.5)
RBC # BLD: 2.77 M/UL (ref 4–5.2)
SEDIMENTATION RATE, ERYTHROCYTE: 43 MM/HR (ref 0–30)
WBC # BLD: 6.3 K/UL (ref 4–11)

## 2021-01-20 PROCEDURE — 85027 COMPLETE CBC AUTOMATED: CPT

## 2021-01-20 PROCEDURE — 97530 THERAPEUTIC ACTIVITIES: CPT

## 2021-01-20 PROCEDURE — 6370000000 HC RX 637 (ALT 250 FOR IP): Performed by: ORTHOPAEDIC SURGERY

## 2021-01-20 PROCEDURE — 94640 AIRWAY INHALATION TREATMENT: CPT

## 2021-01-20 PROCEDURE — 99232 SBSQ HOSP IP/OBS MODERATE 35: CPT | Performed by: INTERNAL MEDICINE

## 2021-01-20 PROCEDURE — 36415 COLL VENOUS BLD VENIPUNCTURE: CPT

## 2021-01-20 PROCEDURE — 2580000003 HC RX 258: Performed by: ORTHOPAEDIC SURGERY

## 2021-01-20 PROCEDURE — 85652 RBC SED RATE AUTOMATED: CPT

## 2021-01-20 PROCEDURE — 97110 THERAPEUTIC EXERCISES: CPT

## 2021-01-20 PROCEDURE — 86140 C-REACTIVE PROTEIN: CPT

## 2021-01-20 PROCEDURE — 6360000002 HC RX W HCPCS: Performed by: ORTHOPAEDIC SURGERY

## 2021-01-20 PROCEDURE — 94150 VITAL CAPACITY TEST: CPT

## 2021-01-20 PROCEDURE — 2700000000 HC OXYGEN THERAPY PER DAY

## 2021-01-20 PROCEDURE — 1200000000 HC SEMI PRIVATE

## 2021-01-20 RX ORDER — BISACODYL 10 MG
10 SUPPOSITORY, RECTAL RECTAL DAILY PRN
Status: DISCONTINUED | OUTPATIENT
Start: 2021-01-20 | End: 2021-01-22 | Stop reason: HOSPADM

## 2021-01-20 RX ADMIN — PANTOPRAZOLE SODIUM 40 MG: 40 TABLET, DELAYED RELEASE ORAL at 06:31

## 2021-01-20 RX ADMIN — INSULIN LISPRO 1 UNITS: 100 INJECTION, SOLUTION INTRAVENOUS; SUBCUTANEOUS at 08:22

## 2021-01-20 RX ADMIN — Medication 10 ML: at 20:23

## 2021-01-20 RX ADMIN — GABAPENTIN 100 MG: 100 CAPSULE ORAL at 08:22

## 2021-01-20 RX ADMIN — IPRATROPIUM BROMIDE AND ALBUTEROL 1 PUFF: 20; 100 SPRAY, METERED RESPIRATORY (INHALATION) at 17:07

## 2021-01-20 RX ADMIN — INSULIN LISPRO 2 UNITS: 100 INJECTION, SOLUTION INTRAVENOUS; SUBCUTANEOUS at 20:16

## 2021-01-20 RX ADMIN — DOCUSATE SODIUM 50 MG AND SENNOSIDES 8.6 MG 1 TABLET: 8.6; 5 TABLET, FILM COATED ORAL at 08:22

## 2021-01-20 RX ADMIN — ACETAMINOPHEN 1000 MG: 500 TABLET, COATED ORAL at 06:31

## 2021-01-20 RX ADMIN — IPRATROPIUM BROMIDE AND ALBUTEROL 1 PUFF: 20; 100 SPRAY, METERED RESPIRATORY (INHALATION) at 09:27

## 2021-01-20 RX ADMIN — Medication 10 ML: at 08:36

## 2021-01-20 RX ADMIN — ACETAMINOPHEN 1000 MG: 500 TABLET, COATED ORAL at 20:16

## 2021-01-20 RX ADMIN — ENOXAPARIN SODIUM 40 MG: 40 INJECTION SUBCUTANEOUS at 08:22

## 2021-01-20 RX ADMIN — DOCUSATE SODIUM 50 MG AND SENNOSIDES 8.6 MG 1 TABLET: 8.6; 5 TABLET, FILM COATED ORAL at 20:16

## 2021-01-20 RX ADMIN — MAGNESIUM HYDROXIDE 30 ML: 400 SUSPENSION ORAL at 15:18

## 2021-01-20 RX ADMIN — GABAPENTIN 100 MG: 100 CAPSULE ORAL at 20:17

## 2021-01-20 RX ADMIN — INSULIN LISPRO 3 UNITS: 100 INJECTION, SOLUTION INTRAVENOUS; SUBCUTANEOUS at 12:09

## 2021-01-20 RX ADMIN — OXYCODONE 5 MG: 5 TABLET ORAL at 20:17

## 2021-01-20 RX ADMIN — SERTRALINE HYDROCHLORIDE 200 MG: 100 TABLET ORAL at 08:22

## 2021-01-20 RX ADMIN — OXYCODONE 5 MG: 5 TABLET ORAL at 12:45

## 2021-01-20 RX ADMIN — INSULIN LISPRO 3 UNITS: 100 INJECTION, SOLUTION INTRAVENOUS; SUBCUTANEOUS at 17:26

## 2021-01-20 RX ADMIN — BUPROPION HYDROCHLORIDE 300 MG: 150 TABLET, FILM COATED, EXTENDED RELEASE ORAL at 08:22

## 2021-01-20 ASSESSMENT — PAIN DESCRIPTION - ORIENTATION: ORIENTATION: RIGHT

## 2021-01-20 ASSESSMENT — PAIN SCALES - GENERAL
PAINLEVEL_OUTOF10: 4
PAINLEVEL_OUTOF10: 6
PAINLEVEL_OUTOF10: 5
PAINLEVEL_OUTOF10: 0

## 2021-01-20 ASSESSMENT — PAIN DESCRIPTION - LOCATION: LOCATION: HIP

## 2021-01-20 ASSESSMENT — PAIN DESCRIPTION - ONSET: ONSET: ON-GOING

## 2021-01-20 ASSESSMENT — PAIN DESCRIPTION - FREQUENCY: FREQUENCY: CONTINUOUS

## 2021-01-20 ASSESSMENT — PAIN DESCRIPTION - PAIN TYPE: TYPE: SURGICAL PAIN

## 2021-01-20 NOTE — PROGRESS NOTES
Physical Therapy  Facility/Department: [facility] 5T ORTHO/NEURO  Daily Treatment Note  NAME: Mery Rodriguez  : 1950  MRN: [id number]    Date of Service: 2021    Discharge Recommendations: Kelli Sharma scored a 8/24 on the AM-PAC short mobility form. Current research shows that an AM-PAC score of 17 or less is typically not associated with a discharge to the patient's home setting. Based on the patient's AM-PAC score and their current functional mobility deficits, it is recommended that the patient have 3-5 sessions per week of Physical Therapy at d/c to increase the patient's independence. Please see assessment section for further patient specific details. If patient discharges prior to next session this note will serve as a discharge summary. Please see below for the latest assessment towards goals. Patient would benefit from continued therapy after discharge   PT Equipment Recommendations  Equipment Needed: (defer)    Assessment   Assessment: Limited by orthostatic hypotension again today. BP dropped to 78/46 with standing & pt less alert & had to return to supine. Unable to tolerate ambulation. Pt is well below her baseline & will need ongoing IP PT prior to return home. Treatment Diagnosis: impaired funcitonal mobility  Prognosis: Good  PT Education: Goals;PT Role;Plan of Care;Precautions; Functional Mobility Training  REQUIRES PT FOLLOW UP: Yes  Activity Tolerance  Activity Tolerance: Patient limited by cognitive status;Treatment limited secondary to medical complications (free text)(limited by orthostatic hypotension)  Activity Tolerance: BP 97/63 supine. 90/59 sitting EOB (pt asymptomatic). 78/46 supine after standing 45 sec (pt c/o feeling \"out of it\"). After 5 min supine, /66. Discussed with RN. Patient Diagnosis(es): The primary encounter diagnosis was Right hip pain.  Diagnoses of Right elbow pain, Closed displaced fracture of right femoral neck ([address] and Closed fracture of right femur, initial encounter [facility]) were also pertinent to this visit. has a past medical history of Hyperlipidemia, Hypertension, Thyroid disease, and Type II or unspecified type diabetes mellitus without mention of complication, not stated as uncontrolled. has a past surgical history that includes Total hip arthroplasty (Right, 1/18/2021). Restrictions  Position Activity Restriction  Other position/activity restrictions: full weight bearing as tolerated; anterior hip approach  Subjective   General  Chart Reviewed: Yes  Additional Pertinent Hx: PMH: HTN, DM. Pt admitted s/p fall at home. Found to have right hip fx. s/p right THR 1/18. Family / Caregiver Present: No  Referring Practitioner: Valencia Alatorre  Subjective  Subjective: Pt supine in bed & agreeable to PT. Reports minimal pain at rest.  States was not very active PTA but was ambulatory without any device. Orientation  Orientation  Overall Orientation Status: Impaired(slow processing; aware that she had hip replacement)     Objective   Bed mobility  Rolling to Left: Moderate assistance(with cues & railing)  Rolling to Right: Moderate assistance(with cues & railing)  Supine to Sit: Dependent/Total(MOD A of 1 & MIN A of 1)  Sit to Supine: Dependent/Total(of 2)  Scooting: Dependent/Total(or 2 to scoot up in bed)  Transfers  Sit to Stand: Dependent/Total(MOD A of 1 & MIN A of 1)  Stand to sit: Dependent/Total(MOD A of 1 & MIN A of 1)  Ambulation  Ambulation?: No     Balance  Sitting - Static: Good(SBA x 5 min)  Comments: Stood with walker appox 45 sec with mod A of1 & min A of 1. Attempted to have pt take 1 step but pt unsteady with post lean & then became less talkative- assisted back to sitting & then supine.   Exercises  Quad Sets: x 5 bilat (max cues)- indep on L; trace mvt on R with max cues  Heelslides: x 10 L independent but appeared effortful  Hip Abduction: x 10 L independent  Knee Short Arc Quad: x 10 R with mod A  Ankle Pumps: x 10 R independent                                                                             AM-PAC Score  AM-PAC Inpatient Mobility Raw Score : 8 (01/20/21 0950)  AM-PAC Inpatient T-Scale Score : 28.52 (01/20/21 0950)  Mobility Inpatient CMS 0-100% Score: 86.62 (01/20/21 0950)  Mobility Inpatient CMS G-Code Modifier : CM (01/20/21 0950)          Goals  Short term goals  Time Frame for Short term goals: D/C  Short term goal 1: supine<->sit mod A  Short term goal 2: pt will tolerate sit<->stand assessment- MET 1/20. Revised:  Sit<>stand min A of 1. Short term goal 3: pt will tolerate bed<->chair assessment  Short term goal 4: pt will tolerate ambulation with RW assessment. Short term goal 5: New Goal 1/20:  Stand 30 sec with walker with CGA of 1.   Patient Goals   Patient goals : not stated    Plan    Plan  Times per week: 7  Current Treatment Recommendations: Functional Mobility Training, Transfer Training, Gait Training, Strengthening, Safety Education & Training, Home Exercise Program, Endurance Training  Safety Devices  Type of devices: Bed alarm in place, Call light within reach, Nurse notified, Left in bed     Therapy Time   Individual Concurrent Group Co-treatment   Time In 0838         Time Out 0920         Minutes 42           Timed Code Treatment Minutes:   42    Total Treatment Minutes:  [address] [facility], [address] [facility]

## 2021-01-20 NOTE — PLAN OF CARE
Problem: Falls - Risk of:  Goal: Will remain free from falls  Description: Will remain free from falls  1/20/2021 0802 by Triston Field RN  Outcome: Ongoing  1/20/2021 0515 by Jonna Mojica RN  Outcome: Ongoing  Note: Patient is a high fall risk. All fall precautions in place: bed alarm on, nonskid socks on pt, call light within reach, bed locked in lowest position. Will continue to monitor pt safety. Problem: Pain:  Goal: Pain level will decrease  Description: Pain level will decrease  1/20/2021 0802 by Triston Field RN  Outcome: Ongoing  1/20/2021 0515 by Jonna Mojica RN  Outcome: Ongoing  Note: Pt complains of surgical pain. Scheduled tylenol given, pt resting comfortably.

## 2021-01-20 NOTE — CARE COORDINATION
Case Management Daily Note                    Date: 1/20/2021     Patient Name: Cortney Mcfarland    Date of Admission: 1/16/2021  7:10 PM  YOB: 1950    Length of Stay: 4         Patient Admission Status: Inpatient  Diagnosis:Closed fracture of right femur, initial encounter ([facility] Travis Torrez     ________________________________________________________________________________________  Discharge Plan: SNF: Referrals pending   From home at baseline     Insurance: Payor: Rosena Schirmer / Plan: Aruna Gleason PPO / Product Type: Medicare /   Is pre-cert/notification needed: Waived until 1/31/21     Tentative discharge date: 1/21    Current barriers: Medical clearance, Placement     Referrals completed: SNF: [facility]    Resources/ information provided: SNF List   ________________________________________________________________________________________  PT AM-PAC: 8 / 24 per last evaluation on: 1/20    OT AM-PAC: 13 / 24 per last evaluation on: 1/20    DME Needs for discharge: defer  ________________________________________________________________________________________  Notes/Plan of Care:   SW met with patient at bedside this AM. SW dicussed discharge planning and placement. Patient was agreeable to referral to [address] [place] [address] based on further review of the SNF list. She wanted to stay close to her home area. SW placed referrals in Epic. Gem, Noe Mccullough, returned call. They could clinically accept patient, but she is 5th on the list today and they only have two other discharges tomorrow. If she is ready today or tomorrow they christiano not have a bed at this time. Gem will follow up in AM regarding any change in bed status. SW received call at [facility] [place]. They are willing to accept patient. SW to follow up with [facility] as well and follow up with patient at bedside. Patient still had issues with Blood Pressure today per Bedside RN when trying to work with therapy. Remains on 4 liters 02 at this time. Chance Lara and/or her family were provided with choice of provider; she and/or her family are in agreement with the discharge plan at this time.     Care Transition Patient: LATOYA Yates  [facility]    Case Management Department  Ph: [phone]

## 2021-01-20 NOTE — PROGRESS NOTES
Pt is alert and oriented times 4. Mepliex to surgical site is CDI. Pt has moderate push pulls and denies numbness and tingling. VSS for most of shift but did become hypotensive when attempting to ambulate to commode, pt returned to bed and BP stable. Pt encouraged PO intake. Pain well managed with scheduled tylenol. All fall precautions in place.

## 2021-01-20 NOTE — PROGRESS NOTES
Pulmonology PGY-2 Resident Progress Note    PCP: Jenniffer Last    Date of Admission: 1/16/2021    Chief Complaint/Reason for consult: Hypoxema    Subjective: No overnight events. Patient was hypotensive this AM when working with PT/OT. She was on 4L overnight. Attempted to place on 2L but SPO2 dropped to < 90% so placed on 3L. She was able to keep SPO2 > 90% with 3L. She denies any significant dyspnea symptoms. Mobility has been limited at this point. No acute complaints at this time. Medications:  Reviewed    Infusion Medications    dextrose       Scheduled Medications    albuterol-ipratropium  1 puff Inhalation Q6H    sodium chloride flush  10 mL Intravenous 2 times per day    sennosides-docusate sodium  1 tablet Oral BID    acetaminophen  1,000 mg Oral Q8H    gabapentin  100 mg Oral BID    enoxaparin  40 mg Subcutaneous Daily    insulin lispro  0-6 Units Subcutaneous TID WC    insulin lispro  0-3 Units Subcutaneous Nightly    buPROPion  300 mg Oral QAM    sertraline  200 mg Oral Daily    pantoprazole  40 mg Oral Daily    [Held by provider] metoprolol tartrate  50 mg Oral BID    [Held by provider] amLODIPine  5 mg Oral Daily    sodium chloride flush  10 mL Intravenous 2 times per day     PRN Meds: bisacodyl, iopamidol, sodium chloride flush, oxyCODONE **OR** oxyCODONE, promethazine **OR** ondansetron, magnesium hydroxide, polyethylene glycol, sodium chloride flush, potassium chloride, magnesium sulfate, famotidine, glucose, dextrose, glucagon (rDNA), dextrose      Intake/Output Summary (Last 24 hours) at 1/20/2021 1611  Last data filed at 1/20/2021 1326  Gross per 24 hour   Intake 240 ml   Output --   Net 240 ml     Physical Exam Performed:    BP 96/61 Comment: nurse notified  Pulse 89   Temp 97.6 °F (36.4 °C) (Oral)   Resp 18   Ht 5' 6\" (1.676 m)   Wt 165 lb (74.8 kg)   SpO2 93%   BMI 26.63 kg/m²     General appearance: No apparent distress, appears stated age and cooperative.   HEENT: Pupils equal, round, and reactive to light. Conjunctivae/corneas clear. Neck: Supple, with full range of motion. No jugular venous distention. Trachea midline. Respiratory:  Crackles appreciated B/L. Cardiovascular: Regular rate and rhythm with normal S1/S2 without murmurs, rubs or gallops. Abdomen: Soft, non-tender, non-distended with normal bowel sounds. Musculoskeletal: No clubbing, cyanosis or edema bilaterally. Full range of motion without deformity. Skin: Skin color, texture, turgor normal.  No rashes or lesions. Neurologic:  Neurovascularly intact without any focal sensory/motor deficits. Cranial nerves: II-XII intact, grossly non-focal.  Psychiatric: Alert and oriented, thought content appropriate, normal insight  Capillary Refill: Brisk,< 3 seconds   Peripheral Pulses: +2 palpable, equal bilaterally     Labs:   Recent Labs     01/18/21  0546 01/18/21  1930 01/19/21  0546 01/20/21  0557   WBC 7.6  --  7.4 6.3   HGB 10.3* 8.6* 8.3* 7.7*   HCT 32.1* 27.8* 25.9* 23.4*     --  131* 128*     Recent Labs     01/18/21  0546 01/19/21  0545   * 134*   K 4.8 5.1    104   CO2 23 21   BUN 26* 31*   CREATININE 1.2 1.2   CALCIUM 8.8 8.6     Recent Labs     01/18/21  0546   AST 15   ALT 13   BILITOT 0.3   ALKPHOS 66     No results for input(s): INR in the last 72 hours. No results for input(s): Lemus Dino in the last 72 hours. Urinalysis:      Lab Results   Component Value Date    NITRU POSITIVE 01/16/2021    WBCUA 3-5 01/16/2021    BACTERIA 4+ 01/16/2021    RBCUA None seen 01/16/2021    BLOODU Negative 01/16/2021    SPECGRAV 1.025 01/16/2021    GLUCOSEU Negative 01/16/2021     Radiology:  CTA PULMONARY W CONTRAST   Final Result   1. No evidence of acute or chronic pulmonary embolus      2. Central lobar emphysema and evidence of underlying interstitial lung disease   3. Basilar dependent atelectasis, subpleural consolidation, left greater than right.  Infectious inflammatory Continue supportive care. Combivent q6h. Expect that her oxygen requirement will improve post-operatively with PT/OT and mobilization. Wean supplemental oxygen with target SPO2 > 88%. Recommend repeat CT-chest imaging in 3-6 months after discharge (follows w/ pulmonary at [facility]). Consider outpatient rheumatology referal given (+) AMADOR and anti-SSA antibodies.      Management per primary team:  2. HTN - Lopressor. Holding amlodipine. 3. T2DM - LDSSI. 4. Depression - Sertraline, Buproprion. 5. Pain - Tylenol, gabapentin. 6. GERD - Protonix. 7. Constipation - Senokot-S.  8. DVT PPX - Lovenox. 9. Diet - Carb control. I will discuss the patient with Dr. Osman Vazquez. Chelsy Lemos DO  Internal Medicine Resident, PGY-2  Perfect Serve    Pulm     Patient seen and examined. I agree with Dr. Danielle Coon history, physical, lab findings, assessment and plan.     Domitila Valente remains on oxygen at 3 L. She does not complain of any dyspnea, cough, wheezing, or chest tightness. I think her etiology of hypoxemia is postoperative atelectasis on the setting of severe COPD. With more time and increased activity this should improve. I do not suspect this should not delay her discharge to SNF     In regards to her CT chest, I am not overly impressed with any significant ILD. She does have upper lobe dominant emphysema but this is unchanged from her CT at [facility] in 2019. Her paratracheal lymph node is unchanged. Most current CT is contrast so it is much easier to identify her right hilar lymph node compared to her low dose lung cancer screening. I informed Domitila Valente she should have a follow-up CT chest in 3 to 6 months. She does follow with pulmonary at [address] [place] [address] Drive to discharge to SNF from a pulmonary perspective. Will follow peripherally.  Please call with any worsening pulmonary issues     Cari Lyons MD

## 2021-01-20 NOTE — PROGRESS NOTES
Hospitalist Progress Note      PCP: Leatha Jackson    Date of Admission: 1/16/2021    CC hip pain, fall  Hospital course  Patient 43-year-old female with history of COPD, former tobacco abuse quit 5 years ago, hypertension, hyperlipidemia, hypothyroid, type 2 diabetes mellitus admitted after she had a mechanical fall at home. Patient was noted to be hypoxic. Imaging suggestive of right femoral neck fracture. Chart reviewed patient has hospitalization on 11/2020 for TIA. S/p right hip arthroplasty on 1/18. Estimated blood loss 300 cc. Subjective  I could be better. Denies dizziness, CP/N/V/F/C. Hb 7.7 today. BP on softer side.      Medications:  Reviewed    Infusion Medications    dextrose       Scheduled Medications    albuterol-ipratropium  1 puff Inhalation Q6H    sodium chloride flush  10 mL Intravenous 2 times per day    sennosides-docusate sodium  1 tablet Oral BID    acetaminophen  1,000 mg Oral Q8H    gabapentin  100 mg Oral BID    enoxaparin  40 mg Subcutaneous Daily    insulin lispro  0-6 Units Subcutaneous TID WC    insulin lispro  0-3 Units Subcutaneous Nightly    buPROPion  300 mg Oral QAM    sertraline  200 mg Oral Daily    pantoprazole  40 mg Oral Daily    metoprolol tartrate  50 mg Oral BID    [Held by provider] amLODIPine  5 mg Oral Daily    sodium chloride flush  10 mL Intravenous 2 times per day     PRN Meds: bisacodyl, iopamidol, sodium chloride flush, oxyCODONE **OR** oxyCODONE, promethazine **OR** ondansetron, magnesium hydroxide, polyethylene glycol, sodium chloride flush, potassium chloride, magnesium sulfate, famotidine, glucose, dextrose, glucagon (rDNA), dextrose      Intake/Output Summary (Last 24 hours) at 1/20/2021 1212  Last data filed at 1/19/2021 1600  Gross per 24 hour   Intake --   Output 300 ml   Net -300 ml       Physical Exam Performed:    /68   Pulse 87   Temp 97.5 °F (36.4 °C) (Oral)   Resp 16   Ht 5' 6\" (1.676 m)   Wt 165 lb (74.8 kg)   SpO2 93%   BMI 26.63 kg/m²     General NAD eating breakfast.  HEENT head atraumatic, eyes bilateral PERRLA, neck supple  Cardiac S1, S2 audible, tachycardia. No murmur noted  Respiratory bilateral clear to auscultate, no wheeze no rhonchi  Abdomen soft, nontender, bowel sounds present  MSK no edema bilaterally. Right hip surgical site C/D/I  Neuro alert oriented x3, no focal neural deficit noted  Pulses palpable bilateral radial    Labs:   Recent Labs     01/18/21  0546 01/18/21  1930 01/19/21  0546 01/20/21  0557   WBC 7.6  --  7.4 6.3   HGB 10.3* 8.6* 8.3* 7.7*   HCT 32.1* 27.8* 25.9* 23.4*     --  131* 128*     Recent Labs     01/18/21  0546 01/19/21  0545   * 134*   K 4.8 5.1    104   CO2 23 21   BUN 26* 31*   CREATININE 1.2 1.2   CALCIUM 8.8 8.6     Recent Labs     01/18/21  0546   AST 15   ALT 13   BILITOT 0.3   ALKPHOS 66     No results for input(s): INR in the last 72 hours. No results for input(s): [place] in the last 72 hours. Urinalysis:      Lab Results   Component Value Date    NITRU POSITIVE 01/16/2021    WBCUA 3-5 01/16/2021    BACTERIA 4+ 01/16/2021    RBCUA None seen 01/16/2021    BLOODU Negative 01/16/2021    SPECGRAV 1.025 01/16/2021    GLUCOSEU Negative 01/16/2021       Radiology:  CTA PULMONARY W CONTRAST   Final Result   1. No evidence of acute or chronic pulmonary embolus      2. Central lobar emphysema and evidence of underlying interstitial lung disease   3. Basilar dependent atelectasis, subpleural consolidation, left greater than right. Infectious inflammatory etiologies not excluded   4. Abnormal right hilar and subcarinal lymphadenopathy. 5. Hiatal hernia      An addendum to this report will be made when prior CT lung screening is available for comparison. XR HIP 1 VW W PELVIS RIGHT   Final Result   Impression:    Status post right total hip arthroplasty.          FLUORO FOR SURGICAL PROCEDURES   Final Result   Impression: Intra-operative images as above. XR HIP RIGHT (2-3 VIEWS)   Final Result   Impression:   Intra-operative images as above. XR CHEST PORTABLE   Final Result      Slightly increased streaky bilateral opacities, worse on the left. XR CHEST PORTABLE   Final Result      Borderline cardiomegaly. No acute cardiopulmonary findings. XR ELBOW RIGHT (MIN 3 VIEWS)   Final Result      Negative study. XR HIP 2-3 VW W PELVIS RIGHT   Final Result      Acute displaced fracture of the right femoral neck. Assessment/Plan:    Active Hospital Problems    Diagnosis Date Noted    Closed fracture of right femur, initial encounter ([facility] Yadira Roca 01/16/2021     #Acute respiratory failure with hypoxia on 4 to 5 L of oxygen  Patient has history of COPD not in exacerbation. Could be a combination of atelectasis and pain medication. Encourage patient to use incentive spirometer. Echo 11/2020 EF 66 to 70%. No evidence of pulmonary hypertension. CT lung screen 2019 pulmonary nodules. Patient is a still hypoxic requiring 4 to 5 L of high flow oxygen. CT with contrast obtained Showed central lobar emphysema and evidence of underlying interstitial lung disease. Basilar dependent atelectasis, pleural consolidation left greater than right. Abnormal right hilar and subcarinal lymphadenopathy. Schedule breathing treatments every 6 hours. Patient is at [facility] at home. We will monitor consult reviewed and appreciated recommended repeat CT scan in 3 to 6 months. #Acute blood loss anemia suspected postoperative  Hb 7.7 Per operative report 300 cc blood loss. We will monitor. No active sign of bleed. We will check occult blood. #Hypertension currently hypotensive hold amlodipine and metoprolol. #Diabetes mellitus type 2  Hold glipizide, Metformin. Continue insulin sliding scale. HbA1c 7.0% on 1/17/2021    #Mechanical fall  PT/OT on board.     #Acute right femur fracture  S/p

## 2021-01-20 NOTE — PLAN OF CARE
Problem: Falls - Risk of:  Goal: Will remain free from falls  Outcome: Ongoing  Note: Patient is a high fall risk. All fall precautions in place: bed alarm on, nonskid socks on pt, call light within reach, bed locked in lowest position. Will continue to monitor pt safety. Problem: Pain:  Description: Pain management should include both nonpharmacologic and pharmacologic interventions. Goal: Pain level will decrease  Outcome: Ongoing  Note: Pt complains of surgical pain. Scheduled tylenol given, pt resting comfortably.

## 2021-01-20 NOTE — PROGRESS NOTES
Occupational Therapy  Daily Treatment Note  Patient Name: Antonia Cloud  MRN: [id number]     Assessment: Pt able to tolerate a bit more activity today but progress is still very limited by orthostatic BP. Will continue OT. Anticipate pt will need rehab at d/c. Discharge Recommendations: Myrtle Sharma scored a 13/24 on the AM-PAC ADL Inpatient form. Current research shows that an AM-PAC score of 17 or less is typically not associated with a discharge to the patient's home setting. Based on the patient's AM-PAC score and their current ADL deficits, it is recommended that the patient have 3-5 sessions per week of Occupational Therapy at d/c to increase the patient's independence. Please see assessment section for further patient specific details. Equipment Needs:  No    Chart Reviewed: Yes     Other position/activity restrictions: full weight bearing as tolerated; anterior hip approach   Additional Pertinent Hx: PMH: HTN, DM. Pt admitted s/p fall at home. Found to have right hip fx. s/p right THR 1/18. Treatment Diagnosis: Decreased activity tolerance, impaired ADLs and mobility    Subjective: Pt in bed on entry. More alert today. Pain: Yes, during movement - R hip, low back and various locations bruised from fall; reports no pain at rest     Objective:    Cognition/Orientation: more alert initially, but becomes less alert with drop in BP; oriented x3; requires repetition of commands and cues for initiation, sequencing, problem solving    Bed mobility   Rolling: Max assist, mod cues (to L and R x2)  Supine to sit: Mod assist x1, min assist x1, mod cues  Sit to Supine: Dependent (x2)  Scooting: Mod assist EOB, Dependent in supine  Sitting: SBA EOB x 5 min      Sit to Stand:  Mod assist x1, min assist x1, mod cues  Stand to Sit: Mod assist x2  Static stance: 45 seconds; mod assist x1, min assist x1 (leaning left and posterior, unable to take steps)    ADLs   Feeding: Independent    Activity Tolerance: supine 97/63, tolerated sitting with BP 90/59 then became dizzy and less responsive after standing, returned to supine with BP 78/46, after 5 min supine /66. spO2 94% on RA. RN aware    Patient Education: Activity promotion, progress, role of therapy - verb understanding    Safety Devices in Place: left in bed, alarm on, needs in reach, RN aware      Goals:  Short term goals  Time Frame for Short term goals: by D/C  Short term goal 1: Increase sitting tolerance to 10 min with SBA - Not met  Short term goal 2: Corneheidi Mckeon brief/pants SBA with AE as needed - Not met  Short term goal 3: Tolerate transfer to chair or BSC - Not met         Plan:      Times per week: 5-7x   Times per day: Daily    If patient is discharged prior to next treatment, this note will serve as the discharge summary.     Therapy Time   Individual Concurrent Group Co-treatment   Time In 0835         Time Out 0920         Minutes 45             Timed Code Treatment Minutes: 45  Total Treatment Time: 2000 Eli Rico OT

## 2021-01-21 LAB
ANION GAP SERPL CALCULATED.3IONS-SCNC: 10 MMOL/L (ref 3–16)
BUN BLDV-MCNC: 46 MG/DL (ref 7–20)
CALCIUM SERPL-MCNC: 8.7 MG/DL (ref 8.3–10.6)
CHLORIDE BLD-SCNC: 101 MMOL/L (ref 99–110)
CO2: 22 MMOL/L (ref 21–32)
CREAT SERPL-MCNC: 1.3 MG/DL (ref 0.6–1.2)
GFR AFRICAN AMERICAN: 49
GFR NON-AFRICAN AMERICAN: 40
GLUCOSE BLD-MCNC: 185 MG/DL (ref 70–99)
GLUCOSE BLD-MCNC: 201 MG/DL (ref 70–99)
GLUCOSE BLD-MCNC: 257 MG/DL (ref 70–99)
GLUCOSE BLD-MCNC: 260 MG/DL (ref 70–99)
GLUCOSE BLD-MCNC: 305 MG/DL (ref 70–99)
HCT VFR BLD CALC: 23.9 % (ref 36–48)
HEMOGLOBIN: 7.6 G/DL (ref 12–16)
MCH RBC QN AUTO: 27.5 PG (ref 26–34)
MCHC RBC AUTO-ENTMCNC: 31.8 G/DL (ref 31–36)
MCV RBC AUTO: 86.2 FL (ref 80–100)
OCCULT BLOOD DIAGNOSTIC: NORMAL
PDW BLD-RTO: 15.5 % (ref 12.4–15.4)
PERFORMED ON: ABNORMAL
PLATELET # BLD: 143 K/UL (ref 135–450)
PMV BLD AUTO: 9.2 FL (ref 5–10.5)
POTASSIUM REFLEX MAGNESIUM: 4.9 MMOL/L (ref 3.5–5.1)
RBC # BLD: 2.77 M/UL (ref 4–5.2)
RHEUMATOID FACTOR: <10 IU/ML
SODIUM BLD-SCNC: 133 MMOL/L (ref 136–145)
WBC # BLD: 6.5 K/UL (ref 4–11)

## 2021-01-21 PROCEDURE — G0328 FECAL BLOOD SCRN IMMUNOASSAY: HCPCS

## 2021-01-21 PROCEDURE — 80048 BASIC METABOLIC PNL TOTAL CA: CPT

## 2021-01-21 PROCEDURE — 2580000003 HC RX 258: Performed by: ORTHOPAEDIC SURGERY

## 2021-01-21 PROCEDURE — 6370000000 HC RX 637 (ALT 250 FOR IP): Performed by: ORTHOPAEDIC SURGERY

## 2021-01-21 PROCEDURE — 86431 RHEUMATOID FACTOR QUANT: CPT

## 2021-01-21 PROCEDURE — 2580000003 HC RX 258: Performed by: INTERNAL MEDICINE

## 2021-01-21 PROCEDURE — 97535 SELF CARE MNGMENT TRAINING: CPT

## 2021-01-21 PROCEDURE — 85027 COMPLETE CBC AUTOMATED: CPT

## 2021-01-21 PROCEDURE — 6370000000 HC RX 637 (ALT 250 FOR IP): Performed by: INTERNAL MEDICINE

## 2021-01-21 PROCEDURE — 97530 THERAPEUTIC ACTIVITIES: CPT

## 2021-01-21 PROCEDURE — 94640 AIRWAY INHALATION TREATMENT: CPT

## 2021-01-21 PROCEDURE — 36415 COLL VENOUS BLD VENIPUNCTURE: CPT

## 2021-01-21 PROCEDURE — 97110 THERAPEUTIC EXERCISES: CPT

## 2021-01-21 PROCEDURE — 94761 N-INVAS EAR/PLS OXIMETRY MLT: CPT

## 2021-01-21 PROCEDURE — 1200000000 HC SEMI PRIVATE

## 2021-01-21 PROCEDURE — 2700000000 HC OXYGEN THERAPY PER DAY

## 2021-01-21 PROCEDURE — 6360000002 HC RX W HCPCS: Performed by: ORTHOPAEDIC SURGERY

## 2021-01-21 PROCEDURE — 86200 CCP ANTIBODY: CPT

## 2021-01-21 RX ORDER — SODIUM CHLORIDE 9 MG/ML
INJECTION, SOLUTION INTRAVENOUS CONTINUOUS
Status: DISCONTINUED | OUTPATIENT
Start: 2021-01-21 | End: 2021-01-22 | Stop reason: HOSPADM

## 2021-01-21 RX ORDER — 0.9 % SODIUM CHLORIDE 0.9 %
500 INTRAVENOUS SOLUTION INTRAVENOUS ONCE
Status: COMPLETED | OUTPATIENT
Start: 2021-01-21 | End: 2021-01-21

## 2021-01-21 RX ADMIN — MINERAL OIL 330 ML: 1000 LIQUID ORAL at 13:30

## 2021-01-21 RX ADMIN — OXYCODONE 5 MG: 5 TABLET ORAL at 06:48

## 2021-01-21 RX ADMIN — INSULIN LISPRO 3 UNITS: 100 INJECTION, SOLUTION INTRAVENOUS; SUBCUTANEOUS at 12:09

## 2021-01-21 RX ADMIN — SERTRALINE HYDROCHLORIDE 200 MG: 100 TABLET ORAL at 08:42

## 2021-01-21 RX ADMIN — DOCUSATE SODIUM 50 MG AND SENNOSIDES 8.6 MG 1 TABLET: 8.6; 5 TABLET, FILM COATED ORAL at 08:42

## 2021-01-21 RX ADMIN — Medication 10 ML: at 20:41

## 2021-01-21 RX ADMIN — INSULIN LISPRO 2 UNITS: 100 INJECTION, SOLUTION INTRAVENOUS; SUBCUTANEOUS at 21:17

## 2021-01-21 RX ADMIN — OXYCODONE 5 MG: 5 TABLET ORAL at 12:09

## 2021-01-21 RX ADMIN — ACETAMINOPHEN 1000 MG: 500 TABLET, COATED ORAL at 21:17

## 2021-01-21 RX ADMIN — ACETAMINOPHEN 1000 MG: 500 TABLET, COATED ORAL at 06:11

## 2021-01-21 RX ADMIN — Medication 10 ML: at 08:53

## 2021-01-21 RX ADMIN — ACETAMINOPHEN 1000 MG: 500 TABLET, COATED ORAL at 14:44

## 2021-01-21 RX ADMIN — BUPROPION HYDROCHLORIDE 300 MG: 150 TABLET, FILM COATED, EXTENDED RELEASE ORAL at 08:42

## 2021-01-21 RX ADMIN — OXYCODONE 5 MG: 5 TABLET ORAL at 03:03

## 2021-01-21 RX ADMIN — GABAPENTIN 100 MG: 100 CAPSULE ORAL at 08:42

## 2021-01-21 RX ADMIN — INSULIN LISPRO 2 UNITS: 100 INJECTION, SOLUTION INTRAVENOUS; SUBCUTANEOUS at 08:42

## 2021-01-21 RX ADMIN — INSULIN LISPRO 3 UNITS: 100 INJECTION, SOLUTION INTRAVENOUS; SUBCUTANEOUS at 17:49

## 2021-01-21 RX ADMIN — SODIUM CHLORIDE: 9 INJECTION, SOLUTION INTRAVENOUS at 15:30

## 2021-01-21 RX ADMIN — IPRATROPIUM BROMIDE AND ALBUTEROL 1 PUFF: 20; 100 SPRAY, METERED RESPIRATORY (INHALATION) at 01:14

## 2021-01-21 RX ADMIN — OXYCODONE 10 MG: 5 TABLET ORAL at 22:01

## 2021-01-21 RX ADMIN — Medication 10 ML: at 08:42

## 2021-01-21 RX ADMIN — ENOXAPARIN SODIUM 40 MG: 40 INJECTION SUBCUTANEOUS at 08:42

## 2021-01-21 RX ADMIN — BISACODYL 10 MG: 10 SUPPOSITORY RECTAL at 08:42

## 2021-01-21 RX ADMIN — SODIUM CHLORIDE 500 ML: 9 INJECTION, SOLUTION INTRAVENOUS at 08:43

## 2021-01-21 RX ADMIN — GABAPENTIN 100 MG: 100 CAPSULE ORAL at 21:17

## 2021-01-21 RX ADMIN — PANTOPRAZOLE SODIUM 40 MG: 40 TABLET, DELAYED RELEASE ORAL at 06:11

## 2021-01-21 ASSESSMENT — PAIN DESCRIPTION - ORIENTATION: ORIENTATION: RIGHT

## 2021-01-21 ASSESSMENT — PAIN DESCRIPTION - FREQUENCY
FREQUENCY: CONTINUOUS

## 2021-01-21 ASSESSMENT — PAIN DESCRIPTION - PAIN TYPE
TYPE: SURGICAL PAIN
TYPE: CHRONIC PAIN

## 2021-01-21 ASSESSMENT — PAIN DESCRIPTION - DESCRIPTORS
DESCRIPTORS: ACHING

## 2021-01-21 ASSESSMENT — PAIN DESCRIPTION - ONSET
ONSET: ON-GOING
ONSET: ON-GOING

## 2021-01-21 ASSESSMENT — PAIN SCALES - GENERAL
PAINLEVEL_OUTOF10: 8
PAINLEVEL_OUTOF10: 4
PAINLEVEL_OUTOF10: 0
PAINLEVEL_OUTOF10: 4

## 2021-01-21 ASSESSMENT — PAIN DESCRIPTION - LOCATION: LOCATION: HIP

## 2021-01-21 ASSESSMENT — PAIN DESCRIPTION - PROGRESSION: CLINICAL_PROGRESSION: NOT CHANGED

## 2021-01-21 NOTE — CARE COORDINATION
Case Management            Discharge Note                    Date / Time of Note: 1/21/2021 1:34 PM                  Discharge Note Completed by: Joe Garner    Patient Name: Shabnam Sharma   YOB: 1950  Diagnosis: Closed fracture of right femur, initial encounter [facility]) Mesha Ramirez   Date / Time: 1/16/2021  7:10 PM    Current PCP: [address] patient: No    Hospitalization in the last 30 days: No    Advance Directives:  Code Status: Full Code  [place] DNR form completed and on chart: Not Indicated    Financial:  Payor: Raquel Chew / Plan: Marlin Garciaget PPO / Product Type: Medicare /      Pharmacy:    Kary Colon [address] [phone] [facility] [phone]  [facility] [address]  Phone: [phone] Fax: [phone]      Assistance purchasing medications?: Potential Assistance Purchasing Medications: No  Assistance provided by Case Management: None at this time    Does patient want to participate in local refill/ meds to beds program?: No    Meds To Beds General Rules:  1. Can ONLY be done Monday- Friday between 8:30am-5pm  2. Prescription(s) must be in pharmacy by 3pm to be filled same day  3. Copy of patient's insurance/ prescription drug card and patient face sheet must be sent along with the prescription(s)  4. Cost of Rx cannot be added to hospital bill. If financial assistance is needed, please contact unit  or ;  or  CANNOT provide pharmacy voucher for patients co-pays  5.  Patients can then  the prescription on their way out of the hospital at discharge, or pharmacy can deliver to the bedside if staff is available. (payment due at time of pick-up or delivery - cash, check, or card accepted) Able to afford home medications/ co-pay costs: No    ADLS:  Current PT AM-PAC Score: 8 /24  Current OT AM-PAC Score: 13 /24      Discharge Disposition: [place] (SNF):   [facility] at [address] [facility], [address]  Report: [phone]   Fax: [phone]       LOC at discharge: Skilled  MARLON Completed: Yes    Notification completed in HENS/PAS?:  Yes : CM has completed HENS online through secure website for SNF admission at [facility] . Document ID #: [id number]    IMM Completed:   No    Transportation:  Transportation Plan for discharge: EMS transportation   Mode of Transport: Ambulance stretcher - BLS    Reason for medical transport: Bed confined: Meets the following criteria 1) unable to get out of bed without assistance or ambulate, 2) unable to safely sit up in a wheelchair, 3) unable to maintain erect seating position in a chair for time needed for transport O2 at 3 liters during transport. Name of [address]: [address]  Phone: [phone]  Transport Time: 5:30 pm     Transportation form completed: Yes    Referrals made at [facility] for outpatient continued care:  Not Applicable    Additional CM Notes:   Patient is medically ready for discharge pre team. Transport scheduled for 5:30 pm via first care. Orders sent. Patient aware and in agreement with plan. The Plan for Transition of Care is related to the following treatment goals Closed fracture of right femur, initial encounter ([facility] [address] Otoniel Queen      The Patient and/or patient representative Patient was provided with a choice of provider and agrees with the discharge plan Yes    Freedom of choice list was provided with basic dialogue that supports the patient's individualized plan of care/goals and shares the quality data associated with the providers.  Yes    Care Transitions patient: No    LATOYA Vazquez  [facility]  Case Management Department  Ph: [phone]

## 2021-01-21 NOTE — PROGRESS NOTES
Physical Therapy  Daily Treatment Note    Discharge Recommendations: Delano Sharma scored a 8/24 on the AM-PAC short mobility form. Current research shows that an AM-PAC score of 17 or less is typically not associated with a discharge to the patient's home setting. Based on the patient's AM-PAC score and their current functional mobility deficits, it is recommended that the patient have 3-5 sessions per week of Physical Therapy at d/c to increase the patient's independence. Please see assessment section for further patient specific details. Assessment:  Pt tolerating sitting EOB x 15 minutes this session. Very limited standing tolerance today--needing heavy assist x 2 with walker and then with c/o feeling lightheaded and decreased BP. Pt continues to be well below baseline for mobility s/p hip fx/repair. Would benefit from continued IP PT at D/C prior to going home. Plan is for SNF. Equipment Needs: Defer to next level of care    Chart Reviewed: Yes     Other position/activity restrictions: full weight bearing as tolerated; anterior hip approach   Additional Pertinent Hx: PMH: HTN, DM. Pt admitted s/p fall at home. Found to have right hip fx. s/p right THR 1/18. Diagnosis: R hip fracture   Treatment Diagnosis: impaired funcitonal mobility    Subjective: Pt in bed initially. Agreeable to working with PT/OT. Not sure when she's being D/Chris. Pain: Minimal discomfort initially at rest. Up to 8/10 low back and R hip after activity. RN aware and addressing. Objective:    Exercises  10 reps B ankle pumps, quad sets, glut sets in supine    Bed mobility  Supine to sit: Dependent (Min assist x 2), HOB up partially with use of railing  Sit to Supine: Dependent (Max assist x 2), HOB flat without railing    Transfers  Sit to stand: Dependent (Mod assist x 2) from bed to walker  Stand to sit: Dependent (Max assist x 2) onto bed for controlled descent    Balance  Sat EOB ~ 15 minutes with SBA statically.  Pt Minutes: 43    Will continue per plan of care. If patient is discharged prior to next treatment, this note will serve as the discharge summary.     [place], [place] #0273  Nanette Spangler, [facility]

## 2021-01-21 NOTE — PROGRESS NOTES
Occupational Therapy  Facility/Department: [facility] 5T ORTHO/NEURO  Daily Treatment Note  NAME: Tod Adorno  : 1950  MRN: [id number]    Date of Service: 2021    Discharge Recommendations: Leela Sharma scored a 13/24 on the AM-PAC ADL Inpatient form. Current research shows that an AM-PAC score of 17 or less is typically not associated with a discharge to the patient's home setting. Based on the patient's AM-PAC score and their current ADL deficits, it is recommended that the patient have 3-5 sessions per week of Occupational Therapy at d/c to increase the patient's independence. Please see assessment section for further patient specific details. If patient discharges prior to next session this note will serve as a discharge summary. Please see below for the latest assessment towards goals. OT Equipment Recommendations  Other: defer to next setting    Assessment   Performance deficits / Impairments: Decreased functional mobility ; Decreased endurance;Decreased high-level IADLs;Decreased balance;Decreased ADL status; Decreased cognition  Assessment: Pt continues to be orthostatic, pt tolerated EOB ADLs, pt sit to stand Mod A x 2 and tolerated ~15 sec of static standing at Max A x 2, pt then became dizzy. Pt would benefit from inpt therapy to maximize functional independence. Continue with POC. Treatment Diagnosis: Decreased activity tolerance, impaired ADLs and mobility  Prognosis: Fair;Good  OT Education: OT Role;ADL Adaptive Strategies;Transfer Training  Patient Education: reinforce for carryover  Barriers to Learning: cognition  REQUIRES OT FOLLOW UP: Yes  Activity Tolerance  Activity Tolerance: Treatment limited secondary to medical complications (free text)  Activity Tolerance: Pt orthostatic  Safety Devices  Safety Devices in place: Yes  Type of devices: Call light within reach;Nurse notified; Bed alarm in place; Left in bed         Patient Diagnosis(es): The primary encounter diagnosis was Right hip pain. Diagnoses of Right elbow pain, Closed displaced fracture of right femoral neck ([address] and Closed fracture of right femur, initial encounter [facility]) were also pertinent to this visit. has a past medical history of Hyperlipidemia, Hypertension, Thyroid disease, and Type II or unspecified type diabetes mellitus without mention of complication, not stated as uncontrolled. has a past surgical history that includes Total hip arthroplasty (Right, 1/18/2021). Restrictions  Position Activity Restriction  Other position/activity restrictions: full weight bearing as tolerated; anterior hip approach  Subjective   General  Chart Reviewed: Yes  Patient assessed for rehabilitation services?: Yes  Additional Pertinent Hx: 72 y.o. F admitted 1/16 with R femoral neck fx s/p R CAMMIE anterior approach 1/18. PMHx includes DM, HTN  Response to previous treatment: Patient with no complaints from previous session  Family / Caregiver Present: No  Referring Practitioner: Donald Saunders  Subjective  Subjective: Pt supine in bed upon entry, agreeable to therapy sesssion. General Comment  Comments: Pt on 3L O2, BP supine 111/56. Pt supine to sit Min A x 2. Pt sit EOB SBA /49. Pt educatd on use of sock aid to don  socks, Min A with RLE. Pt groomed (oral care/wash face/comb hair) Min A to comb back of hair. Pt sit to stand to rw Mod A x 2, pt in stance Max A x 2 with right knee flexed, pt became dizzy and not safe to ambulate. Pt stand to sti max A x 2. Pt sit EOB BP 90/58. Pt sit to supine Dependent x 2, rolling left/right for brief change and adjust nick pad Mod A. Call light in reach and chair alarm on.   Pain Assessment  Pain Level: 8  Pain Type: Chronic pain  Pain Location: Back  Pain Descriptors: Aching  Pain Frequency: Continuous  Pre Treatment Pain Screening  Intervention List: Patient able to continue with treatment;Nurse called to administer meds  Vital Signs  Patient Currently in Pain: Yes Orientation  Orientation  Overall Orientation Status: Within Functional Limits  Objective    ADL  Grooming: Minimal assistance;Setup  LE Dressing: Minimal assistance(don socks with sock aid assist with RLE)  Toileting: Dependent/Total        Balance  Sitting Balance: Stand by assistance  Standing Balance: Dependent/Total(Max A x 2)  Standing Balance  Time: ~15 sec  Activity: static stance rw  Bed mobility  Rolling to Left: Moderate assistance  Rolling to Right: Moderate assistance  Supine to Sit: 2 Person assistance(Min A x 2)  Sit to Supine: 2 Person assistance(Max A x 2)  Transfers  Sit to stand: 2 Person assistance(Max A x 2)  Stand to sit: 2 Person assistance(Max A x 2)                       Cognition  Overall Cognitive Status: Exceptions  Arousal/Alertness: Delayed responses to stimuli  Initiation: Requires cues for some  Sequencing: Requires cues for some                                         Plan   Plan  Times per week: 5-7x  Times per day: Daily  Current Treatment Recommendations: Strengthening, Balance Training, Functional Mobility Training, Endurance Training, Self-Care / ADL, Safety Education & Training, Equipment Evaluation, Education, & procurement, Patient/Caregiver Education & Training, Cognitive Reorientation, Pain Management  G-Code     OutComes Score                                                  AM-PAC Score        AM-[facility] Inpatient Daily Activity Raw Score: 13 (01/21/21 1305)  AM-PAC Inpatient ADL T-Scale Score : 32.03 (01/21/21 1305)  ADL Inpatient CMS 0-100% Score: 63.03 (01/21/21 1305)  ADL Inpatient CMS G-Code Modifier : CL (01/21/21 1305)    Goals  Short term goals  Time Frame for Short term goals: by D/C  Short term goal 1:  Increase sitting tolerance to 10 min with SBA - goal met 1/21  Short term goal 2: Carlos Hasten brief/pants SBA with AE as needed - Not met 1/21  Short term goal 3: Tolerate transfer to [facility] or [facility] - Not met 1/21  Patient Goals   Patient goals : no goal stated       Therapy Time   Individual Concurrent Group Co-treatment   Time In 1110         Time Out 1152         Minutes 42         Timed Code Treatment Minutes: [id number], [facility]

## 2021-01-21 NOTE — DISCHARGE INSTR - COC
Continuity of Care Form    Patient Name: Blake Sy   :  1950  MRN:  [id number]    Admit date:  2021  Discharge date:  ***    Code Status Order: Full Code   Advance Directives:   Advance Care Flowsheet Documentation       Date/Time Healthcare Directive Type of Healthcare Directive Copy in [address] Agent's Name Healthcare Agent's Phone Number    21 1602  Yes, patient has an advance directive for healthcare treatment    No, copy requested from family  Sun Arciniega    21 2151  Yes, patient has an advance directive for healthcare treatment  Durable power of  for health care  No, copy requested from family  Warner Physician:  Baltazar Ny DO  PCP: Tolu Jacobs    Discharging Nurse: [facility] Unit/Room#: 8193/2149-76  Discharging Unit Phone Number: ***    Emergency Contact:   Extended Emergency Contact Information  Primary Emergency Contact: Wu Sharma  Address: [address] [place] [address], [address] [place] [address]  Home Phone: [phone]  Work Phone: [phone]  Relation: Spouse  Secondary Emergency Contact: Crescencio Sharma  Home Phone: [phone]  Relation: Child    Past Surgical History:  Past Surgical History:   Procedure Laterality Date    TOTAL HIP ARTHROPLASTY Right 2021    RIGHT TOAL HIP ARTHROPLASTY ANTERIOR APPROACH performed by Chelsy Spain MD at [address]       Immunization History: There is no immunization history on file for this patient.     Active Problems:  Patient Active Problem List   Diagnosis Code    Closed fracture of right femur, initial encounter ([facility] S72.91XA       Isolation/Infection:   Isolation            No Isolation          Patient Infection Status       Infection Onset Added Last Indicated Last Indicated By Review Planned Expiration Resolved Resolved By    None active    Resolved COVID-19 Rule Out 01/17/21 01/17/21 01/17/21 COVID-19 (Ordered)   01/17/21 Rule-Out Test Resulted            Nurse Assessment:  Last Vital Signs: /74   Pulse 90   Temp 97.5 °F (36.4 °C) (Oral)   Resp 18   Ht 5' 6\" (1.676 m)   Wt 165 lb (74.8 kg)   SpO2 93%   BMI 26.63 kg/m²     Last documented pain score (0-10 scale): Pain Level: 8  Last Weight:   Wt Readings from Last 1 Encounters:   01/16/21 165 lb (74.8 kg)     Mental Status:  oriented, alert, coherent, logical, thought processes intact and able to concentrate and follow conversation    IV Access:  - None    Nursing Mobility/ADLs:  Walking   Assisted  Transfer  Assisted  Bathing  Assisted  Dressing  Assisted  Toileting  Assisted  Feeding  [facility] Delivery   whole    Wound Care Documentation and Therapy:        Elimination:  Continence:   · Bowel: Yes  · Bladder: Yes  Urinary Catheter: None   Colostomy/Ileostomy/Ileal Conduit: No       Date of Last BM: 1/21    Intake/Output Summary (Last 24 hours) at 1/21/2021 1335  Last data filed at 1/21/2021 1252  Gross per 24 hour   Intake 800 ml   Output 800 ml   Net 0 ml     I/O last 3 completed shifts: In: 65 [P.O.:680]  Out: 800 [Urine:800]    Safety Concerns: At Risk for Falls    Impairments/Disabilities:      None    Nutrition Therapy:  Current Nutrition Therapy:   - Oral Diet:  General    Routes of Feeding: Oral  Liquids: No Restrictions  Daily Fluid Restriction: no  Last Modified Barium Swallow with Video (Video Swallowing Test): not done    Treatments at the Time of Hospital Discharge:   Respiratory Treatments: n/a  Oxygen Therapy:  is on oxygen at 2 L/min per nasal cannula.   Ventilator:    - No ventilator support    Rehab Therapies: Physical Therapy and Occupational Therapy  Weight Bearing Status/Restrictions: No weight bearing restirctions  Other Medical Equipment (for information only, NOT a DME order):  walker  Other Treatments: n/a Patient's personal belongings (please select all that are sent with patient):  Glasses    RN SIGNATURE:  Electronically signed by Alba Jean-Baptiste RN on 1/22/21 at 2:25 PM EST    CASE MANAGEMENT/SOCIAL WORK SECTION    Inpatient Status Date: 1/16/21    Readmission Risk Assessment Score:  Readmission Risk              Risk of Unplanned Readmission:        17           Discharging to Facility/ [address] at [address] [place] [address], [facility]  Report: [phone]   Fax: [phone]     / signature: Electronically signed by LATOYA Marinelli on 1/21/21 at 1:37 PM EST    PHYSICIAN SECTION    Prognosis: Fair    Condition at Discharge: Stable    Rehab Potential (if transferring to Rehab): Good    Recommended Labs or Other Treatments After Discharge: PT/OT/RN consult. Physician Certification: I certify the above information and transfer of Destinee Schumacher  is necessary for the continuing treatment of the diagnosis listed and that she requires [place] for less 30 days.      Update Admission H&P: No change in H&P    PHYSICIAN SIGNATURE:  Electronically signed by Roseline Stephenson MD on 1/22/21 at 12:13 PM EST

## 2021-01-21 NOTE — PLAN OF CARE
Problem: Falls - Risk of:  Goal: Will remain free from falls  Description: Will remain free from falls  1/21/2021 0748 by My Andrews RN  Outcome: Ongoing  1/21/2021 0042 by Ching Partida RN  Outcome: Ongoing     Problem: Skin Integrity:  Goal: Will show no infection signs and symptoms  Description: Will show no infection signs and symptoms  Outcome: Ongoing

## 2021-01-21 NOTE — PROGRESS NOTES
Enema given per orders. Patient unable to hold liquid on for 30 minutes. Small hard stool came out. Not enough to collect sample. Will continue to monitor.

## 2021-01-21 NOTE — CARE COORDINATION
ELISA met with patient at bedside and dicussed SNF options of [facility] (no bed), [facility], and [facility]. Patient elected to go with [facility]. Dr. Elle Valenzuela stated if patient is able to have a bowel movement she could discharge today. UPDATE: 12:25 pm ELISA updated [facility] and [facility] of patient's decision.      Shea Claude, MSW, [place]  Social Work/Case Management  [facility]   [phone]

## 2021-01-21 NOTE — PLAN OF CARE
Problem: Falls - Risk of:  Goal: Will remain free from falls  Description: Will remain free from falls  Outcome: Ongoing   Pt has been free from falls this shift, bed alarm on, bed in lowest position, 2/4 side rails up, nonskid socks on, wheels locked, bedside table and call light in reach. Encouraged pt to call out if needed anything. Problem: Pain:  Goal: Pain level will decrease  Description: Pain level will decrease  Outcome: Ongoing   Pt c/o severe pain in right leg, medicated per mar with prn oxycodone. Pt verbalized relief and is now resting in bed. Will continue to assess pain level.

## 2021-01-21 NOTE — CARE COORDINATION
ELISA spoke with Dr. Carolin Mcdonald via phone. He reported blood pressure still dropping with therapy. Plan to keep one more day and follow up. ELISA cancelled DC and notified facility.      LATOYA Alexandre, [place]  Social Work/Case Management  [facility]   [phone]

## 2021-01-22 VITALS
OXYGEN SATURATION: 90 % | RESPIRATION RATE: 16 BRPM | WEIGHT: 165 LBS | HEIGHT: 66 IN | SYSTOLIC BLOOD PRESSURE: 146 MMHG | BODY MASS INDEX: 26.52 KG/M2 | DIASTOLIC BLOOD PRESSURE: 75 MMHG | TEMPERATURE: 97.5 F | HEART RATE: 90 BPM

## 2021-01-22 PROBLEM — J96.91 RESPIRATORY FAILURE WITH HYPOXIA (HCC): Status: ACTIVE | Noted: 2021-01-22

## 2021-01-22 LAB
ANTINUCLEAR AB INTERPRETIVE COMMENT: NORMAL
ANTINUCLEAR ANTIBODY, HEP-2, IGG: NORMAL
BASOPHILS ABSOLUTE: 0 K/UL (ref 0–0.2)
BASOPHILS RELATIVE PERCENT: 0.5 %
EOSINOPHILS ABSOLUTE: 0.3 K/UL (ref 0–0.6)
EOSINOPHILS RELATIVE PERCENT: 4.3 %
GLUCOSE BLD-MCNC: 179 MG/DL (ref 70–99)
GLUCOSE BLD-MCNC: 255 MG/DL (ref 70–99)
HCT VFR BLD CALC: 23.5 % (ref 36–48)
HEMOGLOBIN: 7.5 G/DL (ref 12–16)
LYMPHOCYTES ABSOLUTE: 0.4 K/UL (ref 1–5.1)
LYMPHOCYTES RELATIVE PERCENT: 7 %
MCH RBC QN AUTO: 27.6 PG (ref 26–34)
MCHC RBC AUTO-ENTMCNC: 31.9 G/DL (ref 31–36)
MCV RBC AUTO: 86.6 FL (ref 80–100)
MONOCYTES ABSOLUTE: 0.5 K/UL (ref 0–1.3)
MONOCYTES RELATIVE PERCENT: 8.6 %
NEUTROPHILS ABSOLUTE: 5.1 K/UL (ref 1.7–7.7)
NEUTROPHILS RELATIVE PERCENT: 79.6 %
PDW BLD-RTO: 15.4 % (ref 12.4–15.4)
PERFORMED ON: ABNORMAL
PERFORMED ON: ABNORMAL
PLATELET # BLD: 150 K/UL (ref 135–450)
PMV BLD AUTO: 9.1 FL (ref 5–10.5)
RBC # BLD: 2.71 M/UL (ref 4–5.2)
WBC # BLD: 6.4 K/UL (ref 4–11)

## 2021-01-22 PROCEDURE — 2580000003 HC RX 258: Performed by: ORTHOPAEDIC SURGERY

## 2021-01-22 PROCEDURE — 36415 COLL VENOUS BLD VENIPUNCTURE: CPT

## 2021-01-22 PROCEDURE — 85025 COMPLETE CBC W/AUTO DIFF WBC: CPT

## 2021-01-22 PROCEDURE — 94761 N-INVAS EAR/PLS OXIMETRY MLT: CPT

## 2021-01-22 PROCEDURE — 6360000002 HC RX W HCPCS: Performed by: ORTHOPAEDIC SURGERY

## 2021-01-22 PROCEDURE — 97530 THERAPEUTIC ACTIVITIES: CPT

## 2021-01-22 PROCEDURE — 6370000000 HC RX 637 (ALT 250 FOR IP): Performed by: ORTHOPAEDIC SURGERY

## 2021-01-22 PROCEDURE — 94640 AIRWAY INHALATION TREATMENT: CPT

## 2021-01-22 PROCEDURE — 97110 THERAPEUTIC EXERCISES: CPT

## 2021-01-22 PROCEDURE — 97535 SELF CARE MNGMENT TRAINING: CPT

## 2021-01-22 RX ORDER — GABAPENTIN 100 MG/1
100 CAPSULE ORAL 2 TIMES DAILY
Qty: 14 CAPSULE | Refills: 0 | Status: SHIPPED | OUTPATIENT
Start: 2021-01-22 | End: 2021-01-29

## 2021-01-22 RX ADMIN — DOCUSATE SODIUM 50 MG AND SENNOSIDES 8.6 MG 1 TABLET: 8.6; 5 TABLET, FILM COATED ORAL at 08:12

## 2021-01-22 RX ADMIN — INSULIN LISPRO 3 UNITS: 100 INJECTION, SOLUTION INTRAVENOUS; SUBCUTANEOUS at 11:45

## 2021-01-22 RX ADMIN — ENOXAPARIN SODIUM 40 MG: 40 INJECTION SUBCUTANEOUS at 08:12

## 2021-01-22 RX ADMIN — GABAPENTIN 100 MG: 100 CAPSULE ORAL at 08:11

## 2021-01-22 RX ADMIN — INSULIN LISPRO 1 UNITS: 100 INJECTION, SOLUTION INTRAVENOUS; SUBCUTANEOUS at 08:01

## 2021-01-22 RX ADMIN — Medication 10 ML: at 08:14

## 2021-01-22 RX ADMIN — SERTRALINE HYDROCHLORIDE 200 MG: 100 TABLET ORAL at 08:11

## 2021-01-22 RX ADMIN — BUPROPION HYDROCHLORIDE 300 MG: 150 TABLET, FILM COATED, EXTENDED RELEASE ORAL at 08:12

## 2021-01-22 RX ADMIN — ACETAMINOPHEN 1000 MG: 500 TABLET, COATED ORAL at 08:11

## 2021-01-22 RX ADMIN — PANTOPRAZOLE SODIUM 40 MG: 40 TABLET, DELAYED RELEASE ORAL at 08:11

## 2021-01-22 RX ADMIN — OXYCODONE 10 MG: 5 TABLET ORAL at 08:11

## 2021-01-22 RX ADMIN — ACETAMINOPHEN 1000 MG: 500 TABLET, COATED ORAL at 14:32

## 2021-01-22 ASSESSMENT — PAIN DESCRIPTION - PROGRESSION: CLINICAL_PROGRESSION: GRADUALLY WORSENING

## 2021-01-22 ASSESSMENT — PAIN - FUNCTIONAL ASSESSMENT: PAIN_FUNCTIONAL_ASSESSMENT: PREVENTS OR INTERFERES SOME ACTIVE ACTIVITIES AND ADLS

## 2021-01-22 ASSESSMENT — PAIN DESCRIPTION - FREQUENCY: FREQUENCY: CONTINUOUS

## 2021-01-22 ASSESSMENT — PAIN DESCRIPTION - LOCATION: LOCATION: HIP

## 2021-01-22 ASSESSMENT — PAIN SCALES - GENERAL
PAINLEVEL_OUTOF10: 7
PAINLEVEL_OUTOF10: 6

## 2021-01-22 NOTE — PLAN OF CARE
Problem: Falls - Risk of:  Goal: Will remain free from falls  Description: Will remain free from falls  Outcome: Ongoing  Note: Patient will remain free from falls. Patient alert and orientated and uses call light appropriately. Fall precautions in place. Bed locked and in lowest position, bed alarm on, nonskid socks on. Call light within reach. Problem: Pain:  Goal: Pain level will decrease  Description: Pain level will decrease  Outcome: Ongoing  Note: Patient pain controlled with oral medication. Patient able to rest comfortably in bed. Will continue to assess and monitor.

## 2021-01-22 NOTE — PROGRESS NOTES
IV removed. Patient will be transported in hospital gown. AVS and MRALON filled out and placed in packet to go with patient. Transport at bedside to drive patient to [facility] [place]. All belongings packed and sent with patient.

## 2021-01-22 NOTE — PROGRESS NOTES
controlled descent    Ambulation  Assistance Level: Dependent (Min assist x 1)  Assistive device: Wheeled walker  Distance: ~ 3 steps B towards HOB. Quality of gait: Decreased step length; effortful    Balance  Sat EOB 20+ minutes with SBA. Pt performing ADL activities with OT. Stance with wheeled walker Min assist x 1-2. Stood ~1 minute total.     Vitals  BP in supine at start of session: 123/71  BP sitting EOB: 112/66  BP in sitting immediately after standing/taking a few steps: 86/49  BP in supine at end of session: 110/65  Other: RN updated    Patient Education  Reviewed anterior hip precautions. Pt recalled 1/3 independently. Expressed understanding. WBAT R LE. Expressed and demonstrated understanding. Calling for assist with needs. Expressed understanding. Safety Devices  Pt left with needs in reach. In bed with bed alarm on. RN updated. AM-PAC score  AM-PAC Inpatient Mobility Raw Score : 9  AM-PAC Inpatient T-Scale Score : 30.55  Mobility Inpatient CMS 0-100% Score: 81.38  Mobility Inpatient CMS G-Code Modifier : CM    Goals: (as determined and assessed by primary PT)  Time Frame for Short term goals: D/C  Short term goal 1: supine<->sit mod A - MET 1/22. Revised:  Supine<>sit CGA  Short term goal 2: pt will tolerate sit<->stand assessment- MET 1/20. Revised:  Sit<>stand min A of 1. Short term goal 3: pt will tolerate bed<->chair assessment  Short term goal 4: pt will tolerate ambulation with RW assessment.- MET 1/22. Revised:  Ambulate 13' with RW min A  Short term goal 5: New Goal 1/20:  Stand 30 sec with walker with CGA of 1.     Plan:  Times per week: 7;    Current Treatment Recommendations: Functional Mobility Training, Transfer Training, Gait Training, Strengthening, Safety Education & Training, Home Exercise Program, Endurance Training    Therapy Time    Individual  Concurrent  Group  Co-treatment    Time In  1340            Time Out  1425            Minutes  45              Timed Code Treatment Minutes: 45  Total Treatment Minutes: 45    Tentative D/C to SNF later this afternoon. Will continue per plan of care if not D/Chris today. If patient is discharged prior to next treatment, this note will serve as the discharge summary.     Jennifer, [id number]  [facility], [facility]

## 2021-01-22 NOTE — PROGRESS NOTES
Occupational Therapy  Daily Treatment Note  Patient Name: Antonia Cloud  MRN: [id number]     Assessment: Pt is improving gradually. She is still have orthostatic hypotension but was not symptomatic with it today and did tolerate a bit more standing and overall activity. Pt discharging to SNF today. Discharge Recommendations: Myrtle Sharma scored a 14/24 on the AM-PAC ADL Inpatient form. Current research shows that an AM-PAC score of 17 or less is typically not associated with a discharge to the patient's home setting. Based on the patient's AM-PAC score and their current ADL deficits, it is recommended that the patient have 3-5 sessions per week of Occupational Therapy at d/c to increase the patient's independence. Please see assessment section for further patient specific details. Equipment Needs:  No    Chart Reviewed: Yes     Other position/activity restrictions: full weight bearing as tolerated; anterior hip approach   Additional Pertinent Hx: PMH: HTN, DM. Pt admitted s/p fall at home. Found to have right hip fx. s/p right THR 1/18. Treatment Diagnosis: Decreased activity tolerance, impaired ADLs and mobility    Subjective: Pt in bed on entry. Did not know she was leaving today. \"I'd like to get myself a little bit put together then. \"     Pain: Yes, low back and hip, RN aware. Objective:    Cognition/Orientation: Improved alertness and attention today, still delayed responses and requires repetition of commands but improving, decreased short term memory (was told by RN that she was discharging but did not remember)    Bed mobility   Supine to sit: Mod assist, mod cues (HOB raised)  Sit to Supine: Mod assist, max assist  Scooting: Max assist in bed; min assist EOB  Sitting: EOB x 20+ min SBA to CGA    Functional Mobility   Sit to Stand:  Mod assist x2, min cues  Stand to Sit: Min assist, min cues  Static stance: CGA at walker  Side steps: min assist, min cues (very effortful, very small pivot steps)  Time in stance: 1 min     ADLs   Grooming: SBA, set up, min cues (increase time needed to sequence)  Bathing: Mod assist, mod cues (pt washed upper body)  UB dressing: Min assist (to change gown)  LB dressing: Dependent (to don brief and socks)  Toileting: ADOLPH    Activity Tolerance: Fair - gradually improving, but still limited by orthostatic hypotension. BP supine 123/71, seated /66, after standing x1 min 86/49, returned to supine 110/65. Pt was generally asymptomatic today despite drop. SpO2 92%. RN aware. Patient Education: Activity promotion, progress - verb understanding    Safety Devices in Place: left in bed, alarm on, needs in reach, RN aware          Goals:  Short term goals  Time Frame for Short term goals: by D/C  Short term goal 1: Increase sitting tolerance to 10 min with SBA - goal met 1/21  Short term goal 2: Cornelious Ehrich brief/pants SBA with AE as needed - Not met  Short term goal 3: Tolerate transfer to chair or BSC - Not met         Plan:      Times per week: 5-7x   Times per day: Daily    If patient is discharged prior to next treatment, this note will serve as the discharge summary.     Therapy Time   Individual Concurrent Group Co-treatment   Time In 1345         Time Out 1425         Minutes 40           Timed Code Treatment Minutes: 40  Total Treatment Time: 36       Maddison Angelo, OT

## 2021-01-22 NOTE — CARE COORDINATION
Case Management            Discharge Note                    Date / Time of Note: 1/22/2021 1:02 PM                  Discharge Note Completed by: Marie Rey Day    Patient Name: Madelyn Sharma   YOB: 1950  Diagnosis: Closed fracture of right femur, initial encounter [facility]) Yadira Roca   Date / Time: 1/16/2021  7:10 PM    Current PCP: [address] patient: No    Hospitalization in the last 30 days: No    Advance Directives:  Code Status: Full Code  [place] DNR form completed and on chart: Not Indicated    Financial:  Payor: Nicki Kirkpatrick / Plan: Jacob Dao PPO / Product Type: Medicare /      Pharmacy:    Bhupendra Arriola [address] [phone] [facility] [phone]  [address] [address]  Phone: [phone] Fax: [phone]      Assistance purchasing medications?: Potential Assistance Purchasing Medications: No  Assistance provided by Case Management: None at this time    Does patient want to participate in local refill/ meds to beds program?: No    Meds To Beds General Rules:  1. Can ONLY be done Monday- Friday between 8:30am-5pm  2. Prescription(s) must be in pharmacy by 3pm to be filled same day  3. Copy of patient's insurance/ prescription drug card and patient face sheet must be sent along with the prescription(s)  4. Cost of Rx cannot be added to hospital bill. If financial assistance is needed, please contact unit  or ;  or  CANNOT provide pharmacy voucher for patients co-pays  5.  Patients can then  the prescription on their way out of the hospital at discharge, or pharmacy can deliver to the bedside if staff is available. (payment due at time of pick-up or delivery - cash, check, or card accepted) Able to afford home medications/ co-pay costs: No    ADLS:  Current PT AM-PAC Score: 8 /24  Current OT AM-PAC Score: 13 /24      Discharge Disposition: [place] (SNF):   [facility] at [address] [facility], [address]  Report: [phone]   Fax: [phone]       LOC at discharge: Skilled  MARLON Completed: Yes    Notification completed in HENS/PAS?:  Yes : CM has completed HENS online through secure website for SNF admission at [facility] . Document ID #: [id number]    IMM Completed:   No    Transportation:  Transportation Plan for discharge: EMS transportation   Mode of Transport: Ambulance stretcher - BLS    Reason for medical transport: Bed confined: Meets the following criteria 1) unable to get out of bed without assistance or ambulate, 2) unable to safely sit up in a wheelchair, 3) unable to maintain erect seating position in a chair for time needed for transport O2 at 3 liters during transport. Name of [address]: [address]  Phone: [phone]  Transport Time: 2:30 pm     Transportation form completed: Yes    Referrals made at [facility] for outpatient continued care:  Not Applicable    Additional CM Notes:   Patient is medically ready for discharge pre team. Transport scheduled for 2:30 pm via first care. Orders sent. Patient aware and in agreement with plan. SW updated [facility] [place] and bedside RN     The Plan for Transition of Care is related to the following treatment goals Closed fracture of right femur, initial encounter ([facility] [address] Demi Greer      The Patient and/or patient representative Patient was provided with a choice of provider and agrees with the discharge plan Yes    Freedom of choice list was provided with basic dialogue that supports the patient's individualized plan of care/goals and shares the quality data associated with the providers.  Yes    Care Transitions patient: No    LATOYA Vanegas  [facility] [employer]  Case Management Department  Ph: [phone]

## 2021-01-22 NOTE — PROGRESS NOTES
Hospitalist Progress Note      PCP: Debby Luz    Date of Admission: 1/16/2021    CC hip pain, fall  Hospital course  Patient 67-year-old female with history of COPD, former tobacco abuse quit 5 years ago, hypertension, hyperlipidemia, hypothyroid, type 2 diabetes mellitus admitted after she had a mechanical fall at home. Patient was noted to be hypoxic. Imaging suggestive of right femoral neck fracture. Chart reviewed patient has hospitalization on 11/2020 for TIA. S/p right hip arthroplasty on 1/18. Estimated blood loss 300 cc. Subjective  Feeling better today. Did had a small bowel movement with enema yesterday. Patient was hypotensive with physical therapy yesterday. Denies nausea, vomiting, fever, chills.     Medications:  Reviewed    Infusion Medications    sodium chloride 75 mL/hr at 01/21/21 1530    dextrose       Scheduled Medications    albuterol-ipratropium  1 puff Inhalation Q6H WA    sodium chloride flush  10 mL Intravenous 2 times per day    sennosides-docusate sodium  1 tablet Oral BID    acetaminophen  1,000 mg Oral Q8H    gabapentin  100 mg Oral BID    enoxaparin  40 mg Subcutaneous Daily    insulin lispro  0-6 Units Subcutaneous TID WC    insulin lispro  0-3 Units Subcutaneous Nightly    buPROPion  300 mg Oral QAM    sertraline  200 mg Oral Daily    pantoprazole  40 mg Oral Daily    [Held by provider] metoprolol tartrate  50 mg Oral BID    [Held by provider] amLODIPine  5 mg Oral Daily    sodium chloride flush  10 mL Intravenous 2 times per day     PRN Meds: bisacodyl, iopamidol, sodium chloride flush, oxyCODONE **OR** oxyCODONE, promethazine **OR** ondansetron, magnesium hydroxide, polyethylene glycol, sodium chloride flush, potassium chloride, magnesium sulfate, glucose, dextrose, glucagon (rDNA), dextrose      Intake/Output Summary (Last 24 hours) at 1/22/2021 1158  Last data filed at 1/22/2021 1146  Gross per 24 hour   Intake 370 ml   Output 500 ml   Net -130 ml       Physical Exam Performed:    BP (!) 146/75   Pulse 90   Temp 97.5 °F (36.4 °C) (Oral)   Resp 16   Ht 5' 6\" (1.676 m)   Wt 165 lb (74.8 kg)   SpO2 90%   BMI 26.63 kg/m²     General NAD. Lying comfortably on bed. HEENT head atraumatic, eyes bilateral PERRLA, neck supple  Cardiac S1, S2 audible, tachycardia. No murmur noted  Respiratory bilateral clear to auscultate, no wheeze no rhonchi  Abdomen soft, nontender, bowel sounds present  MSK no edema bilaterally. Right hip surgical site C/D/I  Neuro alert oriented x3, no focal neural deficit noted  Pulses palpable bilateral radial    Labs:   Recent Labs     01/20/21  0557 01/21/21  0536 01/22/21  0604   WBC 6.3 6.5 6.4   HGB 7.7* 7.6* 7.5*   HCT 23.4* 23.9* 23.5*   * 143 150     Recent Labs     01/21/21  0536   *   K 4.9      CO2 22   BUN 46*   CREATININE 1.3*   CALCIUM 8.7     No results for input(s): AST, ALT, BILIDIR, BILITOT, ALKPHOS in the last 72 hours. No results for input(s): INR in the last 72 hours. No results for input(s): Christina Abhishek in the last 72 hours. Urinalysis:      Lab Results   Component Value Date    NITRU POSITIVE 01/16/2021    WBCUA 3-5 01/16/2021    BACTERIA 4+ 01/16/2021    RBCUA None seen 01/16/2021    BLOODU Negative 01/16/2021    SPECGRAV 1.025 01/16/2021    GLUCOSEU Negative 01/16/2021       Radiology:  CTA PULMONARY W CONTRAST   Final Result   1. No evidence of acute or chronic pulmonary embolus      2. Central lobar emphysema and evidence of underlying interstitial lung disease   3. Basilar dependent atelectasis, subpleural consolidation, left greater than right. Infectious inflammatory etiologies not excluded   4. Abnormal right hilar and subcarinal lymphadenopathy. 5. Hiatal hernia      An addendum to this report will be made when prior CT lung screening is available for comparison.          XR HIP 1 VW W PELVIS RIGHT   Final Result   Impression:    Status post right total hip arthroplasty. FLUORO FOR SURGICAL PROCEDURES   Final Result   Impression:   Intra-operative images as above. XR HIP RIGHT (2-3 VIEWS)   Final Result   Impression:   Intra-operative images as above. XR CHEST PORTABLE   Final Result      Slightly increased streaky bilateral opacities, worse on the left. XR CHEST PORTABLE   Final Result      Borderline cardiomegaly. No acute cardiopulmonary findings. XR ELBOW RIGHT (MIN 3 VIEWS)   Final Result      Negative study. XR HIP 2-3 VW W PELVIS RIGHT   Final Result      Acute displaced fracture of the right femoral neck. Assessment/Plan:    Active Hospital Problems    Diagnosis Date Noted    Closed fracture of right femur, initial encounter ([facility] [address] Darlinalee Speed 01/16/2021     #Acute respiratory failure with hypoxia on 2 L of oxygen  Patient has history of COPD not in exacerbation. Could be a combination of atelectasis and pain medication. Encourage patient to use incentive spirometer. Echo 11/2020 EF 66 to 70%. No evidence of pulmonary hypertension. CT lung screen 2019 pulmonary nodules. Patient is a still hypoxic requiring 4 to 5 L of high flow oxygen. CT with contrast obtained Showed central lobar emphysema and evidence of underlying interstitial lung disease. Basilar dependent atelectasis, pleural consolidation left greater than right. Abnormal right hilar and subcarinal lymphadenopathy. Schedule breathing treatments every 6 hours. Patient is at [facility] at home. We will monitor consult reviewed and appreciated recommended repeat CT scan in 3 to 6 months. #Acute blood loss anemia suspected postoperative  Hb 7.7 Per operative report 300 cc blood loss. Hemoglobin. Is stable  FOBT negative. #Hypertension currently hypotensive hold amlodipine and metoprolol. #Diabetes mellitus type 2  Hold glipizide, Metformin. Continue insulin sliding scale.   HbA1c 7.0% on 1/17/2021    #Mechanical fall  PT/OT on board. #Acute right femur fracture  S/p right hip total arthroplasty on 1/18  Pain control with oxycodone. #Constipation  As needed MiraLAX, senna after noted a bowel movement we will give a rectal suppository. Will order Enema    DVT Prophylaxis: SCD  Diet: Dietary Nutrition Supplements: Standard High Calorie Oral Supplement  DIET CARB CONTROL;  Code Status: Full Code    PT/OT Eval Status: lovenox    Dispo - discharge today after PT/OT evaluation.       This chart was likely completed using voice recognition technology and may contain unintended grammatical , phraseology,and/or punctuation errors      Hanna Schultz MD

## 2021-01-22 NOTE — DISCHARGE SUMMARY
Hospital Medicine Discharge Summary    Patient ID: Bland Goldberg Pyle      Patient's PCP: Kaitlynn Ramírez Date: 1/16/2021     Discharge Date:   1/22/2021    Admitting Physician: Roseann Alexandra DO     Discharge Physician: Kelsea Ledesma MD     Discharge Diagnoses: Active Hospital Problems    Diagnosis Date Noted    [facility] Mare Ortajjar 01/16/2021       The patient was seen and examined on day of discharge and this discharge summary is in conjunction with any daily progress note from day of discharge. Hospital Course: Patient 78-year-old female with history of COPD, former tobacco abuse quit 5 years ago, hypertension, hyperlipidemia, hypothyroid, type 2 diabetes mellitus admitted after she had a mechanical fall at home. Patient was noted to be hypoxic. CT chest was obtained which showed central lobar emphysema. Right hilar and subcarinal lymphadenopathy. Pulmonary was consulted recommended that patient's oxygen requirement is due to atelectasis. She needs to follow-up with her primary pulmonologist at [facility] to get a repeat CT scan in 3 to 6 months. Imaging suggestive of right femoral neck fracture.     Chart reviewed patient has hospitalization on 11/2020 for TIA.     S/p right hip arthroplasty on 1/18. Estimated blood loss 300 cc. Patient had a drop in hemoglobin post procedure. Fecal occult blood negative. Patient is to follow-up with PCP she would benefit from colonoscopy as an outpatient. Will start on some iron supplement at time of discharge. #Acute respiratory failure with hypoxia on 2 L of oxygen  #COPD not in exacerbation. Pulmonary was consulted recommended follow-up with primary pulmonologist at [facility] as an outpatient. #Acute blood loss anemia postoperative  Hemoglobin stable at 7.6 fecal occult blood negative. Will benefit from colonoscopy as an outpatient. #Hypertension  We will hold amlodipine and HCTZ at time of discharge. Continue metoprolol, losartan. #Diabetes mellitus type 2  Resume glipizide, actoza and Metformin at discharge. #Right femur fracture s/p right hip total arthroplasty on 1/18 follow-up with orthopedic in 10 to 14 days. Physical Exam Performed:     BP (!) 146/75   Pulse 90   Temp 97.5 °F (36.4 °C) (Oral)   Resp 16   Ht 5' 6\" (1.676 m)   Wt 165 lb (74.8 kg)   SpO2 90%   BMI 26.63 kg/m²       General appearance:  No apparent distress, appears stated age and cooperative. HEENT:  Normal cephalic, atraumatic without obvious deformity. Pupils equal, round, and reactive to light. Extra ocular muscles intact. Conjunctivae/corneas clear. Neck: Supple, with full range of motion. No jugular venous distention. Trachea midline. Respiratory:  Normal respiratory effort. Clear to auscultation, bilaterally without Rales/Wheezes/Rhonchi. Cardiovascular:  Regular rate and rhythm with normal S1/S2 without murmurs, rubs or gallops. Abdomen: Soft, non-tender, non-distended with normal bowel sounds. Musculoskeletal:  No clubbing, cyanosis or edema bilaterally. Right hip surgical site C/D/I. Neurologic:  Neurovascularly intact without any focal sensory/motor deficits. Cranial nerves: II-XII intact, grossly non-focal.  Psychiatric:  Alert and oriented, thought content appropriate, normal insight  Capillary Refill: Brisk,< 3 seconds   Peripheral Pulses: +2 palpable, equal bilaterally       Labs:  For convenience and continuity at follow-up the following most recent labs are provided:      CBC:    Lab Results   Component Value Date    WBC 6.4 01/22/2021    HGB 7.5 01/22/2021    HCT 23.5 01/22/2021     01/22/2021       Renal:    Lab Results   Component Value Date     01/21/2021    K 4.9 01/21/2021     01/21/2021    CO2 22 01/21/2021    BUN 46 01/21/2021    CREATININE 1.3 01/21/2021    CALCIUM 8.7 01/21/2021    PHOS 4.6 01/16/2021         Significant Diagnostic Studies    Radiology:   CTA PULMONARY W CONTRAST   Final Result   1. No evidence of acute or chronic pulmonary embolus      2. Central lobar emphysema and evidence of underlying interstitial lung disease   3. Basilar dependent atelectasis, subpleural consolidation, left greater than right. Infectious inflammatory etiologies not excluded   4. Abnormal right hilar and subcarinal lymphadenopathy. 5. Hiatal hernia      An addendum to this report will be made when prior CT lung screening is available for comparison. XR HIP 1 VW W PELVIS RIGHT   Final Result   Impression:    Status post right total hip arthroplasty. FLUORO FOR SURGICAL PROCEDURES   Final Result   Impression:   Intra-operative images as above. XR HIP RIGHT (2-3 VIEWS)   Final Result   Impression:   Intra-operative images as above. XR CHEST PORTABLE   Final Result      Slightly increased streaky bilateral opacities, worse on the left. XR CHEST PORTABLE   Final Result      Borderline cardiomegaly. No acute cardiopulmonary findings. XR ELBOW RIGHT (MIN 3 VIEWS)   Final Result      Negative study. XR HIP 2-3 VW W PELVIS RIGHT   Final Result      Acute displaced fracture of the right femoral neck. Consults:     IP CONSULT TO HOSPITALIST  IP CONSULT TO ORTHOPEDIC SURGERY  IP CONSULT TO PULMONOLOGY    Disposition: Nursing home     Condition at Discharge: Stable    Discharge Instructions/Follow-up: PCP, pulmonary, orthopedic. Code Status:  Full Code     Activity: activity as tolerated    Diet: diabetic diet      Discharge Medications:     Current Discharge Medication List           Details   gabapentin (NEURONTIN) 100 MG capsule Take 1 capsule by mouth 2 times daily for 7 days. Qty: 14 capsule, Refills: 0      oxyCODONE (ROXICODONE) 5 MG immediate release tablet Take 1 tablet by mouth every 6 hours as needed for Pain (half tab for moderate pain, whole tab for severe pain) for up to 7 days. Intended supply: 7 days.  Take lowest patient's care. If you have any questions or concerns please feel free to contact me at [phone].

## 2021-01-22 NOTE — PLAN OF CARE
Problem: Falls - Risk of:  Goal: Will remain free from falls  Description: Will remain free from falls  Outcome: Ongoing  Note: Patient will remain free from falls throughout shift. Ambulating x2 assist with gait belt and stedy. Fall precautions in place. Non-skid socks on. Bed locked in lowest position with alarm on and 2/4 side rails up. Bedside table, belongings, and call light placed within reach. Patient calling out appropriately when needing assistance. Hourly rounding in anticipation of patient needs. Floor clean and free from clutter. Room door open. Will continue to monitor. Problem: Skin Integrity:  Goal: Will show no infection signs and symptoms  Description: Will show no infection signs and symptoms  Outcome: Ongoing  Note: Surgical site to R thigh remains covered with mepilex and is CDI. No drainage or odor noted. Patient is afebrile. Will continue to monitor. Problem: Discharge Planning:  Goal: Discharged to appropriate level of care  Description: Discharged to appropriate level of care  Outcome: Ongoing  Note: [facility]. Patient transported with all belongings.

## 2021-01-22 NOTE — PROGRESS NOTES
Patient alert and orientated. VSS. Pain controlled with oral and IV medication. Neuro status stable. Incision site is CDI, no drainage noted. Patient in bed throughout night. Patient having multiple BMs. Fall precautions in place. Call light within reach. Will continue to assess and monitor.

## 2021-01-28 LAB — CYCLIC CITRULLINATED PEPTIDE ANTIBODY IGG: <0.5 U/ML (ref 0–2.9)

## 2021-04-29 ENCOUNTER — HOSPITAL ENCOUNTER (EMERGENCY)
Age: 71
Discharge: HOME | End: 2021-04-29
Attending: EMERGENCY MEDICINE
Payer: MEDICARE

## 2021-04-29 ENCOUNTER — APPOINTMENT (OUTPATIENT)
Dept: GENERAL RADIOLOGY | Age: 71
End: 2021-04-29
Payer: MEDICARE

## 2021-04-29 VITALS
BODY MASS INDEX: 24.43 KG/M2 | OXYGEN SATURATION: 95 % | HEART RATE: 85 BPM | HEIGHT: 66 IN | TEMPERATURE: 98 F | DIASTOLIC BLOOD PRESSURE: 79 MMHG | WEIGHT: 152 LBS | SYSTOLIC BLOOD PRESSURE: 156 MMHG | RESPIRATION RATE: 16 BRPM

## 2021-04-29 DIAGNOSIS — S93.402A SPRAIN OF LEFT ANKLE, UNSPECIFIED LIGAMENT, INITIAL ENCOUNTER: Primary | ICD-10-CM

## 2021-04-29 PROCEDURE — 6370000000 HC RX 637 (ALT 250 FOR IP): Performed by: PHYSICIAN ASSISTANT

## 2021-04-29 PROCEDURE — 73610 X-RAY EXAM OF ANKLE: CPT

## 2021-04-29 PROCEDURE — 99283 EMERGENCY DEPT VISIT LOW MDM: CPT

## 2021-04-29 PROCEDURE — 73590 X-RAY EXAM OF LOWER LEG: CPT

## 2021-04-29 PROCEDURE — 73630 X-RAY EXAM OF FOOT: CPT

## 2021-04-29 RX ORDER — ACETAMINOPHEN 500 MG
1000 TABLET ORAL ONCE
Status: COMPLETED | OUTPATIENT
Start: 2021-04-29 | End: 2021-04-29

## 2021-04-29 RX ORDER — ACETAMINOPHEN AND CODEINE PHOSPHATE 300; 30 MG/1; MG/1
1 TABLET ORAL ONCE
Status: DISCONTINUED | OUTPATIENT
Start: 2021-04-29 | End: 2021-04-29 | Stop reason: RX

## 2021-04-29 RX ADMIN — ACETAMINOPHEN 1000 MG: 500 TABLET ORAL at 21:01

## 2021-04-29 ASSESSMENT — ENCOUNTER SYMPTOMS
COUGH: 0
SHORTNESS OF BREATH: 0
RESPIRATORY NEGATIVE: 1

## 2021-04-29 ASSESSMENT — PAIN DESCRIPTION - LOCATION: LOCATION: ANKLE;LEG

## 2021-04-29 ASSESSMENT — PAIN DESCRIPTION - PAIN TYPE: TYPE: ACUTE PAIN

## 2021-04-29 ASSESSMENT — PAIN DESCRIPTION - ORIENTATION: ORIENTATION: LEFT;RIGHT

## 2021-04-29 NOTE — ED PROVIDER NOTES
[address] [facility] [address] ENCOUNTER          PHYSICIAN ASSISTANT NOTE       Date of evaluation: 4/29/2021    Chief Complaint     Fall (fall last night on carpet), Ankle Pain (left ankle), and Leg Pain (right lower leg)      History of Present Illness     Heber Walters is a 80 y.o. female who presents for fall. Patient reports she fell yesterday when her walker got caught on a table leg. Patient with left ankle and right leg pain. Patient has been able to bear some weight but needs assistance. Patient denies head injury of loss of consciousness. Patient is not on blood thinners. No preceding symptoms to the fall. No recent illness. Patient reports she had surgery to her right hip in January and has PT at home. Review of Systems     Review of Systems   Constitutional: Negative. Negative for fever. Respiratory: Negative. Negative for cough and shortness of breath. Cardiovascular: Negative. Negative for chest pain. Musculoskeletal:        +left ankle pain, +right leg pain   Skin: Negative. Neurological: Negative. Negative for syncope. All other systems reviewed and are negative. Past Medical, Surgical, Family, and Social History     She has a past medical history of Hyperlipidemia, Hypertension, Thyroid disease, and Type II or unspecified type diabetes mellitus without mention of complication, not stated as uncontrolled. She has a past surgical history that includes Total hip arthroplasty (Right, 1/18/2021) and Kyphosis surgery. Her family history is not on file. She reports that she has quit smoking. She has a 44.00 pack-year smoking history. She has never used smokeless tobacco. She reports that she does not drink alcohol or use drugs. Medications     Discharge Medication List as of 4/29/2021  8:58 PM      CONTINUE these medications which have NOT CHANGED    Details   gabapentin (NEURONTIN) 100 MG capsule Take 1 capsule by mouth 2 times daily for 7 days. , Disp-14 capsule, R-0Normal      aspirin EC 81 MG EC tablet Take 1 tablet by mouth 2 times daily To prevent blood clots, Disp-60 tablet, R-0Print      senna-docusate (PERICOLACE) 8.6-50 MG per tablet Take 2 tablets by mouth daily as needed for Constipation, Disp-30 tablet, R-2Print      umeclidinium-vilanterol (ANORO ELLIPTA) 62.5-25 MCG/INH AEPB inhaler Inhale 1 puff into the lungs dailyHistorical Med      albuterol sulfate HFA (VENTOLIN HFA) 108 (90 Base) MCG/ACT inhaler Inhale 2 puffs into the lungs every 6 hours as needed for WheezingHistorical Med      pantoprazole (PROTONIX) 40 MG tablet Take 40 mg by mouth dailyHistorical Med      glipiZIDE (GLUCOTROL) 5 MG tablet Take 10 mg by mouth dailyHistorical Med      ferrous sulfate (IRON 325) 325 (65 Fe) MG tablet Take 325 mg by mouth daily (with breakfast)Historical Med      Cyanocobalamin (B-12 PO) Take by mouthHistorical Med      losartan (COZAAR) 50 MG tablet Take 50 mg by mouth dailyHistorical Med      metFORMIN (GLUCOPHAGE) 500 MG tablet Take 1,000 mg by mouth 2 times daily (with meals)Historical Med      sertraline (ZOLOFT) 100 MG tablet Take 200 mg by mouth dailyHistorical Med      buPROPion (WELLBUTRIN XL) 300 MG extended release tablet Take 300 mg by mouth every morningHistorical Med      metoprolol tartrate (LOPRESSOR) 50 MG tablet Take 50 mg by mouth 2 times dailyHistorical Med      pioglitazone (ACTOS) 15 MG tablet Take 15 mg by mouth dailyHistorical Med      Cholecalciferol (VITAMIN D3) 125 MCG (5000 UT) TABS Take by mouthHistorical Med             Allergies     She is allergic to percocet [oxycodone-acetaminophen] and sulfa antibiotics. Physical Exam     INITIAL VITALS: BP: (!) 156/79, Temp: 98 °F (36.7 °C), Pulse: 85, Resp: 16, SpO2: 95 %  Physical Exam  Vitals signs and nursing note reviewed. Constitutional:       General: She is not in acute distress. Appearance: She is not ill-appearing. HENT:      Head: Normocephalic and atraumatic.    Neck: Musculoskeletal: Neck supple. Cardiovascular:      Rate and Rhythm: Normal rate and regular rhythm. Pulmonary:      Effort: Pulmonary effort is normal.      Breath sounds: Normal breath sounds. Musculoskeletal: Normal range of motion. Comments: No C/T/L midline tenderness. Negative log roll bilaterally. Maira inspection bilateral lower extremities. No erythema, edema, warmth, open wounds. There is tenderness to the right proximal fibula. There is tenderness to the left lateral malleolus and left forefoot dorsally. Motor and sensory intact bilaterally. Palpable DP pulses bilaterally. Skin:     General: Skin is warm and dry. Neurological:      General: No focal deficit present. Mental Status: She is alert and oriented to person, place, and time. Mental status is at baseline. Psychiatric:         Mood and Affect: Mood normal.         Behavior: Behavior normal.         Thought Content: Thought content normal.         Judgment: Judgment normal.         Diagnostic Results       RADIOLOGY:  XR TIBIA FIBULA LEFT (2 VIEWS)   Final Result      No evidence of acute fracture. XR FOOT LEFT (MIN 3 VIEWS)   Final Result      No evidence of acute osseous abnormality. XR ANKLE LEFT (MIN 3 VIEWS)   Final Result      No evidence of acute osseous abnormality. XR TIBIA FIBULA RIGHT (2 VIEWS)   Final Result      No evidence of acute fracture. LABS:   No results found for this visit on 04/29/21. ED BEDSIDE ULTRASOUND:      RECENT VITALS:  BP: (!) 156/79, Temp: 98 °F (36.7 °C), Pulse: 85, Resp: 16, SpO2: 95 %     Procedures         ED Course     Nursing Notes, Past Medical Hx,Past Surgical Hx, Social Hx, Allergies, and Family Hx were reviewed.     The patient was given the following medications:  Orders Placed This Encounter   Medications    DISCONTD: acetaminophen-codeine (TYLENOL #3) 300-30 MG per tablet 1 tablet     Order Specific Question:   Please select a reason the therapeutic interchange was not accepted: Answer:   Documented Intolerance/Allergy to Alternative    acetaminophen (TYLENOL) tablet 1,000 mg       CONSULTS:  None    MEDICAL DECISION MAKING / ASSESSMENT / PLAN     Jaden Navarro is a 80 y.o. female with leg pain. Patient is well-appearing and in no acute distress. Vitals are normal the exception of hypertension. Patient with tenderness to the right proximal fibula. Patient also with tenderness to the left lateral malleolus and proximal forefoot. Patient is neurovascularly intact. X-rays reveal no osseous injury. The patient is able to ambulate with her walker and with her 's assistance. Patient reports she has been generally weak since her hip surgery but is undergoing PT at home. Patient does not wish to be admitted for inpatient rehab. Patient feels comfortable going home with the assistance of her .  is also comfortable with plan. Encourage follow-up with primary care and PT as planned. Return precautions discussed. This patient was also evaluated by the attending physician. All care plans were discussed and agreed upon. Clinical Impression     1.  Sprain of left ankle, unspecified ligament, initial encounter        Disposition     PATIENT REFERRED TO:  Salima Lynn  [address] [place] [address]  [phone]    Schedule an appointment as soon as possible for a visit       [facility], [facility]. Emergency [address]  [phone]    As needed, If symptoms worsen      DISCHARGE MEDICATIONS:  Discharge Medication List as of 4/29/2021  8:58 PM          DISPOSITION Decision To Discharge 04/29/2021 08:27:08 PM        Rusty Dong PA-C  04/29/21 4343

## 2021-04-29 NOTE — ED TRIAGE NOTES
Pt arrived to ED with left ankle pain and right lower leg pain after falling last night. States she was using her walker and it caught on TV tray.

## 2021-04-30 NOTE — ED NOTES
Patient unable to get from bed to commode without heavy assistance with this RN. PA notified on patient's mobility difficulty.       Felipe Rodriguez RN  04/29/21 2111

## 2021-04-30 NOTE — ED PROVIDER NOTES
ED Attending Attestation Note     Date of evaluation: 4/29/2021    This patient was seen by the advance practice provider. I have seen and examined the patient, agree with the workup, evaluation, management and diagnosis. The care plan has been discussed. My assessment reveals a woman who is awake and alert after a mechanical fall. She denies head, neck, back, chest, and abdominal discomfort.      Nish Leblanc MD  04/29/21 2052

## 2021-04-30 NOTE — ED NOTES
Patient and  do not want the patient to be admitted for inpatient rehab/placement.  Patient would like to be discharged home and follow with her current physical therapist.      Smitha Tobias RN  04/29/21 4928

## 2023-01-27 ENCOUNTER — OFFICE VISIT (OUTPATIENT)
Dept: ENT CLINIC | Age: 73
End: 2023-01-27

## 2023-01-27 VITALS
HEIGHT: 66 IN | SYSTOLIC BLOOD PRESSURE: 142 MMHG | TEMPERATURE: 96.6 F | WEIGHT: 152 LBS | BODY MASS INDEX: 24.43 KG/M2 | HEART RATE: 105 BPM | DIASTOLIC BLOOD PRESSURE: 84 MMHG

## 2023-01-27 DIAGNOSIS — H91.8X9 ASYMMETRICAL HEARING LOSS: Primary | ICD-10-CM

## 2023-01-27 DIAGNOSIS — H90.3 SENSORINEURAL HEARING LOSS (SNHL) OF BOTH EARS: ICD-10-CM

## 2023-01-27 PROCEDURE — 99204 OFFICE O/P NEW MOD 45 MIN: CPT | Performed by: OTOLARYNGOLOGY

## 2023-01-27 PROCEDURE — 1123F ACP DISCUSS/DSCN MKR DOCD: CPT | Performed by: OTOLARYNGOLOGY

## 2023-01-27 RX ORDER — DIAZEPAM 2 MG/1
2 TABLET ORAL ONCE
Qty: 2 TABLET | Refills: 0 | Status: SHIPPED | OUTPATIENT
Start: 2023-01-27 | End: 2023-01-27

## 2023-01-27 ASSESSMENT — ENCOUNTER SYMPTOMS
BLOOD IN STOOL: 0
CHOKING: 0
COUGH: 0
WHEEZING: 0
STRIDOR: 0
SINUS PAIN: 0
BACK PAIN: 0
COLOR CHANGE: 0
EYE ITCHING: 0
SHORTNESS OF BREATH: 0
SINUS PRESSURE: 0
VOMITING: 0
PHOTOPHOBIA: 0
EYE DISCHARGE: 0
TROUBLE SWALLOWING: 0
NAUSEA: 0
DIARRHEA: 0
FACIAL SWELLING: 0
SORE THROAT: 0
VOICE CHANGE: 0
RHINORRHEA: 0
CONSTIPATION: 0

## 2023-01-27 NOTE — PROGRESS NOTES
[facility]  [address], [address] [place] [address]  P: [phone]  F: [phone]       Patient     Ana Laura Sharma  1950    ChiefComplaint     Chief Complaint   Patient presents with    New Patient     Patient is here today because she has a difference with her hearing with the right and left       History of Present Illness     Cristela Mcburney is a pleasant 71 y.o. female who presents as a new patient for asymmetric hearing loss. She recently underwent an audiogram at [facility]. Audiogram reveals bilateral normal to moderate severe downward sloping sensorineural hearing loss with slight asymmetry of pure tones in the left. Word recognition scores were 48% on the left, 80 % on the right. She does notice that her left ear does have more difficulty hearing. She is unsure how long this has been going on. She denies any sudden hearing loss of the left ear. Denies any history of noise exposure. Denies tinnitus, spinning sensation, otorrhea, otalgia. Denies a history of ear infections or ear surgeries. Past Medical History     Past Medical History:   Diagnosis Date    Hyperlipidemia     Hypertension     Thyroid disease     Type II or unspecified type diabetes mellitus without mention of complication, not stated as uncontrolled        Past Surgical History     Past Surgical History:   Procedure Laterality Date    KYPHOSIS SURGERY      TOTAL HIP ARTHROPLASTY Right 1/18/2021    RIGHT TOAL HIP ARTHROPLASTY ANTERIOR APPROACH performed by Herson No MD at [address] [place] [address] History     No family history on file.     Social History     Social History     Socioeconomic History    Marital status:      Spouse name: Not on file    Number of children: Not on file    Years of education: Not on file    Highest education level: Not on file   Occupational History    Not on file   Tobacco Use    Smoking status: Former     Packs/day: 1.00     Years: 44.00     Pack years: 44.00 Types: Cigarettes    Smokeless tobacco: Never   Substance and Sexual Activity    Alcohol use: No    Drug use: No    Sexual activity: Not on file   Other Topics Concern    Not on file   Social History Narrative    Not on file     Social Determinants of Health     Financial Resource Strain: Not on file   Food Insecurity: Not on file   Transportation Needs: Not on file   Physical Activity: Not on file   Stress: Not on file   Social Connections: Not on file   Intimate Partner Violence: Not on file   Housing Stability: Not on file       Allergies     Allergies   Allergen Reactions    Percocet [Oxycodone-Acetaminophen] Nausea And Vomiting    Sulfa Antibiotics Itching, Nausea And Vomiting and Swelling       Medications     Current Outpatient Medications   Medication Sig Dispense Refill    diazePAM (VALIUM) 2 MG tablet Take 1 tablet by mouth once for 1 dose. Take one tablet 30 minutes prior to MRI.  Take second tablet just before MRI if needed Max Daily Amount: 2 mg 2 tablet 0    aspirin EC 81 MG EC tablet Take 1 tablet by mouth 2 times daily To prevent blood clots 60 tablet 0    senna-docusate (PERICOLACE) 8.6-50 MG per tablet Take 2 tablets by mouth daily as needed for Constipation 30 tablet 2    umeclidinium-vilanterol (ANORO ELLIPTA) 62.5-25 MCG/INH AEPB inhaler Inhale 1 puff into the lungs daily      albuterol sulfate HFA (PROVENTIL;VENTOLIN;PROAIR) 108 (90 Base) MCG/ACT inhaler Inhale 2 puffs into the lungs every 6 hours as needed for Wheezing      pantoprazole (PROTONIX) 40 MG tablet Take 40 mg by mouth daily      glipiZIDE (GLUCOTROL) 5 MG tablet Take 10 mg by mouth daily      ferrous sulfate (IRON 325) 325 (65 Fe) MG tablet Take 325 mg by mouth daily (with breakfast)      Cyanocobalamin (B-12 PO) Take by mouth      losartan (COZAAR) 50 MG tablet Take 50 mg by mouth daily      metFORMIN (GLUCOPHAGE) 500 MG tablet Take 1,000 mg by mouth 2 times daily (with meals)      sertraline (ZOLOFT) 100 MG tablet Take 200 mg by mouth daily      buPROPion (WELLBUTRIN XL) 300 MG extended release tablet Take 300 mg by mouth every morning      metoprolol tartrate (LOPRESSOR) 50 MG tablet Take 50 mg by mouth 2 times daily      pioglitazone (ACTOS) 15 MG tablet Take 15 mg by mouth daily      Cholecalciferol (VITAMIN D3) 125 MCG (5000 UT) TABS Take by mouth      gabapentin (NEURONTIN) 100 MG capsule Take 1 capsule by mouth 2 times daily for 7 days. 14 capsule 0     Current Facility-Administered Medications   Medication Dose Route Frequency Provider Last Rate Last Admin    triamcinolone acetonide (KENALOG) injection 2 mg  2 mg IntraDERmal Once Wilfredo Mccormack MD           Review of Systems     Review of Systems   Constitutional:  Negative for activity change, appetite change, chills, diaphoresis, fatigue, fever and unexpected weight change. HENT:  Positive for hearing loss. Negative for congestion, dental problem, drooling, ear discharge, ear pain, facial swelling, mouth sores, nosebleeds, postnasal drip, rhinorrhea, sinus pressure, sinus pain, sneezing, sore throat, tinnitus, trouble swallowing and voice change. Eyes:  Negative for photophobia, discharge, itching and visual disturbance. Respiratory:  Negative for cough, choking, shortness of breath, wheezing and stridor. Gastrointestinal:  Negative for blood in stool, constipation, diarrhea, nausea and vomiting. Endocrine: Negative for cold intolerance, heat intolerance, polyphagia and polyuria. Musculoskeletal:  Negative for back pain, gait problem, neck pain and neck stiffness. Skin:  Negative for color change, pallor, rash and wound. Neurological:  Negative for dizziness, syncope, facial asymmetry, speech difficulty, light-headedness, numbness and headaches. Hematological:  Negative for adenopathy. Does not bruise/bleed easily. Psychiatric/Behavioral:  Negative for agitation, confusion and sleep disturbance.         PhysicalExam     Vitals:    01/27/23 1140   BP: (!) 142/84   Pulse: (!) 105   Temp: (!) 96.6 °F (35.9 °C)       Physical Exam  Constitutional:       Appearance: She is well-developed. HENT:      Head: Normocephalic and atraumatic. Not macrocephalic and not microcephalic. No raccoon eyes, Pineda's sign, abrasion, contusion, right periorbital erythema, left periorbital erythema or laceration. Hair is normal.      Jaw: No trismus. Right Ear: Hearing, tympanic membrane and external ear normal. No decreased hearing noted. No drainage, swelling or tenderness. No middle ear effusion. No mastoid tenderness. Tympanic membrane is not perforated, retracted or bulging. Tympanic membrane has normal mobility. Left Ear: Hearing, tympanic membrane and external ear normal. No decreased hearing noted. No drainage, swelling or tenderness. No middle ear effusion. No mastoid tenderness. Tympanic membrane is not perforated, retracted or bulging. Tympanic membrane has normal mobility. Nose: No nasal deformity, septal deviation, laceration, mucosal edema or rhinorrhea. Right Sinus: No maxillary sinus tenderness or frontal sinus tenderness. Left Sinus: No maxillary sinus tenderness or frontal sinus tenderness. Mouth/Throat:      Mouth: Mucous membranes are not pale, not dry and not cyanotic. No lacerations or oral lesions. Dentition: Normal dentition. No dental caries or dental abscesses. Pharynx: Uvula midline. No oropharyngeal exudate, posterior oropharyngeal erythema or uvula swelling. Tonsils: No tonsillar abscesses. Eyes:      General: Lids are normal.         Right eye: No discharge. Left eye: No discharge. Extraocular Movements:      Right eye: Normal extraocular motion and no nystagmus. Left eye: Normal extraocular motion and no nystagmus. Conjunctiva/sclera:      Right eye: No chemosis or exudate. Left eye: No chemosis or exudate. Neck:      Thyroid: No thyroid mass or thyromegaly.       Vascular: Normal carotid pulses.      Trachea: No tracheal tenderness or tracheal deviation.   Cardiovascular:      Rate and Rhythm: Normal rate and regular rhythm.   Pulmonary:      Effort: No tachypnea, bradypnea or respiratory distress.      Breath sounds: No stridor.   Musculoskeletal:      Right shoulder: Normal range of motion.      Cervical back: Neck supple.   Lymphadenopathy:      Head:      Right side of head: No submental, submandibular, tonsillar, preauricular, posterior auricular or occipital adenopathy.      Left side of head: No submental, submandibular, tonsillar, preauricular, posterior auricular or occipital adenopathy.      Cervical: No cervical adenopathy.      Right cervical: No superficial, deep or posterior cervical adenopathy.     Left cervical: No superficial, deep or posterior cervical adenopathy.   Skin:     General: Skin is warm and dry.      Findings: No bruising, erythema, laceration, lesion or rash.   Neurological:      Mental Status: She is alert and oriented to person, place, and time.      Cranial Nerves: No cranial nerve deficit.   Psychiatric:         Speech: Speech normal.         Behavior: Behavior normal.         Procedure           Assessment and Plan     1. Asymmetrical hearing loss  Patient does have asymmetric hearing loss, particularly in her decreased word recognition scores on the left by more than 40% compared to the right.  This is concerning for potential retrocochlear tumor.  I recommend MRI of the IAC to rule out vestibular schwannoma.  Valium prescription provided for sedation for MRI due to some claustrophobia symptoms.  Discussed with the patient that she will need a ride to and from the MRI while taking the medication.  We will contact her with MRI results and dictate further management at that time.  - MRI IAC POSTERIOR FOSSA W WO CONTRAST; Future  - diazePAM (VALIUM) 2 MG tablet; Take 1 tablet by mouth once for 1 dose. Take one tablet 30 minutes prior to MRI. Take second  tablet just before MRI if needed Max Daily Amount: 2 mg  Dispense: 2 tablet; Refill: 0    2. Sensorineural hearing loss (SNHL) of both ears    - MRI IAC POSTERIOR FOSSA W WO CONTRAST; Future  - diazePAM (VALIUM) 2 MG tablet; Take 1 tablet by mouth once for 1 dose. Take one tablet 30 minutes prior to MRI. Take second tablet just before MRI if needed Max Daily Amount: 2 mg  Dispense: 2 tablet; Refill: 0    Return for after MRI. [ ] Review/order radiology tests   [x] Independent interpretation of diagnostic test by another provider (audiogram)  [x] Discussed case with another provider (letter)  [x] High risk of loss of major body function (hearing loss, tumor)  [ ] Elective major surgery with risk factors    Portions of this note were dictated using Dragon.  There may be linguistic errors secondary to the use of this program.

## 2023-02-03 ENCOUNTER — HOSPITAL ENCOUNTER (OUTPATIENT)
Dept: MRI IMAGING | Age: 73
Discharge: HOME | End: 2023-02-03

## 2023-02-03 DIAGNOSIS — H90.3 SENSORINEURAL HEARING LOSS (SNHL) OF BOTH EARS: ICD-10-CM

## 2023-02-03 DIAGNOSIS — H91.8X9 ASYMMETRICAL HEARING LOSS: ICD-10-CM

## 2023-02-06 RX ORDER — DIAZEPAM 2 MG/1
TABLET ORAL
Qty: 2 TABLET | Refills: 0 | OUTPATIENT
Start: 2023-02-06

## 2023-02-07 ENCOUNTER — HOSPITAL ENCOUNTER (OUTPATIENT)
Dept: MRI IMAGING | Age: 73
Discharge: HOME | End: 2023-02-07

## 2023-02-07 ENCOUNTER — TELEPHONE (OUTPATIENT)
Dept: ENT CLINIC | Age: 73
End: 2023-02-07

## 2023-02-07 DIAGNOSIS — H91.8X9 ASYMMETRICAL HEARING LOSS: Primary | ICD-10-CM

## 2023-02-07 RX ORDER — DIAZEPAM 5 MG/1
TABLET ORAL
Qty: 1 TABLET | Refills: 0 | Status: SHIPPED | OUTPATIENT
Start: 2023-02-07 | End: 2023-02-08

## 2023-02-07 NOTE — TELEPHONE ENCOUNTER
Patient would like another script for valium so she can do her MRI. She has had 2 test scheduled and the machine broke down again. She is scheduled for 10AM tomorrow again.

## 2023-03-16 ENCOUNTER — HOSPITAL ENCOUNTER (OUTPATIENT)
Dept: MRI IMAGING | Age: 73
Discharge: HOME | End: 2023-03-16
Payer: MEDICARE

## 2023-03-16 DIAGNOSIS — H91.8X9 ASYMMETRICAL HEARING LOSS: ICD-10-CM

## 2023-03-16 DIAGNOSIS — H90.3 SENSORINEURAL HEARING LOSS (SNHL) OF BOTH EARS: ICD-10-CM

## 2023-03-16 PROCEDURE — 2580000003 HC RX 258: Performed by: OTOLARYNGOLOGY

## 2023-03-16 PROCEDURE — A9579 GAD-BASE MR CONTRAST NOS,1ML: HCPCS | Performed by: OTOLARYNGOLOGY

## 2023-03-16 PROCEDURE — 6360000004 HC RX CONTRAST MEDICATION: Performed by: OTOLARYNGOLOGY

## 2023-03-16 PROCEDURE — 70553 MRI BRAIN STEM W/O & W/DYE: CPT

## 2023-03-16 RX ORDER — SODIUM CHLORIDE 0.9 % (FLUSH) 0.9 %
5-40 SYRINGE (ML) INJECTION 2 TIMES DAILY
Status: DISCONTINUED | OUTPATIENT
Start: 2023-03-16 | End: 2023-03-17 | Stop reason: HOSPADM

## 2023-03-16 RX ADMIN — GADOTERIDOL 14 ML: 279.3 INJECTION, SOLUTION INTRAVENOUS at 14:40

## 2023-03-16 RX ADMIN — SODIUM CHLORIDE, PRESERVATIVE FREE 10 ML: 5 INJECTION INTRAVENOUS at 14:40

## 2023-03-22 ENCOUNTER — TELEPHONE (OUTPATIENT)
Dept: ENT CLINIC | Age: 73
End: 2023-03-22

## 2023-03-22 NOTE — TELEPHONE ENCOUNTER
----- Message from Johnnie Woodall DO sent at 3/21/2023  9:05 AM EDT -----  MRI shows no concerning findings regarding the ear. May follow up to go over MRI if interested. Likely not much I can do from a hearing standpoint in that ear.

## 2023-04-06 ENCOUNTER — APPOINTMENT (OUTPATIENT)
Dept: GENERAL RADIOLOGY | Age: 73
End: 2023-04-06
Payer: MEDICARE

## 2023-04-06 ENCOUNTER — HOSPITAL ENCOUNTER (EMERGENCY)
Age: 73
Discharge: HOME | End: 2023-04-06
Attending: EMERGENCY MEDICINE
Payer: MEDICARE

## 2023-04-06 VITALS
OXYGEN SATURATION: 94 % | BODY MASS INDEX: 25.67 KG/M2 | HEIGHT: 66 IN | TEMPERATURE: 98.9 F | SYSTOLIC BLOOD PRESSURE: 132 MMHG | WEIGHT: 159.7 LBS | DIASTOLIC BLOOD PRESSURE: 70 MMHG | HEART RATE: 87 BPM | RESPIRATION RATE: 15 BRPM

## 2023-04-06 DIAGNOSIS — U07.1 COVID-19: Primary | ICD-10-CM

## 2023-04-06 LAB
ALBUMIN SERPL-MCNC: 3.9 G/DL (ref 3.4–5)
ALBUMIN/GLOB SERPL: 1.3 {RATIO} (ref 1.1–2.2)
ALP SERPL-CCNC: 88 U/L (ref 40–129)
ALT SERPL-CCNC: 16 U/L (ref 10–40)
ANION GAP SERPL CALCULATED.3IONS-SCNC: 13 MMOL/L (ref 3–16)
AST SERPL-CCNC: 28 U/L (ref 15–37)
BASOPHILS # BLD: 0 K/UL (ref 0–0.2)
BASOPHILS NFR BLD: 0.5 %
BILIRUB SERPL-MCNC: <0.2 MG/DL (ref 0–1)
BUN SERPL-MCNC: 42 MG/DL (ref 7–20)
CALCIUM SERPL-MCNC: 8.9 MG/DL (ref 8.3–10.6)
CHLORIDE SERPL-SCNC: 100 MMOL/L (ref 99–110)
CO2 SERPL-SCNC: 21 MMOL/L (ref 21–32)
CREAT SERPL-MCNC: 1.6 MG/DL (ref 0.6–1.2)
DEPRECATED RDW RBC AUTO: 16.5 % (ref 12.4–15.4)
EKG ATRIAL RATE: 77 BPM
EKG DIAGNOSIS: NORMAL
EKG P AXIS: 51 DEGREES
EKG P-R INTERVAL: 170 MS
EKG Q-T INTERVAL: 426 MS
EKG QRS DURATION: 72 MS
EKG QTC CALCULATION (BAZETT): 482 MS
EKG R AXIS: 1 DEGREES
EKG T AXIS: 69 DEGREES
EKG VENTRICULAR RATE: 77 BPM
EOSINOPHIL # BLD: 0.1 K/UL (ref 0–0.6)
EOSINOPHIL NFR BLD: 2.8 %
GFR SERPLBLD CREATININE-BSD FMLA CKD-EPI: 34 ML/MIN/{1.73_M2}
GLUCOSE SERPL-MCNC: 113 MG/DL (ref 70–99)
HCT VFR BLD AUTO: 34.8 % (ref 36–48)
HGB BLD-MCNC: 11.6 G/DL (ref 12–16)
LYMPHOCYTES # BLD: 0.7 K/UL (ref 1–5.1)
LYMPHOCYTES NFR BLD: 15.6 %
MCH RBC QN AUTO: 27 PG (ref 26–34)
MCHC RBC AUTO-ENTMCNC: 33.4 G/DL (ref 31–36)
MCV RBC AUTO: 81 FL (ref 80–100)
MONOCYTES # BLD: 0.4 K/UL (ref 0–1.3)
MONOCYTES NFR BLD: 8.5 %
NEUTROPHILS # BLD: 3.3 K/UL (ref 1.7–7.7)
NEUTROPHILS NFR BLD: 72.6 %
PLATELET # BLD AUTO: 231 K/UL (ref 135–450)
PMV BLD AUTO: 8 FL (ref 5–10.5)
POTASSIUM SERPL-SCNC: 4.6 MMOL/L (ref 3.5–5.1)
PROT SERPL-MCNC: 6.8 G/DL (ref 6.4–8.2)
RBC # BLD AUTO: 4.3 M/UL (ref 4–5.2)
SARS-COV-2 RDRP RESP QL NAA+PROBE: DETECTED
SODIUM SERPL-SCNC: 134 MMOL/L (ref 136–145)
TROPONIN T SERPL-MCNC: <0.01 NG/ML
WBC # BLD AUTO: 4.5 K/UL (ref 4–11)

## 2023-04-06 PROCEDURE — 87635 SARS-COV-2 COVID-19 AMP PRB: CPT

## 2023-04-06 PROCEDURE — 84484 ASSAY OF TROPONIN QUANT: CPT

## 2023-04-06 PROCEDURE — 93005 ELECTROCARDIOGRAM TRACING: CPT | Performed by: EMERGENCY MEDICINE

## 2023-04-06 PROCEDURE — 71046 X-RAY EXAM CHEST 2 VIEWS: CPT

## 2023-04-06 PROCEDURE — 36415 COLL VENOUS BLD VENIPUNCTURE: CPT

## 2023-04-06 PROCEDURE — 85025 COMPLETE CBC W/AUTO DIFF WBC: CPT

## 2023-04-06 PROCEDURE — 80053 COMPREHEN METABOLIC PANEL: CPT

## 2023-04-06 ASSESSMENT — PAIN - FUNCTIONAL ASSESSMENT
PAIN_FUNCTIONAL_ASSESSMENT: NONE - DENIES PAIN
PAIN_FUNCTIONAL_ASSESSMENT: NONE - DENIES PAIN

## 2023-04-06 ASSESSMENT — PAIN SCALES - GENERAL: PAINLEVEL_OUTOF10: 0

## 2023-04-06 ASSESSMENT — LIFESTYLE VARIABLES: HOW OFTEN DO YOU HAVE A DRINK CONTAINING ALCOHOL: NEVER

## 2023-04-06 NOTE — ED NOTES
Blood drawn, collected and sent to lab per orders. EKG performed and given to DR. Roy. Pt placed on cardiac monitor and continuous pulse ox.      Joanne Marsh RN  04/06/23 5302

## 2023-04-07 NOTE — ED NOTES
Pt discharged to home. Discharge instructions reviewed with patient. All questions answered, no concerns noted. Pt encouraged to return to emergency department if they have any new or worsening symptoms. Pt alert and oriented, resp even and unlabored, skin natural in color, no signs of acute distress.         Amy Chery RN  04/06/23 8251

## 2023-04-07 NOTE — DISCHARGE INSTRUCTIONS
We saw you in the emergency department for fatigue. Your COVID-19 test was positive. We have included instructions on isolation precautions with these discharge instructions. Please call your primary care doctor in the next 3 days to discuss this visit and arrange a follow-up visit if necessary. If you have worsening symptoms, especially worsening difficulty breathing, then please come back to the emergency department to be seen again immediately.

## 2023-04-07 NOTE — ED NOTES
Pt able to ambulate with steady gait to bathroom. Pt SpO2 96% on RA after walking to and from the bathroom.       Yanely Hernandez RN  04/06/23 5252

## 2023-04-07 NOTE — ED PROVIDER NOTES
daily    PANTOPRAZOLE (PROTONIX) 40 MG TABLET    Take 40 mg by mouth daily    PIOGLITAZONE (ACTOS) 15 MG TABLET    Take 15 mg by mouth daily    SENNA-DOCUSATE (PERICOLACE) 8.6-50 MG PER TABLET    Take 2 tablets by mouth daily as needed for Constipation    SERTRALINE (ZOLOFT) 100 MG TABLET    Take 200 mg by mouth daily    UMECLIDINIUM-VILANTEROL (ANORO ELLIPTA) 62.5-25 MCG/INH AEPB INHALER    Inhale 1 puff into the lungs daily       Allergies     She is allergic to percocet [oxycodone-acetaminophen] and sulfa antibiotics. Physical Exam     INITIAL VITALS: BP: (!) 132/99,  , Heart Rate: 94, Resp: 16, SpO2: 95 %   Physical Exam    General: Well developed and well nourished. No acute distress. HEENT: NCAT, PERRL, moist mucous membranes  Neck: Trachea midline, neck supple with FROM  Heart: Regular rate and rhythm. No murmurs, gallops, or rubs appreciated. Lungs: Clear to auscultation in all fields bilaterally. Normal effort. Abd: Nondistended, no signs of trauma. No tenderness to palpation, guarding, or rebound. MSK: No obvious deformities. Range of motion grossly intact. Extremities: No cyanosis or edema. Peripheral pulses intact. Skin: No rashes, abrasions, contusions, or lacerations noted. Neuro exam:  - Mental status: alert, oriented to person, place, time, and event. - Language: receptive and expressive language in tact: no aphasia or dysarthria.  - Motor: extension and flexion at elbow, wrist, hip, knee, and ankle 5/5.  - No pronator drift noted. - Cerebellar: finger to nose and heel to shin tests preserved. - Gait grossly normal.  - Sensory: SILT distally in hands and feet. - Cranial Nerves: EOMI, PERRLA, hearing in tact and symmetric, face symmetric. evidenced by smile and forehead wrinkle, tongue midline, shoulder shrug 5/5. - Visual fields full to confrontational testing. Psych: Mood and affect appropriate.  Thought process and content normal.      Ligia Arango MD  04/06/23 4822

## 2024-09-22 NOTE — PLAN OF CARE
Problem: Adult Inpatient Plan of Care  Goal: Plan of Care Review  Recent Flowsheet Documentation  Taken 11/12/2020 1137 by Gertrude Wang, OT  Progress: improving  Outcome Summary: Pt supv with toileting, toilet txfr, grooming sinkside and donning undergarment this date. Pt supv with functional mobility in room and chair txfrs. Pt improving and wants to return home. Pt will need spouse present to assist with mobility and ADLs and oupt services.      DISPLAY PLAN FREE TEXT

## 2024-10-29 ENCOUNTER — APPOINTMENT (OUTPATIENT)
Dept: CT IMAGING | Age: 74
End: 2024-10-29
Payer: MEDICARE

## 2024-10-29 ENCOUNTER — APPOINTMENT (OUTPATIENT)
Dept: GENERAL RADIOLOGY | Age: 74
End: 2024-10-29
Payer: MEDICARE

## 2024-10-29 ENCOUNTER — HOSPITAL ENCOUNTER (INPATIENT)
Age: 74
LOS: 9 days | Discharge: NURSING, REHAB OR LONG-TERM CARE | End: 2024-11-08
Attending: EMERGENCY MEDICINE | Admitting: INTERNAL MEDICINE
Payer: MEDICARE

## 2024-10-29 DIAGNOSIS — R06.02 SHORTNESS OF BREATH: Primary | ICD-10-CM

## 2024-10-29 DIAGNOSIS — S72.91XA CLOSED FRACTURE OF RIGHT FEMUR, INITIAL ENCOUNTER (HCC): ICD-10-CM

## 2024-10-29 DIAGNOSIS — R53.1 GENERAL WEAKNESS: ICD-10-CM

## 2024-10-29 LAB
ALBUMIN SERPL-MCNC: 3.8 G/DL (ref 3.4–5)
ALBUMIN/GLOB SERPL: 1.5 {RATIO} (ref 1.1–2.2)
ALP SERPL-CCNC: 182 U/L (ref 40–129)
ALT SERPL-CCNC: 10 U/L (ref 10–40)
ANION GAP SERPL CALCULATED.3IONS-SCNC: 14 MMOL/L (ref 3–16)
AST SERPL-CCNC: 19 U/L (ref 15–37)
BACTERIA URNS QL MICRO: ABNORMAL /HPF
BASOPHILS # BLD: 0.1 K/UL (ref 0–0.2)
BASOPHILS NFR BLD: 0.7 %
BILIRUB SERPL-MCNC: 0.4 MG/DL (ref 0–1)
BILIRUB UR QL STRIP.AUTO: NEGATIVE
BUN SERPL-MCNC: 45 MG/DL (ref 7–20)
CALCIUM SERPL-MCNC: 9 MG/DL (ref 8.3–10.6)
CHLORIDE SERPL-SCNC: 102 MMOL/L (ref 99–110)
CLARITY UR: CLEAR
CO2 SERPL-SCNC: 17 MMOL/L (ref 21–32)
COLOR UR: YELLOW
CREAT SERPL-MCNC: 1.4 MG/DL (ref 0.6–1.2)
D-DIMER QUANTITATIVE: 1.71 UG/ML FEU (ref 0–0.6)
DEPRECATED RDW RBC AUTO: 16.5 % (ref 12.4–15.4)
EOSINOPHIL # BLD: 0.2 K/UL (ref 0–0.6)
EOSINOPHIL NFR BLD: 2.6 %
GFR SERPLBLD CREATININE-BSD FMLA CKD-EPI: 40 ML/MIN/{1.73_M2}
GLUCOSE BLD-MCNC: 258 MG/DL (ref 70–99)
GLUCOSE SERPL-MCNC: 285 MG/DL (ref 70–99)
GLUCOSE UR STRIP.AUTO-MCNC: 250 MG/DL
HCT VFR BLD AUTO: 33.4 % (ref 36–48)
HGB BLD-MCNC: 10.9 G/DL (ref 12–16)
HGB UR QL STRIP.AUTO: NEGATIVE
KETONES UR STRIP.AUTO-MCNC: NEGATIVE MG/DL
LEUKOCYTE ESTERASE UR QL STRIP.AUTO: ABNORMAL
LYMPHOCYTES # BLD: 0.7 K/UL (ref 1–5.1)
LYMPHOCYTES NFR BLD: 8.4 %
MAGNESIUM SERPL-MCNC: 1.4 MG/DL (ref 1.8–2.4)
MCH RBC QN AUTO: 29.9 PG (ref 26–34)
MCHC RBC AUTO-ENTMCNC: 32.6 G/DL (ref 31–36)
MCV RBC AUTO: 91.6 FL (ref 80–100)
MONOCYTES # BLD: 0.6 K/UL (ref 0–1.3)
MONOCYTES NFR BLD: 7.3 %
NEUTROPHILS # BLD: 6.6 K/UL (ref 1.7–7.7)
NEUTROPHILS NFR BLD: 81 %
NITRITE UR QL STRIP.AUTO: NEGATIVE
NT-PROBNP SERPL-MCNC: 5960 PG/ML (ref 0–124)
PERFORMED ON: ABNORMAL
PH UR STRIP.AUTO: 6 [PH] (ref 5–8)
PLATELET # BLD AUTO: 248 K/UL (ref 135–450)
PMV BLD AUTO: 7.9 FL (ref 5–10.5)
POTASSIUM SERPL-SCNC: 5 MMOL/L (ref 3.5–5.1)
PROT SERPL-MCNC: 6.4 G/DL (ref 6.4–8.2)
PROT UR STRIP.AUTO-MCNC: NEGATIVE MG/DL
RBC # BLD AUTO: 3.65 M/UL (ref 4–5.2)
RBC #/AREA URNS HPF: ABNORMAL /HPF (ref 0–4)
SARS-COV-2 RDRP RESP QL NAA+PROBE: NOT DETECTED
SODIUM SERPL-SCNC: 133 MMOL/L (ref 136–145)
SP GR UR STRIP.AUTO: 1.02 (ref 1–1.03)
TROPONIN, HIGH SENSITIVITY: 17 NG/L (ref 0–14)
UA COMPLETE W REFLEX CULTURE PNL UR: YES
UA DIPSTICK W REFLEX MICRO PNL UR: YES
URN SPEC COLLECT METH UR: ABNORMAL
UROBILINOGEN UR STRIP-ACNC: 0.2 E.U./DL
WBC # BLD AUTO: 8.1 K/UL (ref 4–11)
WBC #/AREA URNS HPF: ABNORMAL /HPF (ref 0–5)

## 2024-10-29 PROCEDURE — 81001 URINALYSIS AUTO W/SCOPE: CPT

## 2024-10-29 PROCEDURE — 6360000004 HC RX CONTRAST MEDICATION

## 2024-10-29 PROCEDURE — 73552 X-RAY EXAM OF FEMUR 2/>: CPT

## 2024-10-29 PROCEDURE — 71045 X-RAY EXAM CHEST 1 VIEW: CPT

## 2024-10-29 PROCEDURE — 6360000002 HC RX W HCPCS

## 2024-10-29 PROCEDURE — 83735 ASSAY OF MAGNESIUM: CPT

## 2024-10-29 PROCEDURE — 80053 COMPREHEN METABOLIC PANEL: CPT

## 2024-10-29 PROCEDURE — 72170 X-RAY EXAM OF PELVIS: CPT

## 2024-10-29 PROCEDURE — 93005 ELECTROCARDIOGRAM TRACING: CPT

## 2024-10-29 PROCEDURE — 87635 SARS-COV-2 COVID-19 AMP PRB: CPT

## 2024-10-29 PROCEDURE — 71260 CT THORAX DX C+: CPT

## 2024-10-29 PROCEDURE — 85379 FIBRIN DEGRADATION QUANT: CPT

## 2024-10-29 PROCEDURE — 84484 ASSAY OF TROPONIN QUANT: CPT

## 2024-10-29 PROCEDURE — 99285 EMERGENCY DEPT VISIT HI MDM: CPT

## 2024-10-29 PROCEDURE — 96365 THER/PROPH/DIAG IV INF INIT: CPT

## 2024-10-29 PROCEDURE — 85025 COMPLETE CBC W/AUTO DIFF WBC: CPT

## 2024-10-29 PROCEDURE — 83880 ASSAY OF NATRIURETIC PEPTIDE: CPT

## 2024-10-29 RX ORDER — IOPAMIDOL 755 MG/ML
75 INJECTION, SOLUTION INTRAVASCULAR
Status: COMPLETED | OUTPATIENT
Start: 2024-10-29 | End: 2024-10-29

## 2024-10-29 RX ORDER — MAGNESIUM SULFATE IN WATER 40 MG/ML
2000 INJECTION, SOLUTION INTRAVENOUS ONCE
Status: COMPLETED | OUTPATIENT
Start: 2024-10-29 | End: 2024-10-30

## 2024-10-29 RX ADMIN — IOPAMIDOL 75 ML: 755 INJECTION, SOLUTION INTRAVENOUS at 23:16

## 2024-10-29 RX ADMIN — MAGNESIUM SULFATE HEPTAHYDRATE 2000 MG: 40 INJECTION, SOLUTION INTRAVENOUS at 23:34

## 2024-10-29 ASSESSMENT — LIFESTYLE VARIABLES
HOW OFTEN DO YOU HAVE A DRINK CONTAINING ALCOHOL: NEVER
HOW MANY STANDARD DRINKS CONTAINING ALCOHOL DO YOU HAVE ON A TYPICAL DAY: PATIENT DOES NOT DRINK

## 2024-10-30 ENCOUNTER — APPOINTMENT (OUTPATIENT)
Age: 74
End: 2024-10-30
Attending: INTERNAL MEDICINE
Payer: MEDICARE

## 2024-10-30 PROBLEM — R09.02 HYPOXIA: Status: ACTIVE | Noted: 2024-10-30

## 2024-10-30 LAB
ECHO AO ASC DIAM: 3.6 CM
ECHO AO ASCENDING AORTA INDEX: 2.07 CM/M2
ECHO AO ROOT DIAM: 3.6 CM
ECHO AO ROOT INDEX: 2.07 CM/M2
ECHO AV AREA PEAK VELOCITY: 2.2 CM2
ECHO AV AREA/BSA PEAK VELOCITY: 1.3 CM2/M2
ECHO AV PEAK GRADIENT: 7 MMHG
ECHO AV PEAK VELOCITY: 1.3 M/S
ECHO AV VELOCITY RATIO: 0.77
ECHO BSA: 1.74 M2
ECHO IVC INSP: 0.1 CM
ECHO IVC PROX: 1.5 CM
ECHO LA AREA 2C: 21 CM2
ECHO LA AREA 4C: 17 CM2
ECHO LA MAJOR AXIS: 5.2 CM
ECHO LA MINOR AXIS: 5.9 CM
ECHO LA VOL BP: 54 ML (ref 22–52)
ECHO LA VOL MOD A2C: 59 ML (ref 22–52)
ECHO LA VOL MOD A4C: 44 ML (ref 22–52)
ECHO LA VOL/BSA BIPLANE: 31 ML/M2 (ref 16–34)
ECHO LA VOLUME INDEX MOD A2C: 34 ML/M2 (ref 16–34)
ECHO LA VOLUME INDEX MOD A4C: 25 ML/M2 (ref 16–34)
ECHO LV E' LATERAL VELOCITY: 7.5 CM/S
ECHO LV E' SEPTAL VELOCITY: 4.5 CM/S
ECHO LV EDV A2C: 100 ML
ECHO LV EDV A4C: 81 ML
ECHO LV EDV INDEX A4C: 47 ML/M2
ECHO LV EDV NDEX A2C: 57 ML/M2
ECHO LV EJECTION FRACTION A2C: 56 %
ECHO LV EJECTION FRACTION A4C: 63 %
ECHO LV EJECTION FRACTION BIPLANE: 61 % (ref 55–100)
ECHO LV ESV A2C: 44 ML
ECHO LV ESV A4C: 30 ML
ECHO LV ESV INDEX A2C: 25 ML/M2
ECHO LV ESV INDEX A4C: 17 ML/M2
ECHO LV FRACTIONAL SHORTENING: 36 % (ref 28–44)
ECHO LV INTERNAL DIMENSION DIASTOLE INDEX: 2.07 CM/M2
ECHO LV INTERNAL DIMENSION DIASTOLIC: 3.6 CM (ref 3.9–5.3)
ECHO LV INTERNAL DIMENSION SYSTOLIC INDEX: 1.32 CM/M2
ECHO LV INTERNAL DIMENSION SYSTOLIC: 2.3 CM
ECHO LV IVSD: 1.2 CM (ref 0.6–0.9)
ECHO LV MASS 2D: 115.9 G (ref 67–162)
ECHO LV MASS INDEX 2D: 66.6 G/M2 (ref 43–95)
ECHO LV POSTERIOR WALL DIASTOLIC: 0.9 CM (ref 0.6–0.9)
ECHO LV RELATIVE WALL THICKNESS RATIO: 0.5
ECHO LVOT AREA: 2.8 CM2
ECHO LVOT DIAM: 1.9 CM
ECHO LVOT MEAN GRADIENT: 2 MMHG
ECHO LVOT PEAK GRADIENT: 4 MMHG
ECHO LVOT PEAK VELOCITY: 1 M/S
ECHO LVOT STROKE VOLUME INDEX: 30.8 ML/M2
ECHO LVOT SV: 53.6 ML
ECHO LVOT VTI: 18.9 CM
ECHO PV MAX VELOCITY: 1.1 M/S
ECHO PV PEAK GRADIENT: 5 MMHG
ECHO RA AREA 4C: 16.1 CM2
ECHO RA END SYSTOLIC VOLUME APICAL 4 CHAMBER INDEX BSA: 26 ML/M2
ECHO RA VOLUME: 45 ML
ECHO RV BASAL DIMENSION: 3.7 CM
ECHO RV FREE WALL PEAK S': 11.1 CM/S
ECHO RV MID DIMENSION: 2.2 CM
ECHO RV TAPSE: 1.7 CM (ref 1.7–?)
ECHO TV REGURGITANT MAX VELOCITY: 3.9 M/S
ECHO TV REGURGITANT PEAK GRADIENT: 63 MMHG
EKG ATRIAL RATE: 121 BPM
EKG DIAGNOSIS: NORMAL
EKG P AXIS: 50 DEGREES
EKG P-R INTERVAL: 158 MS
EKG Q-T INTERVAL: 310 MS
EKG QRS DURATION: 66 MS
EKG QTC CALCULATION (BAZETT): 440 MS
EKG R AXIS: -2 DEGREES
EKG T AXIS: 97 DEGREES
EKG VENTRICULAR RATE: 121 BPM
GLUCOSE BLD-MCNC: 140 MG/DL (ref 70–99)
GLUCOSE BLD-MCNC: 204 MG/DL (ref 70–99)
GLUCOSE BLD-MCNC: 216 MG/DL (ref 70–99)
GLUCOSE BLD-MCNC: 247 MG/DL (ref 70–99)
GLUCOSE BLD-MCNC: 279 MG/DL (ref 70–99)
GLUCOSE BLD-MCNC: 298 MG/DL (ref 70–99)
MAGNESIUM SERPL-MCNC: 4 MG/DL (ref 1.8–2.4)
PERFORMED ON: ABNORMAL
TROPONIN, HIGH SENSITIVITY: 15 NG/L (ref 0–14)

## 2024-10-30 PROCEDURE — 2580000003 HC RX 258: Performed by: INTERNAL MEDICINE

## 2024-10-30 PROCEDURE — 1200000000 HC SEMI PRIVATE

## 2024-10-30 PROCEDURE — 97162 PT EVAL MOD COMPLEX 30 MIN: CPT

## 2024-10-30 PROCEDURE — 97530 THERAPEUTIC ACTIVITIES: CPT

## 2024-10-30 PROCEDURE — 97166 OT EVAL MOD COMPLEX 45 MIN: CPT

## 2024-10-30 PROCEDURE — 83735 ASSAY OF MAGNESIUM: CPT

## 2024-10-30 PROCEDURE — 6370000000 HC RX 637 (ALT 250 FOR IP): Performed by: INTERNAL MEDICINE

## 2024-10-30 PROCEDURE — 87077 CULTURE AEROBIC IDENTIFY: CPT

## 2024-10-30 PROCEDURE — 6360000002 HC RX W HCPCS: Performed by: INTERNAL MEDICINE

## 2024-10-30 PROCEDURE — 97535 SELF CARE MNGMENT TRAINING: CPT

## 2024-10-30 PROCEDURE — 93306 TTE W/DOPPLER COMPLETE: CPT | Performed by: INTERNAL MEDICINE

## 2024-10-30 PROCEDURE — 99223 1ST HOSP IP/OBS HIGH 75: CPT | Performed by: INTERNAL MEDICINE

## 2024-10-30 PROCEDURE — 87086 URINE CULTURE/COLONY COUNT: CPT

## 2024-10-30 PROCEDURE — 87186 SC STD MICRODIL/AGAR DIL: CPT

## 2024-10-30 PROCEDURE — 93306 TTE W/DOPPLER COMPLETE: CPT

## 2024-10-30 PROCEDURE — 97116 GAIT TRAINING THERAPY: CPT

## 2024-10-30 RX ORDER — GABAPENTIN 100 MG/1
100 CAPSULE ORAL 2 TIMES DAILY
Status: DISCONTINUED | OUTPATIENT
Start: 2024-10-30 | End: 2024-10-30

## 2024-10-30 RX ORDER — ALBUTEROL SULFATE 0.83 MG/ML
2.5 SOLUTION RESPIRATORY (INHALATION) EVERY 6 HOURS PRN
Status: DISCONTINUED | OUTPATIENT
Start: 2024-10-30 | End: 2024-11-08 | Stop reason: HOSPADM

## 2024-10-30 RX ORDER — ONDANSETRON 2 MG/ML
4 INJECTION INTRAMUSCULAR; INTRAVENOUS EVERY 6 HOURS PRN
Status: DISCONTINUED | OUTPATIENT
Start: 2024-10-30 | End: 2024-11-08 | Stop reason: HOSPADM

## 2024-10-30 RX ORDER — POLYETHYLENE GLYCOL 3350 17 G/17G
17 POWDER, FOR SOLUTION ORAL DAILY PRN
Status: DISCONTINUED | OUTPATIENT
Start: 2024-10-30 | End: 2024-11-08 | Stop reason: HOSPADM

## 2024-10-30 RX ORDER — DEXTROSE MONOHYDRATE 100 MG/ML
INJECTION, SOLUTION INTRAVENOUS CONTINUOUS PRN
Status: DISCONTINUED | OUTPATIENT
Start: 2024-10-30 | End: 2024-11-08 | Stop reason: HOSPADM

## 2024-10-30 RX ORDER — SODIUM CHLORIDE 0.9 % (FLUSH) 0.9 %
5-40 SYRINGE (ML) INJECTION PRN
Status: DISCONTINUED | OUTPATIENT
Start: 2024-10-30 | End: 2024-11-08 | Stop reason: HOSPADM

## 2024-10-30 RX ORDER — PANTOPRAZOLE SODIUM 40 MG/1
40 TABLET, DELAYED RELEASE ORAL DAILY
Status: DISCONTINUED | OUTPATIENT
Start: 2024-10-30 | End: 2024-11-08 | Stop reason: HOSPADM

## 2024-10-30 RX ORDER — AMLODIPINE BESYLATE 2.5 MG/1
2.5 TABLET ORAL DAILY
Status: ON HOLD | COMMUNITY
Start: 2024-03-06

## 2024-10-30 RX ORDER — GLUCAGON 1 MG/ML
1 KIT INJECTION PRN
Status: DISCONTINUED | OUTPATIENT
Start: 2024-10-30 | End: 2024-11-08 | Stop reason: HOSPADM

## 2024-10-30 RX ORDER — INSULIN LISPRO 100 [IU]/ML
0-8 INJECTION, SOLUTION INTRAVENOUS; SUBCUTANEOUS
Status: DISCONTINUED | OUTPATIENT
Start: 2024-10-30 | End: 2024-11-03

## 2024-10-30 RX ORDER — LOSARTAN POTASSIUM 50 MG/1
50 TABLET ORAL DAILY
Status: DISCONTINUED | OUTPATIENT
Start: 2024-10-30 | End: 2024-11-08 | Stop reason: HOSPADM

## 2024-10-30 RX ORDER — INSULIN GLARGINE 100 [IU]/ML
10 INJECTION, SOLUTION SUBCUTANEOUS NIGHTLY
Status: DISCONTINUED | OUTPATIENT
Start: 2024-10-30 | End: 2024-11-08 | Stop reason: HOSPADM

## 2024-10-30 RX ORDER — TRAMADOL HYDROCHLORIDE 50 MG/1
50 TABLET ORAL EVERY 6 HOURS PRN
COMMUNITY
Start: 2024-10-29 | End: 2024-11-05

## 2024-10-30 RX ORDER — ASPIRIN 81 MG/1
81 TABLET ORAL 2 TIMES DAILY
Status: DISCONTINUED | OUTPATIENT
Start: 2024-10-30 | End: 2024-11-08 | Stop reason: HOSPADM

## 2024-10-30 RX ORDER — HYDROCHLOROTHIAZIDE 25 MG/1
25 TABLET ORAL DAILY
Status: ON HOLD | COMMUNITY
Start: 2024-03-06 | End: 2024-11-12 | Stop reason: HOSPADM

## 2024-10-30 RX ORDER — ENOXAPARIN SODIUM 100 MG/ML
40 INJECTION SUBCUTANEOUS DAILY
Status: DISCONTINUED | OUTPATIENT
Start: 2024-10-30 | End: 2024-11-08 | Stop reason: HOSPADM

## 2024-10-30 RX ORDER — ACETAMINOPHEN 650 MG/1
650 SUPPOSITORY RECTAL EVERY 6 HOURS PRN
Status: DISCONTINUED | OUTPATIENT
Start: 2024-10-30 | End: 2024-11-01

## 2024-10-30 RX ORDER — SODIUM CHLORIDE 9 MG/ML
INJECTION, SOLUTION INTRAVENOUS PRN
Status: DISCONTINUED | OUTPATIENT
Start: 2024-10-30 | End: 2024-11-08 | Stop reason: HOSPADM

## 2024-10-30 RX ORDER — LANOLIN ALCOHOL/MO/W.PET/CERES
1000 CREAM (GRAM) TOPICAL DAILY
Status: DISCONTINUED | OUTPATIENT
Start: 2024-10-30 | End: 2024-11-08 | Stop reason: HOSPADM

## 2024-10-30 RX ORDER — METOPROLOL TARTRATE 25 MG/1
50 TABLET, FILM COATED ORAL 2 TIMES DAILY
Status: DISCONTINUED | OUTPATIENT
Start: 2024-10-30 | End: 2024-11-08 | Stop reason: HOSPADM

## 2024-10-30 RX ORDER — FUROSEMIDE 10 MG/ML
20 INJECTION INTRAMUSCULAR; INTRAVENOUS ONCE
Status: COMPLETED | OUTPATIENT
Start: 2024-10-30 | End: 2024-10-30

## 2024-10-30 RX ORDER — FERROUS SULFATE 325(65) MG
325 TABLET ORAL
Status: DISCONTINUED | OUTPATIENT
Start: 2024-10-30 | End: 2024-11-08 | Stop reason: HOSPADM

## 2024-10-30 RX ORDER — ONDANSETRON 4 MG/1
4 TABLET, ORALLY DISINTEGRATING ORAL EVERY 8 HOURS PRN
Status: DISCONTINUED | OUTPATIENT
Start: 2024-10-30 | End: 2024-11-08 | Stop reason: HOSPADM

## 2024-10-30 RX ORDER — ACETAMINOPHEN 325 MG/1
650 TABLET ORAL EVERY 6 HOURS PRN
Status: DISCONTINUED | OUTPATIENT
Start: 2024-10-30 | End: 2024-11-01

## 2024-10-30 RX ORDER — BUPROPION HYDROCHLORIDE 150 MG/1
300 TABLET ORAL EVERY MORNING
Status: DISCONTINUED | OUTPATIENT
Start: 2024-10-30 | End: 2024-11-08 | Stop reason: HOSPADM

## 2024-10-30 RX ORDER — PREDNISONE 20 MG/1
40 TABLET ORAL DAILY
Status: DISCONTINUED | OUTPATIENT
Start: 2024-11-01 | End: 2024-10-30

## 2024-10-30 RX ORDER — SERTRALINE HYDROCHLORIDE 100 MG/1
200 TABLET, FILM COATED ORAL DAILY
Status: DISCONTINUED | OUTPATIENT
Start: 2024-10-30 | End: 2024-10-30

## 2024-10-30 RX ORDER — SODIUM CHLORIDE 0.9 % (FLUSH) 0.9 %
5-40 SYRINGE (ML) INJECTION EVERY 12 HOURS SCHEDULED
Status: DISCONTINUED | OUTPATIENT
Start: 2024-10-30 | End: 2024-11-08 | Stop reason: HOSPADM

## 2024-10-30 RX ORDER — DULOXETIN HYDROCHLORIDE 60 MG/1
60 CAPSULE, DELAYED RELEASE ORAL DAILY
Status: ON HOLD | COMMUNITY
Start: 2024-09-12

## 2024-10-30 RX ADMIN — METOPROLOL TARTRATE 50 MG: 50 TABLET, FILM COATED ORAL at 21:41

## 2024-10-30 RX ADMIN — INSULIN LISPRO 4 UNITS: 100 INJECTION, SOLUTION INTRAVENOUS; SUBCUTANEOUS at 17:59

## 2024-10-30 RX ADMIN — PANTOPRAZOLE SODIUM 40 MG: 40 TABLET, DELAYED RELEASE ORAL at 08:52

## 2024-10-30 RX ADMIN — FUROSEMIDE 20 MG: 10 INJECTION, SOLUTION INTRAVENOUS at 08:59

## 2024-10-30 RX ADMIN — ASPIRIN 81 MG: 81 TABLET, COATED ORAL at 21:41

## 2024-10-30 RX ADMIN — INSULIN GLARGINE 10 UNITS: 100 INJECTION, SOLUTION SUBCUTANEOUS at 03:23

## 2024-10-30 RX ADMIN — Medication 5000 UNITS: at 08:51

## 2024-10-30 RX ADMIN — INSULIN LISPRO 2 UNITS: 100 INJECTION, SOLUTION INTRAVENOUS; SUBCUTANEOUS at 21:41

## 2024-10-30 RX ADMIN — INSULIN LISPRO 4 UNITS: 100 INJECTION, SOLUTION INTRAVENOUS; SUBCUTANEOUS at 14:37

## 2024-10-30 RX ADMIN — ENOXAPARIN SODIUM 40 MG: 100 INJECTION SUBCUTANEOUS at 08:51

## 2024-10-30 RX ADMIN — CYANOCOBALAMIN TAB 1000 MCG 1000 MCG: 1000 TAB at 08:51

## 2024-10-30 RX ADMIN — INSULIN LISPRO 2 UNITS: 100 INJECTION, SOLUTION INTRAVENOUS; SUBCUTANEOUS at 08:51

## 2024-10-30 RX ADMIN — METOPROLOL TARTRATE 50 MG: 50 TABLET, FILM COATED ORAL at 08:51

## 2024-10-30 RX ADMIN — BUPROPION HYDROCHLORIDE 300 MG: 150 TABLET, EXTENDED RELEASE ORAL at 08:51

## 2024-10-30 RX ADMIN — SODIUM CHLORIDE, PRESERVATIVE FREE 10 ML: 5 INJECTION INTRAVENOUS at 08:59

## 2024-10-30 RX ADMIN — FERROUS SULFATE TAB 325 MG (65 MG ELEMENTAL FE) 325 MG: 325 (65 FE) TAB at 08:51

## 2024-10-30 RX ADMIN — ASPIRIN 81 MG: 81 TABLET, COATED ORAL at 08:51

## 2024-10-30 RX ADMIN — LOSARTAN POTASSIUM 50 MG: 50 TABLET, FILM COATED ORAL at 08:52

## 2024-10-30 RX ADMIN — WATER 40 MG: 1 INJECTION INTRAMUSCULAR; INTRAVENOUS; SUBCUTANEOUS at 03:23

## 2024-10-30 RX ADMIN — SODIUM CHLORIDE, PRESERVATIVE FREE 10 ML: 5 INJECTION INTRAVENOUS at 21:46

## 2024-10-30 ASSESSMENT — PAIN - FUNCTIONAL ASSESSMENT: PAIN_FUNCTIONAL_ASSESSMENT: NONE - DENIES PAIN

## 2024-10-30 NOTE — H&P
Hospital Medicine History & Physical      Date of Admission: 10/29/2024    Date of Service:  Pt seen/examined on 10/30/24     [x]Admitted to Inpatient with expected LOS greater than two midnights due to medical therapy.  []Placed in Observation status.    Chief Admission Complaint: Generalized weakness    Presenting Admission History:      74 y.o. female with PMHx significant for COPD, diabetes mellitus, hyperlipidemia, CKD, essential hypertension who presented to ED with a complaint of generalized weakness and fatigue.  Patient has been having worsening fatigue over the last few days.  Patient was not able to stand up and was having difficulty using her walker at home.  Patient denies shortness of breath per se.  She does not use home oxygen at baseline.  Patient is on home inhalers due to previously diagnosed COPD according to the patient.  Patient quit smoking 15 years ago.  In ED patient was initially found to be tachycardic.  While in ER her oxygen saturation dropped in the high 80s requiring placement of nasal cannula 2 L/min.  CTPA was obtained and revealed no evidence of pulmonary embolism or infiltrate, however it did show evidence of possible pulmonary hypertension.  Patient is currently being admitted under inpatient status for further management and evaluation    ROS:     Review of 10 systems is negative except what is outlined in HPI.     Assessment:  Hypoxia: Requiring NC 2 L/min.  COPD without exacerbation.  Pulmonary hypertension as evidenced by pulmonary artery widening on CT chest  Sinus tachycardia, improving.  Bilateral lower extremity edema  Essential hypertension.  Type 2 diabetes mellitus.  Hyperlipidemia.  Normocytic anemia.      Plan:    Patient is admitted under inpatient status.  Continue nasal cannula and wean off oxygen as tolerated.  The exact etiology of hypoxia is unclear could be secondary to potential atelectasis, or mild pulmonary edema.  Add incentive spirometry.  Trial of IV     CO2 17*  --    BUN 45*  --    CREATININE 1.4*  --    CALCIUM 9.0  --    MG 1.40* 4.00*     Recent Labs     10/29/24  2219 10/29/24  2353   PROBNP 5,960*  --    TROPHS 17* 15*     No results for input(s): \"LABA1C\" in the last 72 hours.  Recent Labs     10/29/24  2219   AST 19   ALT 10   BILITOT 0.4   ALKPHOS 182*     No results for input(s): \"INR\", \"LACTA\", \"TSH\" in the last 72 hours.     Glynn Thapa MD

## 2024-10-30 NOTE — ED TRIAGE NOTES
THE [facility]  EMERGENCY DEPARTMENT ENCOUNTER          PHYSICIAN ASSISTANT NOTE       Date of evaluation: 10/29/2024    Chief Complaint     Fatigue (Patient comes in with increase  weakness /Patient had a fall on September 17th and fractured her pelvis in 8 different places per patient /Patient normally can walk with a walker however today she is needing more assistance. /)      History of Present Illness     Carolee Sharma is a 74 y.o. female with PMHx of HTN and HLD who presents with generalized weakness. She states her legs felt weak for the past 2 days but she did not fall. She denies lightheadedness or dizziness. She states she took a long trip to [place] 1 month ago and had a mechanical fall which resulted in a fracture of right displaced inferior pubic rami fracture, right high ramus fracture adjacent to the acetabular cup from previous right total hip arthroplasty. She did stay in rehab with ot/pt. She denies chest pain and shortness of breath. Denies recent cough or URI symptoms. No urinary symptoms.     ASSESSMENT / PLAN  (MEDICAL DECISION MAKING)     INITIAL VITALS: BP: 138/75, Temp: 98 °F (36.7 °C), Pulse: (!) 125, Respirations: 16,      Carolee Sharma is a 74 y.o. female PMHx of HTN and HLD who presents with generalized weakness. She states her legs felt weak for the past 2 days but she did not fall. She denies lightheadedness or dizziness. She states she took a long trip to [place] 1 month ago and had a mechanical fall which resulted in a fracture of right displaced inferior pubic rami fracture, right high ramus fracture adjacent to the acetabular cup from previous right total hip arthroplasty. She did stay in rehab with ot/pt. She denies chest pain and shortness of breath. Denies recent cough or URI symptoms. No urinary symptoms.    On exam, patient is sitting comfortably on the exam table in no acute distress.        D-dimer positive  HypoMg  O2 improved to 100% with 2L    Is this patient to be

## 2024-10-30 NOTE — ED PROVIDER NOTES
ED Attending Attestation Note     Date of evaluation: 10/29/2024    This patient was seen by the advance practice provider.  I have seen and examined the patient, agree with the workup, evaluation, management and diagnosis. The care plan has been discussed.  I have reviewed the ECG and concur with the SHAHRIAR's interpretation.  My assessment reveals a 74-year-old female who presents with a chief complaint of fatigue.  Patient with increasing fatigue over the last 2 days.  History of recent fall with pelvic fractures..        General: This is a wd/wn elderly  in no acute distress who appears their stated age.     HEENT: NC/AT. PERRL. OP clear. MMM.     Neck: Supple. Trachea midline.    Pulmonary: Normal work of breathing, not using accessory muscles or pursed lip breathing    Cardiac: tachycardic    Abdomen: S/NT/ND/+BS. No cva tenderness.     Musculoskeletal: WWP with no clubbing, cyanosis, or deformities noted.     Vascular: +2 radial and DP pulses.     Skin: Warm and dry with no lesions noted    Neuro:  AAOx4.  Face is grossly symmetric.  Gait not assessed. Moving all 4 extremities equally and spontaneously       Alba March MD  10/30/24 0616

## 2024-10-30 NOTE — CONSULTS
Initial Pulmonary & Critical Care Consult Note      Reason for Consult: hypoxia, pulmonary htn   Requesting Physician: Dr Thapa    Subjective:   CHIEF COMPLAINT / HPI:                The patient is a 74 y.o. female with significant past medical history of COPD, DM2, HLD, CKD, HTN who presents with complaints of generalized weakness.     Patient states that over the course of the past couple weeks she has had ongoing issues with generalized weakness/fatigue.  She did have a fall while on a trip to [place] for a wedding that center to the hospital.  She was subsequently sent to a SNF for further rehab.  After returning back to [place] she again presented to her PCP who recommended outpatient physical therapy to gain her strength back.  Since completing that about 3 days ago she has noted continued generalized weakness and states that she has found it difficult to ambulate around her house without assistance.  She states that she has been unable to walk to the bathroom without using a walker and occasionally requiring the assistance of her .    She denies any specific feeling of shortness of breath and states that her complaints are mostly that of weakness/unsteadiness.  She denies any syncopal episodes associated with this.    Since evaluation in the emergency room she has been placed on nasal cannula at 2 L after saturating in the mid to high 80s.  A CTPA was obtained which showed no evidence of acute pulmonary embolism or parenchymal infiltrate.  It did note dilation of the pulmonary artery raising the question of pulmonary hypertension.    Review of notes on care everywhere reveal pre-existing enlargement of the main pulmonary artery suggesting pulmonary hypertension as of 5/22/2019.  The read of the CT lung states that the main pulmonary artery measured 3.9 cm.  On the CT performed at the time of patient's current admission the pulmonary artery measures 3.9 cm once again.     At the time of my     MCH 29.9 10/29/2024 10:19 PM    MCHC 32.6 10/29/2024 10:19 PM    RDW 16.5 10/29/2024 10:19 PM    LYMPHOPCT 8.4 10/29/2024 10:19 PM    MONOPCT 7.3 10/29/2024 10:19 PM    EOSPCT 2.6 10/29/2024 10:19 PM    BASOPCT 0.7 10/29/2024 10:19 PM    MONOSABS 0.6 10/29/2024 10:19 PM    LYMPHSABS 0.7 10/29/2024 10:19 PM    EOSABS 0.2 10/29/2024 10:19 PM    BASOSABS 0.1 10/29/2024 10:19 PM     BMP:    Lab Results   Component Value Date/Time     10/29/2024 10:19 PM    K 5.0 10/29/2024 10:19 PM     10/29/2024 10:19 PM    CO2 17 10/29/2024 10:19 PM    BUN 45 10/29/2024 10:19 PM    CREATININE 1.4 10/29/2024 10:19 PM    CALCIUM 9.0 10/29/2024 10:19 PM    GFRAA 49 01/21/2021 05:36 AM    LABGLOM 40 10/29/2024 10:19 PM    LABGLOM 34 04/06/2023 06:46 PM    GLUCOSE 285 10/29/2024 10:19 PM     Hepatic Function Panel:    Lab Results   Component Value Date/Time    ALKPHOS 182 10/29/2024 10:19 PM    ALT 10 10/29/2024 10:19 PM    AST 19 10/29/2024 10:19 PM    BILITOT 0.4 10/29/2024 10:19 PM     ABG:  No results found for: \"LAF4TKV\", \"BEART\", \"U7XYWDAS\", \"PHART\", \"THGBART\", \"CQW1QSL\", \"PO2ART\", \"CWO4SFX\"    Cultures:   Blood Culture:    Sputum Culture:        Radiology Review:  All pertinent images / reports were reviewed as a part of this visit.  Chest CT reveals the following:    Chest CT 10/29/2024: No evidence of pulmonary embolism.  No evidence of focal consolidation.  Pulmonary artery is dilated to 3.9 cm.  There is evidence of emphysema.       PFTs: 2019  Obstructive defect is present.  Spirometry shows a severe obstructive defect.  There is no response to bronchodilator demonstrated.  Lung volumes are at the lower limit of normal.  Air trapping is present on lung volume testing.  Diffusing capacity is severely decreased.  Muscle strength - MIP is severely decreased, Muscle strength - MEP is severely decreased     Echo:  10/30/24    Left Ventricle: Normal left ventricular systolic function with a visually estimated EF of 60  - 65%. EF by 2D Simpsons Biplane is 61%. Left ventricle size is normal. Increased wall thickness. Normal wall motion. Grade II diastolic dysfunction with increased LAP.    Mitral Valve: Mildly thickened leaflets.  No significant mitral regurgitation or stenosis    Tricuspid Valve: Moderate regurgitation. Severely elevated RVSP, consistent with severe pulmonary hypertension.  RVSP 66 mmHg    Left Atrium: Left atrium is mildly dilated.    Right Atrium: Right atrium is mildly dilated.    Aorta: Dilated ascending aorta. Ao ascending diameter is 3.6 cm.    Pericardium: Trivial pericardial effusion present. No indication of cardiac tamponade. Evidence includes normal LV size, no septal bounce, no IVC dilation, normal hepatic veins and no chamber collapse.    Image quality is adequate.      Assessment/Plan   Carolee Sharma is a 74 y.o. female with past medical hx of COPD, DM2, HLDD, CKD, HTN who presented to the ED with generalized weakness.    COPD, not in acute exacerbation  ?Pulmonary Hypertension   Hypoxemia  Patient with CT findings reflective of emphysema and pulmonary hypertension.  Initial oxygen requirement of 2 L has now been weaned to room air.  Patient denies any acute respiratory changes including shortness of breath, cough, wheezing, fevers, chills.  Enlargement of the pulmonary artery exhibited on CT performed 10/29 is stable from prior CT obtained 5/22/2019.  No acute evidence of edema or pneumonia on CT scan.  -agree with repeat echo  - wean O2 to 88% or >  -Continue Stiolto  -Patient to continue to follow-up with pulmonologist as an outpatient for continued management of her COPD and pulmonary hypertension    Generalized weakness  Deconditioning  Patient symptoms more likely indicative of generalized deconditioning.  She has been working with physical therapy and SNF/outpatient setting but has not seen the progress that she would like.  -PT and OT evaluation with recommendation for inpatient

## 2024-10-30 NOTE — PLAN OF CARE
Problem: Chronic Conditions and Co-morbidities  Goal: Patient's chronic conditions and co-morbidity symptoms are monitored and maintained or improved  Outcome: Progressing  Flowsheets (Taken 10/30/2024 0308)  Care Plan - Patient's Chronic Conditions and Co-Morbidity Symptoms are Monitored and Maintained or Improved:   Monitor and assess patient's chronic conditions and comorbid symptoms for stability, deterioration, or improvement   Collaborate with multidisciplinary team to address chronic and comorbid conditions and prevent exacerbation or deterioration   Update acute care plan with appropriate goals if chronic or comorbid symptoms are exacerbated and prevent overall improvement and discharge     Problem: Skin/Tissue Integrity  Goal: Absence of new skin breakdown  Description: 1.  Monitor for areas of redness and/or skin breakdown  2.  Assess vascular access sites hourly  3.  Every 4-6 hours minimum:  Change oxygen saturation probe site  4.  Every 4-6 hours:  If on nasal continuous positive airway pressure, respiratory therapy assess nares and determine need for appliance change or resting period.  Outcome: Progressing     Problem: Safety - Adult  Goal: Free from fall injury  Outcome: Progressing  Flowsheets (Taken 10/30/2024 0308)  Free From Fall Injury: Based on caregiver fall risk screen, instruct family/caregiver to ask for assistance with transferring infant if caregiver noted to have fall risk factors     Problem: Discharge Planning  Goal: Discharge to home or other facility with appropriate resources  Outcome: Progressing

## 2024-10-30 NOTE — PROGRESS NOTES
4 Eyes Admission Assessment     I agree as the admission nurse that 2 RN's have performed a thorough Head to Toe Skin Assessment on the patient. ALL assessment sites listed below have been assessed on admission.       Areas assessed by both nurses:   [x]   Head, Face, and Ears   [x]   Shoulders, Back, and Chest  [x]   Arms, Elbows, and Hands   [x]   Coccyx, Sacrum, and Ischum  [x]   Legs, Feet, and Heels        Does the Patient have Skin Breakdown?  No       With redness on the right groin, redness both heel and foot, scattered scabs and healed surgical incision right hip  Ra Prevention initiated:  Yes   Wound Care Orders initiated:  No      WOC nurse consulted for Pressure Injury (Stage 3,4, Unstageable, DTI, NWPT, and Complex wounds):  No      Nurse 1 eSignature: Electronically signed by Juan Bowens RN on 10/30/24 at 3:11 AM EDT    **SHARE this note so that the co-signing nurse is able to place an eSignature**    Nurse 2 eSignature: Electronically signed by Bjorn Lopez RN on 10/30/24 at 6:17 AM EDT

## 2024-10-30 NOTE — ED TRIAGE NOTES
Patient comes in with increase  weakness   Patient had a fall on September 17th and fractured her pelvis in 8 different places per patient   Patient normally can walk with a walker however today she is needing more assistance.

## 2024-10-30 NOTE — PROGRESS NOTES
ADL    Restrictions  Position Activity Restriction  Other position/activity restrictions: up with assist    Subjective  General  Chart Reviewed: Yes  Patient assessed for rehabilitation services?: Yes  Additional Pertinent Hx: 74 y.o. female with PMHx significant for COPD, diabetes mellitus, hyperlipidemia, CKD, essential hypertension who presented to ED with a complaint of generalized weakness and fatigue.  Patient has been having worsening fatigue over the last few days.  Patient was not able to stand up and was having difficulty using her walker at home.  Family / Caregiver Present: No  Referring Practitioner: Glynn Thapa MD  Diagnosis: Hypoxia  Subjective  Subjective: Pt in bed upon arrival, pleasant and agreeable to OT eval and treat. Pt reporting unrated \"tail bone/ left hip\" pain. Pt positioned to comfort at end of session     Social/Functional History  Social/Functional History  Lives With: Spouse  Type of Home: House  Home Layout: Two level, Bed/Bath upstairs (12 steps to 2nd floor with BI handrails except for first 3 steps)  Home Access: Stairs to enter with rails  Entrance Stairs - Number of Steps: 4  Entrance Stairs - Rails: Both  Bathroom Shower/Tub: Walk-in shower  Bathroom Toilet: Handicap height  Bathroom Equipment: Grab bars in shower, Shower chair, Grab bars around toilet  Bathroom Accessibility: Accessible  Home Equipment: Walker - Rolling (currently renting a hospital bed at home)  Has the patient had two or more falls in the past year or any fall with injury in the past year?: Yes (most recent sept 19th)  Receives Help From: Family ( helps and has been providing increased assistance)  ADL Assistance: Needs assistance (able to wash hair and front of body seated at shower chair but needs help rinsing and cleaning back/feet)  Homemaking Assistance: Needs assistance  Homemaking Responsibilities: Yes (pt reports wanting to help more but  has taken over most of responsibilities  Exceptions: Wears glasses at all times  Hearing  Hearing: Exceptions to WFL  Hearing Exceptions: Bilateral hearing aid  Cognition  Overall Cognitive Status: WFL  Orientation  Overall Orientation Status: Within Functional Limits  Orientation Level: Oriented X4                  Education Given To: Patient  Education Provided: Role of Therapy;Plan of Care;ADL Adaptive Strategies;Transfer Training  Education Method: Demonstration;Verbal  Barriers to Learning: None  Education Outcome: Verbalized understanding;Demonstrated understanding                     G-Code     OutComes Score                                                  AM-PAC - ADL  AM-PAC Daily Activity - Inpatient   How much help is needed for putting on and taking off regular lower body clothing?: Total  How much help is needed for bathing (which includes washing, rinsing, drying)?: A Lot  How much help is needed for toileting (which includes using toilet, bedpan, or urinal)?: Total  How much help is needed for putting on and taking off regular upper body clothing?: A Little  How much help is needed for taking care of personal grooming?: A Little  How much help for eating meals?: A Little  AM-[facility] Inpatient Daily Activity Raw Score: 13  AM-PAC Inpatient ADL T-Scale Score : 32.03  ADL Inpatient CMS 0-100% Score: 63.03  ADL Inpatient CMS G-Code Modifier : CL    Tinneti Score       Goals  Short Term Goals  Time Frame for Short Term Goals: by dc  Short Term Goal 1: Pt will complete LE dressing w/ CGA and use of LRAD  Short Term Goal 2: Pt will complete toileting w/ CGA  Short Term Goal 3: Pt will complete functional transfers w/ CGA      Therapy Time   Individual Concurrent Group Co-treatment   Time In 0915         Time Out 1030         Minutes 75         Timed Code Treatment Minutes: 60 Minutes       Yamileth mortensen OT

## 2024-10-30 NOTE — ED NOTES
How does patient ambulate?   []Low Fall Risk (ambulates by themselves without support)  []Stand by assist   []Contact Guard   [x]Front wheel walker - Usually uses walker at home but has had increased weakness and needs more assistance  []Wheelchair   []Steady  []Bed bound  []History of Lower Extremity Amputation  []Unknown, did not assess in the emergency department   How does patient take pills?  [x]Whole with Water  []Crushed in applesauce  []Crushed in pudding  []Other  []Unknown no oral medications were given in the ED  Is patient alert?   [x]Alert  []Drowsy but responds to voice  []Doesn't respond to voice but responds to painful stimuli  []Unresponsive  Is patient oriented?   [x]To person  [x]To place  [x]To time  [x]To situation  []Confused  []Agitated  [x]Follows commands  If patient is disoriented or from a Skill Nursing Facility has family been notified of admission?   []Yes - N/A Coming from home, spouse at bedside  []No  Patient belongings?   []Cell phone  []Wallet   []Dentures  [x]Clothing  Any specific patient or family belongings/needs/dynamics?   Patient comes in with increase  weakness. Patient had a fall on September 17th   and fractured her pelvis in 8 different places per patient. Patient normally can  walk with a walker however today she is needing more assistance.         Miscellaneous comments/pending orders?  Recheck Mag Lab after completion of IV mag replacement    If there are any additional questions please reach out to the Emergency Department.           Ryan Parisi RN  10/30/24 0116

## 2024-10-30 NOTE — PROGRESS NOTES
Physical Therapy  Facility/Department: [address]  Physical Therapy Initial Assessment    Name: Carolee Sharma  : 1950  MRN: [id number]  Date of Service: 10/30/2024    Discharge Recommendations:  IP Rehab, Patient able to tolerate 3 hours of therapy per day   PT Equipment Recommendations  Equipment Needed: No    At baseline this patient was Mod I with functional mobility & ADL's. The current hospitalization has resulted in the patient now being limited with all aspects of mobility, negatively impacting the patient's functional independence, ability to safely access their home environment, and ability to complete valued daily tasks and life roles. Carolee Sharma is motivated for recovery and demonstrates the ability to tolerate inpatient rehabilitation 3 hours/day, 5 days/week for optimal return to functional independence, quality of life, and decreased caregiver burden.       Patient Diagnosis(es): Hypoxemia  Past Medical History:  has a past medical history of Hyperlipidemia, Hypertension, Thyroid disease, and Type II or unspecified type diabetes mellitus without mention of complication, not stated as uncontrolled.  Past Surgical History:  has a past surgical history that includes Total hip arthroplasty (Right, 2021) and Kyphosis surgery.    Assessment  Body Structures, Functions, Activity Limitations Requiring Skilled Therapeutic Intervention: Decreased functional mobility ;Decreased ADL status;Decreased ROM;Decreased strength;Decreased safe awareness;Decreased endurance;Increased pain;Decreased balance  Assessment: pt is a 74 y.o. female with PMHx significant for COPD, diabetes mellitus, hyperlipidemia, CKD, essential hypertension who presented to ED with a complaint of generalized weakness and fatigue. pt well below baseline function of mod I home amb and ADLs and a high fall risk this date. on eval, pt reported generalized BI LE weakness and pain on L buttock and sacral region. pt ability  pain/discomfort - doffed pt shirt and donned gown)  Standing - Static: Fair;+  Standing - Dynamic: Fair;- (mod A x1 at RW - donned new brief)          OutComes Score                                                  AM-PAC - Mobility    AM-PAC Basic Mobility - Inpatient   How much help is needed turning from your back to your side while in a flat bed without using bedrails?: A Little  How much help is needed moving from lying on your back to sitting on the side of a flat bed without using bedrails?: A Lot  How much help is needed moving to and from a bed to a chair?: A Lot  How much help is needed standing up from a chair using your arms?: A Lot  How much help is needed walking in hospital room?: A Lot  How much help is needed climbing 3-5 steps with a railing?: Total  AM-PAC Inpatient Mobility Raw Score : 12  AM-PAC Inpatient T-Scale Score : 35.33  Mobility Inpatient CMS 0-100% Score: 68.66  Mobility Inpatient CMS G-Code Modifier : CL         Tinneti Score       Goals  Short Term Goals  Time Frame for Short Term Goals: by dc  Short Term Goal 1: pt to perform bed mobility mod I  Short Term Goal 2: pt to perform transfers mod I with LRAD  Short Term Goal 3: pt to amb 50' mod with LRAD  Short Term Goal 4: pt to perform 12 steps mod I with handrails  Patient Goals   Patient Goals : to return home       Education  Patient Education  Education Given To: Patient  Education Provided: Role of Therapy;Plan of Care;Transfer Training;Energy Conservation;Fall Prevention Strategies;Equipment  Education Provided Comments: educated pt on safe use of RW during transfers and amb. discussed dc planning and the benefits of rehab prior to return home  Education Method: Verbal;Demonstration  Barriers to Learning: None  Education Outcome: Verbalized understanding;Demonstrated understanding      Therapy Time   Individual Concurrent Group Co-treatment   Time In 0915         Time Out 1030         Minutes 75              Timed Code Treatment

## 2024-10-30 NOTE — FLOWSHEET NOTE
Received from ED with complaints of increasing weakness on both lowe leg and unable to ambulate alone. She is alert and oriented x 4 on oxygen at 2L nasal cannula. [place] patient to staff, bed set up, room and use of call light. Physical assessment and vital signs checked and recorded. Hooked to telemetry showing Normal sinus rhythm. Keep patient comfortable in bed.       10/30/24 0222   Vitals   Temp 98.7 °F (37.1 °C)   Temp Source Oral   Pulse 99   Heart Rate Source Monitor   Respirations 18   BP (!) 144/71   MAP (Calculated) 95   BP Location Left upper arm   BP Upper/Lower Upper   BP Method Automatic   Patient Position Semi fowlers   Pain Assessment   Pain Assessment None - Denies Pain   Opioid-Induced Sedation   POSS Score 1   RASS   Melvin Agitation Sedation Scale (RASS) 0   Oxygen Therapy   SpO2 98 %   Pulse Oximeter Device Mode Intermittent   Pulse Oximeter Device Location Left;Finger   O2 Device Nasal cannula   O2 Flow Rate (L/min) 2 L/min

## 2024-10-31 ENCOUNTER — APPOINTMENT (OUTPATIENT)
Dept: GENERAL RADIOLOGY | Age: 74
End: 2024-10-31
Payer: MEDICARE

## 2024-10-31 LAB
ANION GAP SERPL CALCULATED.3IONS-SCNC: 11 MMOL/L (ref 3–16)
BACTERIA UR CULT: ABNORMAL
BASOPHILS # BLD: 0 K/UL (ref 0–0.2)
BASOPHILS NFR BLD: 0.5 %
BUN SERPL-MCNC: 37 MG/DL (ref 7–20)
CALCIUM SERPL-MCNC: 9.1 MG/DL (ref 8.3–10.6)
CHLORIDE SERPL-SCNC: 100 MMOL/L (ref 99–110)
CO2 SERPL-SCNC: 22 MMOL/L (ref 21–32)
CREAT SERPL-MCNC: 1.3 MG/DL (ref 0.6–1.2)
DEPRECATED RDW RBC AUTO: 16 % (ref 12.4–15.4)
EOSINOPHIL # BLD: 0.1 K/UL (ref 0–0.6)
EOSINOPHIL NFR BLD: 1.2 %
EST. AVERAGE GLUCOSE BLD GHB EST-MCNC: 108.3 MG/DL
GFR SERPLBLD CREATININE-BSD FMLA CKD-EPI: 43 ML/MIN/{1.73_M2}
GLUCOSE BLD-MCNC: 151 MG/DL (ref 70–99)
GLUCOSE BLD-MCNC: 169 MG/DL (ref 70–99)
GLUCOSE BLD-MCNC: 251 MG/DL (ref 70–99)
GLUCOSE BLD-MCNC: 310 MG/DL (ref 70–99)
GLUCOSE SERPL-MCNC: 145 MG/DL (ref 70–99)
HBA1C MFR BLD: 5.4 %
HCT VFR BLD AUTO: 29.4 % (ref 36–48)
HGB BLD-MCNC: 9.6 G/DL (ref 12–16)
LYMPHOCYTES # BLD: 1 K/UL (ref 1–5.1)
LYMPHOCYTES NFR BLD: 15.5 %
MAGNESIUM SERPL-MCNC: 1.8 MG/DL (ref 1.8–2.4)
MCH RBC QN AUTO: 29.7 PG (ref 26–34)
MCHC RBC AUTO-ENTMCNC: 32.7 G/DL (ref 31–36)
MCV RBC AUTO: 90.9 FL (ref 80–100)
MONOCYTES # BLD: 0.5 K/UL (ref 0–1.3)
MONOCYTES NFR BLD: 8 %
NEUTROPHILS # BLD: 5 K/UL (ref 1.7–7.7)
NEUTROPHILS NFR BLD: 74.8 %
ORGANISM: ABNORMAL
PERFORMED ON: ABNORMAL
PLATELET # BLD AUTO: 243 K/UL (ref 135–450)
PMV BLD AUTO: 7.6 FL (ref 5–10.5)
POTASSIUM SERPL-SCNC: 4.8 MMOL/L (ref 3.5–5.1)
RBC # BLD AUTO: 3.24 M/UL (ref 4–5.2)
SODIUM SERPL-SCNC: 133 MMOL/L (ref 136–145)
WBC # BLD AUTO: 6.7 K/UL (ref 4–11)

## 2024-10-31 PROCEDURE — 80048 BASIC METABOLIC PNL TOTAL CA: CPT

## 2024-10-31 PROCEDURE — 94640 AIRWAY INHALATION TREATMENT: CPT

## 2024-10-31 PROCEDURE — 6370000000 HC RX 637 (ALT 250 FOR IP)

## 2024-10-31 PROCEDURE — 2580000003 HC RX 258: Performed by: INTERNAL MEDICINE

## 2024-10-31 PROCEDURE — 6360000002 HC RX W HCPCS: Performed by: INTERNAL MEDICINE

## 2024-10-31 PROCEDURE — 1200000000 HC SEMI PRIVATE

## 2024-10-31 PROCEDURE — 83735 ASSAY OF MAGNESIUM: CPT

## 2024-10-31 PROCEDURE — 6370000000 HC RX 637 (ALT 250 FOR IP): Performed by: INTERNAL MEDICINE

## 2024-10-31 PROCEDURE — 72100 X-RAY EXAM L-S SPINE 2/3 VWS: CPT

## 2024-10-31 PROCEDURE — 83036 HEMOGLOBIN GLYCOSYLATED A1C: CPT

## 2024-10-31 PROCEDURE — 36415 COLL VENOUS BLD VENIPUNCTURE: CPT

## 2024-10-31 PROCEDURE — 97530 THERAPEUTIC ACTIVITIES: CPT

## 2024-10-31 PROCEDURE — 85025 COMPLETE CBC W/AUTO DIFF WBC: CPT

## 2024-10-31 PROCEDURE — 97535 SELF CARE MNGMENT TRAINING: CPT

## 2024-10-31 RX ORDER — TRAMADOL HYDROCHLORIDE 50 MG/1
50 TABLET ORAL EVERY 6 HOURS PRN
Status: DISCONTINUED | OUTPATIENT
Start: 2024-10-31 | End: 2024-11-07

## 2024-10-31 RX ORDER — CEFDINIR 300 MG/1
300 CAPSULE ORAL EVERY 12 HOURS SCHEDULED
Status: COMPLETED | OUTPATIENT
Start: 2024-10-31 | End: 2024-11-07

## 2024-10-31 RX ADMIN — TRAMADOL HYDROCHLORIDE 50 MG: 50 TABLET ORAL at 12:17

## 2024-10-31 RX ADMIN — SODIUM CHLORIDE, PRESERVATIVE FREE 10 ML: 5 INJECTION INTRAVENOUS at 09:19

## 2024-10-31 RX ADMIN — ENOXAPARIN SODIUM 40 MG: 100 INJECTION SUBCUTANEOUS at 09:19

## 2024-10-31 RX ADMIN — FERROUS SULFATE TAB 325 MG (65 MG ELEMENTAL FE) 325 MG: 325 (65 FE) TAB at 09:18

## 2024-10-31 RX ADMIN — ACETAMINOPHEN 650 MG: 325 TABLET ORAL at 09:18

## 2024-10-31 RX ADMIN — METOPROLOL TARTRATE 50 MG: 50 TABLET, FILM COATED ORAL at 20:16

## 2024-10-31 RX ADMIN — METOPROLOL TARTRATE 50 MG: 50 TABLET, FILM COATED ORAL at 09:18

## 2024-10-31 RX ADMIN — TIOTROPIUM BROMIDE AND OLODATEROL 2 PUFF: 3.124; 2.736 SPRAY, METERED RESPIRATORY (INHALATION) at 10:50

## 2024-10-31 RX ADMIN — INSULIN LISPRO 4 UNITS: 100 INJECTION, SOLUTION INTRAVENOUS; SUBCUTANEOUS at 12:17

## 2024-10-31 RX ADMIN — INSULIN LISPRO 6 UNITS: 100 INJECTION, SOLUTION INTRAVENOUS; SUBCUTANEOUS at 20:21

## 2024-10-31 RX ADMIN — ACETAMINOPHEN 650 MG: 325 TABLET ORAL at 00:39

## 2024-10-31 RX ADMIN — Medication 5000 UNITS: at 09:19

## 2024-10-31 RX ADMIN — LOSARTAN POTASSIUM 50 MG: 50 TABLET, FILM COATED ORAL at 09:18

## 2024-10-31 RX ADMIN — TRAMADOL HYDROCHLORIDE 50 MG: 50 TABLET ORAL at 18:32

## 2024-10-31 RX ADMIN — CEFDINIR 300 MG: 300 CAPSULE ORAL at 20:16

## 2024-10-31 RX ADMIN — BUPROPION HYDROCHLORIDE 300 MG: 150 TABLET, EXTENDED RELEASE ORAL at 09:18

## 2024-10-31 RX ADMIN — INSULIN GLARGINE 10 UNITS: 100 INJECTION, SOLUTION SUBCUTANEOUS at 20:16

## 2024-10-31 RX ADMIN — ASPIRIN 81 MG: 81 TABLET, COATED ORAL at 20:16

## 2024-10-31 RX ADMIN — PANTOPRAZOLE SODIUM 40 MG: 40 TABLET, DELAYED RELEASE ORAL at 09:18

## 2024-10-31 RX ADMIN — CYANOCOBALAMIN TAB 1000 MCG 1000 MCG: 1000 TAB at 09:18

## 2024-10-31 RX ADMIN — SODIUM CHLORIDE, PRESERVATIVE FREE 10 ML: 5 INJECTION INTRAVENOUS at 20:22

## 2024-10-31 RX ADMIN — ASPIRIN 81 MG: 81 TABLET, COATED ORAL at 09:19

## 2024-10-31 RX ADMIN — ACETAMINOPHEN 650 MG: 325 TABLET ORAL at 20:31

## 2024-10-31 ASSESSMENT — PAIN DESCRIPTION - PAIN TYPE
TYPE: CHRONIC PAIN
TYPE: CHRONIC PAIN

## 2024-10-31 ASSESSMENT — PAIN DESCRIPTION - ORIENTATION
ORIENTATION: LOWER
ORIENTATION: LEFT
ORIENTATION: LOWER

## 2024-10-31 ASSESSMENT — PAIN DESCRIPTION - FREQUENCY
FREQUENCY: INTERMITTENT
FREQUENCY: INTERMITTENT

## 2024-10-31 ASSESSMENT — PAIN - FUNCTIONAL ASSESSMENT
PAIN_FUNCTIONAL_ASSESSMENT: PREVENTS OR INTERFERES SOME ACTIVE ACTIVITIES AND ADLS
PAIN_FUNCTIONAL_ASSESSMENT: PREVENTS OR INTERFERES SOME ACTIVE ACTIVITIES AND ADLS

## 2024-10-31 ASSESSMENT — PAIN DESCRIPTION - LOCATION
LOCATION: HIP
LOCATION: BACK
LOCATION: GENERALIZED
LOCATION: BACK;BUTTOCKS

## 2024-10-31 ASSESSMENT — PAIN DESCRIPTION - DESCRIPTORS
DESCRIPTORS: DISCOMFORT
DESCRIPTORS: DISCOMFORT
DESCRIPTORS: ACHING;TENDER
DESCRIPTORS: ACHING

## 2024-10-31 ASSESSMENT — PAIN SCALES - GENERAL
PAINLEVEL_OUTOF10: 6
PAINLEVEL_OUTOF10: 3

## 2024-10-31 ASSESSMENT — PAIN DESCRIPTION - ONSET
ONSET: GRADUAL
ONSET: GRADUAL

## 2024-10-31 NOTE — PROGRESS NOTES
Physician Progress Note      PATIENT:               RYLEE REINA  CSN #:                  [id number]  :                       1950  ADMIT DATE:       10/29/2024 9:42 PM  DISCH DATE:  RESPONDING  PROVIDER #:        Chaim Washington DO          QUERY TEXT:    Patient admitted with weakness, noted to have CKD. If possible, please   document in progress notes and discharge summary if you are evaluating and/or   treating any of the following:    The medical record reflects the following:  Risk Factors:  Clinical Indicators: GFR 40, 43....CRT 1.4, 1.3......Per H&P on 10/30/24- PMH-   CKD  Treatment: Monitor  Options provided:  -- CKD Stage 1 GFR>90  -- CKD Stage 2 GFR 60-90  -- CKD Stage 3a GFR 45-59  -- CKD Stage 3b GFR 30-44  -- CKD Stage 4 GFR 15-29  -- CKD Stage 5 GFR<15  -- ESRD  -- Other - I will add my own diagnosis  -- Disagree - Not applicable / Not valid  -- Disagree - Clinically unable to determine / Unknown  -- Refer to Clinical Documentation Reviewer    PROVIDER RESPONSE TEXT:    This patient has CKD Stage 3b.    Query created by: Cesilia Eubanks on 10/31/2024 7:48 AM      Electronically signed by:  Chaim Washington DO 10/31/2024 4:02 PM

## 2024-10-31 NOTE — PROGRESS NOTES
Update 11/3/2024: precert pending     Update 11/1/2024: precert pending     [facility] - Acute Rehab Unit   After review, this patient is felt to be:       [x]  Dr. Martinez states this patient is appropriate for rehab.     []  Not appropriate for Acute Inpatient Rehab    []  Referral received and ARU reviewing patient      Precert initiated 10/31/2024 for ARU admission. Pt in agreement per  and CL's conversation with pt this AM. Ref#:[id number] .    Will notify CM/SW with further updates. Thank you for the referral.    Sheron LANDA OTR/L  Clinical Liaison- [facility]   (P): [phone]  (F): [phone]

## 2024-10-31 NOTE — PROGRESS NOTES
Occupational Therapy  Facility/Department: [address]  Occupational Therapy Treatment    Name: Carolee Sharma  : 1950  MRN: [id number]  Date of Service: 10/31/2024    Discharge Recommendations:  IP Rehab  OT Equipment Recommendations  Equipment Needed: No    Treatment Diagnosis: impaired ADLs and functional mobility/transfers    Assessment  Performance deficits / Impairments: Decreased functional mobility ;Decreased endurance;Decreased ADL status;Decreased balance;Decreased strength  Assessment: Pt was independent with ADLs and mobility until September when she vacationed in [place] for a family wedding and sustained a fall that resulted in multiple pelvic fractures. Pt went to SNF in [place], recently returned home to [place] and has had progressive decline in functional status during her brief time home. Pt did demo improved transfers this date and was able to ambulate short distance to bathroom using RW with min A of 1. Pt requires assist for LB ADLs and toileting tasks. Pt is limited by hip and back pain and generalized weakness/deconditioning. Pt remains a high fall risk. Recommend intensive OT Tx upon d/c to maximize pt safety and independence prior to return home  Treatment Diagnosis: impaired ADLs and functional mobility/transfers  REQUIRES OT FOLLOW-UP: Yes  Activity Tolerance  Activity Tolerance: Patient Tolerated treatment well     Plan  Occupational Therapy Plan  Times Per Week: 2-5  Current Treatment Recommendations: Strengthening, Balance training, Functional mobility training, Endurance training, Patient/Caregiver education & training, Safety education & training, Self-Care / ADL    Restrictions  Position Activity Restriction  Other position/activity restrictions: up with assist    Subjective  General  Chart Reviewed: Yes  Patient assessed for rehabilitation services?: Yes  Additional Pertinent Hx: 74 y.o. female with PMHx significant for COPD, diabetes mellitus, hyperlipidemia, CKD, essential  Yes  Ambulation Assistance: Needs assistance  Transfer Assistance: Needs assistance  Active : Yes  Mode of Transportation: Car  Occupation: Retired  Additional Comments: was getting PT at nursing home to improve amb but returned home a week ago and seen a significant decline. Received 2-3 OP PT sessions    Objective    Toilet Transfers  Equipment Used: Standard toilet (L grab bar)  Toilet Transfer: Moderate assistance    ADL  LE Dressing: Moderate assistance (noted brief wet from purewick; doffed brief and donned depends with mod A- assist to thread LEs due to decreased ROM)  Toileting: Dependent/Total (purewick in bed/chair; reports baseline incontinence; denied need to toilet during toilet transfer trial)    Bed mobility  Supine to Sit: Contact guard assistance (HOB raised; increased time/effort)  Scooting: Stand by assistance (hips to edge of bed)    Transfers  Stand Step Transfers: Minimal assistance (bed to chair using RW)  Sit to stand: Moderate assistance (mod A from bed; min A from chair with armrests)  Stand to sit: Minimal assistance (to chair x2 reps)    Functional Mobility  Equipment: Rolling walker  Level of Assist: min A bed>chair; initial min A progressing to CGA to ambulate to/from bathroom (10' x2)  Distance: to/from bathroom  Comment: slow and effortful, painful L hip. Difficult to step either LEs but step fluidity improved as mobility progressed    Activity Tolerance: Pt on RA at rest. spO2 82% after transfer to chair on RA. Trialed 1L O2 to ambulate to bathroom with spO2 87%.Trialed 2L O2 to ambulate back to chair with spO2 90-92%. Pt denied SOB with activity. Removed O2 once pt seated in chair and O2 remained 92-95% on RA. Discussed with RN and MD    Education Given To: Patient  Education Provided: Role of Therapy;Plan of Care;Transfer Training;Mobility Training;Fall Prevention Strategies;ADL Adaptive Strategies;Precautions  Education Method: Demonstration;Verbal  Barriers to Learning:

## 2024-10-31 NOTE — PROGRESS NOTES
Spiritual Health History and Assessment/Progress Note  [facility]    (P) Loneliness/Social Isolation,  ,  ,      Name: Carolee Sharma MRN: [id number]    Age: 74 y.o.     Sex: female   Language: English   Mormonism: Taoist   Hypoxia     Date: 10/31/2024            Total Time Calculated: (P) 7 min              Spiritual Assessment began in [facility] TELEMETRY            Encounter Overview/Reason: (P) Loneliness/Social Isolation  Service Provided For: (P) Patient    Trista, Belief, Meaning:   Patient identifies as spiritual and Other: request no Denominational communion. Meaning through connection with family    Community:  Patient feels well-supported. Support system includes: Spouse/Partner and Children    Assessment and Plan of Care:   Patient Interventions include: Facilitated expression of thoughts and feelings, Explored spiritual coping/struggle/distress, and Other: Pt shared hopes    Patient Plan of Care: No spiritual needs identified for follow-up    Electronically signed by KYLE Ferguson on 10/31/2024 at 10:06 AM

## 2024-10-31 NOTE — PLAN OF CARE
Problem: Safety - Adult  Goal: Free from fall injury  Outcome: Progressing  Note:  Remains free from falls, bed in low position, call light in reach, bed alarm monitoring for safety.     Problem: Pain  Goal: Verbalizes/displays adequate comfort level or baseline comfort level  Outcome: Progressing  Note:  PRN Tylenol 650 mg PO at 0039 for generalized discomfort helpful, as patient denied further pain needs.     Problem: Respiratory - Adult  Goal: Achieves optimal ventilation and oxygenation  Outcome: Progressing  Note:  O2 92 to 94% on Room Air.     Problem: Cardiovascular - Adult  Goal: Absence of cardiac dysrhythmias or at baseline  Outcome: Progressing  Note:  1) Normal Sinus Rhythm rate 84 at 2203; and, 2) Normal Sinus Rhythm rate 76 at 0508.

## 2024-10-31 NOTE — CONSULTS
Consult Note  Physical Medicine and Rehabilitation    Patient: Carolee Sharma  [id number]  Date: 10/31/2024      Chief Complaint: general weakness    History of Present Illness/Hospital Course:    74 y.o. female with PMHx significant for COPD, diabetes mellitus, hyperlipidemia, CKD, essential hypertension who presented to ED with a complaint of generalized weakness and fatigue.  Patient has been having worsening fatigue over the last few days.  Patient was not able to stand up and was having difficulty using her walker at home.  Patient denies shortness of breath per se.  She does not use home oxygen at baseline.  Patient is on home inhalers due to previously diagnosed COPD according to the patient.  Patient quit smoking 15 years ago.  In ED patient was initially found to be tachycardic.  While in ER her oxygen saturation dropped in the high 80s requiring placement of nasal cannula 2 L/min.  CTPA was obtained and revealed no evidence of pulmonary embolism or infiltrate, however it did show evidence of possible pulmonary hypertension.  Patient is currently being admitted under inpatient status for further management and evaluation    Interval history :     Patient was seen in her room this morning. No new complaints. No acute overnight events. Able to eat breakfast with no problems. Currently off O2 support and satting well on RA       Prior Level of Function:  Independent for mobility, ADLs, and IADLs    Current Level of Function:  Impaired mobility, ADLs, and IADLs    Pertinent Social History:  Support: spouse   Home set-up:     Home Access: Stairs to enter with rails  Entrance Stairs - Number of Steps: 4  Entrance Stairs - Rails: Both    Past Medical History:   Diagnosis Date    Hyperlipidemia     Hypertension     Thyroid disease     Type II or unspecified type diabetes mellitus without mention of complication, not stated as uncontrolled        Past Surgical History:   Procedure Laterality Date    KYPHOSIS SURGERY   normal hepatic veins and no chamber collapse.    Image quality is adequate.        Most recent EKG revealed:    Encounter Date: 10/29/24   EKG 12 Lead   Result Value    Ventricular Rate 121    Atrial Rate 121    P-R Interval 158    QRS Duration 66    Q-T Interval 310    QTc Calculation (Bazett) 440    P Axis 50    R Axis -2    T Axis 97    Diagnosis       Poor data quality, interpretation may be adversely affectedSinus tachycardiaAnterior infarct , age undeterminedAbnormal ECGEKG performed in ER and to be interpreted by ER physician.Confirmed by MD, ER ([id number]),  RHEA GREER ([id number]) on 10/30/2024 5:25:24 AM         Most recent imaging studies revealed:  CT CHEST PULMONARY EMBOLISM W CONTRAST   Final Result   1.  No central or lobar pulmonary emboli. Distal segmental and subsegmental branches of the lower lobes are obscured by motion artifact. The pulmonary artery is enlarged suggestive of pulmonary hypertension.   2.  Coronary artery disease.   3.  Small hiatal hernia.      Electronically signed by Ko Bahakel      XR FEMUR RIGHT (MIN 2 VIEWS)   Final Result   1. No acute fracture or dislocation.      Electronically signed by Ko Bahakel      XR FEMUR LEFT (MIN 2 VIEWS)   Final Result   1. No acute fracture or dislocation.      Electronically signed by Ko Bahakel      XR PELVIS (1-2 VIEWS)   Final Result   1. No acute fracture or dislocation.      Electronically signed by Ko Bahakel      XR CHEST PORTABLE   Final Result      No acute cardiopulmonary findings.      Electronically signed by Fernando Guido MD            Assessment:    Hypoxia  Pulmonary hypertension   COPD without exacerbation.  Requiring NC 2 L/min on admission . Currently satting well above 94% on RA . Echo shows EF 60-66 and grade 2 Diastolic dysfunction.    Bilateral lower extremity edema  Received IV lasix    Essential hypertension.  On losartan and lopressor     Type 2 diabetes mellitus.  On latus 10 IU and

## 2024-10-31 NOTE — PROGRESS NOTES
[facility] Medicine Progress Note  V 10.25      Date of Admission: 10/29/2024    Hospital Day: 3      Chief Admission Complaint:  Generalized weakness     Subjective:  Patient seen and examined at bedside. No acute events overnight. Patient reports some lower back pain today, radiates to both sides of her low back. No lumbar imaging done on admission. Discussed results of UA with patient as well. She does report some urinary frequency and some irritation when she urinates. She also desaturated with exertion on room air. Was able to be weaned back off oxygen at rest. Denies any SOB, cough, chest pain, nausea, vomiting.       Presenting Admission History:       74 y.o. female with PMHx significant for COPD, diabetes mellitus, hyperlipidemia, CKD, essential hypertension who presented to ED with a complaint of generalized weakness and fatigue.  Patient has been having worsening fatigue over the last few days.  Patient was not able to stand up and was having difficulty using her walker at home.  Patient denies shortness of breath per se.  She does not use home oxygen at baseline.  Patient is on home inhalers due to previously diagnosed COPD according to the patient.  Patient quit smoking 15 years ago.  In ED patient was initially found to be tachycardic.  While in ER her oxygen saturation dropped in the high 80s requiring placement of nasal cannula 2 L/min.  CTPA was obtained and revealed no evidence of pulmonary embolism or infiltrate, however it did show evidence of possible pulmonary hypertension.  Patient is currently being admitted under inpatient status for further management and evaluation     Assessment/Plan:      Current Principal Problem:  Hypoxia    Plan:    Hypoxia  COPD, not in exacerbation  Pulmonary hypertension, likely group 2 and 3  - Patient initially presented with generalized weakness, did not necessarily feel SOB. Though she did require oxygen, up to 2L NC in the ED. Not on oxygen at baseline.   -

## 2024-11-01 PROBLEM — I27.20 PULMONARY HTN (HCC): Status: ACTIVE | Noted: 2024-11-01

## 2024-11-01 LAB
ANION GAP SERPL CALCULATED.3IONS-SCNC: 11 MMOL/L (ref 3–16)
BASOPHILS # BLD: 0 K/UL (ref 0–0.2)
BASOPHILS NFR BLD: 0.5 %
BUN SERPL-MCNC: 39 MG/DL (ref 7–20)
CALCIUM SERPL-MCNC: 8.8 MG/DL (ref 8.3–10.6)
CHLORIDE SERPL-SCNC: 103 MMOL/L (ref 99–110)
CO2 SERPL-SCNC: 23 MMOL/L (ref 21–32)
CREAT SERPL-MCNC: 1.3 MG/DL (ref 0.6–1.2)
DEPRECATED RDW RBC AUTO: 16.4 % (ref 12.4–15.4)
EKG ATRIAL RATE: 85 BPM
EKG DIAGNOSIS: NORMAL
EKG P AXIS: 49 DEGREES
EKG P-R INTERVAL: 176 MS
EKG Q-T INTERVAL: 384 MS
EKG QRS DURATION: 84 MS
EKG QTC CALCULATION (BAZETT): 456 MS
EKG R AXIS: 2 DEGREES
EKG T AXIS: 69 DEGREES
EKG VENTRICULAR RATE: 85 BPM
EOSINOPHIL # BLD: 0.3 K/UL (ref 0–0.6)
EOSINOPHIL NFR BLD: 5.2 %
GFR SERPLBLD CREATININE-BSD FMLA CKD-EPI: 43 ML/MIN/{1.73_M2}
GLUCOSE BLD-MCNC: 147 MG/DL (ref 70–99)
GLUCOSE BLD-MCNC: 220 MG/DL (ref 70–99)
GLUCOSE BLD-MCNC: 232 MG/DL (ref 70–99)
GLUCOSE BLD-MCNC: 241 MG/DL (ref 70–99)
GLUCOSE SERPL-MCNC: 136 MG/DL (ref 70–99)
HCT VFR BLD AUTO: 30.9 % (ref 36–48)
HGB BLD-MCNC: 9.8 G/DL (ref 12–16)
LYMPHOCYTES # BLD: 0.8 K/UL (ref 1–5.1)
LYMPHOCYTES NFR BLD: 12.5 %
MCH RBC QN AUTO: 29 PG (ref 26–34)
MCHC RBC AUTO-ENTMCNC: 31.8 G/DL (ref 31–36)
MCV RBC AUTO: 91.3 FL (ref 80–100)
MONOCYTES # BLD: 0.7 K/UL (ref 0–1.3)
MONOCYTES NFR BLD: 10.2 %
NEUTROPHILS # BLD: 4.7 K/UL (ref 1.7–7.7)
NEUTROPHILS NFR BLD: 71.6 %
PERFORMED ON: ABNORMAL
PLATELET # BLD AUTO: 259 K/UL (ref 135–450)
PMV BLD AUTO: 7.2 FL (ref 5–10.5)
POTASSIUM SERPL-SCNC: 4.6 MMOL/L (ref 3.5–5.1)
RBC # BLD AUTO: 3.39 M/UL (ref 4–5.2)
SODIUM SERPL-SCNC: 137 MMOL/L (ref 136–145)
WBC # BLD AUTO: 6.5 K/UL (ref 4–11)

## 2024-11-01 PROCEDURE — 94640 AIRWAY INHALATION TREATMENT: CPT

## 2024-11-01 PROCEDURE — 97116 GAIT TRAINING THERAPY: CPT

## 2024-11-01 PROCEDURE — 80048 BASIC METABOLIC PNL TOTAL CA: CPT

## 2024-11-01 PROCEDURE — 99232 SBSQ HOSP IP/OBS MODERATE 35: CPT | Performed by: INTERNAL MEDICINE

## 2024-11-01 PROCEDURE — 97530 THERAPEUTIC ACTIVITIES: CPT

## 2024-11-01 PROCEDURE — 2580000003 HC RX 258: Performed by: INTERNAL MEDICINE

## 2024-11-01 PROCEDURE — 93010 ELECTROCARDIOGRAM REPORT: CPT | Performed by: INTERNAL MEDICINE

## 2024-11-01 PROCEDURE — 93005 ELECTROCARDIOGRAM TRACING: CPT | Performed by: NURSE PRACTITIONER

## 2024-11-01 PROCEDURE — 6370000000 HC RX 637 (ALT 250 FOR IP): Performed by: NURSE PRACTITIONER

## 2024-11-01 PROCEDURE — 1200000000 HC SEMI PRIVATE

## 2024-11-01 PROCEDURE — 36415 COLL VENOUS BLD VENIPUNCTURE: CPT

## 2024-11-01 PROCEDURE — 85025 COMPLETE CBC W/AUTO DIFF WBC: CPT

## 2024-11-01 PROCEDURE — 6370000000 HC RX 637 (ALT 250 FOR IP)

## 2024-11-01 PROCEDURE — 6370000000 HC RX 637 (ALT 250 FOR IP): Performed by: INTERNAL MEDICINE

## 2024-11-01 PROCEDURE — 6360000002 HC RX W HCPCS: Performed by: INTERNAL MEDICINE

## 2024-11-01 RX ORDER — ACETAMINOPHEN 325 MG/1
650 TABLET ORAL EVERY 6 HOURS PRN
Status: DISCONTINUED | OUTPATIENT
Start: 2024-11-01 | End: 2024-11-07

## 2024-11-01 RX ORDER — ACETAMINOPHEN 650 MG/1
650 SUPPOSITORY RECTAL EVERY 6 HOURS PRN
Status: DISCONTINUED | OUTPATIENT
Start: 2024-11-01 | End: 2024-11-07

## 2024-11-01 RX ADMIN — ASPIRIN 81 MG: 81 TABLET, COATED ORAL at 08:53

## 2024-11-01 RX ADMIN — METOPROLOL TARTRATE 50 MG: 50 TABLET, FILM COATED ORAL at 20:27

## 2024-11-01 RX ADMIN — ACETAMINOPHEN 650 MG: 325 TABLET ORAL at 17:27

## 2024-11-01 RX ADMIN — CEFDINIR 300 MG: 300 CAPSULE ORAL at 08:50

## 2024-11-01 RX ADMIN — Medication 5000 UNITS: at 08:49

## 2024-11-01 RX ADMIN — INSULIN LISPRO 2 UNITS: 100 INJECTION, SOLUTION INTRAVENOUS; SUBCUTANEOUS at 20:27

## 2024-11-01 RX ADMIN — TRAMADOL HYDROCHLORIDE 50 MG: 50 TABLET ORAL at 01:05

## 2024-11-01 RX ADMIN — METOPROLOL TARTRATE 50 MG: 50 TABLET, FILM COATED ORAL at 09:00

## 2024-11-01 RX ADMIN — FERROUS SULFATE TAB 325 MG (65 MG ELEMENTAL FE) 325 MG: 325 (65 FE) TAB at 08:55

## 2024-11-01 RX ADMIN — ACETAMINOPHEN 650 MG: 325 TABLET ORAL at 23:39

## 2024-11-01 RX ADMIN — CEFDINIR 300 MG: 300 CAPSULE ORAL at 20:27

## 2024-11-01 RX ADMIN — INSULIN GLARGINE 10 UNITS: 100 INJECTION, SOLUTION SUBCUTANEOUS at 20:27

## 2024-11-01 RX ADMIN — CYANOCOBALAMIN TAB 1000 MCG 1000 MCG: 1000 TAB at 08:51

## 2024-11-01 RX ADMIN — TRAMADOL HYDROCHLORIDE 50 MG: 50 TABLET ORAL at 22:14

## 2024-11-01 RX ADMIN — PANTOPRAZOLE SODIUM 40 MG: 40 TABLET, DELAYED RELEASE ORAL at 09:00

## 2024-11-01 RX ADMIN — INSULIN LISPRO 2 UNITS: 100 INJECTION, SOLUTION INTRAVENOUS; SUBCUTANEOUS at 12:18

## 2024-11-01 RX ADMIN — ASPIRIN 81 MG: 81 TABLET, COATED ORAL at 20:27

## 2024-11-01 RX ADMIN — TIOTROPIUM BROMIDE AND OLODATEROL 2 PUFF: 3.124; 2.736 SPRAY, METERED RESPIRATORY (INHALATION) at 08:23

## 2024-11-01 RX ADMIN — INSULIN LISPRO 2 UNITS: 100 INJECTION, SOLUTION INTRAVENOUS; SUBCUTANEOUS at 17:27

## 2024-11-01 RX ADMIN — SODIUM CHLORIDE, PRESERVATIVE FREE 10 ML: 5 INJECTION INTRAVENOUS at 20:27

## 2024-11-01 RX ADMIN — ACETAMINOPHEN 650 MG: 325 TABLET ORAL at 04:40

## 2024-11-01 RX ADMIN — LOSARTAN POTASSIUM 50 MG: 50 TABLET, FILM COATED ORAL at 08:53

## 2024-11-01 RX ADMIN — BUPROPION HYDROCHLORIDE 300 MG: 150 TABLET, EXTENDED RELEASE ORAL at 08:51

## 2024-11-01 RX ADMIN — SODIUM CHLORIDE, PRESERVATIVE FREE 10 ML: 5 INJECTION INTRAVENOUS at 08:56

## 2024-11-01 RX ADMIN — ENOXAPARIN SODIUM 40 MG: 100 INJECTION SUBCUTANEOUS at 08:55

## 2024-11-01 RX ADMIN — TRAMADOL HYDROCHLORIDE 50 MG: 50 TABLET ORAL at 15:37

## 2024-11-01 ASSESSMENT — PAIN - FUNCTIONAL ASSESSMENT
PAIN_FUNCTIONAL_ASSESSMENT: PREVENTS OR INTERFERES SOME ACTIVE ACTIVITIES AND ADLS
PAIN_FUNCTIONAL_ASSESSMENT: ACTIVITIES ARE NOT PREVENTED
PAIN_FUNCTIONAL_ASSESSMENT: PREVENTS OR INTERFERES SOME ACTIVE ACTIVITIES AND ADLS
PAIN_FUNCTIONAL_ASSESSMENT: ACTIVITIES ARE NOT PREVENTED

## 2024-11-01 ASSESSMENT — PAIN DESCRIPTION - DESCRIPTORS
DESCRIPTORS: ACHING
DESCRIPTORS: ACHING;DISCOMFORT
DESCRIPTORS: ACHING;DISCOMFORT
DESCRIPTORS: ACHING
DESCRIPTORS: ACHING
DESCRIPTORS: DISCOMFORT
DESCRIPTORS: ACHING;DISCOMFORT
DESCRIPTORS: DISCOMFORT

## 2024-11-01 ASSESSMENT — PAIN DESCRIPTION - PAIN TYPE
TYPE: CHRONIC PAIN

## 2024-11-01 ASSESSMENT — PAIN SCALES - GENERAL
PAINLEVEL_OUTOF10: 0
PAINLEVEL_OUTOF10: 5
PAINLEVEL_OUTOF10: 3
PAINLEVEL_OUTOF10: 6
PAINLEVEL_OUTOF10: 7
PAINLEVEL_OUTOF10: 6
PAINLEVEL_OUTOF10: 8
PAINLEVEL_OUTOF10: 4
PAINLEVEL_OUTOF10: 4

## 2024-11-01 ASSESSMENT — PAIN DESCRIPTION - LOCATION
LOCATION: HIP
LOCATION: BACK
LOCATION: HIP
LOCATION: BACK

## 2024-11-01 ASSESSMENT — PAIN DESCRIPTION - FREQUENCY
FREQUENCY: INTERMITTENT

## 2024-11-01 ASSESSMENT — PAIN DESCRIPTION - ORIENTATION
ORIENTATION: LOWER
ORIENTATION: LEFT
ORIENTATION: LOWER
ORIENTATION: LEFT

## 2024-11-01 ASSESSMENT — PAIN DESCRIPTION - ONSET
ONSET: GRADUAL
ONSET: GRADUAL

## 2024-11-01 NOTE — PROGRESS NOTES
Physician Progress Note      PATIENT:               RYLEE REINA  CSN #:                  [id number]  :                       1950  ADMIT DATE:       10/29/2024 9:42 PM  DISCH DATE:  RESPONDING  PROVIDER #:        Chaim Washington DO          QUERY TEXT:    Pt admitted with weakness, UTI and has CHF documented. If possible, please   document in progress notes and discharge summary further specificity regarding   the type and acuity of CHF:    The medical record reflects the following:  Risk Factors: Age 73yo. Hx- COPD, diabetes mellitus, hyperlipidemia, CKD,   essential hypertension  Clinical Indicators: BNP 5960....ECHO-EF 60-65%.......CXR- No acute   cardiopulmonary findings......Per attending pn on 10/31/24- Continue with   current therapies for her underlying COPD and HFpEF  Treatment: Lasix IV, Low sodium diet, Daily weight, Strict I&O  Options provided:  -- Acute Diastolic CHF/HFpEF  -- Chronic Diastolic CHF/HFpEF  -- Acute on Chronic Diastolic CHF/HFpEF  -- Other - I will add my own diagnosis  -- Disagree - Not applicable / Not valid  -- Disagree - Clinically unable to determine / Unknown  -- Refer to Clinical Documentation Reviewer    PROVIDER RESPONSE TEXT:    This patient has chronic diastolic CHF/HFpEF.    Query created by: Cesilia Eubanks on 2024 10:21 AM      Electronically signed by:  Chaim Washington DO 2024 12:36 PM

## 2024-11-01 NOTE — PROGRESS NOTES
Occupational Therapy  [address]  Daily Treatment Note  NAME: Carolee Sharma  : 1950  MRN: [id number]    Date of Service: 2024    Discharge Recommendations:  IP Rehab  OT Equipment Recommendations  Equipment Needed: No  At baseline this patient was INDependent  with functional mobility & ADL's. The current hospitalization has resulted in the patient now being limited with all aspects of mobility, negatively impacting the patient's functional independence, ability to safely access their home environment, and ability to complete valued daily tasks and life roles. Carolee Sharma is motivated for recovery and demonstrates the ability to tolerate inpatient rehabilitation 3 hours/day, 5 days/week for optimal return to functional independence, quality of life, and decreased caregiver burden.       Patient Diagnosis(es): The encounter diagnosis was Shortness of breath.     Assessment   Assessment: Pt agreeable to participate in this OT session, however, stated she was fatigued and may be interested in bathing, \"a little later on.\" RN students entered the room to administer medication. Therapist set-up Pt with breakfast tray. Pt completed bed mobility in preparation for meal consumption. Pt will continue to benefit from IP Rehabilitation in order to increased INDependence with ADLs/IADLs, functional transfers/functional mobility. Continue OT POC.  Activity Tolerance: Patient limited by fatigue  Discharge Recommendations: IP Rehab  Equipment Needed: No     Plan  Occupational Therapy Plan  Times Per Week: 2-5  Current Treatment Recommendations: Strengthening;Balance training;Functional mobility training;Endurance training;Patient/Caregiver education & training;Safety education & training;Self-Care / ADL      Subjective  Subjective  Subjective: Upon entering the room Pt. supine in bed resting, Pt easily aroused to wake. Pt. reports NO pain, only fatigue \"I had a rough night.\"  Pt  of Devices: Bed alarm in place;Call light within reach;Chair alarm in place    Patient Education  Education Given To: Patient  Education Provided: Role of Therapy;Plan of Care;Transfer Training;Mobility Training;Fall Prevention Strategies;ADL Adaptive Strategies;Precautions  Education Method: Demonstration;Verbal  Barriers to Learning: None  Education Outcome: Verbalized understanding;Demonstrated understanding    Goals  Short Term Goals  Time Frame for Short Term Goals: by dc  Short Term Goal 1: Pt will complete LE dressing w/ CGA and use of LRAD- not met  Short Term Goal 2: Pt will complete toileting w/ CGA- not met  Short Term Goal 3: Pt will complete functional transfers w/ CGA- not met    AM-PAC - ADL  AM-PAC Daily Activity - Inpatient   How much help is needed for putting on and taking off regular lower body clothing?: Total  How much help is needed for bathing (which includes washing, rinsing, drying)?: A Lot  How much help is needed for toileting (which includes using toilet, bedpan, or urinal)?: A Lot  How much help is needed for putting on and taking off regular upper body clothing?: A Little  How much help is needed for taking care of personal grooming?: A Little  How much help for eating meals?: None  AM-PAC Inpatient Daily Activity Raw Score: 15  AM-PAC Inpatient ADL T-Scale Score : 34.69  ADL Inpatient CMS 0-100% Score: 56.46  ADL Inpatient CMS G-Code Modifier : CK    Therapy Time   Individual Concurrent Group Co-treatment   Time In 0840         Time Out 0853         Minutes 13           Timed Code Treatment Minutes: 13  minutes     Total Treatment Minutes: 13 minutes     ATILIO Urban/GLEN

## 2024-11-01 NOTE — PROGRESS NOTES
D:  Please note, patient would like to receive Tylenol in between Tramadol; however, she rates her pain a 5/10 moderate level.  Can you please change the order parameters for mild, moderate, and higher level of pain per patient request?  Please advise.  Thank you.  A:  Notified provider, Cesilia Gillespie.  R:  Per provider above, \"Done.\"

## 2024-11-01 NOTE — PROGRESS NOTES
Department of Physical Medicine & Rehabilitation  Progress Note    Patient Identification:  Carolee Sharma  [id number]  : 1950  Admit date: 10/29/2024    Chief Complaint: Hypoxia    Subjective:   No acute events overnight. Patient seen this am.  Patient sustained tachycardia and 140s overnight.  EKG was done and showed occasional PACs.  Patient denied chest pain nausea or vomiting.  Pre-CERT pending       ROS: No f/c, n/v, cp     Objective:  Patient Vitals for the past 24 hrs:   BP Temp Temp src Pulse Resp SpO2   24 08 (!) 164/90 97.8 °F (36.6 °C) Oral 85 18 92 %   24 -- -- -- -- 18 96 %   24 0530 132/86 98.5 °F (36.9 °C) Oral 85 18 94 %   245 135/84 98.5 °F (36.9 °C) Oral 92 18 94 %   10/31/24 2016 127/79 97.4 °F (36.3 °C) Oral 87 18 94 %   10/31/24 1359 131/84 97.7 °F (36.5 °C) Oral 75 18 92 %     Const: Alert. No distress, pleasant.   HEENT: Normocephalic, atraumatic. Normal sclera/conjunctiva. MMM.   CV: Regular rate and rhythm.   Resp: No respiratory distress. Lungs CTAB.   Abd: Soft, nontender, nondistended, NABS+   Ext: No edema.   Neuro: Alert, oriented, appropriately interactive.   Psych: Cooperative, appropriate mood and affect    Laboratory data: Available via EMR.   Last 24 hour lab  Recent Results (from the past 24 hour(s))   POCT Glucose    Collection Time: 10/31/24  4:25 PM   Result Value Ref Range    POC Glucose 169 (H) 70 - 99 mg/dl    Performed on ACCU-CHEK    POCT Glucose    Collection Time: 10/31/24  7:33 PM   Result Value Ref Range    POC Glucose 310 (H) 70 - 99 mg/dl    Performed on ACCU-CHEK    EKG 12 Lead    Collection Time: 24  2:24 AM   Result Value Ref Range    Ventricular Rate 85 BPM    Atrial Rate 85 BPM    P-R Interval 176 ms    QRS Duration 84 ms    Q-T Interval 384 ms    QTc Calculation (Bazett) 456 ms    P Axis 49 degrees    R Axis 2 degrees    T Axis 69 degrees    Diagnosis       Sinus rhythm with Premature atrial complexesOtherwise  normal ECGWhen compared with ECG of 01-NOV-2024 02:23,Premature atrial complexes are now Present   CBC with Auto Differential    Collection Time: 11/01/24  2:58 AM   Result Value Ref Range    WBC 6.5 4.0 - 11.0 K/uL    RBC 3.39 (L) 4.00 - 5.20 M/uL    Hemoglobin 9.8 (L) 12.0 - 16.0 g/dL    Hematocrit 30.9 (L) 36.0 - 48.0 %    MCV 91.3 80.0 - 100.0 fL    MCH 29.0 26.0 - 34.0 pg    MCHC 31.8 31.0 - 36.0 g/dL    RDW 16.4 (H) 12.4 - 15.4 %    Platelets 259 135 - 450 K/uL    MPV 7.2 5.0 - 10.5 fL    Neutrophils % 71.6 %    Lymphocytes % 12.5 %    Monocytes % 10.2 %    Eosinophils % 5.2 %    Basophils % 0.5 %    Neutrophils Absolute 4.7 1.7 - 7.7 K/uL    Lymphocytes Absolute 0.8 (L) 1.0 - 5.1 K/uL    Monocytes Absolute 0.7 0.0 - 1.3 K/uL    Eosinophils Absolute 0.3 0.0 - 0.6 K/uL    Basophils Absolute 0.0 0.0 - 0.2 K/uL   Basic Metabolic Panel    Collection Time: 11/01/24  2:58 AM   Result Value Ref Range    Sodium 137 136 - 145 mmol/L    Potassium 4.6 3.5 - 5.1 mmol/L    Chloride 103 99 - 110 mmol/L    CO2 23 21 - 32 mmol/L    Anion Gap 11 3 - 16    Glucose 136 (H) 70 - 99 mg/dL    BUN 39 (H) 7 - 20 mg/dL    Creatinine 1.3 (H) 0.6 - 1.2 mg/dL    Est, Glom Filt Rate 43 (A) >60    Calcium 8.8 8.3 - 10.6 mg/dL   POCT Glucose    Collection Time: 11/01/24  7:35 AM   Result Value Ref Range    POC Glucose 147 (H) 70 - 99 mg/dl    Performed on ACCU-CHEK    POCT Glucose    Collection Time: 11/01/24 11:23 AM   Result Value Ref Range    POC Glucose 220 (H) 70 - 99 mg/dl    Performed on ACCU-CHEK        Therapy progress:  PT  Position Activity Restriction  Other position/activity restrictions: up with assist  Objective     Sit to Stand: Maximum Assistance (max A x1 from EOB, recliner, chair using RW. frequent VCs required for sequencing and safe hand placement.)  Stand to Sit: Minimal Assistance (min A x1 from RW onto recliner and chair. exhibited fair eccentric lowering to intended surface)  Bed to Chair: Moderate assistance (mod A

## 2024-11-01 NOTE — PROGRESS NOTES
Pulmonary Followup Note    Indication for visit: Hytpoxemia    Subjective:    Carolee remains afebrile  Oxygen saturation is 92% on room air  No complaints of dyspnea or cough     cefdinir  300 mg Oral 2 times per day    aspirin EC  81 mg Oral BID    buPROPion  300 mg Oral QAM    vitamin D  5,000 Units Oral Daily    ferrous sulfate  325 mg Oral Daily with breakfast    vitamin B-12  1,000 mcg Oral Daily    losartan  50 mg Oral Daily    metoprolol tartrate  50 mg Oral BID    pantoprazole  40 mg Oral Daily    tiotropium-olodaterol  2 puff Inhalation Daily    sodium chloride flush  5-40 mL IntraVENous 2 times per day    enoxaparin  40 mg SubCUTAneous Daily    insulin glargine  10 Units SubCUTAneous Nightly    insulin lispro  0-8 Units SubCUTAneous 4x Daily AC & HS       PHYSICAL EXAMINATION:  BP (!) 164/90   Pulse 85   Temp 97.8 °F (36.6 °C) (Oral)   Resp 18   Ht 1.676 m (5' 6\")   Wt 65.2 kg (143 lb 11.8 oz)   SpO2 92%   BMI 23.20 kg/m²     Gen: Well developed, well nourished female in no acute distress. Speaking in full sentences with out using accessory resp muscles  Eyes: PERRL, EOMI, normal conjunctivae  Ears, Nose, Mouth and Throat: Hearing is normal. Oropharynx is normal  Neck: No lymphadenopathy  Respiratory:  CV: RRR without M/R/R  Abd: +BS, soft, NT/ND  Musculoskeletal: No cyanosis, clubbing, or edema.  Skin: No rashes, ulcers, or subcutaneous nodules  Psychiatric: Alert and oriented to time place and person    DATA  CBC:   Recent Labs     10/29/24  2219 10/31/24  0455 11/01/24  0258   WBC 8.1 6.7 6.5   HGB 10.9* 9.6* 9.8*   HCT 33.4* 29.4* 30.9*   MCV 91.6 90.9 91.3    243 259     BMP:   Recent Labs     10/29/24  2219 10/31/24  0455 11/01/24  0258   * 133* 137   K 5.0 4.8 4.6    100 103   CO2 17* 22 23   BUN 45* 37* 39*   CREATININE 1.4* 1.3* 1.3*     No results for input(s): \"PHART\", \"MRG7KNZ\", \"PO2ART\" in the last 72 hours.  LIVER PROFILE:  RVSP, consistent with severe pulmonary hypertension.  RVSP 66 mmHg    Left Atrium: Left atrium is mildly dilated.    Right Atrium: Right atrium is mildly dilated.    Aorta: Dilated ascending aorta. Ao ascending diameter is 3.6 cm.    Pericardium: Trivial pericardial effusion present. No indication of cardiac tamponade. Evidence includes normal LV size, no septal bounce, no IVC dilation, normal hepatic veins and no chamber collapse.    Image quality is adequate.    Assessment    Generalized weakness  UTI  Pulmonary hypertension  Severe COPD  HFpEF    Plan    Continue Stiolto  Omnicef per hospitalist service  Diuretics at discretion of hospitalist service  Disposition plan is ARU -pre-CERT pending  Will sign off.  Call with any pulmonary issues or questions    Gilberto Noland MD

## 2024-11-01 NOTE — PROGRESS NOTES
Physical Therapy  Facility/Department: [address]  Physical Therapy Treatment    Name: Carolee Sharma  : 1950  MRN: [id number]  Date of Service: 2024    Discharge Recommendations:  IP Rehab, Patient able to tolerate 3 hours of therapy per day   PT Equipment Recommendations  Equipment Needed: No      Patient Diagnosis(es): The encounter diagnosis was Shortness of breath.  Past Medical History:  has a past medical history of Hyperlipidemia, Hypertension, Thyroid disease, and Type II or unspecified type diabetes mellitus without mention of complication, not stated as uncontrolled.  Past Surgical History:  has a past surgical history that includes Total hip arthroplasty (Right, 2021) and Kyphosis surgery.    Assessment  Body Structures, Functions, Activity Limitations Requiring Skilled Therapeutic Intervention: Decreased functional mobility ;Decreased ADL status;Decreased ROM;Decreased strength;Decreased safe awareness;Decreased endurance;Increased pain;Decreased balance  Assessment: Pt requires min to mod A for safety with functional mobility.  Demos unsteady gait and decreased balance in stance with walker.  Requires cues for sequencing with simple tasks.  Pt is below her functional baseline.  Safety concerns for returning home.  Rec ARU.  Will follow.  Treatment Diagnosis: decreased functional mobility  Therapy Prognosis: Fair;Guarded  Requires PT Follow-Up: Yes    Plan  Physical Therapy Plan  General Plan:  (2-5)  Current Treatment Recommendations: Strengthening, Stair training, Balance training, Functional mobility training, Transfer training, Endurance training, Gait training, Safety education & training, Patient/Caregiver education & training, Therapeutic activities  Safety Devices  Type of Devices: Call light within reach, Chair alarm in place, Left in bed, Gait belt, Nurse notified (RN in room with pt)    Restrictions  Position Activity Restriction  Other position/activity

## 2024-11-01 NOTE — PLAN OF CARE
Problem: Safety - Adult  Goal: Free from fall injury  Outcome: Progressing  Note:  Remains free from falls, bed in low position, call light in reach, bed alarm monitoring for safety.     Problem: Pain  Goal: Verbalizes/displays adequate comfort level or baseline comfort level  Outcome: Progressing  Note:  PRN Tramadol 50 mg PO at 0105; and, PRN Tylenol 650 mg PO at 2031 and 0440 for complaint of left hip and tailbone pain helpful.     Problem: Respiratory - Adult  Goal: Achieves optimal ventilation and oxygenation  Outcome: Progressing  Note:  O2 94% on Room Air.     Problem: Cardiovascular - Adult  Goal: Absence of cardiac dysrhythmias or at baseline  Outcome: Progressing  Note:  Normal Sinus Rhythm rate 85 at 2200.

## 2024-11-01 NOTE — PROGRESS NOTES
[facility] Medicine Progress Note  V 10.25      Date of Admission: 10/29/2024    Hospital Day: 4      Chief Admission Complaint:  Generalized weakness     Subjective:  Patient seen and examined at bedside. No acute events overnight. Patient reports feeling okay this morning. Denies feeling SOB, chest pain, cough, fevers, chills.       Presenting Admission History:       74 y.o. female with PMHx significant for COPD, diabetes mellitus, hyperlipidemia, CKD, essential hypertension who presented to ED with a complaint of generalized weakness and fatigue.  Patient has been having worsening fatigue over the last few days.  Patient was not able to stand up and was having difficulty using her walker at home.  Patient denies shortness of breath per se.  She does not use home oxygen at baseline.  Patient is on home inhalers due to previously diagnosed COPD according to the patient.  Patient quit smoking 15 years ago.  In ED patient was initially found to be tachycardic.  While in ER her oxygen saturation dropped in the high 80s requiring placement of nasal cannula 2 L/min.  CTPA was obtained and revealed no evidence of pulmonary embolism or infiltrate, however it did show evidence of possible pulmonary hypertension.  Patient is currently being admitted under inpatient status for further management and evaluation     Assessment/Plan:      Current Principal Problem:  Hypoxia    Plan:    Hypoxia - resolved  COPD, not in exacerbation  Pulmonary hypertension, likely group 2 and 3  - Patient initially presented with generalized weakness, did not necessarily feel SOB. Though she did require oxygen, up to 2L NC in the ED. Not on oxygen at baseline.   - Wean oxygen as tolerated for O2 sats between 88-92%  - Pulmonology consulted, appreciate recommendations  - Echo done, does show severe pulmonary hypertension with RSVP of 66 mmHg, grade 2 diastolic dysfunction, EF 60-65%  - Continue with current therapies for her underlying COPD and  HFpEF  - Off remains off oxygen. Still pending precert to ARU.     UTI  - UA positive for Klebsiella >100k cfu. Patient does report some urinary frequency, irritation. Will started on Cefdinir 300mg BID x7 days    DM2  - Continue on lantus 10 units QHS, MDSSI. Monitor for hypoglycemia with POCT glucose checks ACHS while on insulin    Generalized weakness  - Patient recently at her granddaughter's wedding, had fall and suffered from pelvic fracture. Had stay at SNF for rehab following this. Patient continues to remain fairly weak at this time  - PT/OT fausto. Recommending IP rehab. PMR consult placed. Pending precert to ARU  - Lumbar xray shows old T12 compression fracture s/p vertebroplasty. No acute findings      Consults:      IP CONSULT TO PULMONOLOGY  IP CONSULT TO PHYSICAL MEDICINE REHAB        --------------------------------------------------      Medications:        Infusion Medications    sodium chloride      dextrose       Scheduled Medications    cefdinir  300 mg Oral 2 times per day    aspirin EC  81 mg Oral BID    buPROPion  300 mg Oral QAM    vitamin D  5,000 Units Oral Daily    ferrous sulfate  325 mg Oral Daily with breakfast    vitamin B-12  1,000 mcg Oral Daily    losartan  50 mg Oral Daily    metoprolol tartrate  50 mg Oral BID    pantoprazole  40 mg Oral Daily    tiotropium-olodaterol  2 puff Inhalation Daily    sodium chloride flush  5-40 mL IntraVENous 2 times per day    enoxaparin  40 mg SubCUTAneous Daily    insulin glargine  10 Units SubCUTAneous Nightly    insulin lispro  0-8 Units SubCUTAneous 4x Daily AC & HS     PRN Meds: acetaminophen **OR** acetaminophen, traMADol, albuterol, sodium chloride flush, sodium chloride, ondansetron **OR** ondansetron, polyethylene glycol, glucose, dextrose bolus **OR** dextrose bolus, glucagon (rDNA), dextrose      Physical Exam Performed:      General appearance:  No apparent distress, sitting up in chair  Respiratory:  Normal respiratory effort. Clear to  Anticoagulation (Heparin gtt or Coumadin - other anticoagulants in special circumstances)    [] Cardiac Medications (IV Amiodarone/Diltiazem, Tikosyn, etc)    [] Hemodialysis    [] Other -    [] Change in code status    [] Decision to escalate care    [] Major surgery/procedure with associated risk factors    --------------------------------------------------  C. Data (any 2)    [x] Data Review (any 3)    [x] Consultant notes from yesterday/today    [x] All available current labs reviewed interpreted for clinical significance    [x] Appropriate follow-up labs were ordered  [] Collateral history obtained     [] Independent Interpretation of tests (any 1)    [] Telemetry (Rhythm Strip) personally reviewed and interpreted        [] Imaging personally reviewed and interpreted     [x] Discussion (any 1)  [x] Multi-Disciplinary Rounds with Case Management  [] Discussed management of the case with           Labs:  Personally reviewed on 11/1/2024 and interpreted for clinical significance as documented above.     Recent Labs     10/29/24  2219 10/31/24  0455 11/01/24  0258   WBC 8.1 6.7 6.5   HGB 10.9* 9.6* 9.8*   HCT 33.4* 29.4* 30.9*    243 259     Recent Labs     10/29/24  2219 10/30/24  0148 10/31/24  0455 11/01/24  0258   *  --  133* 137   K 5.0  --  4.8 4.6     --  100 103   CO2 17*  --  22 23   BUN 45*  --  37* 39*   CREATININE 1.4*  --  1.3* 1.3*   CALCIUM 9.0  --  9.1 8.8   MG 1.40* 4.00* 1.80  --      Recent Labs     10/29/24  2219 10/29/24  2353   PROBNP 5,960*  --    TROPHS 17* 15*     Recent Labs     10/31/24  0455   LABA1C 5.4     Recent Labs     10/29/24  2219   AST 19   ALT 10   BILITOT 0.4   ALKPHOS 182*     No results for input(s): \"INR\", \"LACTA\", \"TSH\" in the last 72 hours.    Urine Cultures:   Lab Results   Component Value Date/Time    LABURIN >100,000 CFU/ml 10/30/2024 12:15 AM     Blood Cultures: No results found for: \"BC\"  No results found for: \"BLOODCULT2\"  Organism:   Lab Results

## 2024-11-01 NOTE — PROGRESS NOTES
D:  Please note, patient's heart rate is sustaining in the 140s to 160s.  She is sleeping at present.  Please advise.  Thank you.  A:  Notified provider, Cesilia Gillespie.  R:  Per provider above, \"Let's get a 12 Lead EKG.\"  A:  Please note, 12 Lead EKG obtained with results as follows:  Sinus Rhythm with Premature Atrial Complexes, otherwise normal ECG, rate 85.  R:  Per provider above, \"Nothing to do but monitor.\"

## 2024-11-01 NOTE — PLAN OF CARE
Problem: Chronic Conditions and Co-morbidities  Goal: Patient's chronic conditions and co-morbidity symptoms are monitored and maintained or improved  Outcome: Progressing  Flowsheets (Taken 11/1/2024 1505)  Care Plan - Patient's Chronic Conditions and Co-Morbidity Symptoms are Monitored and Maintained or Improved:   Monitor and assess patient's chronic conditions and comorbid symptoms for stability, deterioration, or improvement   Collaborate with multidisciplinary team to address chronic and comorbid conditions and prevent exacerbation or deterioration   Update acute care plan with appropriate goals if chronic or comorbid symptoms are exacerbated and prevent overall improvement and discharge     Problem: Discharge Planning  Goal: Discharge to home or other facility with appropriate resources  Outcome: Progressing  Flowsheets (Taken 11/1/2024 1505)  Discharge to home or other facility with appropriate resources:   Identify barriers to discharge with patient and caregiver   Arrange for needed discharge resources and transportation as appropriate   Refer to discharge planning if patient needs post-hospital services based on physician order or complex needs related to functional status, cognitive ability or social support system   Identify discharge learning needs (meds, wound care, etc)

## 2024-11-02 LAB
ANION GAP SERPL CALCULATED.3IONS-SCNC: 8 MMOL/L (ref 3–16)
BASOPHILS # BLD: 0 K/UL (ref 0–0.2)
BASOPHILS NFR BLD: 0.7 %
BUN SERPL-MCNC: 31 MG/DL (ref 7–20)
CALCIUM SERPL-MCNC: 9.3 MG/DL (ref 8.3–10.6)
CHLORIDE SERPL-SCNC: 101 MMOL/L (ref 99–110)
CO2 SERPL-SCNC: 25 MMOL/L (ref 21–32)
CREAT SERPL-MCNC: 1.1 MG/DL (ref 0.6–1.2)
DEPRECATED RDW RBC AUTO: 16 % (ref 12.4–15.4)
EOSINOPHIL # BLD: 0.4 K/UL (ref 0–0.6)
EOSINOPHIL NFR BLD: 6.4 %
GFR SERPLBLD CREATININE-BSD FMLA CKD-EPI: 53 ML/MIN/{1.73_M2}
GLUCOSE BLD-MCNC: 137 MG/DL (ref 70–99)
GLUCOSE BLD-MCNC: 145 MG/DL (ref 70–99)
GLUCOSE BLD-MCNC: 217 MG/DL (ref 70–99)
GLUCOSE BLD-MCNC: 326 MG/DL (ref 70–99)
GLUCOSE SERPL-MCNC: 136 MG/DL (ref 70–99)
HCT VFR BLD AUTO: 31.3 % (ref 36–48)
HGB BLD-MCNC: 10.2 G/DL (ref 12–16)
LYMPHOCYTES # BLD: 0.9 K/UL (ref 1–5.1)
LYMPHOCYTES NFR BLD: 15.3 %
MCH RBC QN AUTO: 29.7 PG (ref 26–34)
MCHC RBC AUTO-ENTMCNC: 32.5 G/DL (ref 31–36)
MCV RBC AUTO: 91.2 FL (ref 80–100)
MONOCYTES # BLD: 0.6 K/UL (ref 0–1.3)
MONOCYTES NFR BLD: 10.5 %
NEUTROPHILS # BLD: 3.8 K/UL (ref 1.7–7.7)
NEUTROPHILS NFR BLD: 67.1 %
PERFORMED ON: ABNORMAL
PLATELET # BLD AUTO: 266 K/UL (ref 135–450)
PMV BLD AUTO: 7.4 FL (ref 5–10.5)
POTASSIUM SERPL-SCNC: 5 MMOL/L (ref 3.5–5.1)
RBC # BLD AUTO: 3.43 M/UL (ref 4–5.2)
SODIUM SERPL-SCNC: 134 MMOL/L (ref 136–145)
WBC # BLD AUTO: 5.6 K/UL (ref 4–11)

## 2024-11-02 PROCEDURE — 6370000000 HC RX 637 (ALT 250 FOR IP): Performed by: INTERNAL MEDICINE

## 2024-11-02 PROCEDURE — 85025 COMPLETE CBC W/AUTO DIFF WBC: CPT

## 2024-11-02 PROCEDURE — 36415 COLL VENOUS BLD VENIPUNCTURE: CPT

## 2024-11-02 PROCEDURE — 2580000003 HC RX 258: Performed by: INTERNAL MEDICINE

## 2024-11-02 PROCEDURE — 6360000002 HC RX W HCPCS: Performed by: INTERNAL MEDICINE

## 2024-11-02 PROCEDURE — 6370000000 HC RX 637 (ALT 250 FOR IP)

## 2024-11-02 PROCEDURE — 1200000000 HC SEMI PRIVATE

## 2024-11-02 PROCEDURE — 80048 BASIC METABOLIC PNL TOTAL CA: CPT

## 2024-11-02 PROCEDURE — 6370000000 HC RX 637 (ALT 250 FOR IP): Performed by: NURSE PRACTITIONER

## 2024-11-02 RX ADMIN — CEFDINIR 300 MG: 300 CAPSULE ORAL at 20:31

## 2024-11-02 RX ADMIN — LOSARTAN POTASSIUM 50 MG: 50 TABLET, FILM COATED ORAL at 09:30

## 2024-11-02 RX ADMIN — INSULIN GLARGINE 10 UNITS: 100 INJECTION, SOLUTION SUBCUTANEOUS at 20:31

## 2024-11-02 RX ADMIN — TIOTROPIUM BROMIDE AND OLODATEROL 2 PUFF: 3.124; 2.736 SPRAY, METERED RESPIRATORY (INHALATION) at 09:37

## 2024-11-02 RX ADMIN — ENOXAPARIN SODIUM 40 MG: 100 INJECTION SUBCUTANEOUS at 09:29

## 2024-11-02 RX ADMIN — Medication 5000 UNITS: at 09:29

## 2024-11-02 RX ADMIN — CEFDINIR 300 MG: 300 CAPSULE ORAL at 09:29

## 2024-11-02 RX ADMIN — CYANOCOBALAMIN TAB 1000 MCG 1000 MCG: 1000 TAB at 09:30

## 2024-11-02 RX ADMIN — ASPIRIN 81 MG: 81 TABLET, COATED ORAL at 09:30

## 2024-11-02 RX ADMIN — METOPROLOL TARTRATE 50 MG: 50 TABLET, FILM COATED ORAL at 09:30

## 2024-11-02 RX ADMIN — INSULIN LISPRO 6 UNITS: 100 INJECTION, SOLUTION INTRAVENOUS; SUBCUTANEOUS at 12:20

## 2024-11-02 RX ADMIN — PANTOPRAZOLE SODIUM 40 MG: 40 TABLET, DELAYED RELEASE ORAL at 09:30

## 2024-11-02 RX ADMIN — BUPROPION HYDROCHLORIDE 300 MG: 150 TABLET, EXTENDED RELEASE ORAL at 09:29

## 2024-11-02 RX ADMIN — ACETAMINOPHEN 650 MG: 325 TABLET ORAL at 09:30

## 2024-11-02 RX ADMIN — TRAMADOL HYDROCHLORIDE 50 MG: 50 TABLET ORAL at 04:20

## 2024-11-02 RX ADMIN — METOPROLOL TARTRATE 50 MG: 50 TABLET, FILM COATED ORAL at 20:27

## 2024-11-02 RX ADMIN — ASPIRIN 81 MG: 81 TABLET, COATED ORAL at 20:27

## 2024-11-02 RX ADMIN — ACETAMINOPHEN 650 MG: 325 TABLET ORAL at 17:30

## 2024-11-02 RX ADMIN — INSULIN LISPRO 2 UNITS: 100 INJECTION, SOLUTION INTRAVENOUS; SUBCUTANEOUS at 20:27

## 2024-11-02 RX ADMIN — FERROUS SULFATE TAB 325 MG (65 MG ELEMENTAL FE) 325 MG: 325 (65 FE) TAB at 09:30

## 2024-11-02 RX ADMIN — SODIUM CHLORIDE, PRESERVATIVE FREE 10 ML: 5 INJECTION INTRAVENOUS at 09:34

## 2024-11-02 RX ADMIN — SODIUM CHLORIDE, PRESERVATIVE FREE 10 ML: 5 INJECTION INTRAVENOUS at 20:27

## 2024-11-02 ASSESSMENT — PAIN - FUNCTIONAL ASSESSMENT
PAIN_FUNCTIONAL_ASSESSMENT: PREVENTS OR INTERFERES SOME ACTIVE ACTIVITIES AND ADLS

## 2024-11-02 ASSESSMENT — PAIN DESCRIPTION - ORIENTATION
ORIENTATION: LOWER

## 2024-11-02 ASSESSMENT — PAIN DESCRIPTION - DESCRIPTORS
DESCRIPTORS: ACHING
DESCRIPTORS: DISCOMFORT;ACHING

## 2024-11-02 ASSESSMENT — PAIN DESCRIPTION - FREQUENCY: FREQUENCY: INTERMITTENT

## 2024-11-02 ASSESSMENT — PAIN DESCRIPTION - LOCATION
LOCATION: BACK

## 2024-11-02 ASSESSMENT — PAIN SCALES - GENERAL
PAINLEVEL_OUTOF10: 0
PAINLEVEL_OUTOF10: 3
PAINLEVEL_OUTOF10: 8
PAINLEVEL_OUTOF10: 0

## 2024-11-02 ASSESSMENT — PAIN DESCRIPTION - PAIN TYPE
TYPE: CHRONIC PAIN
TYPE: CHRONIC PAIN

## 2024-11-02 NOTE — PLAN OF CARE
Problem: Chronic Conditions and Co-morbidities  Goal: Patient's chronic conditions and co-morbidity symptoms are monitored and maintained or improved  Outcome: Progressing     Problem: Discharge Planning  Goal: Discharge to home or other facility with appropriate resources  Outcome: Progressing     Problem: Skin/Tissue Integrity  Goal: Absence of new skin breakdown  Description: 1.  Monitor for areas of redness and/or skin breakdown  2.  Assess vascular access sites hourly  3.  Every 4-6 hours minimum:  Change oxygen saturation probe site  4.  Every 4-6 hours:  If on nasal continuous positive airway pressure, respiratory therapy assess nares and determine need for appliance change or resting period.  Outcome: Progressing     Problem: Safety - Adult  Goal: Free from fall injury  Outcome: Progressing     Problem: Pain  Goal: Verbalizes/displays adequate comfort level or baseline comfort level  Outcome: Progressing     Problem: Respiratory - Adult  Goal: Achieves optimal ventilation and oxygenation  Outcome: Progressing     Problem: Cardiovascular - Adult  Goal: Absence of cardiac dysrhythmias or at baseline  Outcome: Progressing  Goal: Maintains optimal cardiac output and hemodynamic stability  Outcome: Progressing     Problem: Metabolic/Fluid and Electrolytes - Adult  Goal: Electrolytes maintained within normal limits  Outcome: Progressing  Goal: Hemodynamic stability and optimal renal function maintained  Outcome: Progressing

## 2024-11-02 NOTE — PROGRESS NOTES
Department of Physical Medicine & Rehabilitation  Progress Note    Patient Identification:  Carolee Sharma  [id number]  : 1950  Admit date: 10/29/2024    Chief Complaint: Hypoxia    Subjective:   Seen in her room this morning.  No new complaints overnight.  Awaiting insurance approval.      ROS: No f/c, n/v, cp     Objective:  Patient Vitals for the past 24 hrs:   BP Temp Temp src Pulse Resp SpO2 Weight   24 0828 (!) 152/80 98.3 °F (36.8 °C) Oral 85 18 91 % --   24 0605 -- -- -- -- -- -- 66.4 kg (146 lb 6.2 oz)   24 0455 -- -- -- -- 16 -- --   24 0414 134/84 98 °F (36.7 °C) Oral 80 16 92 % --   24 2336 (!) 142/73 98.4 °F (36.9 °C) Oral 75 16 94 % --   24 2306 -- -- -- -- 18 -- --   244 -- -- -- -- 18 -- --   24 137/76 98.2 °F (36.8 °C) Oral 98 18 93 % --   24 1601 (!) 154/87 98 °F (36.7 °C) Oral 94 18 94 % --   24 1211 139/89 97.8 °F (36.6 °C) Oral 79 18 93 % --     Const: Alert. No distress, pleasant.   HEENT: Normocephalic, atraumatic. Normal sclera/conjunctiva. MMM.   CV: Regular rate and rhythm.   Resp: No respiratory distress. Lungs CTAB.   Abd: Soft, nontender, nondistended, NABS+   Ext: No edema.   Neuro: Alert, oriented, appropriately interactive.   Psych: Cooperative, appropriate mood and affect    Laboratory data: Available via EMR.   Last 24 hour lab  Recent Results (from the past 24 hour(s))   POCT Glucose    Collection Time: 24 11:23 AM   Result Value Ref Range    POC Glucose 220 (H) 70 - 99 mg/dl    Performed on ACCU-CHEK    POCT Glucose    Collection Time: 24  4:52 PM   Result Value Ref Range    POC Glucose 232 (H) 70 - 99 mg/dl    Performed on ACCU-CHEK    POCT Glucose    Collection Time: 24  8:13 PM   Result Value Ref Range    POC Glucose 241 (H) 70 - 99 mg/dl    Performed on ACCU-CHEK    CBC with Auto Differential    Collection Time: 24  4:26 AM   Result Value Ref Range    WBC 5.6 4.0 - 11.0 K/uL     RBC 3.43 (L) 4.00 - 5.20 M/uL    Hemoglobin 10.2 (L) 12.0 - 16.0 g/dL    Hematocrit 31.3 (L) 36.0 - 48.0 %    MCV 91.2 80.0 - 100.0 fL    MCH 29.7 26.0 - 34.0 pg    MCHC 32.5 31.0 - 36.0 g/dL    RDW 16.0 (H) 12.4 - 15.4 %    Platelets 266 135 - 450 K/uL    MPV 7.4 5.0 - 10.5 fL    Neutrophils % 67.1 %    Lymphocytes % 15.3 %    Monocytes % 10.5 %    Eosinophils % 6.4 %    Basophils % 0.7 %    Neutrophils Absolute 3.8 1.7 - 7.7 K/uL    Lymphocytes Absolute 0.9 (L) 1.0 - 5.1 K/uL    Monocytes Absolute 0.6 0.0 - 1.3 K/uL    Eosinophils Absolute 0.4 0.0 - 0.6 K/uL    Basophils Absolute 0.0 0.0 - 0.2 K/uL   Basic Metabolic Panel    Collection Time: 11/02/24  4:26 AM   Result Value Ref Range    Sodium 134 (L) 136 - 145 mmol/L    Potassium 5.0 3.5 - 5.1 mmol/L    Chloride 101 99 - 110 mmol/L    CO2 25 21 - 32 mmol/L    Anion Gap 8 3 - 16    Glucose 136 (H) 70 - 99 mg/dL    BUN 31 (H) 7 - 20 mg/dL    Creatinine 1.1 0.6 - 1.2 mg/dL    Est, Glom Filt Rate 53 (A) >60    Calcium 9.3 8.3 - 10.6 mg/dL   POCT Glucose    Collection Time: 11/02/24  7:26 AM   Result Value Ref Range    POC Glucose 137 (H) 70 - 99 mg/dl    Performed on ACCU-CHEK        Therapy progress:  PT  Position Activity Restriction  Other position/activity restrictions: up with assist  Objective     Sit to Stand: Moderate Assistance (from EOB, chair x 2 trials)  Stand to Sit: Minimal Assistance (cues for safe hand placement)  Bed to Chair: Moderate assistance (mod A x1 stand pivot transfer using RW and shuffling steps. required cueing to get to full upright posture.)  Device: Rolling Walker  Assistance: Minimal assistance  Distance: 3 ft bed to chair  OT  PT Equipment Recommendations  Equipment Needed: No  Equipment Used: Standard toilet (L grab bar)  Assessment        SLP          Body mass index is 23.63 kg/m².    Assessment and Plan:      Hypoxia  Pulmonary hypertension   COPD without exacerbation.  Requiring NC 2 L/min on admission . Currently satting well

## 2024-11-02 NOTE — PROGRESS NOTES
[facility] Medicine Progress Note  V 10.25      Date of Admission: 10/29/2024    Hospital Day: 5      Chief Admission Complaint:  Generalized weakness     Subjective:  Patient seen and examined at bedside. No acute events overnight. Reports feeling okay this morning. States her breathing feels okay. Denies any other acute concerns at this time.       Presenting Admission History:       74 y.o. female with PMHx significant for COPD, diabetes mellitus, hyperlipidemia, CKD, essential hypertension who presented to ED with a complaint of generalized weakness and fatigue.  Patient has been having worsening fatigue over the last few days.  Patient was not able to stand up and was having difficulty using her walker at home.  Patient denies shortness of breath per se.  She does not use home oxygen at baseline.  Patient is on home inhalers due to previously diagnosed COPD according to the patient.  Patient quit smoking 15 years ago.  In ED patient was initially found to be tachycardic.  While in ER her oxygen saturation dropped in the high 80s requiring placement of nasal cannula 2 L/min.  CTPA was obtained and revealed no evidence of pulmonary embolism or infiltrate, however it did show evidence of possible pulmonary hypertension.  Patient is currently being admitted under inpatient status for further management and evaluation     Assessment/Plan:      Current Principal Problem:  Hypoxia    Plan:    Hypoxia - resolved  COPD, not in exacerbation  Pulmonary hypertension, likely group 2 and 3  - Patient initially presented with generalized weakness, did not necessarily feel SOB. Though she did require oxygen, up to 2L NC in the ED. Not on oxygen at baseline.   - Wean oxygen as tolerated for O2 sats between 88-92%  - Pulmonology consulted, appreciate recommendations  - Echo done, does show severe pulmonary hypertension with RSVP of 66 mmHg, grade 2 diastolic dysfunction, EF 60-65%  - Continue with current therapies for her  underlying COPD and HFpEF  - Remains off oxygen. Still pending precert to ARU.     UTI  - UA positive for Klebsiella >100k cfu. Patient does report some urinary frequency, irritation. Started on Cefdinir 300mg BID x7 days, currently day 3/7    DM2  - Continue on lantus 10 units QHS, MDSSI. Monitor for hypoglycemia with POCT glucose checks ACHS while on insulin    Generalized weakness  - Patient recently at her granddaughter's wedding, had fall and suffered from pelvic fracture. Had stay at SNF for rehab following this. Patient continues to remain fairly weak at this time  - PT/OT salals. Recommending IP rehab. PMR consult placed. Pending precert to ARU  - Lumbar xray shows old T12 compression fracture s/p vertebroplasty. No acute findings      Consults:      IP CONSULT TO PULMONOLOGY  IP CONSULT TO PHYSICAL MEDICINE REHAB        --------------------------------------------------      Medications:        Infusion Medications    sodium chloride      dextrose       Scheduled Medications    cefdinir  300 mg Oral 2 times per day    aspirin EC  81 mg Oral BID    buPROPion  300 mg Oral QAM    vitamin D  5,000 Units Oral Daily    ferrous sulfate  325 mg Oral Daily with breakfast    vitamin B-12  1,000 mcg Oral Daily    losartan  50 mg Oral Daily    metoprolol tartrate  50 mg Oral BID    pantoprazole  40 mg Oral Daily    tiotropium-olodaterol  2 puff Inhalation Daily    sodium chloride flush  5-40 mL IntraVENous 2 times per day    enoxaparin  40 mg SubCUTAneous Daily    insulin glargine  10 Units SubCUTAneous Nightly    insulin lispro  0-8 Units SubCUTAneous 4x Daily AC & HS     PRN Meds: acetaminophen **OR** acetaminophen, traMADol, albuterol, sodium chloride flush, sodium chloride, ondansetron **OR** ondansetron, polyethylene glycol, glucose, dextrose bolus **OR** dextrose bolus, glucagon (rDNA), dextrose      Physical Exam Performed:      General appearance:  No apparent distress, sitting up in chair  Respiratory:  Normal

## 2024-11-02 NOTE — PROGRESS NOTES
Pt called for nurse at bedside. Pt upset upon entering room about position of pillows. Patient states she is uncomfortable and frustrated she is unable to move them herself. Patient assisted with repositioning. Call light left within reach and plan of care ongoing.

## 2024-11-02 NOTE — PLAN OF CARE
Problem: Chronic Conditions and Co-morbidities  Goal: Patient's chronic conditions and co-morbidity symptoms are monitored and maintained or improved  11/2/2024 0003 by Niya Jones LPN  Outcome: Progressing     Problem: Discharge Planning  Goal: Discharge to home or other facility with appropriate resources  11/2/2024 0003 by Niya Jones LPN  Outcome: Progressing     Problem: Skin/Tissue Integrity  Goal: Absence of new skin breakdown  Description: 1.  Monitor for areas of redness and/or skin breakdown  2.  Assess vascular access sites hourly  3.  Every 4-6 hours minimum:  Change oxygen saturation probe site  4.  Every 4-6 hours:  If on nasal continuous positive airway pressure, respiratory therapy assess nares and determine need for appliance change or resting period.  Outcome: Progressing     Problem: Safety - Adult  Goal: Free from fall injury  Outcome: Progressing     Problem: Pain  Goal: Verbalizes/displays adequate comfort level or baseline comfort level  Outcome: Progressing     Problem: Respiratory - Adult  Goal: Achieves optimal ventilation and oxygenation  Outcome: Progressing     Problem: Cardiovascular - Adult  Goal: Absence of cardiac dysrhythmias or at baseline  Outcome: Progressing     Problem: Cardiovascular - Adult  Goal: Maintains optimal cardiac output and hemodynamic stability  Outcome: Progressing     Problem: Metabolic/Fluid and Electrolytes - Adult  Goal: Electrolytes maintained within normal limits  Outcome: Progressing     Problem: Metabolic/Fluid and Electrolytes - Adult  Goal: Hemodynamic stability and optimal renal function maintained  Outcome: Progressing

## 2024-11-03 VITALS
RESPIRATION RATE: 18 BRPM | WEIGHT: 147.27 LBS | SYSTOLIC BLOOD PRESSURE: 158 MMHG | HEIGHT: 66 IN | HEART RATE: 74 BPM | OXYGEN SATURATION: 92 % | DIASTOLIC BLOOD PRESSURE: 82 MMHG | TEMPERATURE: 98.1 F | BODY MASS INDEX: 23.67 KG/M2

## 2024-11-03 LAB
ANION GAP SERPL CALCULATED.3IONS-SCNC: 9 MMOL/L (ref 3–16)
ANION GAP SERPL CALCULATED.3IONS-SCNC: 9 MMOL/L (ref 3–16)
BASOPHILS # BLD: 0.1 K/UL (ref 0–0.2)
BASOPHILS NFR BLD: 0.9 %
BUN SERPL-MCNC: 28 MG/DL (ref 7–20)
BUN SERPL-MCNC: 31 MG/DL (ref 7–20)
CALCIUM SERPL-MCNC: 8.8 MG/DL (ref 8.3–10.6)
CALCIUM SERPL-MCNC: 9.7 MG/DL (ref 8.3–10.6)
CHLORIDE SERPL-SCNC: 104 MMOL/L (ref 99–110)
CHLORIDE SERPL-SCNC: 97 MMOL/L (ref 99–110)
CO2 SERPL-SCNC: 19 MMOL/L (ref 21–32)
CO2 SERPL-SCNC: 24 MMOL/L (ref 21–32)
CREAT SERPL-MCNC: 1.3 MG/DL (ref 0.6–1.2)
CREAT SERPL-MCNC: 1.5 MG/DL (ref 0.6–1.2)
DEPRECATED RDW RBC AUTO: 15.9 % (ref 12.4–15.4)
EOSINOPHIL # BLD: 0.4 K/UL (ref 0–0.6)
EOSINOPHIL NFR BLD: 6 %
GFR SERPLBLD CREATININE-BSD FMLA CKD-EPI: 36 ML/MIN/{1.73_M2}
GFR SERPLBLD CREATININE-BSD FMLA CKD-EPI: 43 ML/MIN/{1.73_M2}
GLUCOSE BLD-MCNC: 164 MG/DL (ref 70–99)
GLUCOSE BLD-MCNC: 175 MG/DL (ref 70–99)
GLUCOSE BLD-MCNC: 240 MG/DL (ref 70–99)
GLUCOSE BLD-MCNC: 297 MG/DL (ref 70–99)
GLUCOSE SERPL-MCNC: 162 MG/DL (ref 70–99)
GLUCOSE SERPL-MCNC: 179 MG/DL (ref 70–99)
HCT VFR BLD AUTO: 31.5 % (ref 36–48)
HGB BLD-MCNC: 10.2 G/DL (ref 12–16)
LYMPHOCYTES # BLD: 0.7 K/UL (ref 1–5.1)
LYMPHOCYTES NFR BLD: 11.5 %
MCH RBC QN AUTO: 29.5 PG (ref 26–34)
MCHC RBC AUTO-ENTMCNC: 32.4 G/DL (ref 31–36)
MCV RBC AUTO: 91.2 FL (ref 80–100)
MONOCYTES # BLD: 0.6 K/UL (ref 0–1.3)
MONOCYTES NFR BLD: 9.9 %
NEUTROPHILS # BLD: 4.6 K/UL (ref 1.7–7.7)
NEUTROPHILS NFR BLD: 71.7 %
PERFORMED ON: ABNORMAL
PLATELET # BLD AUTO: 267 K/UL (ref 135–450)
PMV BLD AUTO: 7.4 FL (ref 5–10.5)
POTASSIUM SERPL-SCNC: 5.2 MMOL/L (ref 3.5–5.1)
POTASSIUM SERPL-SCNC: 5.4 MMOL/L (ref 3.5–5.1)
POTASSIUM SERPL-SCNC: 8.1 MMOL/L (ref 3.5–5.1)
RBC # BLD AUTO: 3.45 M/UL (ref 4–5.2)
SODIUM SERPL-SCNC: 125 MMOL/L (ref 136–145)
SODIUM SERPL-SCNC: 137 MMOL/L (ref 136–145)
WBC # BLD AUTO: 6.4 K/UL (ref 4–11)

## 2024-11-03 PROCEDURE — 80048 BASIC METABOLIC PNL TOTAL CA: CPT

## 2024-11-03 PROCEDURE — 85025 COMPLETE CBC W/AUTO DIFF WBC: CPT

## 2024-11-03 PROCEDURE — 2580000003 HC RX 258: Performed by: INTERNAL MEDICINE

## 2024-11-03 PROCEDURE — 36415 COLL VENOUS BLD VENIPUNCTURE: CPT

## 2024-11-03 PROCEDURE — 6370000000 HC RX 637 (ALT 250 FOR IP)

## 2024-11-03 PROCEDURE — 84132 ASSAY OF SERUM POTASSIUM: CPT

## 2024-11-03 PROCEDURE — 6370000000 HC RX 637 (ALT 250 FOR IP): Performed by: NURSE PRACTITIONER

## 2024-11-03 PROCEDURE — 6370000000 HC RX 637 (ALT 250 FOR IP): Performed by: INTERNAL MEDICINE

## 2024-11-03 PROCEDURE — 94640 AIRWAY INHALATION TREATMENT: CPT

## 2024-11-03 PROCEDURE — 6360000002 HC RX W HCPCS: Performed by: INTERNAL MEDICINE

## 2024-11-03 PROCEDURE — 1200000000 HC SEMI PRIVATE

## 2024-11-03 RX ORDER — INSULIN LISPRO 100 [IU]/ML
0-16 INJECTION, SOLUTION INTRAVENOUS; SUBCUTANEOUS
Status: DISCONTINUED | OUTPATIENT
Start: 2024-11-03 | End: 2024-11-08 | Stop reason: HOSPADM

## 2024-11-03 RX ADMIN — ACETAMINOPHEN 650 MG: 325 TABLET ORAL at 23:43

## 2024-11-03 RX ADMIN — FERROUS SULFATE TAB 325 MG (65 MG ELEMENTAL FE) 325 MG: 325 (65 FE) TAB at 09:30

## 2024-11-03 RX ADMIN — METOPROLOL TARTRATE 50 MG: 50 TABLET, FILM COATED ORAL at 09:29

## 2024-11-03 RX ADMIN — TRAMADOL HYDROCHLORIDE 50 MG: 50 TABLET ORAL at 20:22

## 2024-11-03 RX ADMIN — BUPROPION HYDROCHLORIDE 300 MG: 150 TABLET, EXTENDED RELEASE ORAL at 09:29

## 2024-11-03 RX ADMIN — Medication 5000 UNITS: at 09:30

## 2024-11-03 RX ADMIN — METOPROLOL TARTRATE 50 MG: 50 TABLET, FILM COATED ORAL at 20:22

## 2024-11-03 RX ADMIN — CYANOCOBALAMIN TAB 1000 MCG 1000 MCG: 1000 TAB at 09:30

## 2024-11-03 RX ADMIN — INSULIN LISPRO 4 UNITS: 100 INJECTION, SOLUTION INTRAVENOUS; SUBCUTANEOUS at 12:33

## 2024-11-03 RX ADMIN — TIOTROPIUM BROMIDE AND OLODATEROL 2 PUFF: 3.124; 2.736 SPRAY, METERED RESPIRATORY (INHALATION) at 08:31

## 2024-11-03 RX ADMIN — INSULIN GLARGINE 10 UNITS: 100 INJECTION, SOLUTION SUBCUTANEOUS at 20:23

## 2024-11-03 RX ADMIN — CEFDINIR 300 MG: 300 CAPSULE ORAL at 09:30

## 2024-11-03 RX ADMIN — CEFDINIR 300 MG: 300 CAPSULE ORAL at 20:23

## 2024-11-03 RX ADMIN — SODIUM CHLORIDE, PRESERVATIVE FREE 10 ML: 5 INJECTION INTRAVENOUS at 09:30

## 2024-11-03 RX ADMIN — ASPIRIN 81 MG: 81 TABLET, COATED ORAL at 20:23

## 2024-11-03 RX ADMIN — SODIUM CHLORIDE, PRESERVATIVE FREE 10 ML: 5 INJECTION INTRAVENOUS at 20:22

## 2024-11-03 RX ADMIN — ACETAMINOPHEN 650 MG: 325 TABLET ORAL at 13:50

## 2024-11-03 RX ADMIN — INSULIN LISPRO 4 UNITS: 100 INJECTION, SOLUTION INTRAVENOUS; SUBCUTANEOUS at 20:23

## 2024-11-03 RX ADMIN — PANTOPRAZOLE SODIUM 40 MG: 40 TABLET, DELAYED RELEASE ORAL at 09:30

## 2024-11-03 RX ADMIN — ENOXAPARIN SODIUM 40 MG: 100 INJECTION SUBCUTANEOUS at 09:29

## 2024-11-03 RX ADMIN — LOSARTAN POTASSIUM 50 MG: 50 TABLET, FILM COATED ORAL at 09:29

## 2024-11-03 RX ADMIN — ASPIRIN 81 MG: 81 TABLET, COATED ORAL at 09:30

## 2024-11-03 ASSESSMENT — PAIN DESCRIPTION - FREQUENCY
FREQUENCY: CONTINUOUS
FREQUENCY: CONTINUOUS

## 2024-11-03 ASSESSMENT — PAIN DESCRIPTION - DESCRIPTORS
DESCRIPTORS: ACHING
DESCRIPTORS: ACHING

## 2024-11-03 ASSESSMENT — PAIN SCALES - GENERAL
PAINLEVEL_OUTOF10: 0
PAINLEVEL_OUTOF10: 4
PAINLEVEL_OUTOF10: 3
PAINLEVEL_OUTOF10: 0

## 2024-11-03 ASSESSMENT — PAIN DESCRIPTION - LOCATION
LOCATION: BACK
LOCATION: BUTTOCKS

## 2024-11-03 ASSESSMENT — PAIN DESCRIPTION - PAIN TYPE
TYPE: CHRONIC PAIN
TYPE: CHRONIC PAIN

## 2024-11-03 ASSESSMENT — PAIN DESCRIPTION - ONSET
ONSET: ON-GOING
ONSET: ON-GOING

## 2024-11-03 ASSESSMENT — PAIN DESCRIPTION - ORIENTATION
ORIENTATION: MID
ORIENTATION: MID

## 2024-11-03 NOTE — PROGRESS NOTES
Hospital Medicine Progress Note  V 10.25      Date of Admission: 10/29/2024    Hospital Day: 6      Chief Admission Complaint:  Generalized weakness     Subjective:  Patient seen and examined at bedside. No acute events overnight. Reports her breathing still feels fine. Still pending precert to ARU. Denies any acute concerns at this time.       Presenting Admission History:       74 y.o. female with PMHx significant for COPD, diabetes mellitus, hyperlipidemia, CKD, essential hypertension who presented to ED with a complaint of generalized weakness and fatigue.  Patient has been having worsening fatigue over the last few days.  Patient was not able to stand up and was having difficulty using her walker at home.  Patient denies shortness of breath per se.  She does not use home oxygen at baseline.  Patient is on home inhalers due to previously diagnosed COPD according to the patient.  Patient quit smoking 15 years ago.  In ED patient was initially found to be tachycardic.  While in ER her oxygen saturation dropped in the high 80s requiring placement of nasal cannula 2 L/min.  CTPA was obtained and revealed no evidence of pulmonary embolism or infiltrate, however it did show evidence of possible pulmonary hypertension.  Patient is currently being admitted under inpatient status for further management and evaluation     Assessment/Plan:      Current Principal Problem:  Hypoxia    Plan:    Hypoxia - resolved  COPD, not in exacerbation  Pulmonary hypertension, likely group 2 and 3  - Patient initially presented with generalized weakness, did not necessarily feel SOB. Though she did require oxygen, up to 2L NC in the ED. Not on oxygen at baseline.   - Wean oxygen as tolerated for O2 sats between 88-92%  - Pulmonology consulted, appreciate recommendations  - Echo done, does show severe pulmonary hypertension with RSVP of 66 mmHg, grade 2 diastolic dysfunction, EF 60-65%  - Continue with current therapies for her

## 2024-11-03 NOTE — PROGRESS NOTES
Department of Physical Medicine & Rehabilitation  Progress Note    Patient Identification:  Carolee Sharma  [id number]  : 1950  Admit date: 10/29/2024    Chief Complaint: Hypoxia    Subjective:   Resting in her room this morning.  No new complaints overnight.  She continues to want to come to the rehab unit but is getting tired of waiting.  Slept well last night.      ROS: No f/c, n/v, cp     Objective:  Patient Vitals for the past 24 hrs:   BP Temp Temp src Pulse Resp SpO2 Weight   24 0738 (!) 143/89 98.4 °F (36.9 °C) Oral 76 18 94 % --   24 0317 (!) 141/82 98.2 °F (36.8 °C) Oral 80 16 91 % 66.8 kg (147 lb 4.3 oz)   24 0019 -- -- -- 77 16 -- --   24 (!) 157/86 97.4 °F (36.3 °C) Oral 77 18 93 % --   24 1645 116/86 97.3 °F (36.3 °C) Oral 81 18 96 % --   24 1217 (!) 142/90 97.3 °F (36.3 °C) Oral 86 18 94 % --   24 0828 (!) 152/80 98.3 °F (36.8 °C) Oral 85 18 91 % --     Const: Alert. No distress, pleasant.   HEENT: Normocephalic, atraumatic. Normal sclera/conjunctiva. MMM.   CV: Regular rate and rhythm.   Resp: No respiratory distress. Lungs CTAB.   Abd: Soft, nontender, nondistended, NABS+   Ext: No edema.   Neuro: Alert, oriented, appropriately interactive.   Psych: Cooperative, appropriate mood and affect    Laboratory data: Available via EMR.   Last 24 hour lab  Recent Results (from the past 24 hour(s))   POCT Glucose    Collection Time: 24 11:17 AM   Result Value Ref Range    POC Glucose 326 (H) 70 - 99 mg/dl    Performed on ACCU-CHEK    POCT Glucose    Collection Time: 24  4:50 PM   Result Value Ref Range    POC Glucose 145 (H) 70 - 99 mg/dl    Performed on ACCU-CHEK    POCT Glucose    Collection Time: 24  7:02 PM   Result Value Ref Range    POC Glucose 217 (H) 70 - 99 mg/dl    Performed on ACCU-CHEK    CBC with Auto Differential    Collection Time: 24  5:23 AM   Result Value Ref Range    WBC 6.4 4.0 - 11.0 K/uL    RBC 3.45 (L) 4.00 -  Echo shows EF 60-66 and grade 2 Diastolic dysfunction.     Bilateral lower extremity edema  Received IV lasix     Essential hypertension.  On losartan and lopressor      Type 2 diabetes mellitus.  On latus 10 IU and MDSSI     Hyperlipidemia.  Normocytic anemia.     Impairments- Decreased functional mobility, Decreased ADLs     Recommendations:     Pre-CERT pending.        NBA RubioP.H  PM&R  11/3/2024  8:17 AM

## 2024-11-03 NOTE — PLAN OF CARE
Problem: Chronic Conditions and Co-morbidities  Goal: Patient's chronic conditions and co-morbidity symptoms are monitored and maintained or improved  11/2/2024 2200 by Amberly Kapoor RN  Outcome: Progressing     Problem: Discharge Planning  Goal: Discharge to home or other facility with appropriate resources  11/2/2024 2200 by Amberly Kapoor RN  Outcome: Progressing     Problem: Skin/Tissue Integrity  Goal: Absence of new skin breakdown  Description: 1.  Monitor for areas of redness and/or skin breakdown  2.  Assess vascular access sites hourly  3.  Every 4-6 hours minimum:  Change oxygen saturation probe site  4.  Every 4-6 hours:  If on nasal continuous positive airway pressure, respiratory therapy assess nares and determine need for appliance change or resting period.  11/2/2024 2200 by Amberly Kapoor RN  Outcome: Progressing

## 2024-11-03 NOTE — PLAN OF CARE
Problem: Chronic Conditions and Co-morbidities  Goal: Patient's chronic conditions and co-morbidity symptoms are monitored and maintained or improved  Outcome: Progressing     Problem: Skin/Tissue Integrity  Goal: Absence of new skin breakdown  Description: 1.  Monitor for areas of redness and/or skin breakdown  2.  Assess vascular access sites hourly  3.  Every 4-6 hours minimum:  Change oxygen saturation probe site  4.  Every 4-6 hours:  If on nasal continuous positive airway pressure, respiratory therapy assess nares and determine need for appliance change or resting period.  Outcome: Progressing     Problem: Safety - Adult  Goal: Free from fall injury  Outcome: Progressing     Problem: Pain  Goal: Verbalizes/displays adequate comfort level or baseline comfort level

## 2024-11-04 LAB
ANION GAP SERPL CALCULATED.3IONS-SCNC: 9 MMOL/L (ref 3–16)
BASOPHILS # BLD: 0 K/UL (ref 0–0.2)
BASOPHILS NFR BLD: 0.6 %
BUN SERPL-MCNC: 30 MG/DL (ref 7–20)
CALCIUM SERPL-MCNC: 9.7 MG/DL (ref 8.3–10.6)
CHLORIDE SERPL-SCNC: 102 MMOL/L (ref 99–110)
CO2 SERPL-SCNC: 24 MMOL/L (ref 21–32)
CREAT SERPL-MCNC: 1.1 MG/DL (ref 0.6–1.2)
DEPRECATED RDW RBC AUTO: 15.9 % (ref 12.4–15.4)
EOSINOPHIL # BLD: 0.4 K/UL (ref 0–0.6)
EOSINOPHIL NFR BLD: 6.3 %
GFR SERPLBLD CREATININE-BSD FMLA CKD-EPI: 53 ML/MIN/{1.73_M2}
GLUCOSE BLD-MCNC: 158 MG/DL (ref 70–99)
GLUCOSE BLD-MCNC: 174 MG/DL (ref 70–99)
GLUCOSE BLD-MCNC: 275 MG/DL (ref 70–99)
GLUCOSE BLD-MCNC: 327 MG/DL (ref 70–99)
GLUCOSE SERPL-MCNC: 170 MG/DL (ref 70–99)
HCT VFR BLD AUTO: 31.3 % (ref 36–48)
HGB BLD-MCNC: 10.4 G/DL (ref 12–16)
LYMPHOCYTES # BLD: 0.7 K/UL (ref 1–5.1)
LYMPHOCYTES NFR BLD: 12.4 %
MCH RBC QN AUTO: 29.9 PG (ref 26–34)
MCHC RBC AUTO-ENTMCNC: 33.2 G/DL (ref 31–36)
MCV RBC AUTO: 90.2 FL (ref 80–100)
MONOCYTES # BLD: 0.6 K/UL (ref 0–1.3)
MONOCYTES NFR BLD: 9.8 %
NEUTROPHILS # BLD: 4.1 K/UL (ref 1.7–7.7)
NEUTROPHILS NFR BLD: 70.9 %
PERFORMED ON: ABNORMAL
PLATELET # BLD AUTO: 272 K/UL (ref 135–450)
PMV BLD AUTO: 7.4 FL (ref 5–10.5)
POTASSIUM SERPL-SCNC: 5.1 MMOL/L (ref 3.5–5.1)
RBC # BLD AUTO: 3.47 M/UL (ref 4–5.2)
SODIUM SERPL-SCNC: 135 MMOL/L (ref 136–145)
WBC # BLD AUTO: 5.8 K/UL (ref 4–11)

## 2024-11-04 PROCEDURE — 80048 BASIC METABOLIC PNL TOTAL CA: CPT

## 2024-11-04 PROCEDURE — 6360000002 HC RX W HCPCS: Performed by: INTERNAL MEDICINE

## 2024-11-04 PROCEDURE — 97530 THERAPEUTIC ACTIVITIES: CPT

## 2024-11-04 PROCEDURE — 97535 SELF CARE MNGMENT TRAINING: CPT

## 2024-11-04 PROCEDURE — 97116 GAIT TRAINING THERAPY: CPT

## 2024-11-04 PROCEDURE — 6370000000 HC RX 637 (ALT 250 FOR IP): Performed by: NURSE PRACTITIONER

## 2024-11-04 PROCEDURE — 1200000000 HC SEMI PRIVATE

## 2024-11-04 PROCEDURE — 6370000000 HC RX 637 (ALT 250 FOR IP)

## 2024-11-04 PROCEDURE — 97110 THERAPEUTIC EXERCISES: CPT

## 2024-11-04 PROCEDURE — 36415 COLL VENOUS BLD VENIPUNCTURE: CPT

## 2024-11-04 PROCEDURE — 94761 N-INVAS EAR/PLS OXIMETRY MLT: CPT

## 2024-11-04 PROCEDURE — 2580000003 HC RX 258: Performed by: INTERNAL MEDICINE

## 2024-11-04 PROCEDURE — 6370000000 HC RX 637 (ALT 250 FOR IP): Performed by: INTERNAL MEDICINE

## 2024-11-04 PROCEDURE — 85025 COMPLETE CBC W/AUTO DIFF WBC: CPT

## 2024-11-04 PROCEDURE — 94640 AIRWAY INHALATION TREATMENT: CPT

## 2024-11-04 RX ADMIN — INSULIN LISPRO 12 UNITS: 100 INJECTION, SOLUTION INTRAVENOUS; SUBCUTANEOUS at 13:10

## 2024-11-04 RX ADMIN — ASPIRIN 81 MG: 81 TABLET, COATED ORAL at 10:09

## 2024-11-04 RX ADMIN — INSULIN GLARGINE 10 UNITS: 100 INJECTION, SOLUTION SUBCUTANEOUS at 22:50

## 2024-11-04 RX ADMIN — METOPROLOL TARTRATE 50 MG: 50 TABLET, FILM COATED ORAL at 22:51

## 2024-11-04 RX ADMIN — ACETAMINOPHEN 650 MG: 325 TABLET ORAL at 11:14

## 2024-11-04 RX ADMIN — CEFDINIR 300 MG: 300 CAPSULE ORAL at 10:09

## 2024-11-04 RX ADMIN — FERROUS SULFATE TAB 325 MG (65 MG ELEMENTAL FE) 325 MG: 325 (65 FE) TAB at 10:11

## 2024-11-04 RX ADMIN — METOPROLOL TARTRATE 50 MG: 50 TABLET, FILM COATED ORAL at 10:09

## 2024-11-04 RX ADMIN — TRAMADOL HYDROCHLORIDE 50 MG: 50 TABLET ORAL at 15:31

## 2024-11-04 RX ADMIN — TRAMADOL HYDROCHLORIDE 50 MG: 50 TABLET ORAL at 05:35

## 2024-11-04 RX ADMIN — TRAMADOL HYDROCHLORIDE 50 MG: 50 TABLET ORAL at 22:51

## 2024-11-04 RX ADMIN — PANTOPRAZOLE SODIUM 40 MG: 40 TABLET, DELAYED RELEASE ORAL at 10:09

## 2024-11-04 RX ADMIN — CEFDINIR 300 MG: 300 CAPSULE ORAL at 22:50

## 2024-11-04 RX ADMIN — SODIUM CHLORIDE, PRESERVATIVE FREE 10 ML: 5 INJECTION INTRAVENOUS at 10:09

## 2024-11-04 RX ADMIN — INSULIN LISPRO 8 UNITS: 100 INJECTION, SOLUTION INTRAVENOUS; SUBCUTANEOUS at 22:50

## 2024-11-04 RX ADMIN — ASPIRIN 81 MG: 81 TABLET, COATED ORAL at 22:50

## 2024-11-04 RX ADMIN — CYANOCOBALAMIN TAB 1000 MCG 1000 MCG: 1000 TAB at 10:08

## 2024-11-04 RX ADMIN — SODIUM CHLORIDE, PRESERVATIVE FREE 10 ML: 5 INJECTION INTRAVENOUS at 22:50

## 2024-11-04 RX ADMIN — ENOXAPARIN SODIUM 40 MG: 100 INJECTION SUBCUTANEOUS at 10:10

## 2024-11-04 RX ADMIN — BUPROPION HYDROCHLORIDE 300 MG: 150 TABLET, EXTENDED RELEASE ORAL at 10:08

## 2024-11-04 RX ADMIN — LOSARTAN POTASSIUM 50 MG: 50 TABLET, FILM COATED ORAL at 10:09

## 2024-11-04 RX ADMIN — Medication 5000 UNITS: at 10:09

## 2024-11-04 RX ADMIN — TIOTROPIUM BROMIDE AND OLODATEROL 2 PUFF: 3.124; 2.736 SPRAY, METERED RESPIRATORY (INHALATION) at 08:05

## 2024-11-04 RX ADMIN — ACETAMINOPHEN 650 MG: 325 TABLET ORAL at 18:36

## 2024-11-04 ASSESSMENT — PAIN SCALES - GENERAL
PAINLEVEL_OUTOF10: 6
PAINLEVEL_OUTOF10: 8
PAINLEVEL_OUTOF10: 7
PAINLEVEL_OUTOF10: 8
PAINLEVEL_OUTOF10: 7
PAINLEVEL_OUTOF10: 8
PAINLEVEL_OUTOF10: 6

## 2024-11-04 ASSESSMENT — PAIN DESCRIPTION - DESCRIPTORS
DESCRIPTORS: ACHING;DISCOMFORT
DESCRIPTORS: ACHING
DESCRIPTORS: ACHING;DISCOMFORT
DESCRIPTORS: ACHING

## 2024-11-04 ASSESSMENT — PAIN DESCRIPTION - ORIENTATION
ORIENTATION: RIGHT;LEFT
ORIENTATION: LOWER

## 2024-11-04 ASSESSMENT — PAIN - FUNCTIONAL ASSESSMENT
PAIN_FUNCTIONAL_ASSESSMENT: ACTIVITIES ARE NOT PREVENTED
PAIN_FUNCTIONAL_ASSESSMENT: PREVENTS OR INTERFERES SOME ACTIVE ACTIVITIES AND ADLS

## 2024-11-04 ASSESSMENT — PAIN DESCRIPTION - FREQUENCY: FREQUENCY: CONTINUOUS

## 2024-11-04 ASSESSMENT — PAIN DESCRIPTION - PAIN TYPE: TYPE: CHRONIC PAIN

## 2024-11-04 ASSESSMENT — PAIN DESCRIPTION - LOCATION
LOCATION: BACK
LOCATION: BUTTOCKS;HIP
LOCATION: BACK

## 2024-11-04 ASSESSMENT — PAIN DESCRIPTION - ONSET: ONSET: ON-GOING

## 2024-11-04 NOTE — PROGRESS NOTES
Department of Physical Medicine & Rehabilitation  Progress Note    Patient Identification:  Carolee Sharma  [id number]  : 1950  Admit date: 10/29/2024    Chief Complaint: Hypoxia    Subjective:   Seen in her room this morning.  She continues to be irritated about being in the hospital.  Pre-CERT for ARU has been denied by her insurance without a peer to peer option.  Will likely need to discharge to skilled nursing facility defer to case management.      ROS: No f/c, n/v, cp     Objective:  Patient Vitals for the past 24 hrs:   BP Temp Temp src Pulse Resp SpO2   24 0918 (!) 158/79 98.6 °F (37 °C) Oral 93 16 91 %   24 0807 -- -- -- 74 16 92 %   24 0535 -- -- -- -- 17 --   24 0334 (!) 162/79 98.1 °F (36.7 °C) Oral 76 16 91 %   24 2325 (!) 158/82 98.1 °F (36.7 °C) Oral 74 18 92 %   24 -- -- -- -- 19 --   24 138/61 97.6 °F (36.4 °C) Oral 83 19 92 %   24 1615 (!) 143/94 97.9 °F (36.6 °C) Oral 72 18 95 %   24 1215 (!) 154/61 97.9 °F (36.6 °C) Oral 74 16 94 %     Const: Alert. No distress, pleasant.   HEENT: Normocephalic, atraumatic. Normal sclera/conjunctiva. MMM.   CV: Regular rate and rhythm.   Resp: No respiratory distress. Lungs CTAB.   Abd: Soft, nontender, nondistended, NABS+   Ext: No edema.   Neuro: Alert, oriented, appropriately interactive.   Psych: Cooperative, appropriate mood and affect    Laboratory data: Available via EMR.   Last 24 hour lab  Recent Results (from the past 24 hour(s))   POCT Glucose    Collection Time: 24 11:38 AM   Result Value Ref Range    POC Glucose 297 (H) 70 - 99 mg/dl    Performed on ACCU-CHEK    Basic Metabolic Panel    Collection Time: 24  4:06 PM   Result Value Ref Range    Sodium 125 (L) 136 - 145 mmol/L    Potassium 8.1 (HH) 3.5 - 5.1 mmol/L    Chloride 97 (L) 99 - 110 mmol/L    CO2 19 (L) 21 - 32 mmol/L    Anion Gap 9 3 - 16    Glucose 179 (H) 70 - 99 mg/dL    BUN 31 (H) 7 - 20 mg/dL

## 2024-11-04 NOTE — PROGRESS NOTES
Occupational Therapy  Facility/Department: [address] TELEMETRY  Daily Treatment Note  NAME: Carolee Sharma  : 1950  MRN: [id number]    Date of Service: 2024    Discharge Recommendations:  IP Rehab  OT Equipment Recommendations  Equipment Needed: No      Patient Diagnosis(es): The encounter diagnosis was Shortness of breath.  Past Medical History:  has a past medical history of Hyperlipidemia, Hypertension, Thyroid disease, and Type II or unspecified type diabetes mellitus without mention of complication, not stated as uncontrolled.  Past Surgical History:  has a past surgical history that includes Total hip arthroplasty (Right, 2021) and Kyphosis surgery.    Assessment       Assessment: Pt tolerated session well with progress noted in functional activities. CGA for transfers and funct mob, and up to Min A for toileting and standing level ADLs. Pt demoed multiple slight/small LOBs during unsupported stands or for dynamic standing. Pt is motivated to work with therapy and would benefit from cont therapy to maximize safety and independence with ADLs, funct mob and transfers. Cont with POC      Activity Tolerance: Patient limited by fatigue;Patient tolerated treatment well  Discharge Recommendations: IP Rehab  Equipment Needed: No     Plan  Occupational Therapy Plan  Times Per Week: 2-5    Restrictions  Position Activity Restriction  Other position/activity restrictions: up with assist    Subjective  Subjective  Subjective: Pt sitting EOB upon arrival. RN present. Agreeable to OT  Additional Pertinent Hx: pt is a 74 y.o. female with PMHx significant for COPD, diabetes mellitus, hyperlipidemia, CKD, essential hypertension who presented to ED with a complaint of generalized weakness and fatigue.   Pain: No complaint of pain  Orientation  Overall Orientation Status: Within Normal Limits          Objective           Balance  Sitting: Intact  Standing: With support (RW- CGA to Min A)      Transfer  Training  Transfer Training: Yes  Sit to Stand: Contact-guard assistance  Stand to Sit: Contact-guard assistance  Toilet Transfer: Contact-guard assistance (ambulatin, regular toilet, grab bar used)      Gait  Gait Training: Yes  Overall Level of Assistance: Contact-guard assistance  Distance (ft): 30 Feet  Assistive Device: Gait belt;Walker, rolling         ADL  Grooming: Contact guard assistance (for stance at sink for oral care, hand hygiene and combing hair)  LE Dressing: Minimal assistance (Able to thread BLEs and pull up pass hips with Min A for standing balance)  Toileting: Minimal assistance (Able to provide self toileting hygiene seated on toilet. Min A for stance for clothing mgmt)                  Safety Devices  Type of Devices: Left in chair;Call light within reach;Chair alarm in place;Nurse notified    Patient Education  Education Given To: Patient  Education Provided: Role of Therapy;Plan of Care;Transfer Training;Mobility Training;Fall Prevention Strategies;ADL Adaptive Strategies;Precautions  Education Method: Verbal  Education Outcome: Verbalized understanding    Goals  Short Term Goals  Time Frame for Short Term Goals: by dc  Short Term Goal 1: Pt will complete LE dressing w/ CGA and use of LRAD- not met  Short Term Goal 2: Pt will complete toileting w/ CGA- not met  Short Term Goal 3: Pt will complete functional transfers w/ CGA- met 11/4    AM-PAC - ADL  AM-PAC Daily Activity - Inpatient   How much help is needed for putting on and taking off regular lower body clothing?: A Little  How much help is needed for bathing (which includes washing, rinsing, drying)?: A Lot  How much help is needed for toileting (which includes using toilet, bedpan, or urinal)?: A Little  How much help is needed for putting on and taking off regular upper body clothing?: A Little  How much help is needed for taking care of personal grooming?: A Little  How much help for eating meals?: None  AM-PAC Inpatient Daily

## 2024-11-04 NOTE — PROGRESS NOTES
Physical Therapy  Facility/Department: [address]  Physical Therapy Treatment    Name: Carolee Sharma  : 1950  MRN: [id number]  Date of Service: 2024    Discharge Recommendations:  IP Rehab, Subacute/Skilled Nursing Facility (Versus)   PT Equipment Recommendations  Other: Defer      Patient Diagnosis(es): The primary encounter diagnosis was Shortness of breath. A diagnosis of General weakness was also pertinent to this visit.  Past Medical History:  has a past medical history of Hyperlipidemia, Hypertension, Thyroid disease, and Type II or unspecified type diabetes mellitus without mention of complication, not stated as uncontrolled.  Past Surgical History:  has a past surgical history that includes Total hip arthroplasty (Right, 2021) and Kyphosis surgery.    Assessment  Assessment: Pt showing gradual improvement in functional mobility, although very shakey and weak appearing.  Pt ambulating into de la o this date with wheeled walker and CGA.  Pt limited by fatigue.  Pt presents as a fall risk.  Pt tearful due to not being admitted into ARU.  Rec continued inpt PT at d/c prior to return home.  Pt agrees as she states her  is unable to assist her much.  Will follow.  Treatment Diagnosis: decreased functional mobility  Therapy Prognosis: Good  Decision Making: Medium Complexity  Requires PT Follow-Up: Yes    Plan  Physical Therapy Plan  General Plan:  (2-5)  Current Treatment Recommendations: Strengthening, Stair training, Balance training, Functional mobility training, Transfer training, Endurance training, Gait training, Safety education & training, Patient/Caregiver education & training, Therapeutic activities  Safety Devices  Type of Devices: Call light within reach, Nurse notified, Bed alarm in place, Left in bed, All fall risk precautions in place (Pt declining chair due to chronic back pain)    Restrictions  Position Activity Restriction  Other position/activity restrictions: up  with assist     Subjective  General  Chart Reviewed: Yes  Additional Pertinent Hx: pt is a 74 y.o. female with PMHx significant for COPD, diabetes mellitus, hyperlipidemia, CKD, essential hypertension who presented to ED with a complaint of generalized weakness and fatigue.  Family / Caregiver Present: No  Referring Practitioner: DO Felix  Diagnosis: Hypoxia  Subjective  Subjective: Pt supine in bed and agreeable to PT.  Pt tearful due to not be accepted to ARU.  Reports chronic back pain, 8/10, nursing notified.    Social/Functional History  Social/Functional History  Lives With: Spouse  Type of Home: House  Home Layout: Two level, Bed/Bath upstairs  Home Access: Stairs to enter with rails  Entrance Stairs - Number of Steps: 4  Entrance Stairs - Rails: Both  Bathroom Shower/Tub: Walk-in shower  Bathroom Toilet: Handicap height  Bathroom Equipment: Grab bars in shower, Shower chair, Grab bars around toilet  Bathroom Accessibility: Accessible  Home Equipment: Walker - Rolling  Has the patient had two or more falls in the past year or any fall with injury in the past year?: Yes (most recent sept 19th)  Receives Help From: Family  ADL Assistance: Needs assistance  Homemaking Assistance: Needs assistance  Homemaking Responsibilities: Yes  Ambulation Assistance: Needs assistance  Transfer Assistance: Needs assistance  Active : Yes  Mode of Transportation: Car  Occupation: Retired  Additional Comments: was getting PT at nursing home to improve amb but returned home a week ago and seen a significant decline    Vision/Hearing  Vision  Vision Exceptions: Wears glasses at all times  Hearing  Hearing Exceptions: Bilateral hearing aid      Cognition   Orientation  Orientation Level: Oriented X4  Cognition  Sequencing: Requires cues for some    Objective  Bed mobility  Supine to Sit: Stand by assistance  Sit to Supine: Stand by assistance  Scooting: Stand by assistance  Bed Mobility Comments: Slow and effortful with bed

## 2024-11-05 LAB
ANION GAP SERPL CALCULATED.3IONS-SCNC: 14 MMOL/L (ref 3–16)
BASOPHILS # BLD: 0.1 K/UL (ref 0–0.2)
BASOPHILS NFR BLD: 0.7 %
BUN SERPL-MCNC: 28 MG/DL (ref 7–20)
CALCIUM SERPL-MCNC: 9.5 MG/DL (ref 8.3–10.6)
CHLORIDE SERPL-SCNC: 98 MMOL/L (ref 99–110)
CO2 SERPL-SCNC: 21 MMOL/L (ref 21–32)
CREAT SERPL-MCNC: 1.2 MG/DL (ref 0.6–1.2)
DEPRECATED RDW RBC AUTO: 15.6 % (ref 12.4–15.4)
EOSINOPHIL # BLD: 0.4 K/UL (ref 0–0.6)
EOSINOPHIL NFR BLD: 5.2 %
GFR SERPLBLD CREATININE-BSD FMLA CKD-EPI: 48 ML/MIN/{1.73_M2}
GLUCOSE BLD-MCNC: 156 MG/DL (ref 70–99)
GLUCOSE BLD-MCNC: 170 MG/DL (ref 70–99)
GLUCOSE BLD-MCNC: 286 MG/DL (ref 70–99)
GLUCOSE BLD-MCNC: 320 MG/DL (ref 70–99)
GLUCOSE SERPL-MCNC: 137 MG/DL (ref 70–99)
HCT VFR BLD AUTO: 32.7 % (ref 36–48)
HGB BLD-MCNC: 10.8 G/DL (ref 12–16)
LYMPHOCYTES # BLD: 0.8 K/UL (ref 1–5.1)
LYMPHOCYTES NFR BLD: 11.4 %
MCH RBC QN AUTO: 30.2 PG (ref 26–34)
MCHC RBC AUTO-ENTMCNC: 33 G/DL (ref 31–36)
MCV RBC AUTO: 91.4 FL (ref 80–100)
MONOCYTES # BLD: 0.7 K/UL (ref 0–1.3)
MONOCYTES NFR BLD: 10.2 %
NEUTROPHILS # BLD: 5 K/UL (ref 1.7–7.7)
NEUTROPHILS NFR BLD: 72.5 %
PERFORMED ON: ABNORMAL
PLATELET # BLD AUTO: 307 K/UL (ref 135–450)
PMV BLD AUTO: 7.9 FL (ref 5–10.5)
POTASSIUM SERPL-SCNC: 5.6 MMOL/L (ref 3.5–5.1)
RBC # BLD AUTO: 3.58 M/UL (ref 4–5.2)
SODIUM SERPL-SCNC: 133 MMOL/L (ref 136–145)
WBC # BLD AUTO: 6.9 K/UL (ref 4–11)

## 2024-11-05 PROCEDURE — 2580000003 HC RX 258: Performed by: INTERNAL MEDICINE

## 2024-11-05 PROCEDURE — 6360000002 HC RX W HCPCS: Performed by: INTERNAL MEDICINE

## 2024-11-05 PROCEDURE — 6370000000 HC RX 637 (ALT 250 FOR IP): Performed by: INTERNAL MEDICINE

## 2024-11-05 PROCEDURE — 80048 BASIC METABOLIC PNL TOTAL CA: CPT

## 2024-11-05 PROCEDURE — 1200000000 HC SEMI PRIVATE

## 2024-11-05 PROCEDURE — 36415 COLL VENOUS BLD VENIPUNCTURE: CPT

## 2024-11-05 PROCEDURE — 6370000000 HC RX 637 (ALT 250 FOR IP): Performed by: NURSE PRACTITIONER

## 2024-11-05 PROCEDURE — 85025 COMPLETE CBC W/AUTO DIFF WBC: CPT

## 2024-11-05 PROCEDURE — 94640 AIRWAY INHALATION TREATMENT: CPT

## 2024-11-05 PROCEDURE — 6370000000 HC RX 637 (ALT 250 FOR IP)

## 2024-11-05 RX ORDER — CEFDINIR 300 MG/1
300 CAPSULE ORAL EVERY 12 HOURS SCHEDULED
Qty: 4 CAPSULE | Refills: 0 | Status: SHIPPED | OUTPATIENT
Start: 2024-11-05 | End: 2024-11-07

## 2024-11-05 RX ADMIN — BUPROPION HYDROCHLORIDE 300 MG: 150 TABLET, EXTENDED RELEASE ORAL at 10:11

## 2024-11-05 RX ADMIN — TRAMADOL HYDROCHLORIDE 50 MG: 50 TABLET ORAL at 05:37

## 2024-11-05 RX ADMIN — SODIUM CHLORIDE, PRESERVATIVE FREE 10 ML: 5 INJECTION INTRAVENOUS at 10:12

## 2024-11-05 RX ADMIN — INSULIN LISPRO 8 UNITS: 100 INJECTION, SOLUTION INTRAVENOUS; SUBCUTANEOUS at 20:42

## 2024-11-05 RX ADMIN — TRAMADOL HYDROCHLORIDE 50 MG: 50 TABLET ORAL at 17:23

## 2024-11-05 RX ADMIN — TRAMADOL HYDROCHLORIDE 50 MG: 50 TABLET ORAL at 23:55

## 2024-11-05 RX ADMIN — ACETAMINOPHEN 650 MG: 325 TABLET ORAL at 20:53

## 2024-11-05 RX ADMIN — LOSARTAN POTASSIUM 50 MG: 50 TABLET, FILM COATED ORAL at 10:12

## 2024-11-05 RX ADMIN — PANTOPRAZOLE SODIUM 40 MG: 40 TABLET, DELAYED RELEASE ORAL at 10:11

## 2024-11-05 RX ADMIN — ACETAMINOPHEN 650 MG: 325 TABLET ORAL at 13:22

## 2024-11-05 RX ADMIN — ENOXAPARIN SODIUM 40 MG: 100 INJECTION SUBCUTANEOUS at 10:12

## 2024-11-05 RX ADMIN — METOPROLOL TARTRATE 50 MG: 50 TABLET, FILM COATED ORAL at 10:12

## 2024-11-05 RX ADMIN — FERROUS SULFATE TAB 325 MG (65 MG ELEMENTAL FE) 325 MG: 325 (65 FE) TAB at 10:11

## 2024-11-05 RX ADMIN — CEFDINIR 300 MG: 300 CAPSULE ORAL at 10:12

## 2024-11-05 RX ADMIN — CYANOCOBALAMIN TAB 1000 MCG 1000 MCG: 1000 TAB at 10:11

## 2024-11-05 RX ADMIN — SODIUM CHLORIDE, PRESERVATIVE FREE 10 ML: 5 INJECTION INTRAVENOUS at 20:55

## 2024-11-05 RX ADMIN — CEFDINIR 300 MG: 300 CAPSULE ORAL at 20:43

## 2024-11-05 RX ADMIN — ASPIRIN 81 MG: 81 TABLET, COATED ORAL at 10:12

## 2024-11-05 RX ADMIN — METOPROLOL TARTRATE 50 MG: 50 TABLET, FILM COATED ORAL at 20:43

## 2024-11-05 RX ADMIN — INSULIN LISPRO 12 UNITS: 100 INJECTION, SOLUTION INTRAVENOUS; SUBCUTANEOUS at 13:24

## 2024-11-05 RX ADMIN — INSULIN GLARGINE 10 UNITS: 100 INJECTION, SOLUTION SUBCUTANEOUS at 20:42

## 2024-11-05 RX ADMIN — TIOTROPIUM BROMIDE AND OLODATEROL 2 PUFF: 3.124; 2.736 SPRAY, METERED RESPIRATORY (INHALATION) at 08:56

## 2024-11-05 RX ADMIN — ASPIRIN 81 MG: 81 TABLET, COATED ORAL at 20:43

## 2024-11-05 RX ADMIN — Medication 5000 UNITS: at 10:11

## 2024-11-05 ASSESSMENT — PAIN DESCRIPTION - FREQUENCY
FREQUENCY: CONTINUOUS

## 2024-11-05 ASSESSMENT — PAIN - FUNCTIONAL ASSESSMENT
PAIN_FUNCTIONAL_ASSESSMENT: PREVENTS OR INTERFERES WITH ALL ACTIVE AND SOME PASSIVE ACTIVITIES
PAIN_FUNCTIONAL_ASSESSMENT: PREVENTS OR INTERFERES SOME ACTIVE ACTIVITIES AND ADLS

## 2024-11-05 ASSESSMENT — PAIN DESCRIPTION - LOCATION
LOCATION: HIP;BACK
LOCATION: HIP
LOCATION: HIP;OTHER (COMMENT)
LOCATION: HIP
LOCATION: BACK;HIP

## 2024-11-05 ASSESSMENT — PAIN DESCRIPTION - DESCRIPTORS
DESCRIPTORS: THROBBING
DESCRIPTORS: ACHING
DESCRIPTORS: DISCOMFORT
DESCRIPTORS: ACHING;DISCOMFORT
DESCRIPTORS: DISCOMFORT

## 2024-11-05 ASSESSMENT — PAIN DESCRIPTION - ONSET
ONSET: ON-GOING

## 2024-11-05 ASSESSMENT — PAIN DESCRIPTION - ORIENTATION
ORIENTATION: LEFT
ORIENTATION: MID

## 2024-11-05 ASSESSMENT — PAIN SCALES - GENERAL
PAINLEVEL_OUTOF10: 0
PAINLEVEL_OUTOF10: 6
PAINLEVEL_OUTOF10: 8
PAINLEVEL_OUTOF10: 3
PAINLEVEL_OUTOF10: 7
PAINLEVEL_OUTOF10: 8
PAINLEVEL_OUTOF10: 7
PAINLEVEL_OUTOF10: 8

## 2024-11-05 ASSESSMENT — PAIN DESCRIPTION - PAIN TYPE
TYPE: ACUTE PAIN

## 2024-11-05 NOTE — PLAN OF CARE
Problem: Discharge Planning  Goal: Discharge to home or other facility with appropriate resources  Outcome: Progressing   Waiting for pre cert for rehab.  Problem: Safety - Adult  Goal: Free from fall injury  Outcome: Progressing  Bed alarm on.  Patient using call light appropriately.       Problem: Metabolic/Fluid and Electrolytes - Adult  Goal: Electrolytes maintained within normal limits  Outcome: Progressing   Potassium level was 5.6 today.  Dr. Torres notified.  No new orders at this time

## 2024-11-05 NOTE — DISCHARGE SUMMARY
V2.0  Discharge Summary    Name:  Carolee Sharma /Age/Sex: 1950 (74 y.o. female)   Admit Date: 10/29/2024  Discharge Date:  24     MRN & CSN:  [id number] & [id number] Encounter Date and Time 24 10:59 AM EST    Attending:  Cezar Torres MD Discharging Provider: Cezar Torres MD       Hospital Course:     Brief HPI: Carolee Sharma is a 74 y.o. female who presented with PMHx significant for COPD, diabetes mellitus, hyperlipidemia, CKD, essential hypertension who presented to ED with a complaint of generalized weakness and fatigue. Patient has been having worsening fatigue over the last few days. Patient was not able to stand up and was having difficulty using her walker at home. Patient denies shortness of breath per se. She does not use home oxygen at baseline. Patient is on home inhalers due to previously diagnosed COPD according to the patient. Patient quit smoking 15 years ago. In ED patient was initially found to be tachycardic. While in ER her oxygen saturation dropped in the high 80s requiring placement of nasal cannula 2 L/min. CTPA was obtained and revealed no evidence of pulmonary embolism or infiltrate, however it did show evidence of possible pulmonary hypertension. Patient is currently being admitted under inpatient status for further management and evaluation     Brief Problem Based Course:   Hypoxia - resolved  COPD, not in exacerbation  Pulmonary hypertension, likely group 2 and 3  - Patient initially presented with generalized weakness, did not necessarily feel SOB. Though she did require oxygen, up to 2L NC in the ED. Not on oxygen at baseline.   - Wean oxygen as tolerated for O2 sats between 88-92%  - Pulmonology consulted, appreciate recommendations  - Echo done, does show severe pulmonary hypertension with RSVP of 66 mmHg, grade 2 diastolic dysfunction, EF 60-65%  - Continue with current therapies for her underlying COPD and HFpEF  -Patient was denied ARU.  Patient

## 2024-11-05 NOTE — PROGRESS NOTES
[facility] Medicine Progress Note  V 10.25      Date of Admission: 10/29/2024    Hospital Day: 7      Chief Admission Complaint:  Generalized weakness     Subjective:  Patient seen and examined at bedside. No acute events overnight. Reports her breathing still feels fine. Precert to ARU denied. Patient working with case management on finding SNF.       Presenting Admission History:       74 y.o. female with PMHx significant for COPD, diabetes mellitus, hyperlipidemia, CKD, essential hypertension who presented to ED with a complaint of generalized weakness and fatigue.  Patient has been having worsening fatigue over the last few days.  Patient was not able to stand up and was having difficulty using her walker at home.  Patient denies shortness of breath per se.  She does not use home oxygen at baseline.  Patient is on home inhalers due to previously diagnosed COPD according to the patient.  Patient quit smoking 15 years ago.  In ED patient was initially found to be tachycardic.  While in ER her oxygen saturation dropped in the high 80s requiring placement of nasal cannula 2 L/min.  CTPA was obtained and revealed no evidence of pulmonary embolism or infiltrate, however it did show evidence of possible pulmonary hypertension.  Patient is currently being admitted under inpatient status for further management and evaluation     Assessment/Plan:      Current Principal Problem:  Hypoxia    Plan:    Hypoxia - resolved  COPD, not in exacerbation  Pulmonary hypertension, likely group 2 and 3  - Patient initially presented with generalized weakness, did not necessarily feel SOB. Though she did require oxygen, up to 2L NC in the ED. Not on oxygen at baseline.   - Wean oxygen as tolerated for O2 sats between 88-92%  - Pulmonology consulted, appreciate recommendations  - Echo done, does show severe pulmonary hypertension with RSVP of 66 mmHg, grade 2 diastolic dysfunction, EF 60-65%  - Continue with current therapies for her  Post-Cath/Contrast study requiring serial monitoring    [] IV Narcotic analgesia    [] Aggressive IV diuresis    [] Hypertonic Saline    [] Critical electrolyte abnormalities requiring IV replacement    [x] Insulin - Scheduled/SSI or Insulin gtt    [] Anticoagulation (Heparin gtt or Coumadin - other anticoagulants in special circumstances)    [] Cardiac Medications (IV Amiodarone/Diltiazem, Tikosyn, etc)    [] Hemodialysis    [] Other -    [] Change in code status    [] Decision to escalate care    [] Major surgery/procedure with associated risk factors    --------------------------------------------------  C. Data (any 2)    [x] Data Review (any 3)    [x] Consultant notes from yesterday/today    [x] All available current labs reviewed interpreted for clinical significance    [x] Appropriate follow-up labs were ordered  [] Collateral history obtained     [] Independent Interpretation of tests (any 1)    [] Telemetry (Rhythm Strip) personally reviewed and interpreted        [] Imaging personally reviewed and interpreted     [] Discussion (any 1)  [] Multi-Disciplinary Rounds with Case Management  [] Discussed management of the case with           Labs:  Personally reviewed on 11/4/2024 and interpreted for clinical significance as documented above.     Recent Labs     11/02/24  0426 11/03/24  0523 11/04/24  0538   WBC 5.6 6.4 5.8   HGB 10.2* 10.2* 10.4*   HCT 31.3* 31.5* 31.3*    267 272     Recent Labs     11/03/24  0523 11/03/24  1606 11/03/24  1922 11/04/24  0538    125*  --  135*   K 5.2* 8.1* 5.4* 5.1    97*  --  102   CO2 24 19*  --  24   BUN 28* 31*  --  30*   CREATININE 1.3* 1.5*  --  1.1   CALCIUM 9.7 8.8  --  9.7     No results for input(s): \"PROBNP\", \"TROPHS\" in the last 72 hours.    No results for input(s): \"LABA1C\" in the last 72 hours.    No results for input(s): \"AST\", \"ALT\", \"BILIDIR\", \"BILITOT\", \"ALKPHOS\" in the last 72 hours.    No results for input(s): \"INR\", \"LACTA\", \"TSH\" in the

## 2024-11-05 NOTE — DISCHARGE INSTR - COC
Continuity of Care Form    Patient Name: Carolee Sharma   :  1950  MRN:  [id number]    Admit date:  10/29/2024  Discharge date:  24    Code Status Order: Full Code   Advance Directives:   Advance Care Flowsheet Documentation             Admitting Physician:  Glynn Thapa MD  PCP: Anirudh Ortez MD    Discharging Nurse: Gwendolyn ADAMS RN  Discharging Hospital Unit/Room#: 6310/6310-01  Discharging Unit Phone Number: [phone]    Emergency Contact:   Extended Emergency Contact Information  Primary Emergency Contact: Wu Sharma  Address: [address] [place] [address]  Home Phone: [phone]  Work Phone: [phone]  Relation: Spouse  Secondary Emergency Contact: Crescencio Sharma  Home Phone: [phone]  Relation: Child    Past Surgical History:  Past Surgical History:   Procedure Laterality Date    KYPHOSIS SURGERY      TOTAL HIP ARTHROPLASTY Right 2021    RIGHT TOAL HIP ARTHROPLASTY ANTERIOR APPROACH performed by Parag Gu MD at [facility] OR       Immunization History:   Immunization History   Administered Date(s) Administered    COVID-19, MODERNA BLUE border, Primary or Immunocompromised, (age 12y+), IM, 100 mcg/0.5mL 2021    COVID-19, PFIZER Bivalent, DO NOT Dilute, (age 12y+), IM, 30 mcg/0.3 mL 2022    COVID-19, PFIZER PURPLE top, DILUTE for use, (age 12 y+), 30mcg/0.3mL 2021, 2021    COVID-19, PFIZER, (age 12y+), IM, 30mcg/0.3mL 2023       Active Problems:  Patient Active Problem List   Diagnosis Code    Closed fracture of right femur, initial encounter ([facility]) S72.91XA    Respiratory failure with hypoxia J96.91    Hypoxia R09.02    Pulmonary HTN ([facility]) I27.20       Isolation/Infection:   Isolation            No Isolation          Patient Infection Status       Infection Onset Added Last Indicated Last Indicated By Review Planned Expiration Resolved Resolved By    None active    Resolved    COVID-19 23 COVID-19,  Rapid   23 Infection                        Nurse Assessment:  Last Vital Signs: BP (!) 160/78   Pulse 86   Temp 97.7 °F (36.5 °C) (Oral)   Resp 18   Ht 1.676 m (5' 6\")   Wt 66.8 kg (147 lb 4.3 oz)   SpO2 93%   BMI 23.77 kg/m²     Last documented pain score (0-10 scale): Pain Level: 3  Last Weight:   Wt Readings from Last 1 Encounters:   24 66.8 kg (147 lb 4.3 oz)     Mental Status:  oriented and alert    IV Access:  - None    Nursing Mobility/ADLs:  Walking   Assisted  Transfer  Assisted  Bathing  Assisted  Dressing  Assisted  Toileting  Assisted  Feeding  Assisted  Med Admin  Assisted  Med Delivery   whole    Wound Care Documentation and Therapy:  Wound 10/30/24 Buttocks Left small stage 2 left buttock (Active)   Dressing Status Clean;Dry;Intact 24 1214   Wound Cleansed Soap and water 24 0416   Dressing/Treatment Zinc paste 24 0416   Drainage Amount None (dry) 24 0416   Odor None 24 0416   Carmela-wound Assessment Intact 24 0416   Number of days: 5       Incision Thigh Anterior;Proximal;Right (Active)   Number of days:         Elimination:  Continence:   Bowel: Yes  Bladder: No  Urinary Catheter: None   Colostomy/Ileostomy/Ileal Conduit: No       Date of Last BM: 24    Intake/Output Summary (Last 24 hours) at 2024 1058  Last data filed at 2024 0416  Gross per 24 hour   Intake --   Output 1625 ml   Net -1625 ml     I/O last 3 completed shifts:  In: 540 [P.O.:540]  Out: 2425 [Urine:2425]    Safety Concerns:     At Risk for Falls    Impairments/Disabilities:      None    Nutrition Therapy:  Current Nutrition Therapy:   - Oral Diet:  General    Routes of Feeding: Oral  Liquids: Thin Liquids  Daily Fluid Restriction: no  Last Modified Barium Swallow with Video (Video Swallowing Test): not done    Treatments at the Time of Hospital Discharge:   Respiratory Treatments: ***  Oxygen Therapy:  is not on home oxygen therapy.  Ventilator:    - No

## 2024-11-05 NOTE — ED PROVIDER NOTES
THE [facility]  EMERGENCY DEPARTMENT ENCOUNTER          PHYSICIAN ASSISTANT NOTE       Date of evaluation: 10/29/2024    Chief Complaint     Fatigue (Patient comes in with increase  weakness /Patient had a fall on September 17th and fractured her pelvis in 8 different places per patient /Patient normally can walk with a walker however today she is needing more assistance. /)      History of Present Illness     Carolee Sharma is a 74 y.o. female with PMHx of HTN and HLD who presents with generalized weakness. She states her legs felt weak for the past 2 days but she did not fall. She denies lightheadedness or dizziness. She states she took a long trip to [place] 1 month ago and had a mechanical fall which resulted in a fracture of right displaced inferior pubic rami fracture, right high ramus fracture adjacent to the acetabular cup from previous right total hip arthroplasty. She did stay in rehab with ot/pt. She denies chest pain and shortness of breath. Denies recent cough or URI symptoms. No urinary symptoms.     ASSESSMENT / PLAN  (MEDICAL DECISION MAKING)     INITIAL VITALS: BP: 138/75, Temp: 98 °F (36.7 °C), Pulse: (!) 125, Respirations: 16, SpO2: 96 %    Carolee Sharma is a 74 y.o. female PMHx of HTN and HLD who presents with generalized weakness. She states her legs felt weak for the past 2 days but she did not fall. She denies lightheadedness or dizziness. She states she took a long trip to [place] 1 month ago and had a mechanical fall which resulted in a fracture of right displaced inferior pubic rami fracture, right high ramus fracture adjacent to the acetabular cup from previous right total hip arthroplasty. She did stay in rehab with ot/pt. She denies chest pain and shortness of breath. Denies recent cough or URI symptoms. No urinary symptoms. .    Vital signs here in the ED with SpO2 of 93% on arrival. She was placed on 2L of O2. She was tachycardic with HR of 125, hence obtaining a d-dimer. On

## 2024-11-06 ENCOUNTER — APPOINTMENT (OUTPATIENT)
Dept: CT IMAGING | Age: 74
End: 2024-11-06
Payer: MEDICARE

## 2024-11-06 LAB
ANION GAP SERPL CALCULATED.3IONS-SCNC: 9 MMOL/L (ref 3–16)
BASOPHILS # BLD: 0 K/UL (ref 0–0.2)
BASOPHILS NFR BLD: 0.7 %
BUN SERPL-MCNC: 28 MG/DL (ref 7–20)
CALCIUM SERPL-MCNC: 9.3 MG/DL (ref 8.3–10.6)
CHLORIDE SERPL-SCNC: 103 MMOL/L (ref 99–110)
CO2 SERPL-SCNC: 22 MMOL/L (ref 21–32)
CREAT SERPL-MCNC: 1.1 MG/DL (ref 0.6–1.2)
DEPRECATED RDW RBC AUTO: 15.9 % (ref 12.4–15.4)
EOSINOPHIL # BLD: 0.3 K/UL (ref 0–0.6)
EOSINOPHIL NFR BLD: 5.4 %
GFR SERPLBLD CREATININE-BSD FMLA CKD-EPI: 53 ML/MIN/{1.73_M2}
GLUCOSE BLD-MCNC: 166 MG/DL (ref 70–99)
GLUCOSE BLD-MCNC: 217 MG/DL (ref 70–99)
GLUCOSE BLD-MCNC: 241 MG/DL (ref 70–99)
GLUCOSE BLD-MCNC: 258 MG/DL (ref 70–99)
GLUCOSE SERPL-MCNC: 141 MG/DL (ref 70–99)
HCT VFR BLD AUTO: 34.6 % (ref 36–48)
HGB BLD-MCNC: 11.3 G/DL (ref 12–16)
LYMPHOCYTES # BLD: 0.8 K/UL (ref 1–5.1)
LYMPHOCYTES NFR BLD: 11.9 %
MCH RBC QN AUTO: 29.9 PG (ref 26–34)
MCHC RBC AUTO-ENTMCNC: 32.6 G/DL (ref 31–36)
MCV RBC AUTO: 91.8 FL (ref 80–100)
MONOCYTES # BLD: 0.7 K/UL (ref 0–1.3)
MONOCYTES NFR BLD: 11.3 %
NEUTROPHILS # BLD: 4.6 K/UL (ref 1.7–7.7)
NEUTROPHILS NFR BLD: 70.7 %
PERFORMED ON: ABNORMAL
PLATELET # BLD AUTO: 290 K/UL (ref 135–450)
PMV BLD AUTO: 7.8 FL (ref 5–10.5)
POTASSIUM SERPL-SCNC: 5.7 MMOL/L (ref 3.5–5.1)
RBC # BLD AUTO: 3.77 M/UL (ref 4–5.2)
SODIUM SERPL-SCNC: 134 MMOL/L (ref 136–145)
WBC # BLD AUTO: 6.5 K/UL (ref 4–11)

## 2024-11-06 PROCEDURE — 72128 CT CHEST SPINE W/O DYE: CPT

## 2024-11-06 PROCEDURE — 85025 COMPLETE CBC W/AUTO DIFF WBC: CPT

## 2024-11-06 PROCEDURE — 1200000000 HC SEMI PRIVATE

## 2024-11-06 PROCEDURE — 97530 THERAPEUTIC ACTIVITIES: CPT

## 2024-11-06 PROCEDURE — 6370000000 HC RX 637 (ALT 250 FOR IP)

## 2024-11-06 PROCEDURE — 72131 CT LUMBAR SPINE W/O DYE: CPT

## 2024-11-06 PROCEDURE — 97535 SELF CARE MNGMENT TRAINING: CPT

## 2024-11-06 PROCEDURE — 80048 BASIC METABOLIC PNL TOTAL CA: CPT

## 2024-11-06 PROCEDURE — 36415 COLL VENOUS BLD VENIPUNCTURE: CPT

## 2024-11-06 PROCEDURE — 6370000000 HC RX 637 (ALT 250 FOR IP): Performed by: NURSE PRACTITIONER

## 2024-11-06 PROCEDURE — 6370000000 HC RX 637 (ALT 250 FOR IP): Performed by: INTERNAL MEDICINE

## 2024-11-06 PROCEDURE — 97116 GAIT TRAINING THERAPY: CPT

## 2024-11-06 PROCEDURE — 94640 AIRWAY INHALATION TREATMENT: CPT

## 2024-11-06 PROCEDURE — 2580000003 HC RX 258: Performed by: INTERNAL MEDICINE

## 2024-11-06 PROCEDURE — 6360000002 HC RX W HCPCS: Performed by: INTERNAL MEDICINE

## 2024-11-06 RX ADMIN — INSULIN LISPRO 4 UNITS: 100 INJECTION, SOLUTION INTRAVENOUS; SUBCUTANEOUS at 19:52

## 2024-11-06 RX ADMIN — ENOXAPARIN SODIUM 40 MG: 100 INJECTION SUBCUTANEOUS at 09:31

## 2024-11-06 RX ADMIN — TIOTROPIUM BROMIDE AND OLODATEROL 2 PUFF: 3.124; 2.736 SPRAY, METERED RESPIRATORY (INHALATION) at 10:00

## 2024-11-06 RX ADMIN — CEFDINIR 300 MG: 300 CAPSULE ORAL at 19:37

## 2024-11-06 RX ADMIN — SODIUM CHLORIDE, PRESERVATIVE FREE 10 ML: 5 INJECTION INTRAVENOUS at 09:33

## 2024-11-06 RX ADMIN — LOSARTAN POTASSIUM 50 MG: 50 TABLET, FILM COATED ORAL at 09:32

## 2024-11-06 RX ADMIN — ACETAMINOPHEN 650 MG: 325 TABLET ORAL at 09:55

## 2024-11-06 RX ADMIN — PANTOPRAZOLE SODIUM 40 MG: 40 TABLET, DELAYED RELEASE ORAL at 09:32

## 2024-11-06 RX ADMIN — CYANOCOBALAMIN TAB 1000 MCG 1000 MCG: 1000 TAB at 09:32

## 2024-11-06 RX ADMIN — SODIUM CHLORIDE, PRESERVATIVE FREE 10 ML: 5 INJECTION INTRAVENOUS at 19:40

## 2024-11-06 RX ADMIN — INSULIN LISPRO 4 UNITS: 100 INJECTION, SOLUTION INTRAVENOUS; SUBCUTANEOUS at 17:17

## 2024-11-06 RX ADMIN — FERROUS SULFATE TAB 325 MG (65 MG ELEMENTAL FE) 325 MG: 325 (65 FE) TAB at 09:32

## 2024-11-06 RX ADMIN — ACETAMINOPHEN 650 MG: 325 TABLET ORAL at 16:30

## 2024-11-06 RX ADMIN — INSULIN LISPRO 8 UNITS: 100 INJECTION, SOLUTION INTRAVENOUS; SUBCUTANEOUS at 11:49

## 2024-11-06 RX ADMIN — METOPROLOL TARTRATE 50 MG: 50 TABLET, FILM COATED ORAL at 09:32

## 2024-11-06 RX ADMIN — INSULIN GLARGINE 10 UNITS: 100 INJECTION, SOLUTION SUBCUTANEOUS at 19:52

## 2024-11-06 RX ADMIN — ASPIRIN 81 MG: 81 TABLET, COATED ORAL at 09:32

## 2024-11-06 RX ADMIN — TRAMADOL HYDROCHLORIDE 50 MG: 50 TABLET ORAL at 11:49

## 2024-11-06 RX ADMIN — CEFDINIR 300 MG: 300 CAPSULE ORAL at 09:32

## 2024-11-06 RX ADMIN — METOPROLOL TARTRATE 50 MG: 50 TABLET, FILM COATED ORAL at 19:38

## 2024-11-06 RX ADMIN — BUPROPION HYDROCHLORIDE 300 MG: 150 TABLET, EXTENDED RELEASE ORAL at 09:32

## 2024-11-06 RX ADMIN — ASPIRIN 81 MG: 81 TABLET, COATED ORAL at 19:37

## 2024-11-06 RX ADMIN — Medication 5000 UNITS: at 09:32

## 2024-11-06 RX ADMIN — TRAMADOL HYDROCHLORIDE 50 MG: 50 TABLET ORAL at 19:38

## 2024-11-06 ASSESSMENT — PAIN DESCRIPTION - PAIN TYPE
TYPE: ACUTE PAIN

## 2024-11-06 ASSESSMENT — PAIN SCALES - GENERAL
PAINLEVEL_OUTOF10: 5
PAINLEVEL_OUTOF10: 8
PAINLEVEL_OUTOF10: 5
PAINLEVEL_OUTOF10: 5
PAINLEVEL_OUTOF10: 4
PAINLEVEL_OUTOF10: 3
PAINLEVEL_OUTOF10: 8

## 2024-11-06 ASSESSMENT — PAIN - FUNCTIONAL ASSESSMENT
PAIN_FUNCTIONAL_ASSESSMENT: PREVENTS OR INTERFERES SOME ACTIVE ACTIVITIES AND ADLS

## 2024-11-06 ASSESSMENT — PAIN DESCRIPTION - FREQUENCY
FREQUENCY: CONTINUOUS

## 2024-11-06 ASSESSMENT — PAIN DESCRIPTION - ORIENTATION
ORIENTATION: LEFT

## 2024-11-06 ASSESSMENT — PAIN DESCRIPTION - DESCRIPTORS
DESCRIPTORS: ACHING;CRUSHING
DESCRIPTORS: DISCOMFORT
DESCRIPTORS: ACHING
DESCRIPTORS: ACHING

## 2024-11-06 ASSESSMENT — PAIN DESCRIPTION - LOCATION
LOCATION: HIP

## 2024-11-06 ASSESSMENT — PAIN DESCRIPTION - ONSET
ONSET: ON-GOING

## 2024-11-06 NOTE — PROGRESS NOTES
V2.0    [facility] Progress Note      Name:  Carolee Sharma /Age/Sex: 1950  (74 y.o. female)   MRN & CSN:  [id number] & [id number] Encounter Date/Time: 2024 12:50 PM EST   Location:  6310/6310-01 PCP: Anirudh Ortez MD     Attending:Cezar Torres MD       Hospital Day: 9    Assessment and Recommendations       Assessment/Plan:      Acute right and left sacral alar insufficiency type fractures:   This occurred while she was at a wedding in [place] when she fell.  She did do SNF rehab in [place] at that time.  Patient still having significant amount of pain.  Asked to see patient patient today because she has complained of chronic hip pain ongoing since her fall.  I did order CT of thoracic and lumbar spine  Will continue with current management.    Pulmonary hypertension likely group 2 and 3:  COPD not in exacerbation  Hypoxia at time of admission resolved  To this point:  - Patient initially presented with generalized weakness, did not necessarily feel SOB. Though she did require oxygen, up to 2L NC in the ED. Not on oxygen at baseline.   - Wean oxygen as tolerated for O2 sats between 88-92%  - Pulmonology consulted, appreciate recommendations  - Echo done, does show severe pulmonary hypertension with RSVP of 66 mmHg, grade 2 diastolic dysfunction, EF 60-65%  - Continue with current therapies for her underlying COPD and HFpEF  - Remains off oxygen.   Precert to ARU denied and family has made a formal appeal. Pending SNF referrals now.     UTI:  Completing 7 days of cefdinir    Diabetes mellitus type 2:  Continue Lantus at hour sleep and sliding scale  Will monitor for hypoglycemia while receiving high-dose sliding scale          MDM       [] High (any 2)     A. Problems (any 1)  [] Acute/Chronic Illness/injury posing threat to life or bodily function:    [] Severe exacerbation of chronic illness:    ---------------------------------------------------------------------  B. Risk of Treatment  vitamin D  5,000 Units Oral Daily    ferrous sulfate  325 mg Oral Daily with breakfast    vitamin B-12  1,000 mcg Oral Daily    losartan  50 mg Oral Daily    metoprolol tartrate  50 mg Oral BID    pantoprazole  40 mg Oral Daily    tiotropium-olodaterol  2 puff Inhalation Daily    sodium chloride flush  5-40 mL IntraVENous 2 times per day    enoxaparin  40 mg SubCUTAneous Daily    insulin glargine  10 Units SubCUTAneous Nightly      Infusions:    sodium chloride      dextrose       PRN Meds: acetaminophen, 650 mg, Q6H PRN   Or  acetaminophen, 650 mg, Q6H PRN  traMADol, 50 mg, Q6H PRN  albuterol, 2.5 mg, Q6H PRN  sodium chloride flush, 5-40 mL, PRN  sodium chloride, , PRN  ondansetron, 4 mg, Q8H PRN   Or  ondansetron, 4 mg, Q6H PRN  polyethylene glycol, 17 g, Daily PRN  glucose, 4 tablet, PRN  dextrose bolus, 125 mL, PRN   Or  dextrose bolus, 250 mL, PRN  glucagon (rDNA), 1 mg, PRN  dextrose, , Continuous PRN        Labs and Imaging   CT LUMBAR SPINE WO CONTRAST    Result Date: 11/6/2024  HISTORY: Fall. Back pain. CT of the thoracic and CT of the lumbar spine without contrast TECHNIQUE: CT images were obtained of the thoracic and lumbar spine without the use of contrast. Individualized dose optimization technique was used in order to meet ALARA standards for radiation dose reduction.  In addition to vendor specific dose reduction algorithms, the dose reduction techniques vary based on the specific scanner utilized but frequently include automated exposure control, adjustment of the mA and/or kV according to patient size, and use of iterative reconstruction technique. COMPARISON: CT pulmonary angiogram 10/29/2024. CT pulmonary angiogram 1/19/2021. FINDINGS: Thoracic vertebral bodies demonstrate mild kyphosis. There is a mild superior endplate deformity at T10 with osteophyte formation. This is unchanged in comparison to 2021. Mild Schmorl node deformity in mild chronic compression fracture superior endplate of T11

## 2024-11-06 NOTE — PROGRESS NOTES
Occupational Therapy  Facility/Department: [address]  Occupational Therapy Treatment Note     Name: Carolee Sharma  : 1950  MRN: [id number]  Date of Service: 2024    Discharge Recommendations:  Subacute/Skilled Nursing Facility  OT Equipment Recommendations  Equipment Needed: No  Other: defer to next care facility       Patient Diagnosis(es): The primary encounter diagnosis was Shortness of breath. A diagnosis of General weakness was also pertinent to this visit.  Past Medical History:  has a past medical history of Hyperlipidemia, Hypertension, Thyroid disease, and Type II or unspecified type diabetes mellitus without mention of complication, not stated as uncontrolled.  Past Surgical History:  has a past surgical history that includes Total hip arthroplasty (Right, 2021) and Kyphosis surgery.    Treatment Diagnosis: impaired ADLs and functional mobility/transfers      Assessment  Performance deficits / Impairments: Decreased functional mobility ;Decreased endurance;Decreased ADL status;Decreased balance;Decreased strength  Assessment: Pt continues with ongoing deficits.  Limited by pain and anxiety. Pt requires increased time and encouragement for ADLs/ mobility. Pt requires Min/ Mod A with functional transfers and Min A with mobility using RW.  Max A with LE ADLs / toileting. Emotional support provided.  Recommend ongoing inpt OT at SNF to maximize functional level.  Will follow up as inpt.  Treatment Diagnosis: impaired ADLs and functional mobility/transfers  REQUIRES OT FOLLOW-UP: Yes  Activity Tolerance  Activity Tolerance: Patient Tolerated treatment well;Patient limited by pain     Plan  Occupational Therapy Plan  Times Per Week: 2-5  Times Per Day: Once a day  Current Treatment Recommendations: Strengthening, Balance training, Functional mobility training, Endurance training, Patient/Caregiver education & training, Safety education & training, Self-Care /  needed for toileting (which includes using toilet, bedpan, or urinal)?: A Lot  How much help is needed for putting on and taking off regular upper body clothing?: A Little  How much help is needed for taking care of personal grooming?: A Little  How much help for eating meals?: None  AM-[facility] Inpatient Daily Activity Raw Score: 16  AM-[facility] Inpatient ADL T-Scale Score : 35.96  ADL Inpatient CMS 0-100% Score: 53.32  ADL Inpatient CMS G-Code Modifier : CK      Goals  Short Term Goals  Time Frame for Short Term Goals: by dc -  Short Term Goal 1: Pt will complete LE dressing w/ CGA and use of LRAD- not met  Short Term Goal 2: Pt will complete toileting w/ CGA- not met  Short Term Goal 3: Pt will complete functional transfers w/ CGA- met 11/4    Therapy Time   Individual Concurrent Group Co-treatment   Time In 1349         Time Out 1430         Minutes 41             Timed Code Treatment Minutes:   41 mins     Total Treatment Minutes:  41 mins       Jessica Virgen, OT

## 2024-11-06 NOTE — PROGRESS NOTES
Physical Therapy    Daily Treatment Note      Discharge Recommendations:  Subacute/Skilled Nursing Facility  Equipment Needs:  Defer    Assessment:  Pt with decreased gait distance today, limited by pain and anxiety.  Pt does well with encouragement and is motivated for PT.  Gait is unsteady and pt is at risk for falls.  Pt is below independent baseline and would benefit from ongoing PT to increase strength and maximize functional independence prior to returning home.  Should pt return home, pt would need 24hr capable hands-on assist and home PT to ensure safety.    Chart Reviewed: Yes   Other position/activity restrictions: up with assist   Additional Pertinent Hx: pt is a 74 y.o. female with PMHx significant for COPD, diabetes mellitus, hyperlipidemia, CKD, essential hypertension who presented to ED with a complaint of generalized weakness and fatigue.      Diagnosis: Hypoxia   Treatment Diagnosis: decreased functional mobility      Subjective:  Pt found in bathroom.  Pt upset, \"I've lost it. (Re: strength)  I couldn't even get myself out of bed.\"      Pain:  L LE/back pain, not rated and RN aware.       Objective:    Transfers  Sit to stand:  Min A (from chair to rolling walker, increased time and difficulty transitioning hands to rolling walker.  Pt needing Max A from commode to ZIYAD stedy.)  Stand to sit:  Min A (cues/assist to reach back with UEs before sitting, increased pain)    Ambulation  Assistance Level:  Min A   Assistive device:  Rolling walker  Distance:  15ft   Quality of gait:  slow and antalgic gait, effortful, decreased step length, decreased step height  Other:  Chair follow    Neuro/balance  Sitting balance:  WFL  Static standing:  CGA (at rolling walker)  Dynamic standing:  Min A (with rolling walker for ambulation)    Patient Education  Role of PT and d/c recommendations.  Pt verbalized understanding.       Safety Devices  Pt left with needs in reach, seated in chair with LEs elevated, alarm

## 2024-11-06 NOTE — PLAN OF CARE
Problem: Chronic Conditions and Co-morbidities  Goal: Patient's chronic conditions and co-morbidity symptoms are monitored and maintained or improved  Outcome: Progressing  Flowsheets (Taken 11/3/2024 2003 by Radha Qureshi, RN)  Care Plan - Patient's Chronic Conditions and Co-Morbidity Symptoms are Monitored and Maintained or Improved: Monitor and assess patient's chronic conditions and comorbid symptoms for stability, deterioration, or improvement     Problem: Discharge Planning  Goal: Discharge to home or other facility with appropriate resources  Outcome: Progressing  Flowsheets (Taken 11/6/2024 1343)  Discharge to home or other facility with appropriate resources:   Identify barriers to discharge with patient and caregiver   Arrange for needed discharge resources and transportation as appropriate     Problem: Skin/Tissue Integrity  Goal: Absence of new skin breakdown  Description: 1.  Monitor for areas of redness and/or skin breakdown  2.  Assess vascular access sites hourly  3.  Every 4-6 hours minimum:  Change oxygen saturation probe site  4.  Every 4-6 hours:  If on nasal continuous positive airway pressure, respiratory therapy assess nares and determine need for appliance change or resting period.  Outcome: Progressing     Problem: Safety - Adult  Goal: Free from fall injury  11/6/2024 1343 by Cristin Parra, RN  Outcome: Progressing  Note: Pt remains free of fall. Bed alarm on, bed lowest position, grippers socks on and call light within reach.      Problem: Pain  Goal: Verbalizes/displays adequate comfort level or baseline comfort level  11/6/2024 1343 by Cristin Parra, RN  Outcome: Progressing  Flowsheets (Taken 11/6/2024 1343)  Verbalizes/displays adequate comfort level or baseline comfort level:   Encourage patient to monitor pain and request assistance   Assess pain using appropriate pain scale   Administer analgesics based on type and severity of pain and evaluate response   Implement  non-pharmacological measures as appropriate and evaluate response  Note: Pt rated pain 8 out of 10. Given PRN Tylenol and Tramadol to provide relief. Implemented nonpharmacological measures to provide comfort.

## 2024-11-06 NOTE — PLAN OF CARE
Problem: Safety - Adult  Goal: Free from fall injury  Outcome: Progressing  Note:  Remains free from falls, bed in low position, call light in reach, bed alarm monitoring for safety.     Problem: Pain  Goal: Verbalizes/displays adequate comfort level or baseline comfort level  Outcome: Progressing  Note:  PRN Tylenol 650 mg PO at 2053; and, Tramadol 50 mg PO at 2355 for complaint of left hip/tailbone pain helpful.     Problem: Respiratory - Adult  Goal: Achieves optimal ventilation and oxygenation  Outcome: Progressing  Note:  O2 93 to 95% on Room Air.     Problem: Cardiovascular - Adult  Goal: Absence of cardiac dysrhythmias or at baseline  Outcome: Progressing  Note:  Normal Sinus Rhythm rate 79 at 0450.

## 2024-11-07 LAB
ANION GAP SERPL CALCULATED.3IONS-SCNC: 8 MMOL/L (ref 3–16)
BASOPHILS # BLD: 0.1 K/UL (ref 0–0.2)
BASOPHILS NFR BLD: 0.8 %
BUN SERPL-MCNC: 28 MG/DL (ref 7–20)
CALCIUM SERPL-MCNC: 9.3 MG/DL (ref 8.3–10.6)
CHLORIDE SERPL-SCNC: 101 MMOL/L (ref 99–110)
CO2 SERPL-SCNC: 24 MMOL/L (ref 21–32)
CREAT SERPL-MCNC: 1.2 MG/DL (ref 0.6–1.2)
DEPRECATED RDW RBC AUTO: 15.8 % (ref 12.4–15.4)
EOSINOPHIL # BLD: 0.3 K/UL (ref 0–0.6)
EOSINOPHIL NFR BLD: 5.2 %
GFR SERPLBLD CREATININE-BSD FMLA CKD-EPI: 48 ML/MIN/{1.73_M2}
GLUCOSE BLD-MCNC: 155 MG/DL (ref 70–99)
GLUCOSE BLD-MCNC: 183 MG/DL (ref 70–99)
GLUCOSE BLD-MCNC: 258 MG/DL (ref 70–99)
GLUCOSE BLD-MCNC: 275 MG/DL (ref 70–99)
GLUCOSE SERPL-MCNC: 157 MG/DL (ref 70–99)
HCT VFR BLD AUTO: 31.5 % (ref 36–48)
HGB BLD-MCNC: 10.1 G/DL (ref 12–16)
LYMPHOCYTES # BLD: 0.9 K/UL (ref 1–5.1)
LYMPHOCYTES NFR BLD: 13.3 %
MCH RBC QN AUTO: 29.2 PG (ref 26–34)
MCHC RBC AUTO-ENTMCNC: 32.1 G/DL (ref 31–36)
MCV RBC AUTO: 91.1 FL (ref 80–100)
MONOCYTES # BLD: 0.7 K/UL (ref 0–1.3)
MONOCYTES NFR BLD: 11.1 %
NEUTROPHILS # BLD: 4.5 K/UL (ref 1.7–7.7)
NEUTROPHILS NFR BLD: 69.6 %
PERFORMED ON: ABNORMAL
PLATELET # BLD AUTO: 304 K/UL (ref 135–450)
PMV BLD AUTO: 7.6 FL (ref 5–10.5)
POTASSIUM SERPL-SCNC: 5.1 MMOL/L (ref 3.5–5.1)
RBC # BLD AUTO: 3.46 M/UL (ref 4–5.2)
SODIUM SERPL-SCNC: 133 MMOL/L (ref 136–145)
WBC # BLD AUTO: 6.5 K/UL (ref 4–11)

## 2024-11-07 PROCEDURE — 6370000000 HC RX 637 (ALT 250 FOR IP)

## 2024-11-07 PROCEDURE — 2580000003 HC RX 258: Performed by: INTERNAL MEDICINE

## 2024-11-07 PROCEDURE — 6370000000 HC RX 637 (ALT 250 FOR IP): Performed by: INTERNAL MEDICINE

## 2024-11-07 PROCEDURE — 6370000000 HC RX 637 (ALT 250 FOR IP): Performed by: NURSE PRACTITIONER

## 2024-11-07 PROCEDURE — 6360000002 HC RX W HCPCS: Performed by: INTERNAL MEDICINE

## 2024-11-07 PROCEDURE — 80048 BASIC METABOLIC PNL TOTAL CA: CPT

## 2024-11-07 PROCEDURE — 1200000000 HC SEMI PRIVATE

## 2024-11-07 PROCEDURE — 94640 AIRWAY INHALATION TREATMENT: CPT

## 2024-11-07 PROCEDURE — 85025 COMPLETE CBC W/AUTO DIFF WBC: CPT

## 2024-11-07 PROCEDURE — 36415 COLL VENOUS BLD VENIPUNCTURE: CPT

## 2024-11-07 RX ORDER — IBUPROFEN 400 MG/1
600 TABLET, FILM COATED ORAL EVERY 8 HOURS
Status: DISCONTINUED | OUTPATIENT
Start: 2024-11-07 | End: 2024-11-08

## 2024-11-07 RX ORDER — HYDROCODONE BITARTRATE AND ACETAMINOPHEN 5; 325 MG/1; MG/1
1 TABLET ORAL EVERY 6 HOURS PRN
Status: DISCONTINUED | OUTPATIENT
Start: 2024-11-07 | End: 2024-11-08

## 2024-11-07 RX ORDER — TRAMADOL HYDROCHLORIDE 50 MG/1
50 TABLET ORAL 3 TIMES DAILY
Status: DISCONTINUED | OUTPATIENT
Start: 2024-11-07 | End: 2024-11-08

## 2024-11-07 RX ORDER — ACETAMINOPHEN 325 MG/1
650 TABLET ORAL EVERY 6 HOURS PRN
Status: DISCONTINUED | OUTPATIENT
Start: 2024-11-07 | End: 2024-11-08 | Stop reason: HOSPADM

## 2024-11-07 RX ORDER — ACETAMINOPHEN 650 MG/1
650 SUPPOSITORY RECTAL EVERY 6 HOURS PRN
Status: DISCONTINUED | OUTPATIENT
Start: 2024-11-07 | End: 2024-11-08 | Stop reason: HOSPADM

## 2024-11-07 RX ORDER — MECOBALAMIN 5000 MCG
5 TABLET,DISINTEGRATING ORAL NIGHTLY PRN
Status: DISCONTINUED | OUTPATIENT
Start: 2024-11-07 | End: 2024-11-08 | Stop reason: HOSPADM

## 2024-11-07 RX ORDER — TRAMADOL HYDROCHLORIDE 50 MG/1
50 TABLET ORAL EVERY 6 HOURS PRN
Status: DISCONTINUED | OUTPATIENT
Start: 2024-11-07 | End: 2024-11-07

## 2024-11-07 RX ADMIN — INSULIN LISPRO 8 UNITS: 100 INJECTION, SOLUTION INTRAVENOUS; SUBCUTANEOUS at 12:16

## 2024-11-07 RX ADMIN — ASPIRIN 81 MG: 81 TABLET, COATED ORAL at 20:20

## 2024-11-07 RX ADMIN — PANTOPRAZOLE SODIUM 40 MG: 40 TABLET, DELAYED RELEASE ORAL at 08:44

## 2024-11-07 RX ADMIN — CYANOCOBALAMIN TAB 1000 MCG 1000 MCG: 1000 TAB at 08:44

## 2024-11-07 RX ADMIN — METOPROLOL TARTRATE 50 MG: 50 TABLET, FILM COATED ORAL at 08:45

## 2024-11-07 RX ADMIN — SODIUM CHLORIDE, PRESERVATIVE FREE 10 ML: 5 INJECTION INTRAVENOUS at 20:22

## 2024-11-07 RX ADMIN — IBUPROFEN 600 MG: 200 TABLET, FILM COATED ORAL at 20:36

## 2024-11-07 RX ADMIN — TRAMADOL HYDROCHLORIDE 50 MG: 50 TABLET ORAL at 10:59

## 2024-11-07 RX ADMIN — ACETAMINOPHEN 650 MG: 325 TABLET ORAL at 06:47

## 2024-11-07 RX ADMIN — ENOXAPARIN SODIUM 40 MG: 100 INJECTION SUBCUTANEOUS at 08:45

## 2024-11-07 RX ADMIN — FERROUS SULFATE TAB 325 MG (65 MG ELEMENTAL FE) 325 MG: 325 (65 FE) TAB at 08:45

## 2024-11-07 RX ADMIN — INSULIN LISPRO 8 UNITS: 100 INJECTION, SOLUTION INTRAVENOUS; SUBCUTANEOUS at 20:28

## 2024-11-07 RX ADMIN — IBUPROFEN 600 MG: 200 TABLET, FILM COATED ORAL at 14:32

## 2024-11-07 RX ADMIN — TRAMADOL HYDROCHLORIDE 50 MG: 50 TABLET ORAL at 20:20

## 2024-11-07 RX ADMIN — ACETAMINOPHEN 650 MG: 325 TABLET ORAL at 00:38

## 2024-11-07 RX ADMIN — SODIUM CHLORIDE, PRESERVATIVE FREE 10 ML: 5 INJECTION INTRAVENOUS at 08:45

## 2024-11-07 RX ADMIN — LOSARTAN POTASSIUM 50 MG: 50 TABLET, FILM COATED ORAL at 08:44

## 2024-11-07 RX ADMIN — INSULIN GLARGINE 10 UNITS: 100 INJECTION, SOLUTION SUBCUTANEOUS at 20:29

## 2024-11-07 RX ADMIN — Medication 5000 UNITS: at 08:44

## 2024-11-07 RX ADMIN — BUPROPION HYDROCHLORIDE 300 MG: 150 TABLET, EXTENDED RELEASE ORAL at 08:44

## 2024-11-07 RX ADMIN — Medication 5 MG: at 02:11

## 2024-11-07 RX ADMIN — CEFDINIR 300 MG: 300 CAPSULE ORAL at 08:44

## 2024-11-07 RX ADMIN — INSULIN LISPRO 4 UNITS: 100 INJECTION, SOLUTION INTRAVENOUS; SUBCUTANEOUS at 08:45

## 2024-11-07 RX ADMIN — TRAMADOL HYDROCHLORIDE 50 MG: 50 TABLET ORAL at 02:11

## 2024-11-07 RX ADMIN — ASPIRIN 81 MG: 81 TABLET, COATED ORAL at 08:44

## 2024-11-07 RX ADMIN — TIOTROPIUM BROMIDE AND OLODATEROL 2 PUFF: 3.124; 2.736 SPRAY, METERED RESPIRATORY (INHALATION) at 09:04

## 2024-11-07 RX ADMIN — METOPROLOL TARTRATE 50 MG: 50 TABLET, FILM COATED ORAL at 20:20

## 2024-11-07 RX ADMIN — TRAMADOL HYDROCHLORIDE 50 MG: 50 TABLET ORAL at 14:32

## 2024-11-07 ASSESSMENT — PAIN DESCRIPTION - DESCRIPTORS
DESCRIPTORS: DISCOMFORT
DESCRIPTORS: DISCOMFORT
DESCRIPTORS: ACHING;CRUSHING;SHOOTING
DESCRIPTORS: DISCOMFORT

## 2024-11-07 ASSESSMENT — PAIN - FUNCTIONAL ASSESSMENT
PAIN_FUNCTIONAL_ASSESSMENT: PREVENTS OR INTERFERES SOME ACTIVE ACTIVITIES AND ADLS

## 2024-11-07 ASSESSMENT — PAIN DESCRIPTION - ORIENTATION
ORIENTATION: LOWER
ORIENTATION: LEFT
ORIENTATION: LOWER
ORIENTATION: LEFT
ORIENTATION: LEFT

## 2024-11-07 ASSESSMENT — PAIN DESCRIPTION - LOCATION
LOCATION: BACK
LOCATION: HIP
LOCATION: HIP
LOCATION: BACK
LOCATION: HIP

## 2024-11-07 ASSESSMENT — PAIN SCALES - GENERAL
PAINLEVEL_OUTOF10: 5
PAINLEVEL_OUTOF10: 8
PAINLEVEL_OUTOF10: 2
PAINLEVEL_OUTOF10: 2
PAINLEVEL_OUTOF10: 4
PAINLEVEL_OUTOF10: 4

## 2024-11-07 ASSESSMENT — PAIN DESCRIPTION - PAIN TYPE
TYPE: ACUTE PAIN

## 2024-11-07 ASSESSMENT — PAIN DESCRIPTION - FREQUENCY
FREQUENCY: CONTINUOUS

## 2024-11-07 ASSESSMENT — PAIN DESCRIPTION - DIRECTION: RADIATING_TOWARDS: LEFT LEG

## 2024-11-07 ASSESSMENT — PAIN DESCRIPTION - ONSET: ONSET: GRADUAL

## 2024-11-07 NOTE — PROGRESS NOTES
Low Risk Nutrition Note       Nutrition Assessment:  Admitted with weakness/lethargy, hypoxic. Pt tearful and in pain upon RD entry. Emotional support provided. Pt requesting RD come back later. Per EMR, >75% of majority of meals this admit. , on 4cc diet. Weight stable prior to admit. No nutrition interventions at this time. Plan to d/c to SNF.     Current Nutrition Therapies:    ADULT DIET; Regular; 4 carb choices (60 gm/meal); Low Sodium (2 gm)    Anthropometrics:   Current Height: 167.6 cm (5' 6\")  Current Weight - Scale: 64.3 kg (141 lb 12.1 oz)      Monitoring and Evaluation:  No nutrition diagnosis. Patient will be monitored per nutrition standards of care.     Consult Dietitian if nutrition intervention essential to patient care is needed.     Discharge Planning:  No needs    Angeles Kemp RD  [place]:  [phone]

## 2024-11-07 NOTE — PROGRESS NOTES
V2.0    [facility] [place]      Name:  Carolee Sharma /Age/Sex: 1950  (74 y.o. female)   MRN & CSN:  [id number] & [id number] Encounter Date/Time: 2024 12:50 PM EST   Location:  6310/6310-01 PCP: Anirudh Ortez MD     Attending:Cezar Torres MD       Hospital Day: 10    Assessment and Recommendations       Assessment/Plan:      Acute right and left sacral alar insufficiency type fractures:   This occurred while she was at a wedding in [place] when she fell.  She did do SNF rehab in [place] at that time.  Patient still having significant amount of pain.  Asked to see patient patient today because she has complained of chronic hip pain ongoing since her fall.  Patient continues to have pain.  And this seems to be a primary issue.  At this point I have discussed with her taking some pain medication routinely which she has agreed to will ask for it as she needs it.  She does not want anything stronger than tramadol.  Additionally been asked for peer for peer regarding SNF level of care needed    Pulmonary hypertension likely group 2 and 3:  COPD not in exacerbation  Hypoxia at time of admission resolved  To this point:  - Patient initially presented with generalized weakness, did not necessarily feel SOB. Though she did require oxygen, up to 2L NC in the ED. Not on oxygen at baseline.   - Wean oxygen as tolerated for O2 sats between 88-92%  - Pulmonology consulted, appreciate recommendations  - Echo done, does show severe pulmonary hypertension with RSVP of 66 mmHg, grade 2 diastolic dysfunction, EF 60-65%  - Continue with current therapies for her underlying COPD and HFpEF  - Remains off oxygen.   Precert to ARU denied and family has made a formal appeal. Pending SNF referrals now.     UTI:  Completing 7 days of cefdinir    Diabetes mellitus type 2:  Continue Lantus at hour sleep and sliding scale  Will monitor for hypoglycemia while receiving high-dose sliding scale    Prolonged care 25  AM      Comment: Please note this report has been produced using speech recognition software and may contain errors related to that system including errors in grammar, punctuation, and spelling, as well as words and phrases that may be inappropriate. If there are any questions or concerns please feel free to contact the dictating provider for clarification.

## 2024-11-07 NOTE — PROGRESS NOTES
4 Eyes Skin Assessment     NAME:  Carolee Sharma  YOB: 1950  MEDICAL RECORD NUMBER:  [id number]    The patient is being assessed for  Admission    I agree that at least one RN has performed a thorough Head to Toe Skin Assessment on the patient. ALL assessment sites listed below have been assessed.      Areas assessed by both nurses:    Head, Face, Ears, Shoulders, Back, Chest, Arms, Elbows, Hands, Sacrum. Buttock, Coccyx, Ischium, Legs. Feet and Heels, Under Medical Devices , and Other      Bruising bilateral upper extremities  Abdominal bruising  Blanchable redness to left heel           Does the Patient have a Wound? No noted wound(s)       Ra Prevention initiated by RN: Yes  Wound Care Orders initiated by RN: No    Pressure Injury (Stage 3,4, Unstageable, DTI, NWPT, and Complex wounds) if present, place Wound referral order by RN under : No    New Ostomies, if present place, Ostomy referral order under : No     Nurse 1 eSignature: Electronically signed by Agustina Fleming RN on 11/7/24 at 6:55 PM EST    **SHARE this note so that the co-signing nurse can place an eSignature**    Nurse 2 eSignature: {Esignature:[id number]}

## 2024-11-07 NOTE — PLAN OF CARE
Problem: Chronic Conditions and Co-morbidities  Goal: Patient's chronic conditions and co-morbidity symptoms are monitored and maintained or improved  Outcome: Progressing  Flowsheets (Taken 11/7/2024 1234)  Care Plan - Patient's Chronic Conditions and Co-Morbidity Symptoms are Monitored and Maintained or Improved:   Monitor and assess patient's chronic conditions and comorbid symptoms for stability, deterioration, or improvement   Collaborate with multidisciplinary team to address chronic and comorbid conditions and prevent exacerbation or deterioration   Update acute care plan with appropriate goals if chronic or comorbid symptoms are exacerbated and prevent overall improvement and discharge     Problem: Discharge Planning  Goal: Discharge to home or other facility with appropriate resources  Outcome: Progressing  Flowsheets (Taken 11/7/2024 1234)  Discharge to home or other facility with appropriate resources:   Identify barriers to discharge with patient and caregiver   Arrange for needed discharge resources and transportation as appropriate   Identify discharge learning needs (meds, wound care, etc)   Refer to discharge planning if patient needs post-hospital services based on physician order or complex needs related to functional status, cognitive ability or social support system

## 2024-11-07 NOTE — PROGRESS NOTES
Patient admitted to room 3307. Report received from POP Ordonez. VSS on room air. Patient oriented to room and call light. Rights and responsibilities provided to patient, and welcome packet provided to patient. 4 eyes skin assessment completed with 2nd RN.

## 2024-11-07 NOTE — PROGRESS NOTES
D:  Patient is complaining of insomnia.  May we please have a PRN order for Melatonin?  Please advise.  Thank you.  A:  Notified provider, Gemma Panchal.  R:  Per provider above, \"Order is in, thanks.\"

## 2024-11-07 NOTE — PLAN OF CARE
Problem: Safety - Adult  Goal: Free from fall injury  Outcome: Progressing  Note:  Remains free from falls, bed in low position, call light in reach, bed alarm monitoring for safety.     Problem: Pain  Goal: Verbalizes/displays adequate comfort level or baseline comfort level  Outcome: Progressing  Note:  PRN Tramadol 50 mg PO at 1938 and 0211; and, PRN Tylenol 650 mg PO at 0038 and 0647 for left hip and tailbone pain.     Problem: Respiratory - Adult  Goal: Achieves optimal ventilation and oxygenation  Outcome: Progressing  Note:  O2 96% on Room Air.

## 2024-11-07 NOTE — PROGRESS NOTES
Occupational Therapy  No Treatment    Chart Reviewed and RN consulted. Attempt made to see pt for therapy. Pt tearful and in pain upon entry. Emotional support provided.  RN consulted and admin pain med's. Ice provided and pt educated on importance of out of bed activity. Pt declining all activity at this time. Will attempt to see pt later as schedule allows. Continue OT per POC.     Lynn TRIMBLE/GLEN

## 2024-11-08 VITALS
DIASTOLIC BLOOD PRESSURE: 77 MMHG | BODY MASS INDEX: 22.78 KG/M2 | SYSTOLIC BLOOD PRESSURE: 159 MMHG | OXYGEN SATURATION: 94 % | HEART RATE: 90 BPM | TEMPERATURE: 97.4 F | RESPIRATION RATE: 18 BRPM | HEIGHT: 66 IN | WEIGHT: 141.76 LBS

## 2024-11-08 LAB
ANION GAP SERPL CALCULATED.3IONS-SCNC: 6 MMOL/L (ref 3–16)
BASOPHILS # BLD: 0.1 K/UL (ref 0–0.2)
BASOPHILS NFR BLD: 1 %
BUN SERPL-MCNC: 31 MG/DL (ref 7–20)
CALCIUM SERPL-MCNC: 9.3 MG/DL (ref 8.3–10.6)
CHLORIDE SERPL-SCNC: 99 MMOL/L (ref 99–110)
CO2 SERPL-SCNC: 26 MMOL/L (ref 21–32)
CREAT SERPL-MCNC: 1.3 MG/DL (ref 0.6–1.2)
DEPRECATED RDW RBC AUTO: 15.2 % (ref 12.4–15.4)
EOSINOPHIL # BLD: 0.3 K/UL (ref 0–0.6)
EOSINOPHIL NFR BLD: 5.1 %
GFR SERPLBLD CREATININE-BSD FMLA CKD-EPI: 43 ML/MIN/{1.73_M2}
GLUCOSE BLD-MCNC: 151 MG/DL (ref 70–99)
GLUCOSE BLD-MCNC: 177 MG/DL (ref 70–99)
GLUCOSE SERPL-MCNC: 141 MG/DL (ref 70–99)
HCT VFR BLD AUTO: 31.6 % (ref 36–48)
HGB BLD-MCNC: 10.4 G/DL (ref 12–16)
LYMPHOCYTES # BLD: 0.9 K/UL (ref 1–5.1)
LYMPHOCYTES NFR BLD: 14.2 %
MCH RBC QN AUTO: 30.1 PG (ref 26–34)
MCHC RBC AUTO-ENTMCNC: 32.9 G/DL (ref 31–36)
MCV RBC AUTO: 91.5 FL (ref 80–100)
MONOCYTES # BLD: 0.6 K/UL (ref 0–1.3)
MONOCYTES NFR BLD: 9.4 %
NEUTROPHILS # BLD: 4.3 K/UL (ref 1.7–7.7)
NEUTROPHILS NFR BLD: 70.3 %
PERFORMED ON: ABNORMAL
PERFORMED ON: ABNORMAL
PLATELET # BLD AUTO: 298 K/UL (ref 135–450)
PMV BLD AUTO: 7.7 FL (ref 5–10.5)
POTASSIUM SERPL-SCNC: 5.5 MMOL/L (ref 3.5–5.1)
RBC # BLD AUTO: 3.45 M/UL (ref 4–5.2)
SODIUM SERPL-SCNC: 131 MMOL/L (ref 136–145)
WBC # BLD AUTO: 6.1 K/UL (ref 4–11)

## 2024-11-08 PROCEDURE — 6370000000 HC RX 637 (ALT 250 FOR IP): Performed by: INTERNAL MEDICINE

## 2024-11-08 PROCEDURE — 80048 BASIC METABOLIC PNL TOTAL CA: CPT

## 2024-11-08 PROCEDURE — 94761 N-INVAS EAR/PLS OXIMETRY MLT: CPT

## 2024-11-08 PROCEDURE — 36415 COLL VENOUS BLD VENIPUNCTURE: CPT

## 2024-11-08 PROCEDURE — 94640 AIRWAY INHALATION TREATMENT: CPT

## 2024-11-08 PROCEDURE — 6360000002 HC RX W HCPCS: Performed by: INTERNAL MEDICINE

## 2024-11-08 PROCEDURE — 2580000003 HC RX 258: Performed by: INTERNAL MEDICINE

## 2024-11-08 PROCEDURE — 85025 COMPLETE CBC W/AUTO DIFF WBC: CPT

## 2024-11-08 RX ORDER — METHOCARBAMOL 500 MG/1
750 TABLET, FILM COATED ORAL 3 TIMES DAILY
Status: DISCONTINUED | OUTPATIENT
Start: 2024-11-08 | End: 2024-11-08 | Stop reason: HOSPADM

## 2024-11-08 RX ORDER — OXYCODONE HYDROCHLORIDE 5 MG/1
5 TABLET ORAL EVERY 6 HOURS
Status: DISCONTINUED | OUTPATIENT
Start: 2024-11-08 | End: 2024-11-08 | Stop reason: HOSPADM

## 2024-11-08 RX ORDER — OXYCODONE HYDROCHLORIDE 5 MG/1
5 TABLET ORAL EVERY 4 HOURS PRN
Status: DISCONTINUED | OUTPATIENT
Start: 2024-11-08 | End: 2024-11-08

## 2024-11-08 RX ORDER — MECOBALAMIN 5000 MCG
5 TABLET,DISINTEGRATING ORAL NIGHTLY PRN
Qty: 10 TABLET | Refills: 0 | Status: ON HOLD
Start: 2024-11-08

## 2024-11-08 RX ORDER — OXYCODONE HYDROCHLORIDE 5 MG/1
5 TABLET ORAL EVERY 6 HOURS
Qty: 12 TABLET | Refills: 0 | Status: ON HOLD | OUTPATIENT
Start: 2024-11-08 | End: 2024-11-13 | Stop reason: HOSPADM

## 2024-11-08 RX ORDER — METHOCARBAMOL 750 MG/1
750 TABLET, FILM COATED ORAL 3 TIMES DAILY
Qty: 30 TABLET | Refills: 0 | Status: ON HOLD
Start: 2024-11-08 | End: 2024-11-18

## 2024-11-08 RX ORDER — MORPHINE SULFATE 4 MG/ML
4 INJECTION INTRAVENOUS ONCE
Status: COMPLETED | OUTPATIENT
Start: 2024-11-08 | End: 2024-11-08

## 2024-11-08 RX ADMIN — METOPROLOL TARTRATE 50 MG: 50 TABLET, FILM COATED ORAL at 08:34

## 2024-11-08 RX ADMIN — ENOXAPARIN SODIUM 40 MG: 100 INJECTION SUBCUTANEOUS at 08:34

## 2024-11-08 RX ADMIN — LOSARTAN POTASSIUM 50 MG: 50 TABLET, FILM COATED ORAL at 08:34

## 2024-11-08 RX ADMIN — TIOTROPIUM BROMIDE AND OLODATEROL 2 PUFF: 3.124; 2.736 SPRAY, METERED RESPIRATORY (INHALATION) at 07:32

## 2024-11-08 RX ADMIN — PANTOPRAZOLE SODIUM 40 MG: 40 TABLET, DELAYED RELEASE ORAL at 08:34

## 2024-11-08 RX ADMIN — HYDROCODONE BITARTRATE AND ACETAMINOPHEN 1 TABLET: 5; 325 TABLET ORAL at 08:34

## 2024-11-08 RX ADMIN — CYANOCOBALAMIN TAB 1000 MCG 1000 MCG: 1000 TAB at 08:34

## 2024-11-08 RX ADMIN — IBUPROFEN 600 MG: 200 TABLET, FILM COATED ORAL at 08:33

## 2024-11-08 RX ADMIN — METHOCARBAMOL 750 MG: 500 TABLET ORAL at 09:29

## 2024-11-08 RX ADMIN — TRAMADOL HYDROCHLORIDE 50 MG: 50 TABLET ORAL at 06:54

## 2024-11-08 RX ADMIN — OXYCODONE 5 MG: 5 TABLET ORAL at 10:46

## 2024-11-08 RX ADMIN — SODIUM CHLORIDE, PRESERVATIVE FREE 10 ML: 5 INJECTION INTRAVENOUS at 08:36

## 2024-11-08 RX ADMIN — MORPHINE SULFATE 4 MG: 4 INJECTION INTRAVENOUS at 09:33

## 2024-11-08 RX ADMIN — FERROUS SULFATE TAB 325 MG (65 MG ELEMENTAL FE) 325 MG: 325 (65 FE) TAB at 08:34

## 2024-11-08 RX ADMIN — BUPROPION HYDROCHLORIDE 300 MG: 150 TABLET, EXTENDED RELEASE ORAL at 08:34

## 2024-11-08 RX ADMIN — Medication 5000 UNITS: at 08:34

## 2024-11-08 RX ADMIN — ASPIRIN 81 MG: 81 TABLET, COATED ORAL at 08:34

## 2024-11-08 ASSESSMENT — PAIN DESCRIPTION - LOCATION
LOCATION: HIP
LOCATION: LEG;HIP
LOCATION: HIP

## 2024-11-08 ASSESSMENT — PAIN DESCRIPTION - PAIN TYPE: TYPE: ACUTE PAIN

## 2024-11-08 ASSESSMENT — PAIN SCALES - GENERAL
PAINLEVEL_OUTOF10: 6
PAINLEVEL_OUTOF10: 8
PAINLEVEL_OUTOF10: 10
PAINLEVEL_OUTOF10: 10

## 2024-11-08 ASSESSMENT — PAIN DESCRIPTION - ORIENTATION
ORIENTATION: LOWER
ORIENTATION: LEFT
ORIENTATION: LEFT

## 2024-11-08 ASSESSMENT — PAIN DESCRIPTION - DESCRIPTORS
DESCRIPTORS: ACHING;BURNING;DISCOMFORT
DESCRIPTORS: ACHING;DISCOMFORT;BURNING
DESCRIPTORS: ACHING;DISCOMFORT;STABBING

## 2024-11-08 ASSESSMENT — PAIN DESCRIPTION - ONSET: ONSET: ON-GOING

## 2024-11-08 ASSESSMENT — PAIN DESCRIPTION - FREQUENCY: FREQUENCY: CONTINUOUS

## 2024-11-08 ASSESSMENT — PAIN - FUNCTIONAL ASSESSMENT: PAIN_FUNCTIONAL_ASSESSMENT: ACTIVITIES ARE NOT PREVENTED

## 2024-11-08 NOTE — PLAN OF CARE
Problem: Chronic Conditions and Co-morbidities  Goal: Patient's chronic conditions and co-morbidity symptoms are monitored and maintained or improved  11/8/2024 1105 by Gwendolyn Reece RN  Outcome: Adequate for Discharge  Care Plan - Patient's Chronic Conditions and Co-Morbidity Symptoms are Monitored and Maintained or Improved:   Collaborate with multidisciplinary team to address chronic and comorbid conditions and prevent exacerbation or deterioration   Monitor and assess patient's chronic conditions and comorbid symptoms for stability, deterioration, or improvement   Update acute care plan with appropriate goals if chronic or comorbid symptoms are exacerbated and prevent overall improvement and discharge     Problem: Discharge Planning  Goal: Discharge to home or other facility with appropriate resources  11/8/2024 1105 by Gwendolyn Reece RN  Outcome: Adequate for Discharge  Discharge to home or other facility with appropriate resources:   Identify barriers to discharge with patient and caregiver   Identify discharge learning needs (meds, wound care, etc)   Arrange for needed discharge resources and transportation as appropriate     Problem: Skin/Tissue Integrity  Goal: Absence of new skin breakdown  Description: 1.  Monitor for areas of redness and/or skin breakdown  2.  Assess vascular access sites hourly  3.  Every 4-6 hours minimum:  Change oxygen saturation probe site  4.  Every 4-6 hours:  If on nasal continuous positive airway pressure, respiratory therapy assess nares and determine need for appliance change or resting period.  11/8/2024 1105 by Gwendolyn Reece RN  Outcome: Adequate for Discharge    Problem: Safety - Adult  Goal: Free from fall injury  11/8/2024 1105 by Gwendolyn Reece RN  Outcome: Adequate for Discharge   Instruct family/caregiver on patient safety   Based on caregiver fall risk screen, instruct family/caregiver to ask for assistance with transferring infant if caregiver noted to have  fall risk factors     Problem: Pain  Goal: Verbalizes/displays adequate comfort level or baseline comfort level  Outcome: Adequate for Discharge     Problem: Respiratory - Adult  Goal: Achieves optimal ventilation and oxygenation  11/8/2024 1105 by Gwendolyn Reece RN  Outcome: Adequate for Discharge  Achieves optimal ventilation and oxygenation: Assess for changes in respiratory status     Problem: Cardiovascular - Adult  Goal: Absence of cardiac dysrhythmias or at baseline  11/8/2024 1105 by Gwendolyn Reece RN  Outcome: Adequate for Discharge  Maintains optimal cardiac output and hemodynamic stability: Monitor blood pressure and heart rate      Problem: Metabolic/Fluid and Electrolytes - Adult  Goal: Electrolytes maintained within normal limits  11/8/2024 1105 by Gwendolyn Reece RN  Outcome: Adequate for Discharge   Monitor labs and assess patient for signs and symptoms of electrolyte imbalances   Administer electrolyte replacement as ordered   Instruct patient on fluid and nutrition restrictions as appropriate  Goal: Hemodynamic stability and optimal renal function maintained  Outcome: Adequate for Discharge

## 2024-11-08 NOTE — CARE COORDINATION
CM  following for  d/c planning:    Patient in agreement  with Rehab .     Patient agreeable to  IPR  here at [facility]:  and PM&R consult placed  .     Dr Martinez  approved admission to ARU and Liaison Sheron is starting pre cert .   Electronically signed by Claire Thompson RN on 10/31/2024 at 2:31 PM       Claire Thompson RN Case Manager  [facility] [place] [address]  [place] [id number] [phone]  Fax [phone]   
CM  met with pt  at bedside  and  provided  SNF  list  for review .     Patient and spouse provided the following in order of preference  :    1.)  [facility]  2.)  [facility]   3.)  [facility]  4.)  [facility]   5.)  [facility]    Electronically signed by Claire Thompson RN on 11/4/2024 at 5:23 PM     +++++++++++++++++++++++++++++++++      CM  following for  d/c planning:    Patient was  pending  ARU pre cert  : and  per  ARU liaison Sheron : pt  was  denied:    -   Pre-CERT for ARU has been denied by her insurance without a peer to peer option.   -   Will likely need to discharge to skilled nursing facility defer to case management.    CM  will follow up with pt  and preference moving  forward.    Electronically signed by Claire Thompson RN on 11/4/2024 at 1:53 PM       Claire Thompson RN Case Manager  [facility] [place] [address]  [facility] [id number] [phone]  Fax [phone]         
CM following for discharge planning.      Pt accepted at [facility].  Precert started on 11/5/24.     Precert remains pending at this time.     Luma Acosta RN, BSN, CM  Case Management Department  [phone]    
CM following for discharge planning.     [email or web address] and [facility] ([place], [place]). Pt's first choice is [email or web address] CM  left message with Alysia [phone] to start precert.     CM met with pt at bedside and she is in agreement with the discharge plan.     Luma Acosta RN, BSN, CM  Case Management Department  [phone]  
CM following for discharge planning.  Pt was denied by her insurance for ARU.     Pt accepted at [facility] and precert started on 11/5/24.      Per Sharron at [facility], pt is being offered a P2P by her insurance company for SNF admission.     Per Dr Torres, P2P scheduled for tomorrow 11/7/24.    HENS completed Document ID : [id number]     Pt will need transport at discharge.     Luma Acosta RN, BSN,   Case Management Department  [phone]  
CM following for discharge planning.( Patient was transferred to  [address] [place] [address])   -   Pt was denied by her insurance for ARU.     Pt accepted at [facility] and precert started on 11/5/24.      Per Sharron at [facility], pt is being offered a P2P by her insurance company for SNF admission.     Per Dr Torres, P2P scheduled for today:  11/7/24.    UPDATE:    Patient  PTP  was  APPOVED  and can admit :    [facility]  Services: Skilled Nursing  [address] [place] [id number] [phone]      KEO  notified  ELISA Villalobos who is following the  patient today .     Critical access hospital completed Document ID : [id number]     Pt will need transport at discharge.     Electronically signed by Claire Thompson RN on 11/8/2024 at 9:31 AM       Claire Thompson RN Case Manager  [facility] [place] [address]  [facility] [id number] [phone]  Fax [phone]   
CM received call from Sharron at [facility]. P2P offered for SNF. [phone] option 4; ref #[id number]. Needs to be done today by 4:30pm.     Perfectserve message sent to attending.   
Case Management Assessment  Initial Evaluation    Date/Time of Evaluation: 10/30/2024 3:35 PM  Assessment Completed by: Claire Thompson RN    If patient is discharged prior to next notation, then this note serves as note for discharge by case management.    Patient Name: Carolee Sharma                   YOB: 1950  Diagnosis: Hypoxia [R09.02]                   Date / Time: 10/29/2024  9:42 PM    Patient Admission Status: Inpatient   Readmission Risk (Low < 19, Mod (19-27), High > 27): Readmission Risk Score: 14.3    Current PCP: Anirudh Ortez MD  PCP verified by CM? Yes    Chart Reviewed: Yes      History Provided by: Patient  Patient Orientation: Alert and Oriented, Person, Place    Patient Cognition: Alert    Hospitalization in the last 30 days (Readmission):  No    If yes, Readmission Assessment in  Navigator will be completed.    Advance Directives:      Code Status: Full Code   Patient's Primary Decision Maker is: Legal Next of Kin      Discharge Planning:    Patient lives with: Spouse/Significant Other Type of Home: House  Primary Care Giver: Self  Patient Support Systems include: Spouse/Significant Other   Current Financial resources: Medicare  Current community resources: ECF/Home Care  Current services prior to admission: None            Current DME:              Type of Home Care services:  PT, OT    ADLS  Prior functional level: Assistance with the following:  Current functional level: Assistance with the following:    PT AM-PAC: 12 /24  OT AM-PAC: 13 /24    Family can provide assistance at DC: Yes  Would you like Case Management to discuss the discharge plan with any other family members/significant others, and if so, who? Yes  Plans to Return to Present Housing:    Other Identified Issues/Barriers to RETURNING to current housing: safety  Potential Assistance needed at discharge: Skilled Nursing Facility, Transitional Care            Potential DME: Oxygen Therapy 
jacqueline, she is aware of transport time and in agreement.     2pm via strategic.     Rn updated.  Report: [phone]      COVID Result:    Lab Results   Component Value Date/Time    COVID19 Not Detected 10/29/2024 10:20 PM       The Plan for Transition of Care is related to the following treatment goals of Hypoxia [R09.02]    The Patient and/or patient representative Carolee and her family were provided with a choice of provider and agrees with the discharge plan Yes    Freedom of choice list was provided with basic dialogue that supports the patient's individualized plan of care/goals and shares the quality data associated with the providers. Yes    Care Transitions patient: No    Talita Cotton, MSW, LSW  [facility] [place]  Case Management Department  Ph: [phone]

## 2024-11-08 NOTE — PROGRESS NOTES
Pt d/c'd 11/8/24. IV access removed with no complications. Reviewed d/c instructions, home meds, and f/u information utilizing teach-back method. Scripts for oxycodone, robaxin, roxicodone sent with patient. Patient verbalized understanding.      Patient discharged in ambulance and left with all documented belongings.     Attempted to call report to [phone] unable to leave voicemail. No answer.

## 2024-11-08 NOTE — PROGRESS NOTES
V2.0    [facility] Progress Note      Name:  Carolee Sharma /Age/Sex: 1950  (74 y.o. female)   MRN & CSN:  [id number] & [id number] Encounter Date/Time: 2024 12:50 PM EST   Location:  [facility]/3307-01 PCP: Anirudh Ortez MD     Attending:Cezar Torres MD       Hospital Day: 11    Assessment and Recommendations       Assessment/Plan:      Acute right and left sacral alar insufficiency type fractures: Reevaluating today because of patient increased pain and need for peer to peer for insurance denial of skilled nursing facility  This occurred while she was at a wedding in [place] when she fell.  She did do SNF rehab in [place] at that time.  Patient still having significant amount of pain.  Asked to see patient patient today because she has complained of chronic hip pain ongoing since her fall.  Patient continues to have pain.  And this seems to be a primary issue.   patient was having excruciating pain.  At this point we have agreed to start her on tramadol 3 times a day and Motrin 3 times a day(will closely monitor renal function while on ibuprofen given her chronic kidney disease) and as needed Norco.  Patient did not want any oxycodone.  Patient has a high concern about possible addiction issues although she has never personally had problems with addiction issues of any type.  Today patient is having significant mount of pain.  Some of it feels spasm-like.  Did discuss with her and she is open to adjustments in her pain management including oxycodone should it be necessary.  Starting patient on Robaxin routinely and oxycodone routinely.  Discontinuing Motrin as renal function is slightly declined.  Peer to peer with insurance company medical director and patient's denial for SNF has been now reversed and is approved.    Hyperkalemia:  Secondary to initiating ibuprofen  I have discontinued it at this time  Will need to be followed at her nursing facility    Pulmonary hypertension likely group

## 2024-11-08 NOTE — PLAN OF CARE
Problem: Chronic Conditions and Co-morbidities  Goal: Patient's chronic conditions and co-morbidity symptoms are monitored and maintained or improved  Outcome: Progressing  Flowsheets (Taken 11/8/2024 0611)  Care Plan - Patient's Chronic Conditions and Co-Morbidity Symptoms are Monitored and Maintained or Improved:   Collaborate with multidisciplinary team to address chronic and comorbid conditions and prevent exacerbation or deterioration   Monitor and assess patient's chronic conditions and comorbid symptoms for stability, deterioration, or improvement   Update acute care plan with appropriate goals if chronic or comorbid symptoms are exacerbated and prevent overall improvement and discharge     Problem: Discharge Planning  Goal: Discharge to home or other facility with appropriate resources  Outcome: Progressing  Flowsheets (Taken 11/8/2024 0611)  Discharge to home or other facility with appropriate resources:   Identify barriers to discharge with patient and caregiver   Identify discharge learning needs (meds, wound care, etc)   Arrange for needed discharge resources and transportation as appropriate     Problem: Skin/Tissue Integrity  Goal: Absence of new skin breakdown  Description: 1.  Monitor for areas of redness and/or skin breakdown  2.  Assess vascular access sites hourly  3.  Every 4-6 hours minimum:  Change oxygen saturation probe site  4.  Every 4-6 hours:  If on nasal continuous positive airway pressure, respiratory therapy assess nares and determine need for appliance change or resting period.  Outcome: Progressing     Problem: Safety - Adult  Goal: Free from fall injury  Outcome: Progressing  Flowsheets (Taken 11/8/2024 0611)  Free From Fall Injury:   Instruct family/caregiver on patient safety   Based on caregiver fall risk screen, instruct family/caregiver to ask for assistance with transferring infant if caregiver noted to have fall risk factors     Problem: Respiratory - Adult  Goal: Achieves  optimal ventilation and oxygenation  Outcome: Progressing  Flowsheets  Taken 11/8/2024 0611 by Ranjith Hobson RN  Achieves optimal ventilation and oxygenation:   Assess for changes in respiratory status   Assess for changes in mentation and behavior   Respiratory therapy support as indicated       Problem: Cardiovascular - Adult  Goal: Absence of cardiac dysrhythmias or at baseline  Outcome: Progressing  Flowsheets  Taken 11/8/2024 0611 by Ranjith Hobson RN  Absence of cardiac dysrhythmias or at baseline:   Monitor cardiac rate and rhythm   Assess for signs of decreased cardiac output  \  G  Problem: Metabolic/Fluid and Electrolytes - Adult  Goal: Electrolytes maintained within normal limits  Outcome: Progressing  Flowsheets (Taken 11/8/2024 0611)  Electrolytes maintained within normal limits:   Monitor labs and assess patient for signs and symptoms of electrolyte imbalances   Administer electrolyte replacement as ordered   Instruct patient on fluid and nutrition restrictions as appropriate

## 2024-11-10 ENCOUNTER — APPOINTMENT (OUTPATIENT)
Dept: CT IMAGING | Age: 74
DRG: 394 | End: 2024-11-10
Payer: MEDICARE

## 2024-11-10 ENCOUNTER — HOSPITAL ENCOUNTER (INPATIENT)
Age: 74
LOS: 9 days | Discharge: HOME HEALTH | DRG: 394 | End: 2024-11-19
Attending: EMERGENCY MEDICINE | Admitting: INTERNAL MEDICINE
Payer: MEDICARE

## 2024-11-10 DIAGNOSIS — E87.1 HYPONATREMIA: ICD-10-CM

## 2024-11-10 DIAGNOSIS — K52.9 COLITIS: Primary | ICD-10-CM

## 2024-11-10 DIAGNOSIS — K92.1 MELENA: ICD-10-CM

## 2024-11-10 DIAGNOSIS — R10.2 PELVIC PAIN: ICD-10-CM

## 2024-11-10 LAB
ABO + RH BLD: NORMAL
ALBUMIN SERPL-MCNC: 3.6 G/DL (ref 3.4–5)
ALBUMIN/GLOB SERPL: 1.3 {RATIO} (ref 1.1–2.2)
ALP SERPL-CCNC: 254 U/L (ref 40–129)
ALT SERPL-CCNC: 17 U/L (ref 10–40)
ANION GAP SERPL CALCULATED.3IONS-SCNC: 13 MMOL/L (ref 3–16)
AST SERPL-CCNC: 22 U/L (ref 15–37)
BACTERIA URNS QL MICRO: ABNORMAL /HPF
BASOPHILS # BLD: 0 K/UL (ref 0–0.2)
BASOPHILS NFR BLD: 0.4 %
BILIRUB SERPL-MCNC: 0.3 MG/DL (ref 0–1)
BILIRUB UR QL STRIP.AUTO: NEGATIVE
BLD GP AB SCN SERPL QL: NORMAL
BUN SERPL-MCNC: 42 MG/DL (ref 7–20)
CALCIUM SERPL-MCNC: 9.1 MG/DL (ref 8.3–10.6)
CHLORIDE SERPL-SCNC: 95 MMOL/L (ref 99–110)
CLARITY UR: CLEAR
CO2 SERPL-SCNC: 19 MMOL/L (ref 21–32)
COLOR UR: YELLOW
CREAT SERPL-MCNC: 1.6 MG/DL (ref 0.6–1.2)
DEPRECATED RDW RBC AUTO: 15.5 % (ref 12.4–15.4)
EOSINOPHIL # BLD: 0.2 K/UL (ref 0–0.6)
EOSINOPHIL NFR BLD: 2.3 %
EPI CELLS #/AREA URNS HPF: ABNORMAL /HPF (ref 0–5)
GFR SERPLBLD CREATININE-BSD FMLA CKD-EPI: 34 ML/MIN/{1.73_M2}
GLUCOSE SERPL-MCNC: 115 MG/DL (ref 70–99)
GLUCOSE UR STRIP.AUTO-MCNC: NEGATIVE MG/DL
HCT VFR BLD AUTO: 33.8 % (ref 36–48)
HGB BLD-MCNC: 11 G/DL (ref 12–16)
HGB UR QL STRIP.AUTO: ABNORMAL
INR PPP: 0.94 (ref 0.85–1.15)
KETONES UR STRIP.AUTO-MCNC: NEGATIVE MG/DL
LACTATE BLDV-SCNC: 2.3 MMOL/L (ref 0.4–2)
LACTATE BLDV-SCNC: 2.7 MMOL/L (ref 0.4–1.9)
LEUKOCYTE ESTERASE UR QL STRIP.AUTO: ABNORMAL
LIPASE SERPL-CCNC: 21 U/L (ref 13–60)
LYMPHOCYTES # BLD: 0.7 K/UL (ref 1–5.1)
LYMPHOCYTES NFR BLD: 6.9 %
MCH RBC QN AUTO: 29.8 PG (ref 26–34)
MCHC RBC AUTO-ENTMCNC: 32.5 G/DL (ref 31–36)
MCV RBC AUTO: 91.6 FL (ref 80–100)
MONOCYTES # BLD: 0.5 K/UL (ref 0–1.3)
MONOCYTES NFR BLD: 5.2 %
MUCOUS THREADS #/AREA URNS LPF: ABNORMAL /LPF
NEUTROPHILS # BLD: 8.4 K/UL (ref 1.7–7.7)
NEUTROPHILS NFR BLD: 85.2 %
NITRITE UR QL STRIP.AUTO: NEGATIVE
PH UR STRIP.AUTO: 6 [PH] (ref 5–8)
PLATELET # BLD AUTO: 338 K/UL (ref 135–450)
PMV BLD AUTO: 7.7 FL (ref 5–10.5)
POTASSIUM SERPL-SCNC: 5.3 MMOL/L (ref 3.5–5.1)
PROT SERPL-MCNC: 6.3 G/DL (ref 6.4–8.2)
PROT UR STRIP.AUTO-MCNC: NEGATIVE MG/DL
PROTHROMBIN TIME: 12.8 SEC (ref 11.9–14.9)
RBC # BLD AUTO: 3.69 M/UL (ref 4–5.2)
RBC #/AREA URNS HPF: ABNORMAL /HPF (ref 0–4)
SODIUM SERPL-SCNC: 127 MMOL/L (ref 136–145)
SP GR UR STRIP.AUTO: >=1.03 (ref 1–1.03)
UA COMPLETE W REFLEX CULTURE PNL UR: ABNORMAL
UA DIPSTICK W REFLEX MICRO PNL UR: YES
URN SPEC COLLECT METH UR: ABNORMAL
UROBILINOGEN UR STRIP-ACNC: 0.2 E.U./DL
WBC # BLD AUTO: 9.8 K/UL (ref 4–11)
WBC #/AREA URNS HPF: ABNORMAL /HPF (ref 0–5)

## 2024-11-10 PROCEDURE — 96375 TX/PRO/DX INJ NEW DRUG ADDON: CPT

## 2024-11-10 PROCEDURE — 86850 RBC ANTIBODY SCREEN: CPT

## 2024-11-10 PROCEDURE — 85610 PROTHROMBIN TIME: CPT

## 2024-11-10 PROCEDURE — 86900 BLOOD TYPING SEROLOGIC ABO: CPT

## 2024-11-10 PROCEDURE — 96374 THER/PROPH/DIAG INJ IV PUSH: CPT

## 2024-11-10 PROCEDURE — 2580000003 HC RX 258

## 2024-11-10 PROCEDURE — 93005 ELECTROCARDIOGRAM TRACING: CPT

## 2024-11-10 PROCEDURE — 6360000004 HC RX CONTRAST MEDICATION

## 2024-11-10 PROCEDURE — 86901 BLOOD TYPING SEROLOGIC RH(D): CPT

## 2024-11-10 PROCEDURE — 85025 COMPLETE CBC W/AUTO DIFF WBC: CPT

## 2024-11-10 PROCEDURE — 1200000000 HC SEMI PRIVATE

## 2024-11-10 PROCEDURE — 81001 URINALYSIS AUTO W/SCOPE: CPT

## 2024-11-10 PROCEDURE — 74174 CTA ABD&PLVS W/CONTRAST: CPT

## 2024-11-10 PROCEDURE — 96361 HYDRATE IV INFUSION ADD-ON: CPT

## 2024-11-10 PROCEDURE — 6360000002 HC RX W HCPCS

## 2024-11-10 PROCEDURE — 80053 COMPREHEN METABOLIC PANEL: CPT

## 2024-11-10 PROCEDURE — 99285 EMERGENCY DEPT VISIT HI MDM: CPT

## 2024-11-10 PROCEDURE — 83690 ASSAY OF LIPASE: CPT

## 2024-11-10 PROCEDURE — 83605 ASSAY OF LACTIC ACID: CPT

## 2024-11-10 RX ORDER — SODIUM CHLORIDE, SODIUM LACTATE, POTASSIUM CHLORIDE, AND CALCIUM CHLORIDE .6; .31; .03; .02 G/100ML; G/100ML; G/100ML; G/100ML
1000 INJECTION, SOLUTION INTRAVENOUS ONCE
Status: DISCONTINUED | OUTPATIENT
Start: 2024-11-10 | End: 2024-11-10

## 2024-11-10 RX ORDER — IOPAMIDOL 755 MG/ML
75 INJECTION, SOLUTION INTRAVASCULAR
Status: COMPLETED | OUTPATIENT
Start: 2024-11-10 | End: 2024-11-10

## 2024-11-10 RX ORDER — 0.9 % SODIUM CHLORIDE 0.9 %
1000 INTRAVENOUS SOLUTION INTRAVENOUS ONCE
Status: COMPLETED | OUTPATIENT
Start: 2024-11-10 | End: 2024-11-10

## 2024-11-10 RX ADMIN — SODIUM CHLORIDE 1000 ML: 9 INJECTION, SOLUTION INTRAVENOUS at 20:27

## 2024-11-10 RX ADMIN — HYDROMORPHONE HYDROCHLORIDE 0.5 MG: 1 INJECTION, SOLUTION INTRAMUSCULAR; INTRAVENOUS; SUBCUTANEOUS at 19:20

## 2024-11-10 RX ADMIN — IOPAMIDOL 75 ML: 755 INJECTION, SOLUTION INTRAVENOUS at 20:03

## 2024-11-10 ASSESSMENT — PAIN - FUNCTIONAL ASSESSMENT: PAIN_FUNCTIONAL_ASSESSMENT: 0-10

## 2024-11-10 ASSESSMENT — PAIN SCALES - GENERAL
PAINLEVEL_OUTOF10: 6
PAINLEVEL_OUTOF10: 6

## 2024-11-10 ASSESSMENT — PAIN DESCRIPTION - LOCATION: LOCATION: ABDOMEN

## 2024-11-10 ASSESSMENT — PAIN DESCRIPTION - ORIENTATION: ORIENTATION: LEFT

## 2024-11-11 ENCOUNTER — ANESTHESIA (OUTPATIENT)
Dept: ENDOSCOPY | Age: 74
End: 2024-11-11
Payer: MEDICARE

## 2024-11-11 ENCOUNTER — ANESTHESIA EVENT (OUTPATIENT)
Dept: ENDOSCOPY | Age: 74
End: 2024-11-11
Payer: MEDICARE

## 2024-11-11 PROBLEM — K92.2 LOWER GI BLEED: Status: ACTIVE | Noted: 2024-11-11

## 2024-11-11 PROBLEM — K92.1 MELENA: Status: ACTIVE | Noted: 2024-11-11

## 2024-11-11 LAB
ANION GAP SERPL CALCULATED.3IONS-SCNC: 10 MMOL/L (ref 3–16)
BASOPHILS # BLD: 0 K/UL (ref 0–0.2)
BASOPHILS NFR BLD: 0 %
BUN SERPL-MCNC: 34 MG/DL (ref 7–20)
BURR CELLS BLD QL SMEAR: ABNORMAL
CALCIUM SERPL-MCNC: 8.7 MG/DL (ref 8.3–10.6)
CHLORIDE SERPL-SCNC: 100 MMOL/L (ref 99–110)
CO2 SERPL-SCNC: 19 MMOL/L (ref 21–32)
CREAT SERPL-MCNC: 1.5 MG/DL (ref 0.6–1.2)
DEPRECATED RDW RBC AUTO: 15.9 % (ref 12.4–15.4)
EKG ATRIAL RATE: 84 BPM
EKG DIAGNOSIS: NORMAL
EKG P AXIS: 64 DEGREES
EKG P-R INTERVAL: 190 MS
EKG Q-T INTERVAL: 394 MS
EKG QRS DURATION: 72 MS
EKG QTC CALCULATION (BAZETT): 465 MS
EKG R AXIS: 25 DEGREES
EKG T AXIS: 71 DEGREES
EKG VENTRICULAR RATE: 84 BPM
EOSINOPHIL # BLD: 0.4 K/UL (ref 0–0.6)
EOSINOPHIL NFR BLD: 4 %
GFR SERPLBLD CREATININE-BSD FMLA CKD-EPI: 36 ML/MIN/{1.73_M2}
GGT SERPL-CCNC: 19 U/L (ref 5–36)
GLUCOSE BLD-MCNC: 119 MG/DL (ref 70–99)
GLUCOSE BLD-MCNC: 163 MG/DL (ref 70–99)
GLUCOSE SERPL-MCNC: 122 MG/DL (ref 70–99)
HCT VFR BLD AUTO: 33.2 % (ref 36–48)
HGB BLD-MCNC: 10.6 G/DL (ref 12–16)
LACTATE BLDV-SCNC: 1.1 MMOL/L (ref 0.4–2)
LYMPHOCYTES # BLD: 0.6 K/UL (ref 1–5.1)
LYMPHOCYTES NFR BLD: 7 %
MCH RBC QN AUTO: 30.1 PG (ref 26–34)
MCHC RBC AUTO-ENTMCNC: 31.8 G/DL (ref 31–36)
MCV RBC AUTO: 94.5 FL (ref 80–100)
MONOCYTES # BLD: 0.7 K/UL (ref 0–1.3)
MONOCYTES NFR BLD: 8 %
NEUTROPHILS # BLD: 7.4 K/UL (ref 1.7–7.7)
NEUTROPHILS NFR BLD: 77 %
NEUTS BAND NFR BLD MANUAL: 4 % (ref 0–7)
PERFORMED ON: ABNORMAL
PERFORMED ON: ABNORMAL
PLATELET # BLD AUTO: 279 K/UL (ref 135–450)
PLATELET BLD QL SMEAR: ADEQUATE
PMV BLD AUTO: 8.2 FL (ref 5–10.5)
POIKILOCYTOSIS BLD QL SMEAR: ABNORMAL
POTASSIUM SERPL-SCNC: 5.3 MMOL/L (ref 3.5–5.1)
RBC # BLD AUTO: 3.51 M/UL (ref 4–5.2)
SCHISTOCYTES BLD QL SMEAR: ABNORMAL
SLIDE REVIEW: ABNORMAL
SODIUM SERPL-SCNC: 129 MMOL/L (ref 136–145)
WBC # BLD AUTO: 9.1 K/UL (ref 4–11)

## 2024-11-11 PROCEDURE — 2500000003 HC RX 250 WO HCPCS

## 2024-11-11 PROCEDURE — 82977 ASSAY OF GGT: CPT

## 2024-11-11 PROCEDURE — 88305 TISSUE EXAM BY PATHOLOGIST: CPT

## 2024-11-11 PROCEDURE — 3700000001 HC ADD 15 MINUTES (ANESTHESIA): Performed by: INTERNAL MEDICINE

## 2024-11-11 PROCEDURE — 2580000003 HC RX 258

## 2024-11-11 PROCEDURE — 99222 1ST HOSP IP/OBS MODERATE 55: CPT | Performed by: SURGERY

## 2024-11-11 PROCEDURE — 7100000010 HC PHASE II RECOVERY - FIRST 15 MIN: Performed by: INTERNAL MEDICINE

## 2024-11-11 PROCEDURE — 3609010300 HC COLONOSCOPY W/BIOPSY SINGLE/MULTIPLE: Performed by: INTERNAL MEDICINE

## 2024-11-11 PROCEDURE — 3700000000 HC ANESTHESIA ATTENDED CARE: Performed by: INTERNAL MEDICINE

## 2024-11-11 PROCEDURE — 6360000002 HC RX W HCPCS

## 2024-11-11 PROCEDURE — 83605 ASSAY OF LACTIC ACID: CPT

## 2024-11-11 PROCEDURE — 6370000000 HC RX 637 (ALT 250 FOR IP)

## 2024-11-11 PROCEDURE — 0DBP8ZX EXCISION OF RECTUM, VIA NATURAL OR ARTIFICIAL OPENING ENDOSCOPIC, DIAGNOSTIC: ICD-10-PCS | Performed by: INTERNAL MEDICINE

## 2024-11-11 PROCEDURE — 3609012400 HC EGD TRANSORAL BIOPSY SINGLE/MULTIPLE: Performed by: INTERNAL MEDICINE

## 2024-11-11 PROCEDURE — 2709999900 HC NON-CHARGEABLE SUPPLY: Performed by: INTERNAL MEDICINE

## 2024-11-11 PROCEDURE — 6370000000 HC RX 637 (ALT 250 FOR IP): Performed by: INTERNAL MEDICINE

## 2024-11-11 PROCEDURE — 94640 AIRWAY INHALATION TREATMENT: CPT

## 2024-11-11 PROCEDURE — 80048 BASIC METABOLIC PNL TOTAL CA: CPT

## 2024-11-11 PROCEDURE — 36415 COLL VENOUS BLD VENIPUNCTURE: CPT

## 2024-11-11 PROCEDURE — 0DB68ZX EXCISION OF STOMACH, VIA NATURAL OR ARTIFICIAL OPENING ENDOSCOPIC, DIAGNOSTIC: ICD-10-PCS | Performed by: INTERNAL MEDICINE

## 2024-11-11 PROCEDURE — 1200000000 HC SEMI PRIVATE

## 2024-11-11 PROCEDURE — 7100000011 HC PHASE II RECOVERY - ADDTL 15 MIN: Performed by: INTERNAL MEDICINE

## 2024-11-11 PROCEDURE — 85025 COMPLETE CBC W/AUTO DIFF WBC: CPT

## 2024-11-11 RX ORDER — ACETAMINOPHEN 650 MG/1
650 SUPPOSITORY RECTAL EVERY 6 HOURS PRN
Status: DISCONTINUED | OUTPATIENT
Start: 2024-11-11 | End: 2024-11-11

## 2024-11-11 RX ORDER — GLIPIZIDE 5 MG/1
5 TABLET, FILM COATED, EXTENDED RELEASE ORAL DAILY
COMMUNITY

## 2024-11-11 RX ORDER — METHOCARBAMOL 500 MG/1
750 TABLET, FILM COATED ORAL 3 TIMES DAILY
Status: DISCONTINUED | OUTPATIENT
Start: 2024-11-11 | End: 2024-11-15

## 2024-11-11 RX ORDER — METRONIDAZOLE 500 MG/1
500 TABLET ORAL EVERY 8 HOURS SCHEDULED
Status: COMPLETED | OUTPATIENT
Start: 2024-11-11 | End: 2024-11-16

## 2024-11-11 RX ORDER — ONDANSETRON 4 MG/1
4 TABLET, ORALLY DISINTEGRATING ORAL EVERY 8 HOURS PRN
Status: DISCONTINUED | OUTPATIENT
Start: 2024-11-11 | End: 2024-11-19 | Stop reason: HOSPADM

## 2024-11-11 RX ORDER — ALBUTEROL SULFATE 0.83 MG/ML
2.5 SOLUTION RESPIRATORY (INHALATION) EVERY 6 HOURS PRN
Status: DISCONTINUED | OUTPATIENT
Start: 2024-11-11 | End: 2024-11-19 | Stop reason: HOSPADM

## 2024-11-11 RX ORDER — FERROUS SULFATE 325(65) MG
325 TABLET ORAL
Status: DISCONTINUED | OUTPATIENT
Start: 2024-11-11 | End: 2024-11-11

## 2024-11-11 RX ORDER — MECOBALAMIN 5000 MCG
5 TABLET,DISINTEGRATING ORAL NIGHTLY PRN
Status: DISCONTINUED | OUTPATIENT
Start: 2024-11-11 | End: 2024-11-19 | Stop reason: HOSPADM

## 2024-11-11 RX ORDER — SODIUM CHLORIDE 0.9 % (FLUSH) 0.9 %
5-40 SYRINGE (ML) INJECTION PRN
Status: DISCONTINUED | OUTPATIENT
Start: 2024-11-11 | End: 2024-11-19 | Stop reason: HOSPADM

## 2024-11-11 RX ORDER — POLYETHYLENE GLYCOL 3350 17 G/17G
17 POWDER, FOR SOLUTION ORAL DAILY PRN
Status: DISCONTINUED | OUTPATIENT
Start: 2024-11-11 | End: 2024-11-19 | Stop reason: HOSPADM

## 2024-11-11 RX ORDER — ACETAMINOPHEN 325 MG/1
650 TABLET ORAL EVERY 6 HOURS PRN
Status: DISCONTINUED | OUTPATIENT
Start: 2024-11-11 | End: 2024-11-19 | Stop reason: HOSPADM

## 2024-11-11 RX ORDER — METFORMIN HYDROCHLORIDE 500 MG/1
1000 TABLET, EXTENDED RELEASE ORAL
COMMUNITY

## 2024-11-11 RX ORDER — HYDROCHLOROTHIAZIDE 25 MG/1
25 TABLET ORAL DAILY
Status: DISCONTINUED | OUTPATIENT
Start: 2024-11-11 | End: 2024-11-11

## 2024-11-11 RX ORDER — PANTOPRAZOLE SODIUM 40 MG/1
40 TABLET, DELAYED RELEASE ORAL DAILY
Status: DISCONTINUED | OUTPATIENT
Start: 2024-11-11 | End: 2024-11-11

## 2024-11-11 RX ORDER — ONDANSETRON 2 MG/ML
4 INJECTION INTRAMUSCULAR; INTRAVENOUS EVERY 6 HOURS PRN
Status: DISCONTINUED | OUTPATIENT
Start: 2024-11-11 | End: 2024-11-11

## 2024-11-11 RX ORDER — OXYCODONE HYDROCHLORIDE 5 MG/1
5 TABLET ORAL EVERY 6 HOURS PRN
Status: DISCONTINUED | OUTPATIENT
Start: 2024-11-11 | End: 2024-11-19 | Stop reason: HOSPADM

## 2024-11-11 RX ORDER — GLYCOPYRROLATE 0.2 MG/ML
INJECTION INTRAMUSCULAR; INTRAVENOUS
Status: DISCONTINUED | OUTPATIENT
Start: 2024-11-11 | End: 2024-11-11 | Stop reason: SDUPTHER

## 2024-11-11 RX ORDER — AMLODIPINE BESYLATE 2.5 MG/1
2.5 TABLET ORAL DAILY
Status: DISCONTINUED | OUTPATIENT
Start: 2024-11-11 | End: 2024-11-19 | Stop reason: HOSPADM

## 2024-11-11 RX ORDER — SODIUM CHLORIDE 0.9 % (FLUSH) 0.9 %
5-40 SYRINGE (ML) INJECTION EVERY 12 HOURS SCHEDULED
Status: DISCONTINUED | OUTPATIENT
Start: 2024-11-11 | End: 2024-11-19 | Stop reason: HOSPADM

## 2024-11-11 RX ORDER — SODIUM CHLORIDE 9 MG/ML
INJECTION, SOLUTION INTRAVENOUS PRN
Status: DISCONTINUED | OUTPATIENT
Start: 2024-11-11 | End: 2024-11-19 | Stop reason: HOSPADM

## 2024-11-11 RX ORDER — ONDANSETRON 4 MG/1
4 TABLET, ORALLY DISINTEGRATING ORAL EVERY 8 HOURS PRN
Status: DISCONTINUED | OUTPATIENT
Start: 2024-11-11 | End: 2024-11-11

## 2024-11-11 RX ORDER — METOPROLOL TARTRATE 50 MG
50 TABLET ORAL 2 TIMES DAILY
Status: DISCONTINUED | OUTPATIENT
Start: 2024-11-11 | End: 2024-11-19 | Stop reason: HOSPADM

## 2024-11-11 RX ORDER — ACETAMINOPHEN 325 MG/1
650 TABLET ORAL EVERY 6 HOURS PRN
Status: DISCONTINUED | OUTPATIENT
Start: 2024-11-11 | End: 2024-11-11

## 2024-11-11 RX ORDER — LOSARTAN POTASSIUM 50 MG/1
50 TABLET ORAL DAILY
Status: DISCONTINUED | OUTPATIENT
Start: 2024-11-11 | End: 2024-11-11

## 2024-11-11 RX ORDER — INSULIN LISPRO 100 [IU]/ML
0-8 INJECTION, SOLUTION INTRAVENOUS; SUBCUTANEOUS
Status: DISCONTINUED | OUTPATIENT
Start: 2024-11-11 | End: 2024-11-12

## 2024-11-11 RX ORDER — DULOXETIN HYDROCHLORIDE 60 MG/1
60 CAPSULE, DELAYED RELEASE ORAL DAILY
Status: DISCONTINUED | OUTPATIENT
Start: 2024-11-11 | End: 2024-11-11

## 2024-11-11 RX ORDER — KETAMINE HYDROCHLORIDE 50 MG/ML
INJECTION, SOLUTION INTRAMUSCULAR; INTRAVENOUS
Status: DISCONTINUED | OUTPATIENT
Start: 2024-11-11 | End: 2024-11-11 | Stop reason: SDUPTHER

## 2024-11-11 RX ORDER — ATORVASTATIN CALCIUM 40 MG/1
40 TABLET, FILM COATED ORAL DAILY
COMMUNITY

## 2024-11-11 RX ORDER — POLYETHYLENE GLYCOL 3350 17 G/17G
17 POWDER, FOR SOLUTION ORAL DAILY PRN
Status: DISCONTINUED | OUTPATIENT
Start: 2024-11-11 | End: 2024-11-11 | Stop reason: SDUPTHER

## 2024-11-11 RX ORDER — SODIUM CHLORIDE 9 MG/ML
INJECTION, SOLUTION INTRAVENOUS CONTINUOUS
Status: ACTIVE | OUTPATIENT
Start: 2024-11-11 | End: 2024-11-11

## 2024-11-11 RX ORDER — LIDOCAINE HYDROCHLORIDE 20 MG/ML
INJECTION, SOLUTION INFILTRATION; PERINEURAL
Status: DISCONTINUED | OUTPATIENT
Start: 2024-11-11 | End: 2024-11-11 | Stop reason: SDUPTHER

## 2024-11-11 RX ORDER — ONDANSETRON 2 MG/ML
4 INJECTION INTRAMUSCULAR; INTRAVENOUS EVERY 6 HOURS PRN
Status: DISCONTINUED | OUTPATIENT
Start: 2024-11-11 | End: 2024-11-19 | Stop reason: HOSPADM

## 2024-11-11 RX ORDER — ACETAMINOPHEN 650 MG/1
650 SUPPOSITORY RECTAL EVERY 6 HOURS PRN
Status: DISCONTINUED | OUTPATIENT
Start: 2024-11-11 | End: 2024-11-19 | Stop reason: HOSPADM

## 2024-11-11 RX ORDER — ESMOLOL HYDROCHLORIDE 10 MG/ML
INJECTION INTRAVENOUS
Status: DISCONTINUED | OUTPATIENT
Start: 2024-11-11 | End: 2024-11-11 | Stop reason: SDUPTHER

## 2024-11-11 RX ADMIN — SODIUM CHLORIDE, PRESERVATIVE FREE 5 ML: 5 INJECTION INTRAVENOUS at 10:39

## 2024-11-11 RX ADMIN — KETAMINE HYDROCHLORIDE 5 MG: 50 INJECTION, SOLUTION INTRAMUSCULAR; INTRAVENOUS at 13:53

## 2024-11-11 RX ADMIN — METOPROLOL TARTRATE 50 MG: 50 TABLET, FILM COATED ORAL at 10:23

## 2024-11-11 RX ADMIN — GLYCOPYRROLATE 0.2 MG: 0.2 INJECTION INTRAMUSCULAR; INTRAVENOUS at 13:43

## 2024-11-11 RX ADMIN — KETAMINE HYDROCHLORIDE 15 MG: 50 INJECTION, SOLUTION INTRAMUSCULAR; INTRAVENOUS at 13:47

## 2024-11-11 RX ADMIN — SODIUM CHLORIDE, PRESERVATIVE FREE 10 ML: 5 INJECTION INTRAVENOUS at 21:38

## 2024-11-11 RX ADMIN — Medication 5 MG: at 21:39

## 2024-11-11 RX ADMIN — METRONIDAZOLE 500 MG: 500 TABLET ORAL at 16:04

## 2024-11-11 RX ADMIN — KETAMINE HYDROCHLORIDE 10 MG: 50 INJECTION, SOLUTION INTRAMUSCULAR; INTRAVENOUS at 13:56

## 2024-11-11 RX ADMIN — METHOCARBAMOL 750 MG: 500 TABLET ORAL at 21:39

## 2024-11-11 RX ADMIN — TIOTROPIUM BROMIDE AND OLODATEROL 2 PUFF: 3.124; 2.736 SPRAY, METERED RESPIRATORY (INHALATION) at 08:08

## 2024-11-11 RX ADMIN — ESMOLOL HYDROCHLORIDE 40 MG: 10 INJECTION, SOLUTION INTRAVENOUS at 13:59

## 2024-11-11 RX ADMIN — METHOCARBAMOL 750 MG: 500 TABLET ORAL at 10:22

## 2024-11-11 RX ADMIN — SODIUM ZIRCONIUM CYCLOSILICATE 10 G: 10 POWDER, FOR SUSPENSION ORAL at 02:57

## 2024-11-11 RX ADMIN — LIDOCAINE HYDROCHLORIDE 60 MG: 20 INJECTION, SOLUTION INFILTRATION; PERINEURAL at 13:45

## 2024-11-11 RX ADMIN — AMLODIPINE BESYLATE 2.5 MG: 2.5 TABLET ORAL at 10:22

## 2024-11-11 RX ADMIN — SODIUM CHLORIDE: 9 INJECTION, SOLUTION INTRAVENOUS at 01:38

## 2024-11-11 RX ADMIN — OXYCODONE 5 MG: 5 TABLET ORAL at 16:50

## 2024-11-11 RX ADMIN — METRONIDAZOLE 500 MG: 500 TABLET ORAL at 21:39

## 2024-11-11 RX ADMIN — METOPROLOL TARTRATE 50 MG: 50 TABLET, FILM COATED ORAL at 21:39

## 2024-11-11 RX ADMIN — METOPROLOL TARTRATE 50 MG: 50 TABLET, FILM COATED ORAL at 01:45

## 2024-11-11 RX ADMIN — METHOCARBAMOL 750 MG: 500 TABLET ORAL at 16:04

## 2024-11-11 RX ADMIN — ESMOLOL HYDROCHLORIDE 30 MG: 10 INJECTION, SOLUTION INTRAVENOUS at 13:50

## 2024-11-11 RX ADMIN — KETAMINE HYDROCHLORIDE 35 MG: 50 INJECTION, SOLUTION INTRAMUSCULAR; INTRAVENOUS at 13:45

## 2024-11-11 RX ADMIN — PHENYLEPHRINE HYDROCHLORIDE 100 MCG: 10 INJECTION, SOLUTION INTRAVENOUS at 14:02

## 2024-11-11 ASSESSMENT — PAIN - FUNCTIONAL ASSESSMENT: PAIN_FUNCTIONAL_ASSESSMENT: NONE - DENIES PAIN

## 2024-11-11 NOTE — H&P
ischemic Colitis (most common in elderly with thickening finding on CT) vs infectious colitis (No WBC) vs hemorrhoids vs Colorectal Cancer (No unintentional no family hx, Colonoscopy 2021 significant for removal of two 4 mm polyps removed with cold snare. Diverticulosis in the sigmoid colon ).  - NPO for Bowel rest,   - IV fluid   - Monitoring CBC  - Infection workup:   - will need Colonoscopy to rule out colon cancer   - GI consultation appreciate reccs      # JUDITH  # Normal gap metabolic acidosis  Most likely prerenal 2/2 to dehydration from poor oral intake  Cr 1.06, Baseline 1.2, BUN 42 with BUN over creatinine more than 2012  S. K 5.3, lactic acid 2.3.  HCO3 19 a gap 13  - f/u BMP daily  -monitor lytes replace as needed  -avoid Nephrotoxic meds- hold HCTZ and cozaar  -monitor I/Os strictly      # Hyponatremia   Most likely 2/2 to poor oral intake  Patient has generalized weakness but mental status is good.   S. Na 127  - IV fluid  - BMP daily      # Chronic anemia  Patient is asymptomatic  - Hb 11 which is similar to her baseline  -hold iron tablets  - CBC daily  - Blood transfusion if hemoglobin less than 7 or symptomatic      # Asymptomatic bacteriuria   UA (11/10) showed large blood small leukoesterase 21-50 RBC 4+ bacteria.   History of UA positive for Klebsiella recently with urinary frequency and irritation cefdinir 300 mg was completed on previous admission  - Urine culture      Chronic medical conditions:  # Hypertension   # HFpEF  - Echo (10/24) Severe PH with RSVP of 66 mmHg, G2 diastolic dysfunction, EF 60-65%  -Continue amlodipine  - hold cozaar and HCTZ due to renal failure      # T2DM   - On glipizide 5 mg tablet and metformin 500 mg  - MDSSI  - hypoglycemia protocol  - POCT glucose X 4 daily     # COPD  - Continue Stiolto    # Bilateral sacral alar insufficiency fractures   - Oxycodone 5 mg every 6 PRN      DVT PPx:  SCD  Diet:  No diet orders on file   Code status:  Prior     ELOS:  Barriers to

## 2024-11-11 NOTE — PROCEDURES
normal antrum and body.  Retroflexion did show a hiatal hernia an 1-2cm sliding hiatal hernia with ulceration/friability and biopsies obtained.   Esophagus: GE junction at 40cms.  No evidence of Stephenson's under white light or NBI. There was LA grade A erosive esophagitis present. No nodularity    Next patient positioned for a sigmoidoscopy. Rectal exam showed hemorrhoids. The gastroscope was placed atraumatically into the rectum and advanced without difficulty to the descending colon. Mild ulceration of the descending colon and the rectum and biopsies are obtained. Severe left sided diverticulosis.  Retroflexed views of the rectum show hemorhroids    Estimated blood loss none  Complications: no pics as MDreports crashed    Plan:  BID PPI x 4 weeks and then daily  Full colon in 4 weeks  Flagyl x 7 days  The patient knows it is their responsibility to call for biopsy results in 7 days   Call back with questions or concerns

## 2024-11-11 NOTE — ANESTHESIA POSTPROCEDURE EVALUATION
Department of Anesthesiology  Postprocedure Note    Patient: Carolee Sharma  MRN: [id number]  YOB: 1950  Date of evaluation: 11/11/2024    Procedure Summary       Date: 11/11/24 Room / Location: [place] [address] / [facility]    Anesthesia Start: 1338 Anesthesia Stop: 1405    Procedures:       ESOPHAGOGASTRODUODENOSCOPY BIOPSY      COLONOSCOPY BIOPSY Diagnosis:       Melena      (Melena [K92.1])    Surgeons: Turner Root MD Responsible Provider: Logan Rocha MD    Anesthesia Type: MAC ASA Status: 3            Anesthesia Type: No value filed.    Cordelia Phase I:      Cordelia Phase II: Cordelia Score: 10    Anesthesia Post Evaluation    Patient location during evaluation: PACU  Patient participation: complete - patient participated  Level of consciousness: awake  Pain score: 2  Airway patency: patent  Cardiovascular status: blood pressure returned to baseline  Respiratory status: acceptable  Hydration status: euvolemic  Pain management: adequate    No notable events documented.

## 2024-11-11 NOTE — CONSULTS
IntraVENous, Q6H PRN  polyethylene glycol (GLYCOLAX) packet 17 g, 17 g, Oral, Daily PRN  oxyCODONE (ROXICODONE) immediate release tablet 5 mg, 5 mg, Oral, Q6H PRN  insulin lispro (HUMALOG,ADMELOG) injection vial 0-8 Units, 0-8 Units, SubCUTAneous, 4x Daily AC & HS  pantoprazole (PROTONIX) 40 mg in sodium chloride (PF) 0.9 % 10 mL injection, 40 mg, IntraVENous, Q12H  Allergies:  Percocet [oxycodone-acetaminophen] and Sulfa antibiotics    Social History:    TOBACCO:   reports that she has quit smoking. She has a 44 pack-year smoking history. She has never used smokeless tobacco.  ETOH:   reports no history of alcohol use.  DRUGS:   reports no history of drug use.    Family History:   History reviewed. No pertinent family history.  REVIEW OF SYSTEMS:    A comprehensive 12 point review of systems is negative except as documented above.     PHYSICAL EXAM:      Vitals:    /71   Pulse 62   Temp 98 °F (36.7 °C) (Oral)   Resp 16   Ht 1.676 m (5' 6\")   Wt 69 kg (152 lb 1.9 oz)   SpO2 100%   BMI 24.55 kg/m²     General: No acute distress  HEENT: PERRL, EOMI, oral mucosa moist and intact, no sclericterus  Neck: no thyromegaly  Lymphatic: no cervical or supraclavicular lymphadenopathy  Heart: no m/r/g; +s1/s2 rrr  Lungs: CTA bilaterally  Abdomen: soft,mild ttp in epi area +BS  Extremities: 2+pulses, no edema  Skin: no rashes or lesions  Neuro: a&o x 3; no gross deficit  DATA:    Recent Labs     11/10/24  1914   WBC 9.8   HGB 11.0*   HCT 33.8*   MCV 91.6        Recent Labs     11/10/24  1914 11/11/24  0519   * 129*   K 5.3* 5.3*   CL 95* 100   CO2 19* 19*   BUN 42* 34*   CREATININE 1.6* 1.5*     Recent Labs     11/10/24  1914   AST 22   ALT 17   BILITOT 0.3   ALKPHOS 254*     Recent Labs     11/10/24  1914   LIPASE 21.0     Recent Labs     11/10/24  1914   INR 0.94     Invalid input(s): \"PTT\"  No results for input(s): \"OCCULTBLD\" in the last 72 hours.    Imaging: CT    IMPRESSION/RECOMMENDATIONS:

## 2024-11-11 NOTE — CONSULTS
times per day  sodium chloride flush 0.9 % injection 5-40 mL, PRN  0.9 % sodium chloride infusion, PRN  acetaminophen (TYLENOL) tablet 650 mg, Q6H PRN   Or  acetaminophen (TYLENOL) suppository 650 mg, Q6H PRN  albuterol (PROVENTIL) (2.5 MG/3ML) 0.083% nebulizer solution 2.5 mg, Q6H PRN  amLODIPine (NORVASC) tablet 2.5 mg, Daily  melatonin disintegrating tablet 5 mg, Nightly PRN  methocarbamol (ROBAXIN) tablet 750 mg, TID  metoprolol tartrate (LOPRESSOR) tablet 50 mg, BID  tiotropium-olodaterol (STIOLTO) 2.5-2.5 MCG/ACT inhaler 2 puff, Daily  sodium chloride flush 0.9 % injection 5-40 mL, 2 times per day  sodium chloride flush 0.9 % injection 5-40 mL, PRN  0.9 % sodium chloride infusion, PRN  ondansetron (ZOFRAN-ODT) disintegrating tablet 4 mg, Q8H PRN   Or  ondansetron (ZOFRAN) injection 4 mg, Q6H PRN  polyethylene glycol (GLYCOLAX) packet 17 g, Daily PRN  0.9 % sodium chloride infusion, Continuous  oxyCODONE (ROXICODONE) immediate release tablet 5 mg, Q6H PRN  insulin lispro (HUMALOG,ADMELOG) injection vial 0-8 Units, 4x Daily AC & HS        Family History:   History reviewed. No pertinent family history.    Social History:   TOBACCO:   reports that she has quit smoking. She has a 44 pack-year smoking history. She has never used smokeless tobacco.  ETOH:   reports no history of alcohol use.  DRUGS:   reports no history of drug use.    Review of Systems:   A 14 point review of systems was conducted, significant findings as noted in HPI. All other systems negative.     Physical exam:    Vitals:    11/10/24 2306 11/11/24 0145 11/11/24 0409 11/11/24 0534   BP: 109/60 123/62  (!) 136/58   Pulse: 86 89  72   Resp: 16   16   Temp: 97.9 °F (36.6 °C)   97.6 °F (36.4 °C)   TempSrc: Oral   Axillary   SpO2: 96%   95%   Weight:   69 kg (152 lb 1.9 oz)    Height:           General appearance: alert, no acute distress  Eyes: No scleral icterus, EOM grossly intact  Neck: trachea midline, no JVD  Chest/Lungs: Normal effort with no

## 2024-11-11 NOTE — ED PROVIDER NOTES
ED Attending Attestation Note     Date of evaluation: 11/10/2024    This patient was seen by the resident.  I have seen and examined the patient, agree with the workup, evaluation, management and diagnosis. The care plan has been discussed.  My assessment reveals complaints of abdominal pain and rectal bleeding.  Patient was noted today at her nursing home to have bloody stools.  Patient also notes crampy abdominal pain though cannot tell me specifically where this pain is and only indicates pain in her left hip where she was recently diagnosed with sacral alar insufficiency fractures and is currently in a nursing facility.  On arrival, patient is hemodynamically stable.  She has a soft abdomen on exam.  She did have bowel movement in the emerged ferment that was bloody.  Will check laboratory studies, imaging.     Miguel Banda MD  11/10/24 2017    
mouth daily 2 tablets at breakfast and 1 tablet at dinnerHistorical Med      metFORMIN (GLUCOPHAGE-XR) 500 MG extended release tablet Take 2 tablets by mouth 2 times daily (before meals)Historical Med      melatonin 5 MG TBDP disintegrating tablet Take 1 tablet by mouth nightly as needed (insomnia), Disp-10 tablet, R-0NO PRINT      amLODIPine (NORVASC) 2.5 MG tablet Take 1 tablet by mouth dailyHistorical Med      DULoxetine (CYMBALTA) 60 MG extended release capsule Take 1 capsule by mouth dailyHistorical Med      aspirin EC 81 MG EC tablet Take 1 tablet by mouth 2 times daily To prevent blood clots, Disp-60 tablet, R-0Print      senna-docusate (PERICOLACE) 8.6-50 MG per tablet Take 2 tablets by mouth daily as needed for Constipation, Disp-30 tablet, R-2Print      umeclidinium-vilanterol (ANORO ELLIPTA) 62.5-25 MCG/INH AEPB inhaler Inhale 1 puff into the lungs dailyHistorical Med      albuterol sulfate HFA (PROVENTIL;VENTOLIN;PROAIR) 108 (90 Base) MCG/ACT inhaler Inhale 2 puffs into the lungs every 6 hours as needed for WheezingHistorical Med      ferrous sulfate (IRON 325) 325 (65 Fe) MG tablet Take 1 tablet by mouth daily (with breakfast)Historical Med      Cyanocobalamin (B-12 PO) Take 1,000 mcg by mouth dailyHistorical Med      losartan (COZAAR) 50 MG tablet Take 1 tablet by mouth dailyHistorical Med      metoprolol tartrate (LOPRESSOR) 50 MG tablet Take 1 tablet by mouth 2 times dailyHistorical Med      Cholecalciferol (VITAMIN D3) 125 MCG (5000 UT) TABS Take by mouthHistorical Med             Allergies     She is allergic to percocet [oxycodone-acetaminophen] and sulfa antibiotics.    Physical Exam     GENERAL APPEARANCE: NAD, well developed/well nourished, no cyanosis, pallor, or diaphoresis.  EYES: Lids/conjunctiva normal.  EARS/NOSE/THROAT: Mucous membranes moist, uvula midline, no oral pharyngeal erythema, exudate or swelling. No lymphangitis/lymphedema.  HEAD/NECK: Normocephalic, atraumatic, neck is

## 2024-11-11 NOTE — PROGRESS NOTES
Physician Progress Note      PATIENT:               RYLEE REINA  CSN #:                  [id number]  :                       1950  ADMIT DATE:       10/29/2024 9:42 PM  DISCH DATE:        2024 2:28 PM  RESPONDING  PROVIDER #:        Cezar Torres MD          QUERY TEXT:    Pt admitted with weakness. Pt noted to have UTI. If possible, please document   in progress notes and discharge summary the relationship, if any, between    The medical record reflects the following:  Risk Factors: Age 73yo. Hx- recent Bilateral sacral alar insufficiency   fractures, -COPD, diabetes mellitus, hyperlipidemia, CKD 3b, essential   hypertension  Clinical Indicators: u/a- trace leuk, 10-20 wbc, 4+ bacteria.....UTI-   Klebsiella PNA  Treatment: Cefdinir oral bid, PT/OT, PMR consult  Options provided:  -- Weakness possibly due to UTI  -- This patient has weakness possibly due to age-related weakness.  -- Weakness possibly due to generalized weakness  -- Weakness due to, Please specify cause  -- Other - I will add my own diagnosis  -- Disagree - Not applicable / Not valid  -- Disagree - Clinically unable to determine / Unknown  -- Refer to Clinical Documentation Reviewer    PROVIDER RESPONSE TEXT:    This patient has weakness possibly due to UTI.    Query created by: Cesilia Eubanks on 2024 5:38 AM      Electronically signed by:  Cezar Torres MD 2024 1:08 PM

## 2024-11-11 NOTE — PLAN OF CARE
[facility] [place] brief note  Consult received.  Case reviewed with ER physician- colitis, hyponatremia and dehydration    Full note to follow.    Anirudh Ortez MD    Thanks  GEOVANNY HOLLAND MD

## 2024-11-11 NOTE — ED NOTES
How does patient ambulate?   []Low Fall Risk (ambulates by themselves without support)  [x]Stand by assist   []Contact Guard   [x]Front wheel walker  []Wheelchair   []Steady  []Bed bound  []History of Lower Extremity Amputation  []Unknown, did not assess in the emergency department   How does patient take pills?  [x]Whole with Water  []Crushed in applesauce  []Crushed in pudding  []Other  []Unknown no oral medications were given in the ED  Is patient alert?   [x]Alert  []Drowsy but responds to voice  []Doesn't respond to voice but responds to painful stimuli  []Unresponsive  Is patient oriented?   [x]To person  [x]To place  [x]To time  [x]To situation  []Confused  []Agitated  []Follows commands  If patient is disoriented or from a Skill Nursing Facility has family been notified of admission?   []Yes   [x]No  Patient belongings?   [x]Cell phone  []Wallet   []Dentures  [x]Clothing  Any specific patient or family belongings/needs/dynamics?   Family @bedside  Miscellaneous comments/pending orders?       If there are any additional questions please reach out to the Emergency Department.           Maciel, Dali, RN  11/10/24 7119

## 2024-11-11 NOTE — PLAN OF CARE
Problem: Chronic Conditions and Co-morbidities  Goal: Patient's chronic conditions and co-morbidity symptoms are monitored and maintained or improved  Outcome: Progressing     Problem: Pain  Goal: Verbalizes/displays adequate comfort level or baseline comfort level  Outcome: Progressing     Problem: Skin/Tissue Integrity  Goal: Absence of new skin breakdown  Outcome: Progressing

## 2024-11-11 NOTE — ANESTHESIA PRE PROCEDURE
Department of Anesthesiology  Preprocedure Note       Name:  Carolee Sharma   Age:  74 y.o.  :  1950                                          MRN:  [id number]         Date:  2024      Surgeon: Surgeon(s):  Turner Root MD    Procedure: Procedure(s):  ESOPHAGOGASTRODUODENOSCOPY  SIGMOIDOSCOPY DIAGNOSTIC FLEXIBLE    Medications prior to admission:   Prior to Admission medications    Medication Sig Start Date End Date Taking? Authorizing Provider   atorvastatin (LIPITOR) 40 MG tablet Take 1 tablet by mouth daily   Yes Provider, MD Gilberto   glipiZIDE (GLUCOTROL XL) 5 MG extended release tablet Take 1 tablet by mouth daily 2 tablets at breakfast and 1 tablet at dinner   Yes Provider, MD Gilberto   metFORMIN (GLUCOPHAGE-XR) 500 MG extended release tablet Take 2 tablets by mouth 2 times daily (before meals)   Yes Provider, MD Gilberto   amLODIPine (NORVASC) 2.5 MG tablet Take 1 tablet by mouth daily 3/6/24  Yes Provider, MD Gilberto   hydroCHLOROthiazide (HYDRODIURIL) 25 MG tablet Take 1 tablet by mouth daily 3/6/24  Yes Provider, Historical, MD   DULoxetine (CYMBALTA) 60 MG extended release capsule Take 1 capsule by mouth daily 24  Yes Provider, MD Gilberto   aspirin EC 81 MG EC tablet Take 1 tablet by mouth 2 times daily To prevent blood clots 21  Yes Parag Gu MD   albuterol sulfate HFA (PROVENTIL;VENTOLIN;PROAIR) 108 (90 Base) MCG/ACT inhaler Inhale 2 puffs into the lungs every 6 hours as needed for Wheezing   Yes Provider, MD Gilberto   pantoprazole (PROTONIX) 40 MG tablet Take 1 tablet by mouth daily   Yes Provider, Historical, MD   ferrous sulfate (IRON 325) 325 (65 Fe) MG tablet Take 1 tablet by mouth daily (with breakfast)   Yes Provider, MD Gilberto   losartan (COZAAR) 50 MG tablet Take 1 tablet by mouth daily   Yes Provider, MD Gilberto   metoprolol tartrate (LOPRESSOR) 50 MG tablet Take 1 tablet by mouth 2 times daily   Yes Provider

## 2024-11-11 NOTE — PLAN OF CARE
Problem: Safety - Adult  Goal: Free from fall injury  Outcome: Progressing  Note:  Remains free from falls, bed in low position, call light in reach, bed alarm monitoring for safety.     Problem: Pain  Goal: Verbalizes/displays adequate comfort level or baseline comfort level  Outcome: Progressing  Note:  Denies pain/needs, will continue to monitor.     Problem: Respiratory - Adult  Goal: Achieves optimal ventilation and oxygenation  Outcome: Progressing  Note:  O2 96% on Room Air.     Problem: Cardiovascular - Adult  Goal: Absence of cardiac dysrhythmias or at baseline  Outcome: Progressing  Note:  Normal Sinus Rhythm rate 90 at 0205.

## 2024-11-12 LAB
ALBUMIN SERPL-MCNC: 3.3 G/DL (ref 3.4–5)
ANION GAP SERPL CALCULATED.3IONS-SCNC: 9 MMOL/L (ref 3–16)
BASOPHILS # BLD: 0 K/UL (ref 0–0.2)
BASOPHILS NFR BLD: 0.5 %
BUN SERPL-MCNC: 26 MG/DL (ref 7–20)
CALCIUM SERPL-MCNC: 9 MG/DL (ref 8.3–10.6)
CHLORIDE SERPL-SCNC: 101 MMOL/L (ref 99–110)
CO2 SERPL-SCNC: 20 MMOL/L (ref 21–32)
CREAT SERPL-MCNC: 1.4 MG/DL (ref 0.6–1.2)
DEPRECATED RDW RBC AUTO: 15 % (ref 12.4–15.4)
EOSINOPHIL # BLD: 0.6 K/UL (ref 0–0.6)
EOSINOPHIL NFR BLD: 10.3 %
GFR SERPLBLD CREATININE-BSD FMLA CKD-EPI: 40 ML/MIN/{1.73_M2}
GLUCOSE BLD-MCNC: 164 MG/DL (ref 70–99)
GLUCOSE BLD-MCNC: 174 MG/DL (ref 70–99)
GLUCOSE BLD-MCNC: 204 MG/DL (ref 70–99)
GLUCOSE BLD-MCNC: 279 MG/DL (ref 70–99)
GLUCOSE SERPL-MCNC: 128 MG/DL (ref 70–99)
HCT VFR BLD AUTO: 31.6 % (ref 36–48)
HGB BLD-MCNC: 10.3 G/DL (ref 12–16)
LYMPHOCYTES # BLD: 0.6 K/UL (ref 1–5.1)
LYMPHOCYTES NFR BLD: 9.5 %
MAGNESIUM SERPL-MCNC: 1.81 MG/DL (ref 1.8–2.4)
MCH RBC QN AUTO: 29.7 PG (ref 26–34)
MCHC RBC AUTO-ENTMCNC: 32.5 G/DL (ref 31–36)
MCV RBC AUTO: 91.6 FL (ref 80–100)
MONOCYTES # BLD: 0.5 K/UL (ref 0–1.3)
MONOCYTES NFR BLD: 9.1 %
NEUTROPHILS # BLD: 4.3 K/UL (ref 1.7–7.7)
NEUTROPHILS NFR BLD: 70.6 %
PERFORMED ON: ABNORMAL
PHOSPHATE SERPL-MCNC: 3.2 MG/DL (ref 2.5–4.9)
PLATELET # BLD AUTO: 266 K/UL (ref 135–450)
PMV BLD AUTO: 7.5 FL (ref 5–10.5)
POTASSIUM SERPL-SCNC: 4.7 MMOL/L (ref 3.5–5.1)
RBC # BLD AUTO: 3.45 M/UL (ref 4–5.2)
SODIUM SERPL-SCNC: 130 MMOL/L (ref 136–145)
WBC # BLD AUTO: 6 K/UL (ref 4–11)

## 2024-11-12 PROCEDURE — 2580000003 HC RX 258: Performed by: INTERNAL MEDICINE

## 2024-11-12 PROCEDURE — 36415 COLL VENOUS BLD VENIPUNCTURE: CPT

## 2024-11-12 PROCEDURE — 94640 AIRWAY INHALATION TREATMENT: CPT

## 2024-11-12 PROCEDURE — 1200000000 HC SEMI PRIVATE

## 2024-11-12 PROCEDURE — 99232 SBSQ HOSP IP/OBS MODERATE 35: CPT | Performed by: SURGERY

## 2024-11-12 PROCEDURE — 2580000003 HC RX 258

## 2024-11-12 PROCEDURE — 6370000000 HC RX 637 (ALT 250 FOR IP): Performed by: INTERNAL MEDICINE

## 2024-11-12 PROCEDURE — 6370000000 HC RX 637 (ALT 250 FOR IP): Performed by: HOSPITALIST

## 2024-11-12 PROCEDURE — 85025 COMPLETE CBC W/AUTO DIFF WBC: CPT

## 2024-11-12 PROCEDURE — 80069 RENAL FUNCTION PANEL: CPT

## 2024-11-12 PROCEDURE — 96375 TX/PRO/DX INJ NEW DRUG ADDON: CPT

## 2024-11-12 PROCEDURE — 83735 ASSAY OF MAGNESIUM: CPT

## 2024-11-12 PROCEDURE — 6360000002 HC RX W HCPCS

## 2024-11-12 RX ORDER — GLUCAGON 1 MG/ML
1 KIT INJECTION PRN
Status: DISCONTINUED | OUTPATIENT
Start: 2024-11-12 | End: 2024-11-19 | Stop reason: HOSPADM

## 2024-11-12 RX ORDER — DEXTROSE MONOHYDRATE 100 MG/ML
INJECTION, SOLUTION INTRAVENOUS CONTINUOUS PRN
Status: DISCONTINUED | OUTPATIENT
Start: 2024-11-12 | End: 2024-11-19 | Stop reason: HOSPADM

## 2024-11-12 RX ORDER — CIPROFLOXACIN 500 MG/1
500 TABLET, FILM COATED ORAL EVERY 12 HOURS SCHEDULED
Status: COMPLETED | OUTPATIENT
Start: 2024-11-12 | End: 2024-11-17

## 2024-11-12 RX ORDER — PANTOPRAZOLE SODIUM 40 MG/1
40 TABLET, DELAYED RELEASE ORAL
Status: DISCONTINUED | OUTPATIENT
Start: 2024-11-12 | End: 2024-11-19 | Stop reason: HOSPADM

## 2024-11-12 RX ORDER — INSULIN LISPRO 100 [IU]/ML
0-8 INJECTION, SOLUTION INTRAVENOUS; SUBCUTANEOUS
Status: DISCONTINUED | OUTPATIENT
Start: 2024-11-12 | End: 2024-11-15

## 2024-11-12 RX ADMIN — TIOTROPIUM BROMIDE AND OLODATEROL 2 PUFF: 3.124; 2.736 SPRAY, METERED RESPIRATORY (INHALATION) at 11:01

## 2024-11-12 RX ADMIN — PANTOPRAZOLE SODIUM 40 MG: 40 TABLET, DELAYED RELEASE ORAL at 06:36

## 2024-11-12 RX ADMIN — METOPROLOL TARTRATE 50 MG: 50 TABLET, FILM COATED ORAL at 09:09

## 2024-11-12 RX ADMIN — PANTOPRAZOLE SODIUM 40 MG: 40 TABLET, DELAYED RELEASE ORAL at 16:02

## 2024-11-12 RX ADMIN — METHOCARBAMOL 750 MG: 500 TABLET ORAL at 21:07

## 2024-11-12 RX ADMIN — CIPROFLOXACIN HYDROCHLORIDE 500 MG: 500 TABLET, FILM COATED ORAL at 21:09

## 2024-11-12 RX ADMIN — METHOCARBAMOL 750 MG: 500 TABLET ORAL at 13:00

## 2024-11-12 RX ADMIN — SODIUM CHLORIDE, PRESERVATIVE FREE 5 ML: 5 INJECTION INTRAVENOUS at 09:10

## 2024-11-12 RX ADMIN — METOPROLOL TARTRATE 50 MG: 50 TABLET, FILM COATED ORAL at 21:09

## 2024-11-12 RX ADMIN — METRONIDAZOLE 500 MG: 500 TABLET ORAL at 13:01

## 2024-11-12 RX ADMIN — SODIUM CHLORIDE, PRESERVATIVE FREE 10 ML: 5 INJECTION INTRAVENOUS at 21:09

## 2024-11-12 RX ADMIN — ACETAMINOPHEN 650 MG: 325 TABLET ORAL at 22:25

## 2024-11-12 RX ADMIN — METRONIDAZOLE 500 MG: 500 TABLET ORAL at 21:10

## 2024-11-12 RX ADMIN — ACETAMINOPHEN 650 MG: 325 TABLET ORAL at 16:02

## 2024-11-12 RX ADMIN — INSULIN LISPRO 2 UNITS: 100 INJECTION, SOLUTION INTRAVENOUS; SUBCUTANEOUS at 21:07

## 2024-11-12 RX ADMIN — METHOCARBAMOL 750 MG: 500 TABLET ORAL at 09:09

## 2024-11-12 RX ADMIN — AMLODIPINE BESYLATE 2.5 MG: 2.5 TABLET ORAL at 09:09

## 2024-11-12 RX ADMIN — METRONIDAZOLE 500 MG: 500 TABLET ORAL at 06:36

## 2024-11-12 RX ADMIN — SODIUM CHLORIDE, PRESERVATIVE FREE 5 ML: 5 INJECTION INTRAVENOUS at 15:52

## 2024-11-12 RX ADMIN — PANTOPRAZOLE SODIUM 40 MG: 40 INJECTION, POWDER, FOR SOLUTION INTRAVENOUS at 00:57

## 2024-11-12 RX ADMIN — INSULIN LISPRO 4 UNITS: 100 INJECTION, SOLUTION INTRAVENOUS; SUBCUTANEOUS at 13:01

## 2024-11-12 ASSESSMENT — PAIN DESCRIPTION - LOCATION
LOCATION: BACK;HIP

## 2024-11-12 ASSESSMENT — PAIN - FUNCTIONAL ASSESSMENT
PAIN_FUNCTIONAL_ASSESSMENT: ACTIVITIES ARE NOT PREVENTED

## 2024-11-12 ASSESSMENT — PAIN DESCRIPTION - DESCRIPTORS
DESCRIPTORS: DISCOMFORT

## 2024-11-12 ASSESSMENT — PAIN DESCRIPTION - ORIENTATION
ORIENTATION: LOWER
ORIENTATION: RIGHT
ORIENTATION: RIGHT

## 2024-11-12 ASSESSMENT — PAIN SCALES - GENERAL
PAINLEVEL_OUTOF10: 2
PAINLEVEL_OUTOF10: 2
PAINLEVEL_OUTOF10: 3

## 2024-11-12 NOTE — PLAN OF CARE
Problem: Chronic Conditions and Co-morbidities  Goal: Patient's chronic conditions and co-morbidity symptoms are monitored and maintained or improved  Outcome: Progressing     Problem: Discharge Planning  Goal: Discharge to home or other facility with appropriate resources  Outcome: Progressing     Problem: Pain  Goal: Verbalizes/displays adequate comfort level or baseline comfort level  Outcome: Progressing     Problem: Skin/Tissue Integrity  Goal: Absence of new skin breakdown  Description: 1.  Monitor for areas of redness and/or skin breakdown  2.  Assess vascular access sites hourly  3.  Every 4-6 hours minimum:  Change oxygen saturation probe site  4.  Every 4-6 hours:  If on nasal continuous positive airway pressure, respiratory therapy assess nares and determine need for appliance change or resting period.  Outcome: Progressing     Problem: Safety - Adult  Goal: Free from fall injury  Outcome: Progressing     Problem: Respiratory - Adult  Goal: Achieves optimal ventilation and oxygenation  Outcome: Progressing     Problem: Cardiovascular - Adult  Goal: Absence of cardiac dysrhythmias or at baseline  Outcome: Progressing

## 2024-11-13 LAB
ANION GAP SERPL CALCULATED.3IONS-SCNC: 8 MMOL/L (ref 3–16)
BASOPHILS # BLD: 0.1 K/UL (ref 0–0.2)
BASOPHILS NFR BLD: 0.8 %
BUN SERPL-MCNC: 27 MG/DL (ref 7–20)
CALCIUM SERPL-MCNC: 9 MG/DL (ref 8.3–10.6)
CHLORIDE SERPL-SCNC: 101 MMOL/L (ref 99–110)
CO2 SERPL-SCNC: 24 MMOL/L (ref 21–32)
CREAT SERPL-MCNC: 1.3 MG/DL (ref 0.6–1.2)
DEPRECATED RDW RBC AUTO: 15.4 % (ref 12.4–15.4)
EOSINOPHIL # BLD: 0.7 K/UL (ref 0–0.6)
EOSINOPHIL NFR BLD: 11.3 %
GFR SERPLBLD CREATININE-BSD FMLA CKD-EPI: 43 ML/MIN/{1.73_M2}
GLUCOSE BLD-MCNC: 135 MG/DL (ref 70–99)
GLUCOSE BLD-MCNC: 172 MG/DL (ref 70–99)
GLUCOSE BLD-MCNC: 178 MG/DL (ref 70–99)
GLUCOSE BLD-MCNC: 389 MG/DL (ref 70–99)
GLUCOSE SERPL-MCNC: 174 MG/DL (ref 70–99)
HCT VFR BLD AUTO: 31.4 % (ref 36–48)
HGB BLD-MCNC: 10.3 G/DL (ref 12–16)
LYMPHOCYTES # BLD: 0.8 K/UL (ref 1–5.1)
LYMPHOCYTES NFR BLD: 11.6 %
MCH RBC QN AUTO: 29.8 PG (ref 26–34)
MCHC RBC AUTO-ENTMCNC: 32.7 G/DL (ref 31–36)
MCV RBC AUTO: 91.3 FL (ref 80–100)
MONOCYTES # BLD: 0.6 K/UL (ref 0–1.3)
MONOCYTES NFR BLD: 9 %
NEUTROPHILS # BLD: 4.4 K/UL (ref 1.7–7.7)
NEUTROPHILS NFR BLD: 67.3 %
PERFORMED ON: ABNORMAL
PLATELET # BLD AUTO: 285 K/UL (ref 135–450)
PMV BLD AUTO: 7.9 FL (ref 5–10.5)
POTASSIUM SERPL-SCNC: 4.5 MMOL/L (ref 3.5–5.1)
RBC # BLD AUTO: 3.44 M/UL (ref 4–5.2)
SODIUM SERPL-SCNC: 133 MMOL/L (ref 136–145)
WBC # BLD AUTO: 6.6 K/UL (ref 4–11)

## 2024-11-13 PROCEDURE — 97116 GAIT TRAINING THERAPY: CPT

## 2024-11-13 PROCEDURE — 6370000000 HC RX 637 (ALT 250 FOR IP): Performed by: INTERNAL MEDICINE

## 2024-11-13 PROCEDURE — 2580000003 HC RX 258: Performed by: INTERNAL MEDICINE

## 2024-11-13 PROCEDURE — 96365 THER/PROPH/DIAG IV INF INIT: CPT

## 2024-11-13 PROCEDURE — 85025 COMPLETE CBC W/AUTO DIFF WBC: CPT

## 2024-11-13 PROCEDURE — 6370000000 HC RX 637 (ALT 250 FOR IP)

## 2024-11-13 PROCEDURE — 94640 AIRWAY INHALATION TREATMENT: CPT

## 2024-11-13 PROCEDURE — 97535 SELF CARE MNGMENT TRAINING: CPT

## 2024-11-13 PROCEDURE — 6370000000 HC RX 637 (ALT 250 FOR IP): Performed by: HOSPITALIST

## 2024-11-13 PROCEDURE — 97530 THERAPEUTIC ACTIVITIES: CPT

## 2024-11-13 PROCEDURE — 96366 THER/PROPH/DIAG IV INF ADDON: CPT

## 2024-11-13 PROCEDURE — 97162 PT EVAL MOD COMPLEX 30 MIN: CPT

## 2024-11-13 PROCEDURE — 6360000002 HC RX W HCPCS

## 2024-11-13 PROCEDURE — 97166 OT EVAL MOD COMPLEX 45 MIN: CPT

## 2024-11-13 PROCEDURE — 80048 BASIC METABOLIC PNL TOTAL CA: CPT

## 2024-11-13 PROCEDURE — 1200000000 HC SEMI PRIVATE

## 2024-11-13 PROCEDURE — 36415 COLL VENOUS BLD VENIPUNCTURE: CPT

## 2024-11-13 PROCEDURE — 2580000003 HC RX 258

## 2024-11-13 RX ORDER — POLYETHYLENE GLYCOL 3350 17 G/17G
17 POWDER, FOR SOLUTION ORAL ONCE
Status: COMPLETED | OUTPATIENT
Start: 2024-11-13 | End: 2024-11-13

## 2024-11-13 RX ORDER — OXYCODONE HYDROCHLORIDE 5 MG/1
5 TABLET ORAL EVERY 6 HOURS PRN
Qty: 12 TABLET | Refills: 0 | Status: SHIPPED | OUTPATIENT
Start: 2024-11-13 | End: 2024-11-16

## 2024-11-13 RX ORDER — METRONIDAZOLE 500 MG/1
500 TABLET ORAL EVERY 8 HOURS SCHEDULED
Qty: 12 TABLET | Refills: 0 | Status: SHIPPED | OUTPATIENT
Start: 2024-11-13 | End: 2024-11-16

## 2024-11-13 RX ORDER — CIPROFLOXACIN 500 MG/1
500 TABLET, FILM COATED ORAL EVERY 12 HOURS SCHEDULED
Qty: 14 TABLET | Refills: 0 | Status: SHIPPED | OUTPATIENT
Start: 2024-11-13 | End: 2024-11-16

## 2024-11-13 RX ADMIN — AMLODIPINE BESYLATE 2.5 MG: 2.5 TABLET ORAL at 10:44

## 2024-11-13 RX ADMIN — METOPROLOL TARTRATE 50 MG: 50 TABLET, FILM COATED ORAL at 20:42

## 2024-11-13 RX ADMIN — PANTOPRAZOLE SODIUM 40 MG: 40 TABLET, DELAYED RELEASE ORAL at 06:04

## 2024-11-13 RX ADMIN — Medication 5 MG: at 00:40

## 2024-11-13 RX ADMIN — ACETAMINOPHEN 650 MG: 325 TABLET ORAL at 16:19

## 2024-11-13 RX ADMIN — CIPROFLOXACIN HYDROCHLORIDE 500 MG: 500 TABLET, FILM COATED ORAL at 20:41

## 2024-11-13 RX ADMIN — POLYETHYLENE GLYCOL 3350 17 G: 17 POWDER, FOR SOLUTION ORAL at 12:34

## 2024-11-13 RX ADMIN — SODIUM CHLORIDE 100 MG: 9 INJECTION, SOLUTION INTRAVENOUS at 12:40

## 2024-11-13 RX ADMIN — CIPROFLOXACIN HYDROCHLORIDE 500 MG: 500 TABLET, FILM COATED ORAL at 09:00

## 2024-11-13 RX ADMIN — SODIUM CHLORIDE, PRESERVATIVE FREE 10 ML: 5 INJECTION INTRAVENOUS at 20:46

## 2024-11-13 RX ADMIN — METRONIDAZOLE 500 MG: 500 TABLET ORAL at 21:54

## 2024-11-13 RX ADMIN — METOPROLOL TARTRATE 50 MG: 50 TABLET, FILM COATED ORAL at 10:44

## 2024-11-13 RX ADMIN — TIOTROPIUM BROMIDE AND OLODATEROL 2 PUFF: 3.124; 2.736 SPRAY, METERED RESPIRATORY (INHALATION) at 08:39

## 2024-11-13 RX ADMIN — INSULIN LISPRO 8 UNITS: 100 INJECTION, SOLUTION INTRAVENOUS; SUBCUTANEOUS at 12:38

## 2024-11-13 RX ADMIN — METRONIDAZOLE 500 MG: 500 TABLET ORAL at 14:00

## 2024-11-13 RX ADMIN — METHOCARBAMOL 750 MG: 500 TABLET ORAL at 14:00

## 2024-11-13 RX ADMIN — ACETAMINOPHEN 650 MG: 325 TABLET ORAL at 23:17

## 2024-11-13 RX ADMIN — PANTOPRAZOLE SODIUM 40 MG: 40 TABLET, DELAYED RELEASE ORAL at 16:19

## 2024-11-13 RX ADMIN — METRONIDAZOLE 500 MG: 500 TABLET ORAL at 06:04

## 2024-11-13 RX ADMIN — METHOCARBAMOL 750 MG: 500 TABLET ORAL at 10:44

## 2024-11-13 RX ADMIN — SODIUM CHLORIDE, PRESERVATIVE FREE 10 ML: 5 INJECTION INTRAVENOUS at 10:47

## 2024-11-13 RX ADMIN — METHOCARBAMOL 750 MG: 500 TABLET ORAL at 20:41

## 2024-11-13 ASSESSMENT — PAIN DESCRIPTION - ORIENTATION: ORIENTATION: LOWER

## 2024-11-13 ASSESSMENT — PAIN DESCRIPTION - LOCATION
LOCATION: BACK;HIP
LOCATION: BACK

## 2024-11-13 ASSESSMENT — PAIN SCALES - GENERAL
PAINLEVEL_OUTOF10: 0
PAINLEVEL_OUTOF10: 0
PAINLEVEL_OUTOF10: 3
PAINLEVEL_OUTOF10: 3
PAINLEVEL_OUTOF10: 0

## 2024-11-13 ASSESSMENT — PAIN - FUNCTIONAL ASSESSMENT
PAIN_FUNCTIONAL_ASSESSMENT: ACTIVITIES ARE NOT PREVENTED
PAIN_FUNCTIONAL_ASSESSMENT: ACTIVITIES ARE NOT PREVENTED

## 2024-11-13 ASSESSMENT — PAIN DESCRIPTION - DESCRIPTORS
DESCRIPTORS: DISCOMFORT
DESCRIPTORS: ACHING

## 2024-11-13 NOTE — DISCHARGE INSTRUCTIONS
Medications:    Please start taking your protonix (acid reducer medication) twice a day. On Dec 10th, you should take this medication once a day.   Please stop taking Hydrochlorothiazide, this is a blood pressure diuretic pill.  Please take oxycodone for your pain once every 6 hours as needed for the next 3 days. Reduce use as able.  Continue taking your other medications as prescribed.    Follow ups:    Please contact your primary care provider's office to follow up on your hospital stay.  Please call your GI (gastrointestinal) provider's office to schedule a follow up appointment, and to schedule a full colonoscopy for 4 weeks from now (which would be around Dec 10th).  As a reminder, please call either your primary care or GI provider to ask about your biopsy results in one week.    You may follow up with your GI doctor at , this information has been attached. If you are having difficulty or would like to see another GI doctor, we have attached the information of Dr. Root at [facility].  Call 911 in the case of any emergency.  
Pt orientated x 0. Pt has been declining and now on bi pap

## 2024-11-13 NOTE — PLAN OF CARE
Problem: Discharge Planning  Goal: Discharge to home or other facility with appropriate resources  11/13/2024 0214 by Siena Echavarria, RN  Outcome: Progressing     Problem: Chronic Conditions and Co-morbidities  Goal: Patient's chronic conditions and co-morbidity symptoms are monitored and maintained or improved  11/13/2024 0214 by Siena Echavarria, RN  Outcome: Progressing  Flowsheets (Taken 11/13/2024 0214)  Care Plan - Patient's Chronic Conditions and Co-Morbidity Symptoms are Monitored and Maintained or Improved:   Monitor and assess patient's chronic conditions and comorbid symptoms for stability, deterioration, or improvement   Collaborate with multidisciplinary team to address chronic and comorbid conditions and prevent exacerbation or deterioration   Update acute care plan with appropriate goals if chronic or comorbid symptoms are exacerbated and prevent overall improvement and discharge     Problem: Pain  Goal: Verbalizes/displays adequate comfort level or baseline comfort level  11/13/2024 0214 by Siena Echavarria, RN  Outcome: Progressing  Flowsheets (Taken 11/13/2024 0214)  Verbalizes/displays adequate comfort level or baseline comfort level:   Encourage patient to monitor pain and request assistance   Assess pain using appropriate pain scale   Administer analgesics based on type and severity of pain and evaluate response   Implement non-pharmacological measures as appropriate and evaluate response   Consider cultural and social influences on pain and pain management     Problem: Skin/Tissue Integrity  Goal: Absence of new skin breakdown  Description: 1.  Monitor for areas of redness and/or skin breakdown  2.  Assess vascular access sites hourly  3.  Every 4-6 hours minimum:  Change oxygen saturation probe site  4.  Every 4-6 hours:  If on nasal continuous positive airway pressure, respiratory therapy assess nares and determine need for appliance change or resting period.  11/13/2024 0214 by

## 2024-11-13 NOTE — DISCHARGE INSTR - COC
Continuity of Care Form    Patient Name: Carolee Sharma   :  1950  MRN:  [id number]    Admit date:  11/10/2024  Discharge date:  24    Code Status Order: Full Code   Advance Directives:   Advance Care Flowsheet Documentation             Admitting Physician:  Drake Hamilton MD  PCP: Anirudh Ortez MD    Discharging Nurse: Janice    Discharging Hospital Unit/Room#: 6312/6312-01  Discharging Unit Phone Number: [phone]    Emergency Contact:   Extended Emergency Contact Information  Primary Emergency Contact: Wu Sharma  Address: [address] [place] [address]  Home Phone: [phone]  Work Phone: [phone]  Relation: Spouse  Secondary Emergency Contact: Crescencio Sharma  Home Phone: [phone]  Relation: Child    Past Surgical History:  Past Surgical History:   Procedure Laterality Date    COLONOSCOPY N/A 2024    COLONOSCOPY BIOPSY performed by Turner Root MD at [facility] ENDOSCOPY    KYPHOSIS SURGERY      TOTAL HIP ARTHROPLASTY Right 2021    RIGHT TOAL HIP ARTHROPLASTY ANTERIOR APPROACH performed by Parag Gu MD at [facility] OR    UPPER GASTROINTESTINAL ENDOSCOPY N/A 2024    ESOPHAGOGASTRODUODENOSCOPY BIOPSY performed by Turner Root MD at [facility] ENDOSCOPY       Immunization History:   Immunization History   Administered Date(s) Administered    COVID-19, MODERNA BLUE border, Primary or Immunocompromised, (age 12y+), IM, 100 mcg/0.5mL 2021    COVID-19, PFIZER Bivalent, DO NOT Dilute, (age 12y+), IM, 30 mcg/0.3 mL 2022    COVID-19, PFIZER PURPLE top, DILUTE for use, (age 12 y+), 30mcg/0.3mL 2021, 2021    COVID-19, PFIZER, (age 12y+), IM, 30mcg/0.3mL 2023       Active Problems:  Patient Active Problem List   Diagnosis Code    Closed fracture of right femur, initial encounter ([facility]) S72.91XA    Respiratory failure with hypoxia J96.91    Hypoxia R09.02    Pulmonary HTN ([facility]) I27.20    Colitis K52.9    Melena K92.1

## 2024-11-14 LAB
ANION GAP SERPL CALCULATED.3IONS-SCNC: 8 MMOL/L (ref 3–16)
BASOPHILS # BLD: 0 K/UL (ref 0–0.2)
BASOPHILS NFR BLD: 0.8 %
BUN SERPL-MCNC: 23 MG/DL (ref 7–20)
CALCIUM SERPL-MCNC: 9.3 MG/DL (ref 8.3–10.6)
CHLORIDE SERPL-SCNC: 102 MMOL/L (ref 99–110)
CO2 SERPL-SCNC: 23 MMOL/L (ref 21–32)
CREAT SERPL-MCNC: 1.2 MG/DL (ref 0.6–1.2)
DEPRECATED RDW RBC AUTO: 15.3 % (ref 12.4–15.4)
EOSINOPHIL # BLD: 0.4 K/UL (ref 0–0.6)
EOSINOPHIL NFR BLD: 7.7 %
GFR SERPLBLD CREATININE-BSD FMLA CKD-EPI: 48 ML/MIN/{1.73_M2}
GLUCOSE BLD-MCNC: 168 MG/DL (ref 70–99)
GLUCOSE BLD-MCNC: 230 MG/DL (ref 70–99)
GLUCOSE BLD-MCNC: 237 MG/DL (ref 70–99)
GLUCOSE BLD-MCNC: 241 MG/DL (ref 70–99)
GLUCOSE SERPL-MCNC: 166 MG/DL (ref 70–99)
HCT VFR BLD AUTO: 32.5 % (ref 36–48)
HGB BLD-MCNC: 10.8 G/DL (ref 12–16)
LYMPHOCYTES # BLD: 0.7 K/UL (ref 1–5.1)
LYMPHOCYTES NFR BLD: 11.7 %
MAGNESIUM SERPL-MCNC: 1.71 MG/DL (ref 1.8–2.4)
MCH RBC QN AUTO: 30.2 PG (ref 26–34)
MCHC RBC AUTO-ENTMCNC: 33.4 G/DL (ref 31–36)
MCV RBC AUTO: 90.3 FL (ref 80–100)
MONOCYTES # BLD: 0.5 K/UL (ref 0–1.3)
MONOCYTES NFR BLD: 8.7 %
NEUTROPHILS # BLD: 4.1 K/UL (ref 1.7–7.7)
NEUTROPHILS NFR BLD: 71.1 %
PERFORMED ON: ABNORMAL
PLATELET # BLD AUTO: 292 K/UL (ref 135–450)
PMV BLD AUTO: 7.5 FL (ref 5–10.5)
POTASSIUM SERPL-SCNC: 5.1 MMOL/L (ref 3.5–5.1)
RBC # BLD AUTO: 3.59 M/UL (ref 4–5.2)
SODIUM SERPL-SCNC: 133 MMOL/L (ref 136–145)
WBC # BLD AUTO: 5.7 K/UL (ref 4–11)

## 2024-11-14 PROCEDURE — 6370000000 HC RX 637 (ALT 250 FOR IP): Performed by: INTERNAL MEDICINE

## 2024-11-14 PROCEDURE — 6370000000 HC RX 637 (ALT 250 FOR IP): Performed by: HOSPITALIST

## 2024-11-14 PROCEDURE — 6360000002 HC RX W HCPCS

## 2024-11-14 PROCEDURE — 36415 COLL VENOUS BLD VENIPUNCTURE: CPT

## 2024-11-14 PROCEDURE — 80048 BASIC METABOLIC PNL TOTAL CA: CPT

## 2024-11-14 PROCEDURE — 97535 SELF CARE MNGMENT TRAINING: CPT

## 2024-11-14 PROCEDURE — 96366 THER/PROPH/DIAG IV INF ADDON: CPT

## 2024-11-14 PROCEDURE — 97530 THERAPEUTIC ACTIVITIES: CPT

## 2024-11-14 PROCEDURE — 1200000000 HC SEMI PRIVATE

## 2024-11-14 PROCEDURE — 85025 COMPLETE CBC W/AUTO DIFF WBC: CPT

## 2024-11-14 PROCEDURE — 2580000003 HC RX 258: Performed by: INTERNAL MEDICINE

## 2024-11-14 PROCEDURE — 96367 TX/PROPH/DG ADDL SEQ IV INF: CPT

## 2024-11-14 PROCEDURE — 83735 ASSAY OF MAGNESIUM: CPT

## 2024-11-14 PROCEDURE — 94640 AIRWAY INHALATION TREATMENT: CPT

## 2024-11-14 RX ORDER — MAGNESIUM SULFATE IN WATER 40 MG/ML
4000 INJECTION, SOLUTION INTRAVENOUS ONCE
Status: COMPLETED | OUTPATIENT
Start: 2024-11-14 | End: 2024-11-14

## 2024-11-14 RX ORDER — HYDROXYZINE HYDROCHLORIDE 10 MG/1
10 TABLET, FILM COATED ORAL 3 TIMES DAILY PRN
Status: DISCONTINUED | OUTPATIENT
Start: 2024-11-14 | End: 2024-11-19

## 2024-11-14 RX ADMIN — ACETAMINOPHEN 650 MG: 325 TABLET ORAL at 23:34

## 2024-11-14 RX ADMIN — METOPROLOL TARTRATE 50 MG: 50 TABLET, FILM COATED ORAL at 08:43

## 2024-11-14 RX ADMIN — INSULIN LISPRO 2 UNITS: 100 INJECTION, SOLUTION INTRAVENOUS; SUBCUTANEOUS at 13:11

## 2024-11-14 RX ADMIN — METHOCARBAMOL 750 MG: 500 TABLET ORAL at 21:04

## 2024-11-14 RX ADMIN — TIOTROPIUM BROMIDE AND OLODATEROL 2 PUFF: 3.124; 2.736 SPRAY, METERED RESPIRATORY (INHALATION) at 09:14

## 2024-11-14 RX ADMIN — MAGNESIUM SULFATE HEPTAHYDRATE 4000 MG: 40 INJECTION, SOLUTION INTRAVENOUS at 06:31

## 2024-11-14 RX ADMIN — PANTOPRAZOLE SODIUM 40 MG: 40 TABLET, DELAYED RELEASE ORAL at 05:31

## 2024-11-14 RX ADMIN — SODIUM CHLORIDE, PRESERVATIVE FREE 10 ML: 5 INJECTION INTRAVENOUS at 21:08

## 2024-11-14 RX ADMIN — METHOCARBAMOL 750 MG: 500 TABLET ORAL at 08:43

## 2024-11-14 RX ADMIN — METOPROLOL TARTRATE 50 MG: 50 TABLET, FILM COATED ORAL at 21:04

## 2024-11-14 RX ADMIN — METRONIDAZOLE 500 MG: 500 TABLET ORAL at 13:11

## 2024-11-14 RX ADMIN — INSULIN LISPRO 2 UNITS: 100 INJECTION, SOLUTION INTRAVENOUS; SUBCUTANEOUS at 17:05

## 2024-11-14 RX ADMIN — METRONIDAZOLE 500 MG: 500 TABLET ORAL at 05:31

## 2024-11-14 RX ADMIN — METRONIDAZOLE 500 MG: 500 TABLET ORAL at 21:04

## 2024-11-14 RX ADMIN — AMLODIPINE BESYLATE 2.5 MG: 2.5 TABLET ORAL at 08:44

## 2024-11-14 RX ADMIN — CIPROFLOXACIN HYDROCHLORIDE 500 MG: 500 TABLET, FILM COATED ORAL at 21:04

## 2024-11-14 RX ADMIN — CIPROFLOXACIN HYDROCHLORIDE 500 MG: 500 TABLET, FILM COATED ORAL at 08:44

## 2024-11-14 RX ADMIN — INSULIN LISPRO 2 UNITS: 100 INJECTION, SOLUTION INTRAVENOUS; SUBCUTANEOUS at 21:08

## 2024-11-14 RX ADMIN — PANTOPRAZOLE SODIUM 40 MG: 40 TABLET, DELAYED RELEASE ORAL at 17:05

## 2024-11-14 RX ADMIN — ACETAMINOPHEN 650 MG: 325 TABLET ORAL at 06:18

## 2024-11-14 RX ADMIN — HYDROXYZINE HYDROCHLORIDE 10 MG: 10 TABLET, FILM COATED ORAL at 08:44

## 2024-11-14 RX ADMIN — METHOCARBAMOL 750 MG: 500 TABLET ORAL at 13:11

## 2024-11-14 ASSESSMENT — PAIN DESCRIPTION - PAIN TYPE
TYPE: CHRONIC PAIN
TYPE: ACUTE PAIN
TYPE: CHRONIC PAIN

## 2024-11-14 ASSESSMENT — PAIN DESCRIPTION - LOCATION
LOCATION: BACK
LOCATION: GENERALIZED
LOCATION: BACK

## 2024-11-14 ASSESSMENT — PAIN SCALES - GENERAL
PAINLEVEL_OUTOF10: 3
PAINLEVEL_OUTOF10: 3
PAINLEVEL_OUTOF10: 7
PAINLEVEL_OUTOF10: 0
PAINLEVEL_OUTOF10: 0

## 2024-11-14 ASSESSMENT — PAIN DESCRIPTION - ONSET
ONSET: ON-GOING
ONSET: ON-GOING
ONSET: GRADUAL

## 2024-11-14 ASSESSMENT — PAIN DESCRIPTION - FREQUENCY
FREQUENCY: CONTINUOUS
FREQUENCY: CONTINUOUS
FREQUENCY: INTERMITTENT

## 2024-11-14 ASSESSMENT — PAIN DESCRIPTION - DESCRIPTORS
DESCRIPTORS: ACHING;DISCOMFORT
DESCRIPTORS: ACHING
DESCRIPTORS: ACHING

## 2024-11-14 ASSESSMENT — PAIN - FUNCTIONAL ASSESSMENT
PAIN_FUNCTIONAL_ASSESSMENT: ACTIVITIES ARE NOT PREVENTED

## 2024-11-14 ASSESSMENT — PAIN DESCRIPTION - ORIENTATION: ORIENTATION: MID

## 2024-11-14 NOTE — PLAN OF CARE
Problem: Chronic Conditions and Co-morbidities  Goal: Patient's chronic conditions and co-morbidity symptoms are monitored and maintained or improved  Outcome: Progressing  Flowsheets (Taken 11/13/2024 0214 by Siena Echavarria, RN)  Care Plan - Patient's Chronic Conditions and Co-Morbidity Symptoms are Monitored and Maintained or Improved:   Monitor and assess patient's chronic conditions and comorbid symptoms for stability, deterioration, or improvement   Collaborate with multidisciplinary team to address chronic and comorbid conditions and prevent exacerbation or deterioration   Update acute care plan with appropriate goals if chronic or comorbid symptoms are exacerbated and prevent overall improvement and discharge     Problem: Discharge Planning  Goal: Discharge to home or other facility with appropriate resources  Outcome: Progressing  Flowsheets (Taken 11/14/2024 0121)  Discharge to home or other facility with appropriate resources:   Identify barriers to discharge with patient and caregiver   Identify discharge learning needs (meds, wound care, etc)   Refer to discharge planning if patient needs post-hospital services based on physician order or complex needs related to functional status, cognitive ability or social support system   Arrange for needed discharge resources and transportation as appropriate   Arrange for interpreters to assist at discharge as needed

## 2024-11-15 LAB
GLUCOSE BLD-MCNC: 174 MG/DL (ref 70–99)
GLUCOSE BLD-MCNC: 175 MG/DL (ref 70–99)
GLUCOSE BLD-MCNC: 230 MG/DL (ref 70–99)
GLUCOSE BLD-MCNC: 311 MG/DL (ref 70–99)
PERFORMED ON: ABNORMAL

## 2024-11-15 PROCEDURE — 6370000000 HC RX 637 (ALT 250 FOR IP): Performed by: HOSPITALIST

## 2024-11-15 PROCEDURE — 6370000000 HC RX 637 (ALT 250 FOR IP): Performed by: INTERNAL MEDICINE

## 2024-11-15 PROCEDURE — 2580000003 HC RX 258: Performed by: INTERNAL MEDICINE

## 2024-11-15 PROCEDURE — 1200000000 HC SEMI PRIVATE

## 2024-11-15 PROCEDURE — 94640 AIRWAY INHALATION TREATMENT: CPT

## 2024-11-15 RX ORDER — INSULIN LISPRO 100 [IU]/ML
0-16 INJECTION, SOLUTION INTRAVENOUS; SUBCUTANEOUS
Status: DISCONTINUED | OUTPATIENT
Start: 2024-11-15 | End: 2024-11-19 | Stop reason: HOSPADM

## 2024-11-15 RX ORDER — METHOCARBAMOL 500 MG/1
750 TABLET, FILM COATED ORAL 3 TIMES DAILY PRN
Status: DISCONTINUED | OUTPATIENT
Start: 2024-11-15 | End: 2024-11-19 | Stop reason: HOSPADM

## 2024-11-15 RX ADMIN — SODIUM CHLORIDE, PRESERVATIVE FREE 10 ML: 5 INJECTION INTRAVENOUS at 21:30

## 2024-11-15 RX ADMIN — ACETAMINOPHEN 650 MG: 325 TABLET ORAL at 16:33

## 2024-11-15 RX ADMIN — METRONIDAZOLE 500 MG: 500 TABLET ORAL at 06:11

## 2024-11-15 RX ADMIN — INSULIN LISPRO 12 UNITS: 100 INJECTION, SOLUTION INTRAVENOUS; SUBCUTANEOUS at 13:18

## 2024-11-15 RX ADMIN — METRONIDAZOLE 500 MG: 500 TABLET ORAL at 21:26

## 2024-11-15 RX ADMIN — METOPROLOL TARTRATE 50 MG: 50 TABLET, FILM COATED ORAL at 11:00

## 2024-11-15 RX ADMIN — METOPROLOL TARTRATE 50 MG: 50 TABLET, FILM COATED ORAL at 21:26

## 2024-11-15 RX ADMIN — SODIUM CHLORIDE, PRESERVATIVE FREE 10 ML: 5 INJECTION INTRAVENOUS at 11:03

## 2024-11-15 RX ADMIN — Medication 5 MG: at 02:14

## 2024-11-15 RX ADMIN — AMLODIPINE BESYLATE 2.5 MG: 2.5 TABLET ORAL at 10:58

## 2024-11-15 RX ADMIN — CIPROFLOXACIN HYDROCHLORIDE 500 MG: 500 TABLET, FILM COATED ORAL at 10:58

## 2024-11-15 RX ADMIN — METRONIDAZOLE 500 MG: 500 TABLET ORAL at 13:18

## 2024-11-15 RX ADMIN — CIPROFLOXACIN HYDROCHLORIDE 500 MG: 500 TABLET, FILM COATED ORAL at 21:26

## 2024-11-15 RX ADMIN — INSULIN LISPRO 4 UNITS: 100 INJECTION, SOLUTION INTRAVENOUS; SUBCUTANEOUS at 21:26

## 2024-11-15 RX ADMIN — PANTOPRAZOLE SODIUM 40 MG: 40 TABLET, DELAYED RELEASE ORAL at 16:33

## 2024-11-15 RX ADMIN — PANTOPRAZOLE SODIUM 40 MG: 40 TABLET, DELAYED RELEASE ORAL at 06:11

## 2024-11-15 RX ADMIN — TIOTROPIUM BROMIDE AND OLODATEROL 2 PUFF: 3.124; 2.736 SPRAY, METERED RESPIRATORY (INHALATION) at 09:23

## 2024-11-15 ASSESSMENT — PAIN DESCRIPTION - PAIN TYPE: TYPE: CHRONIC PAIN

## 2024-11-15 ASSESSMENT — PAIN DESCRIPTION - ORIENTATION: ORIENTATION: LOWER

## 2024-11-15 ASSESSMENT — PAIN SCALES - GENERAL: PAINLEVEL_OUTOF10: 7

## 2024-11-15 ASSESSMENT — PAIN - FUNCTIONAL ASSESSMENT: PAIN_FUNCTIONAL_ASSESSMENT: ACTIVITIES ARE NOT PREVENTED

## 2024-11-15 ASSESSMENT — PAIN DESCRIPTION - LOCATION: LOCATION: BACK

## 2024-11-15 ASSESSMENT — PAIN DESCRIPTION - DESCRIPTORS: DESCRIPTORS: ACHING;DISCOMFORT

## 2024-11-15 NOTE — PLAN OF CARE
Problem: Chronic Conditions and Co-morbidities  Goal: Patient's chronic conditions and co-morbidity symptoms are monitored and maintained or improved  Outcome: Adequate for Discharge     Problem: Discharge Planning  Goal: Discharge to home or other facility with appropriate resources  Outcome: Adequate for Discharge     Problem: Pain  Goal: Verbalizes/displays adequate comfort level or baseline comfort level  Outcome: Adequate for Discharge     Problem: Skin/Tissue Integrity  Goal: Absence of new skin breakdown  Description: 1.  Monitor for areas of redness and/or skin breakdown  2.  Assess vascular access sites hourly  3.  Every 4-6 hours minimum:  Change oxygen saturation probe site  4.  Every 4-6 hours:  If on nasal continuous positive airway pressure, respiratory therapy assess nares and determine need for appliance change or resting period.  Outcome: Adequate for Discharge     Problem: Safety - Adult  Goal: Free from fall injury  Outcome: Adequate for Discharge     Problem: Respiratory - Adult  Goal: Achieves optimal ventilation and oxygenation  Outcome: Adequate for Discharge     Problem: Cardiovascular - Adult  Goal: Absence of cardiac dysrhythmias or at baseline  Outcome: Adequate for Discharge

## 2024-11-16 LAB
ANION GAP SERPL CALCULATED.3IONS-SCNC: 7 MMOL/L (ref 3–16)
BASOPHILS # BLD: 0.1 K/UL (ref 0–0.2)
BASOPHILS NFR BLD: 1.1 %
BUN SERPL-MCNC: 25 MG/DL (ref 7–20)
CALCIUM SERPL-MCNC: 8.9 MG/DL (ref 8.3–10.6)
CHLORIDE SERPL-SCNC: 102 MMOL/L (ref 99–110)
CO2 SERPL-SCNC: 24 MMOL/L (ref 21–32)
CREAT SERPL-MCNC: 1.3 MG/DL (ref 0.6–1.2)
DEPRECATED RDW RBC AUTO: 15.4 % (ref 12.4–15.4)
EOSINOPHIL # BLD: 0.4 K/UL (ref 0–0.6)
EOSINOPHIL NFR BLD: 6 %
GFR SERPLBLD CREATININE-BSD FMLA CKD-EPI: 43 ML/MIN/{1.73_M2}
GLUCOSE BLD-MCNC: 200 MG/DL (ref 70–99)
GLUCOSE BLD-MCNC: 201 MG/DL (ref 70–99)
GLUCOSE BLD-MCNC: 288 MG/DL (ref 70–99)
GLUCOSE SERPL-MCNC: 191 MG/DL (ref 70–99)
HCT VFR BLD AUTO: 32.2 % (ref 36–48)
HGB BLD-MCNC: 10.5 G/DL (ref 12–16)
LYMPHOCYTES # BLD: 1 K/UL (ref 1–5.1)
LYMPHOCYTES NFR BLD: 15.5 %
MCH RBC QN AUTO: 29.8 PG (ref 26–34)
MCHC RBC AUTO-ENTMCNC: 32.6 G/DL (ref 31–36)
MCV RBC AUTO: 91.4 FL (ref 80–100)
MONOCYTES # BLD: 0.7 K/UL (ref 0–1.3)
MONOCYTES NFR BLD: 10.8 %
NEUTROPHILS # BLD: 4.4 K/UL (ref 1.7–7.7)
NEUTROPHILS NFR BLD: 66.6 %
PERFORMED ON: ABNORMAL
PLATELET # BLD AUTO: 278 K/UL (ref 135–450)
PMV BLD AUTO: 7.7 FL (ref 5–10.5)
POTASSIUM SERPL-SCNC: 4.8 MMOL/L (ref 3.5–5.1)
RBC # BLD AUTO: 3.52 M/UL (ref 4–5.2)
SODIUM SERPL-SCNC: 133 MMOL/L (ref 136–145)
WBC # BLD AUTO: 6.6 K/UL (ref 4–11)

## 2024-11-16 PROCEDURE — 6370000000 HC RX 637 (ALT 250 FOR IP): Performed by: HOSPITALIST

## 2024-11-16 PROCEDURE — 85025 COMPLETE CBC W/AUTO DIFF WBC: CPT

## 2024-11-16 PROCEDURE — 94640 AIRWAY INHALATION TREATMENT: CPT

## 2024-11-16 PROCEDURE — 6370000000 HC RX 637 (ALT 250 FOR IP): Performed by: INTERNAL MEDICINE

## 2024-11-16 PROCEDURE — 36415 COLL VENOUS BLD VENIPUNCTURE: CPT

## 2024-11-16 PROCEDURE — 1200000000 HC SEMI PRIVATE

## 2024-11-16 PROCEDURE — 80048 BASIC METABOLIC PNL TOTAL CA: CPT

## 2024-11-16 RX ORDER — CIPROFLOXACIN 500 MG/1
500 TABLET, FILM COATED ORAL EVERY 12 HOURS SCHEDULED
Qty: 4 TABLET | Refills: 0 | Status: SHIPPED | OUTPATIENT
Start: 2024-11-16 | End: 2024-11-17 | Stop reason: HOSPADM

## 2024-11-16 RX ORDER — METRONIDAZOLE 500 MG/1
500 TABLET ORAL EVERY 8 HOURS SCHEDULED
Qty: 3 TABLET | Refills: 0 | Status: SHIPPED | OUTPATIENT
Start: 2024-11-16 | End: 2024-11-17 | Stop reason: HOSPADM

## 2024-11-16 RX ORDER — PANTOPRAZOLE SODIUM 40 MG/1
40 TABLET, DELAYED RELEASE ORAL 2 TIMES DAILY
Qty: 60 TABLET | Refills: 0 | Status: SHIPPED | OUTPATIENT
Start: 2024-11-16 | End: 2024-12-16

## 2024-11-16 RX ADMIN — METOPROLOL TARTRATE 50 MG: 50 TABLET, FILM COATED ORAL at 20:34

## 2024-11-16 RX ADMIN — METRONIDAZOLE 500 MG: 500 TABLET ORAL at 07:22

## 2024-11-16 RX ADMIN — INSULIN LISPRO 8 UNITS: 100 INJECTION, SOLUTION INTRAVENOUS; SUBCUTANEOUS at 13:37

## 2024-11-16 RX ADMIN — Medication 5 MG: at 23:02

## 2024-11-16 RX ADMIN — TIOTROPIUM BROMIDE AND OLODATEROL 2 PUFF: 3.124; 2.736 SPRAY, METERED RESPIRATORY (INHALATION) at 10:04

## 2024-11-16 RX ADMIN — PANTOPRAZOLE SODIUM 40 MG: 40 TABLET, DELAYED RELEASE ORAL at 17:15

## 2024-11-16 RX ADMIN — AMLODIPINE BESYLATE 2.5 MG: 2.5 TABLET ORAL at 09:14

## 2024-11-16 RX ADMIN — METOPROLOL TARTRATE 50 MG: 50 TABLET, FILM COATED ORAL at 09:14

## 2024-11-16 RX ADMIN — CIPROFLOXACIN HYDROCHLORIDE 500 MG: 500 TABLET, FILM COATED ORAL at 09:14

## 2024-11-16 RX ADMIN — INSULIN LISPRO 4 UNITS: 100 INJECTION, SOLUTION INTRAVENOUS; SUBCUTANEOUS at 20:40

## 2024-11-16 RX ADMIN — Medication 5 MG: at 00:57

## 2024-11-16 RX ADMIN — ACETAMINOPHEN 650 MG: 325 TABLET ORAL at 00:57

## 2024-11-16 RX ADMIN — INSULIN LISPRO 4 UNITS: 100 INJECTION, SOLUTION INTRAVENOUS; SUBCUTANEOUS at 09:14

## 2024-11-16 RX ADMIN — PANTOPRAZOLE SODIUM 40 MG: 40 TABLET, DELAYED RELEASE ORAL at 07:22

## 2024-11-16 RX ADMIN — ACETAMINOPHEN 650 MG: 325 TABLET ORAL at 23:02

## 2024-11-16 RX ADMIN — CIPROFLOXACIN HYDROCHLORIDE 500 MG: 500 TABLET, FILM COATED ORAL at 20:34

## 2024-11-16 RX ADMIN — INSULIN LISPRO 4 UNITS: 100 INJECTION, SOLUTION INTRAVENOUS; SUBCUTANEOUS at 17:15

## 2024-11-16 ASSESSMENT — PAIN SCALES - GENERAL
PAINLEVEL_OUTOF10: 6
PAINLEVEL_OUTOF10: 7

## 2024-11-16 ASSESSMENT — PAIN DESCRIPTION - LOCATION
LOCATION: GENERALIZED
LOCATION: BACK

## 2024-11-16 ASSESSMENT — PAIN DESCRIPTION - DESCRIPTORS
DESCRIPTORS: ACHING
DESCRIPTORS: ACHING

## 2024-11-16 ASSESSMENT — PAIN DESCRIPTION - ORIENTATION: ORIENTATION: LEFT;RIGHT

## 2024-11-17 LAB
GLUCOSE BLD-MCNC: 164 MG/DL (ref 70–99)
GLUCOSE BLD-MCNC: 209 MG/DL (ref 70–99)
GLUCOSE BLD-MCNC: 212 MG/DL (ref 70–99)
GLUCOSE BLD-MCNC: 275 MG/DL (ref 70–99)
PERFORMED ON: ABNORMAL

## 2024-11-17 PROCEDURE — 1200000000 HC SEMI PRIVATE

## 2024-11-17 PROCEDURE — 6370000000 HC RX 637 (ALT 250 FOR IP): Performed by: HOSPITALIST

## 2024-11-17 PROCEDURE — 6370000000 HC RX 637 (ALT 250 FOR IP)

## 2024-11-17 PROCEDURE — 6370000000 HC RX 637 (ALT 250 FOR IP): Performed by: INTERNAL MEDICINE

## 2024-11-17 PROCEDURE — 94640 AIRWAY INHALATION TREATMENT: CPT

## 2024-11-17 PROCEDURE — 94761 N-INVAS EAR/PLS OXIMETRY MLT: CPT

## 2024-11-17 RX ORDER — INSULIN GLARGINE 100 [IU]/ML
10 INJECTION, SOLUTION SUBCUTANEOUS NIGHTLY
Status: DISCONTINUED | OUTPATIENT
Start: 2024-11-17 | End: 2024-11-19 | Stop reason: HOSPADM

## 2024-11-17 RX ORDER — INSULIN GLARGINE 100 [IU]/ML
5 INJECTION, SOLUTION SUBCUTANEOUS NIGHTLY
Status: DISCONTINUED | OUTPATIENT
Start: 2024-11-17 | End: 2024-11-17

## 2024-11-17 RX ADMIN — INSULIN LISPRO 8 UNITS: 100 INJECTION, SOLUTION INTRAVENOUS; SUBCUTANEOUS at 12:20

## 2024-11-17 RX ADMIN — PANTOPRAZOLE SODIUM 40 MG: 40 TABLET, DELAYED RELEASE ORAL at 17:31

## 2024-11-17 RX ADMIN — PANTOPRAZOLE SODIUM 40 MG: 40 TABLET, DELAYED RELEASE ORAL at 06:42

## 2024-11-17 RX ADMIN — METOPROLOL TARTRATE 50 MG: 50 TABLET, FILM COATED ORAL at 09:25

## 2024-11-17 RX ADMIN — INSULIN LISPRO 4 UNITS: 100 INJECTION, SOLUTION INTRAVENOUS; SUBCUTANEOUS at 19:59

## 2024-11-17 RX ADMIN — CIPROFLOXACIN HYDROCHLORIDE 500 MG: 500 TABLET, FILM COATED ORAL at 09:25

## 2024-11-17 RX ADMIN — METOPROLOL TARTRATE 50 MG: 50 TABLET, FILM COATED ORAL at 20:00

## 2024-11-17 RX ADMIN — AMLODIPINE BESYLATE 2.5 MG: 2.5 TABLET ORAL at 09:25

## 2024-11-17 RX ADMIN — ACETAMINOPHEN 650 MG: 325 TABLET ORAL at 20:00

## 2024-11-17 RX ADMIN — Medication 5 MG: at 20:00

## 2024-11-17 RX ADMIN — TIOTROPIUM BROMIDE AND OLODATEROL 2 PUFF: 3.124; 2.736 SPRAY, METERED RESPIRATORY (INHALATION) at 07:59

## 2024-11-17 RX ADMIN — INSULIN LISPRO 4 UNITS: 100 INJECTION, SOLUTION INTRAVENOUS; SUBCUTANEOUS at 17:31

## 2024-11-17 RX ADMIN — INSULIN GLARGINE 10 UNITS: 100 INJECTION, SOLUTION SUBCUTANEOUS at 19:59

## 2024-11-17 RX ADMIN — ACETAMINOPHEN 650 MG: 325 TABLET ORAL at 13:41

## 2024-11-17 ASSESSMENT — PAIN SCALES - GENERAL: PAINLEVEL_OUTOF10: 4

## 2024-11-17 ASSESSMENT — PAIN DESCRIPTION - DESCRIPTORS: DESCRIPTORS: ACHING

## 2024-11-17 ASSESSMENT — PAIN DESCRIPTION - LOCATION: LOCATION: BACK

## 2024-11-17 ASSESSMENT — PAIN DESCRIPTION - ORIENTATION: ORIENTATION: MID;LOWER

## 2024-11-18 LAB
GLUCOSE BLD-MCNC: 146 MG/DL (ref 70–99)
GLUCOSE BLD-MCNC: 192 MG/DL (ref 70–99)
GLUCOSE BLD-MCNC: 226 MG/DL (ref 70–99)
GLUCOSE BLD-MCNC: 238 MG/DL (ref 70–99)
GLUCOSE BLD-MCNC: 303 MG/DL (ref 70–99)
PERFORMED ON: ABNORMAL

## 2024-11-18 PROCEDURE — 94640 AIRWAY INHALATION TREATMENT: CPT

## 2024-11-18 PROCEDURE — 97116 GAIT TRAINING THERAPY: CPT

## 2024-11-18 PROCEDURE — 6370000000 HC RX 637 (ALT 250 FOR IP)

## 2024-11-18 PROCEDURE — 97535 SELF CARE MNGMENT TRAINING: CPT

## 2024-11-18 PROCEDURE — 6370000000 HC RX 637 (ALT 250 FOR IP): Performed by: INTERNAL MEDICINE

## 2024-11-18 PROCEDURE — 1200000000 HC SEMI PRIVATE

## 2024-11-18 PROCEDURE — 97530 THERAPEUTIC ACTIVITIES: CPT

## 2024-11-18 PROCEDURE — 6370000000 HC RX 637 (ALT 250 FOR IP): Performed by: HOSPITALIST

## 2024-11-18 RX ADMIN — ACETAMINOPHEN 650 MG: 325 TABLET ORAL at 16:48

## 2024-11-18 RX ADMIN — INSULIN LISPRO 4 UNITS: 100 INJECTION, SOLUTION INTRAVENOUS; SUBCUTANEOUS at 22:08

## 2024-11-18 RX ADMIN — INSULIN LISPRO 12 UNITS: 100 INJECTION, SOLUTION INTRAVENOUS; SUBCUTANEOUS at 12:39

## 2024-11-18 RX ADMIN — METOPROLOL TARTRATE 50 MG: 50 TABLET, FILM COATED ORAL at 21:57

## 2024-11-18 RX ADMIN — Medication 5 MG: at 23:15

## 2024-11-18 RX ADMIN — TIOTROPIUM BROMIDE AND OLODATEROL 2 PUFF: 3.124; 2.736 SPRAY, METERED RESPIRATORY (INHALATION) at 10:26

## 2024-11-18 RX ADMIN — METOPROLOL TARTRATE 50 MG: 50 TABLET, FILM COATED ORAL at 08:02

## 2024-11-18 RX ADMIN — ACETAMINOPHEN 650 MG: 325 TABLET ORAL at 23:15

## 2024-11-18 RX ADMIN — PANTOPRAZOLE SODIUM 40 MG: 40 TABLET, DELAYED RELEASE ORAL at 06:29

## 2024-11-18 RX ADMIN — PANTOPRAZOLE SODIUM 40 MG: 40 TABLET, DELAYED RELEASE ORAL at 16:49

## 2024-11-18 RX ADMIN — AMLODIPINE BESYLATE 2.5 MG: 2.5 TABLET ORAL at 08:02

## 2024-11-18 RX ADMIN — INSULIN GLARGINE 10 UNITS: 100 INJECTION, SOLUTION SUBCUTANEOUS at 22:09

## 2024-11-18 RX ADMIN — INSULIN LISPRO 4 UNITS: 100 INJECTION, SOLUTION INTRAVENOUS; SUBCUTANEOUS at 17:45

## 2024-11-18 ASSESSMENT — PAIN DESCRIPTION - PAIN TYPE: TYPE: CHRONIC PAIN

## 2024-11-18 ASSESSMENT — PAIN DESCRIPTION - LOCATION
LOCATION: GENERALIZED
LOCATION: BACK;HEAD

## 2024-11-18 ASSESSMENT — ENCOUNTER SYMPTOMS
ABDOMINAL PAIN: 0
CONSTIPATION: 0
NAUSEA: 0
DIARRHEA: 0
VOMITING: 0
RESPIRATORY NEGATIVE: 1

## 2024-11-18 ASSESSMENT — PAIN SCALES - GENERAL
PAINLEVEL_OUTOF10: 5
PAINLEVEL_OUTOF10: 3

## 2024-11-18 ASSESSMENT — PAIN - FUNCTIONAL ASSESSMENT: PAIN_FUNCTIONAL_ASSESSMENT: ACTIVITIES ARE NOT PREVENTED

## 2024-11-18 ASSESSMENT — PAIN DESCRIPTION - DESCRIPTORS
DESCRIPTORS: ACHING
DESCRIPTORS: DISCOMFORT

## 2024-11-18 ASSESSMENT — PAIN DESCRIPTION - ORIENTATION: ORIENTATION: RIGHT;LEFT

## 2024-11-19 VITALS
BODY MASS INDEX: 23.85 KG/M2 | OXYGEN SATURATION: 97 % | RESPIRATION RATE: 18 BRPM | HEART RATE: 86 BPM | SYSTOLIC BLOOD PRESSURE: 159 MMHG | TEMPERATURE: 97.3 F | DIASTOLIC BLOOD PRESSURE: 79 MMHG | HEIGHT: 66 IN | WEIGHT: 148.4 LBS

## 2024-11-19 LAB
GLUCOSE BLD-MCNC: 166 MG/DL (ref 70–99)
GLUCOSE BLD-MCNC: 276 MG/DL (ref 70–99)
PERFORMED ON: ABNORMAL
PERFORMED ON: ABNORMAL

## 2024-11-19 PROCEDURE — 94640 AIRWAY INHALATION TREATMENT: CPT

## 2024-11-19 PROCEDURE — 6370000000 HC RX 637 (ALT 250 FOR IP)

## 2024-11-19 PROCEDURE — 6370000000 HC RX 637 (ALT 250 FOR IP): Performed by: INTERNAL MEDICINE

## 2024-11-19 PROCEDURE — 6370000000 HC RX 637 (ALT 250 FOR IP): Performed by: HOSPITALIST

## 2024-11-19 RX ORDER — HYDROXYZINE HYDROCHLORIDE 10 MG/1
10 TABLET, FILM COATED ORAL 3 TIMES DAILY PRN
Status: DISCONTINUED | OUTPATIENT
Start: 2024-11-19 | End: 2024-11-19 | Stop reason: HOSPADM

## 2024-11-19 RX ADMIN — INSULIN LISPRO 8 UNITS: 100 INJECTION, SOLUTION INTRAVENOUS; SUBCUTANEOUS at 12:59

## 2024-11-19 RX ADMIN — PANTOPRAZOLE SODIUM 40 MG: 40 TABLET, DELAYED RELEASE ORAL at 06:59

## 2024-11-19 RX ADMIN — AMLODIPINE BESYLATE 2.5 MG: 2.5 TABLET ORAL at 10:18

## 2024-11-19 RX ADMIN — METOPROLOL TARTRATE 50 MG: 50 TABLET, FILM COATED ORAL at 10:18

## 2024-11-19 RX ADMIN — HYDROXYZINE HYDROCHLORIDE 10 MG: 10 TABLET, FILM COATED ORAL at 00:47

## 2024-11-19 RX ADMIN — TIOTROPIUM BROMIDE AND OLODATEROL 2 PUFF: 3.124; 2.736 SPRAY, METERED RESPIRATORY (INHALATION) at 09:52

## 2024-11-19 ASSESSMENT — ENCOUNTER SYMPTOMS
CONSTIPATION: 0
VOMITING: 0
NAUSEA: 0
ABDOMINAL PAIN: 0
DIARRHEA: 0
RESPIRATORY NEGATIVE: 1

## 2024-11-19 NOTE — PLAN OF CARE
Problem: Safety - Adult  Goal: Free from fall injury  Outcome: Progressing  Note:  Remains free from falls, bed in low position, call light in reach, bed alarm monitoring for safety.     Problem: Pain  Goal: Verbalizes/displays adequate comfort level or baseline comfort level  Outcome: Progressing  Note:  PRN Tylenol 650 mg PO at 2315 for generalized discomfort.     Problem: Skin/Tissue Integrity  Goal: Absence of new skin breakdown  Outcome: Progressing  Note:  Ecchymosis to buttocks, sacral Mepilex and zinc paste applied to prevent skin breakdown to buttocks/coccyx/sacrum.

## 2024-11-19 NOTE — DISCHARGE SUMMARY
INTERNAL MEDICINE DEPARTMENT AT THE [facility]  DISCHARGE SUMMARY    Patient ID:  Carolee Sharma                                             Discharge Date:  11/19/2024  Patient's PCP:  Anirudh Ortez MD                                          Discharge Physician:  Kian Tobar MD  Admit Date:  11/10/2024   Admitting Physician:  Drake Hamilton MD      DISCHARGE DIAGNOSES:  Present on Admission:   Colitis   Melena   Lower GI bleed      Follow-up Appointments:  Primary care provider in 2 weeks      Incidental Findings:  Iron pill gastritis       Hospital Course:  MsJuanjose Sharma is a 74 year old female with a medical history significant for COPD, CKD, Type II DM who was admitted on 11/10 for melena. Red bowel movement noticed in depends at facility, and patient was sent to the ED. Upon admission, patient was hemodynamically stable, labs significant for Hgb 11.0 lactic of 2.3 > 1.1.    Patient underwent flex sig and EGD on 11/11, no bleeding noted, sliding hiatal hernia with ulceration/friability, and erosive esophagitis. Mild ulceration of descending colon and rectum , left sided diverticulosis and hemorrhoids. Pathology showed iron pill gastritis and ischemic colitis.    Patient was placed on flagyl for 7 days and a PPI with instruction to continue PPI daily for 4 weeks and undergo colonoscopy in 4 weeks.     With findings of iron pill gastritis, patient's iron pills were stopped with plan to replace iron with IV in the future as needed. Ciprofloxacin was added to management for ischemic colitis in addition to flagyl and PPI.     In addition, patient presented with JUDITH and hyponatremia, her hydrochlorothiazide was held in the hospital and stopped on discharge.     Due to difficulties with facility pre cert, patient stayed in the hospital and was eventually discharged home with home health.    Physical Exam:  BP (!) 159/79   Pulse 86   Temp 97.3 °F (36.3 °C) (Oral)   Resp 18   Ht 1.676 m (5' 6\")

## 2024-11-19 NOTE — PROGRESS NOTES
Internal Medicine Progress Note    Patient Name: Carolee Sharma   Patient : 1950   Date: 11/15/2024   Admit Date: 11/10/2024     CC: Melena       Interval History     No overnight events    VSS    Labs significant for Na 133, BUN 23, Hgb 10.8, Hct 32.5    ROS   Review of Systems   Constitutional: Negative.    HENT: Negative.     Respiratory: Negative.     Cardiovascular: Negative.    Gastrointestinal:  Negative for abdominal pain, constipation, diarrhea, nausea and vomiting.   Genitourinary: Negative.    Musculoskeletal: Negative.    Neurological: Negative.    Psychiatric/Behavioral:  The patient is nervous/anxious.           Objective     I/Os:  I/O last 3 completed shifts:  In: 375 [P.O.:350; I.V.:25]  Out: 3100 [Urine:3100]      Vital Signs:  Patient Vitals for the past 8 hrs:   BP Temp Temp src Pulse Resp SpO2   11/15/24 0755 (!) 159/82 96.9 °F (36.1 °C) Oral 75 18 95 %   11/15/24 0356 (!) 142/80 96.9 °F (36.1 °C) Oral 66 18 98 %   11/15/24 0040 (!) 140/78 97.1 °F (36.2 °C) Oral 80 20 95 %       Physical Exam:  Physical Exam  Constitutional:       General: She is not in acute distress.     Appearance: Normal appearance. She is normal weight. She is not ill-appearing.   HENT:      Head: Normocephalic and atraumatic.      Right Ear: External ear normal.      Left Ear: External ear normal.      Nose: Nose normal.      Mouth/Throat:      Mouth: Mucous membranes are moist.      Pharynx: Oropharynx is clear.   Eyes:      General: No scleral icterus.  Cardiovascular:      Rate and Rhythm: Normal rate and regular rhythm.      Pulses: Normal pulses.      Heart sounds: Normal heart sounds.   Pulmonary:      Effort: Pulmonary effort is normal.      Breath sounds: Normal breath sounds.   Abdominal:      Palpations: Abdomen is soft.      Tenderness: There is no abdominal tenderness. There is no guarding or rebound.   Musculoskeletal:      Cervical back: No tenderness.      Right lower leg: No edema.      Left 
     Internal Medicine Progress Note    Patient Name: Carolee Sharma   Patient : 1950   Date: 2024   Admit Date: 11/10/2024     CC: Melena       Interval History     - endorsed generalized itching overnight, received hydroxyzine which alleviated symptoms. Otherwise no acute complaints  - plan for dc home with home health services    ROS   Review of Systems   Constitutional: Negative.    HENT: Negative.     Respiratory: Negative.     Cardiovascular: Negative.    Gastrointestinal:  Negative for abdominal pain, constipation, diarrhea, nausea and vomiting.   Genitourinary: Negative.    Musculoskeletal: Negative.    Neurological: Negative.           Objective     I/Os:  I/O last 3 completed shifts:  In: 480 [P.O.:480]  Out: -       Vital Signs:  Patient Vitals for the past 8 hrs:   BP Temp Temp src Pulse Resp SpO2 Weight   24 0745 (!) 159/79 97.3 °F (36.3 °C) Oral 86 18 96 % --   24 0640 -- -- -- -- -- -- 67.3 kg (148 lb 6.4 oz)         Physical Exam:  Physical Exam  Constitutional:       General: She is not in acute distress.     Appearance: Normal appearance. She is normal weight. She is not ill-appearing.   HENT:      Head: Normocephalic and atraumatic.      Right Ear: External ear normal.      Left Ear: External ear normal.      Nose: Nose normal.      Mouth/Throat:      Mouth: Mucous membranes are moist.      Pharynx: Oropharynx is clear.   Eyes:      General: No scleral icterus.  Cardiovascular:      Rate and Rhythm: Normal rate and regular rhythm.      Pulses: Normal pulses.      Heart sounds: Normal heart sounds.   Pulmonary:      Effort: Pulmonary effort is normal.      Breath sounds: Normal breath sounds.   Abdominal:      Palpations: Abdomen is soft.      Tenderness: There is no abdominal tenderness. There is no guarding or rebound.   Musculoskeletal:      Cervical back: No tenderness.      Right lower leg: No edema.      Left lower leg: No edema.   Skin:     General: Skin is warm and 
     Internal Medicine Progress Note    Patient Name: Carolee Sharma   Patient : 1950   Date: 2024   Admit Date: 11/10/2024     CC: Melena       Interval History   NAEO   Patient seen at bedside this AM   She denies any acute complaints upon ROS  She is pending precert after family decision to not return home with [facility] yesterday    ROS   Review of Systems   Constitutional: Negative.    HENT: Negative.     Respiratory: Negative.     Cardiovascular: Negative.    Gastrointestinal:  Negative for abdominal pain, constipation, diarrhea, nausea and vomiting.   Genitourinary: Negative.    Musculoskeletal: Negative.    Neurological: Negative.    Psychiatric/Behavioral:  The patient is nervous/anxious.           Objective     I/Os:  I/O last 3 completed shifts:  In: 360 [P.O.:360]  Out: -       Vital Signs:  No data found.      Physical Exam:  Physical Exam  Constitutional:       General: She is not in acute distress.     Appearance: Normal appearance. She is normal weight. She is not ill-appearing.   HENT:      Head: Normocephalic and atraumatic.      Right Ear: External ear normal.      Left Ear: External ear normal.      Nose: Nose normal.      Mouth/Throat:      Mouth: Mucous membranes are moist.      Pharynx: Oropharynx is clear.   Eyes:      General: No scleral icterus.  Cardiovascular:      Rate and Rhythm: Normal rate and regular rhythm.      Pulses: Normal pulses.      Heart sounds: Normal heart sounds.   Pulmonary:      Effort: Pulmonary effort is normal.      Breath sounds: Normal breath sounds.   Abdominal:      Palpations: Abdomen is soft.      Tenderness: There is no abdominal tenderness. There is no guarding or rebound.   Musculoskeletal:      Cervical back: No tenderness.      Right lower leg: No edema.      Left lower leg: No edema.   Skin:     General: Skin is warm and dry.   Neurological:      General: No focal deficit present.      Mental Status: She is alert and oriented to person, place, and 
  Comprehensive Nutrition Assessment    RECOMMENDATIONS:  PO Diet: Regular, will modify to CCC-4  Nutrition Supplement: Will order Ensure Max once daily   Nutrition Education: Education/Counseling not indicated     NUTRITION ASSESSMENT:   Nutritional summary & status: LOS. Per ED notes, presented to ED w/ melena. S/p EGD and sigmoidoscopy 11/11, found hiatal hernia w/ ulceration and severe left sided diverticulosis per GI note 11/11. Per MD note 11/17, biopsied ulceration r/t iron pill gastritis so holding PO iron. Wt hx per EMR w/ small fluctuations but wt mostly stable x1 year. Upon visit, pt reports good appetite and intake pta as well as currently; documented PO intake per EMR shows most meals at % consumed. Agreeable to Ensure for additional protein intake r/t colitis and COPD. Will modify diet to CCC d/t T2DM and hyperglycemia. Will order Ensure Max once daily and continue to monitor PO intake as well as any other changes to nutritional status.   Admission // PMH: Colitis // COPD, T2DM, CKD, HTN, HLD, thyroid disease    MALNUTRITION ASSESSMENT  Context of Malnutrition: Acute Illness   Malnutrition Status: At risk for malnutrition (if intake decreases again)  Findings of the 6 clinical characteristics of malnutrition (Minimum of 2 out of 6 clinical characteristics is required to make the diagnosis of moderate or severe Protein Calorie Malnutrition based on AND/ASPEN Guidelines):  Energy Intake:  Mild decrease in energy intake (x4 days while inpatient, now improving)  Weight Loss:  No weight loss     Body Fat Loss:  No body fat loss     Muscle Mass Loss:  No muscle mass loss      NUTRITION DIAGNOSIS   Inadequate protein intake related to increase demand for energy/nutrients as evidenced by GI abnormality, nausea, diarrhea    Nutrition Monitoring and Evaluation:   Food/Nutrient Intake Outcomes:  Food and Nutrient Intake, Supplement Intake  Physical Signs/Symptoms Outcomes:  Biochemical Data, GI Status, 
  I have personally seen and examined the patient independently. I have reviewed the patient's available data,including medical history and recent test results. Reviewed and discussed note as documented by resident.  I agree with the physical exam findings, assessment and plan. I personally performed a substantive portion of the visit including all aspects of the medical decision making.      Lower GI bleed   Ischemic colitis   -EGD showed hiatal hernia with ulceration/friability . Path showed iron pill gastritis. Stop iron pills and replace iron with IV in the future as needed  -Sigmoidoscopy showed hemorrhoids, diverticulosis and mild ulceration of the descending colon and the rectum for which pathology showed ulcerated colonic mucosa with acute colitis and rectal ulcer pathology showed ischemic colitis.   -Not sure what's the cause of her ischemic colitis but seems mild . Cipro was added to her flagyl  -Cont PPI   -Pending placement      JUDITH /hyponatremia- improving  . Stop HCTZ on discharge      DMII - increase SSI to high Will monitor insulin toxicity with serial glucose checks for hypoglycemia. Hypoglycemia protocol      Rest per resident's note      Kian Tobar MD  Hospitalist                  Internal Medicine Progress Note    Patient Name: Carolee Sharma   Patient : 1950   Date: 11/15/2024   Admit Date: 11/10/2024     CC: Melena       Interval History     No overnight events    VSS    Pt declined all labs    ROS   Review of Systems   Constitutional: Negative.    HENT: Negative.     Respiratory: Negative.     Cardiovascular: Negative.    Gastrointestinal:  Negative for abdominal pain, constipation, diarrhea, nausea and vomiting.   Genitourinary: Negative.    Musculoskeletal: Negative.    Neurological: Negative.    Psychiatric/Behavioral:  The patient is nervous/anxious.           Objective     I/Os:  I/O last 3 completed shifts:  In: 375 [P.O.:350; I.V.:25]  Out: 3100 [Urine:3100]      Vital 
4 Eyes Admission Assessment     I agree as the admission nurse that 2 RN's have performed a thorough Head to Toe Skin Assessment on the patient. ALL assessment sites listed below have been assessed on admission.       Areas assessed by both nurses: Mirian Mascorro and Enoc Barry, RNs  [x]   Head, Face, and Ears   [x]   Shoulders, Back, and Chest  [x]   Arms, Elbows, and Hands   [x]   Coccyx, Sacrum, and Ischium  [x]   Legs, Feet, and Heels        Does the Patient have Skin Breakdown?  Yes, as follows:    1) Stage 2 on left buttock.  2) Scattered bruising over body.  3) Blanchable redness to buttocks.         Ra Prevention initiated:  Yes   Wound Care Orders initiated:  Yes      [facility] nurse consulted for Pressure Injury (Stage 3,4, Unstageable, DTI, NWPT, and Complex wounds) or Ra score 18 or lower:  Yes      Nurse 1 eSignature: Electronically signed by Mirian Mascorro RN on 11/11/24 at 9:47 AM EST    **SHARE this note so that the co-signing nurse is able to place an eSignature**    Nurse 2 eSignature: {Esignature:[id number]}   
Admission: Patient received to room 6312 from ED. Patient admitted with Dx of Colitis. Patient A&Ox4 upon arrival. Tele monitor applied, rate and rhythm verified with monitor reader. Admission assessment as charted. VSS. Patient oriented to room, staff, and call system. Educated on fall protocol and hourly rounding. Patient informed to utilize call light with any needs. Pt verbalized understanding. Will continue to monitor.    
Bariatric Surgery   Daily Progress Note  Patient: Carolee Sharma      CC: Melena       SUBJECTIVE:     No acute events overnight. No blood per rectum. Patient denies abdominal pain.     ROS:   A 14 point review of systems was conducted, significant findings as noted above. All other systems negative.    OBJECTIVE:    PHYSICAL EXAM:    Vitals:    11/11/24 1608 11/11/24 2020 11/12/24 0059 11/12/24 0634   BP: 116/78 (!) 140/80 126/81 133/77   Pulse: 76 79 80 78   Resp: 17 16 16 18   Temp: 98 °F (36.7 °C) 97 °F (36.1 °C) 97 °F (36.1 °C) 97.1 °F (36.2 °C)   TempSrc: Oral Oral Oral Oral   SpO2:  93% 93% 93%   Weight:       Height:           General appearance: alert, no acute distress  Eyes: No scleral icterus, EOM grossly intact  Neck: trachea midline, no JVD  Chest/Lungs: Normal effort with no accessory muscle use on RA  Cardiovascular: RRR, well perfused  Abdomen: Soft, non-tender, non-distended, no rebound, guarding, or rigidity.  Skin: warm and dry, no rashes  Extremities: no edema, no cyanosis  Genitourinary: Purewick in place  Neuro: A&Ox3, no focal deficits, sensation intact    LABS:   Recent Labs     11/11/24  0255 11/12/24 0447   WBC 9.1 6.0   HGB 10.6* 10.3*   HCT 33.2* 31.6*   MCV 94.5 91.6    266        Recent Labs     11/11/24  0519 11/12/24 0447   * 130*   K 5.3* 4.7    101   CO2 19* 20*   PHOS  --  3.2   BUN 34* 26*   CREATININE 1.5* 1.4*        Recent Labs     11/10/24  1914   AST 22   ALT 17   BILITOT 0.3   ALKPHOS 254*        Recent Labs     11/10/24  1914   LIPASE 21.0        Recent Labs     11/10/24  1914   INR 0.94      No results for input(s): \"CKTOTAL\", \"CKMB\", \"CKMBINDEX\", \"TROPONINI\" in the last 72 hours.      ASSESSMENT & PLAN:   Carolee Sharma is a 74 y.o. female with Hx COPD, DM, HLD, CKD, HTN w weakness, fatigue, BL sacral alar fracture, surgical history significant for ECTOR with bilateral salpingoopherectomy; recently admitted for failure to thrive and fall 11/8, presented 
Consulted for L Buttocks - Stage 2. Assessed pt's buttocks and coccyx; pt does not have a pressure injury. Wound Care to sign off; re-consult for changes or deterioration.   
Consulted for concern of wound d/t the PureWick tubing. Assessed pt's abdominal pannus. Appreciated scratches from pt's fingernails. No wound care orders. Wound Care to sign off.  
D:  Please note, patient arrived from the ED to [address] [place] [address] and needs orders please.  Please advise.  Thank you.  A:  Notified provider, Umm Head MD.  
D:  Please note, patient is c/o generalized itching.  There are no PRN orders for itching.  However, there is a PRN order for Hydroxyzine (Atarax) for anxiety.  Please advise.  Thank you.  A:  Notified provider, Jyotsna Monsalve MD.  R:  Per provider above, \"Ok, I changed to allow for itching too.\"    
Discussed AVS with patient and patient's . Pt picked up new medication from pharmacy and pt aware to schedule follow up appointments with GI and PCP. Script for oxy also sent with patient. All belongings sent with patient.  
Medicated with Tylenol 650 mg po and Melatonin 5 mg po for sleep. Vital signs stable, afebrile. Assist x one with walker to bathroom, gait steady.  Voices no complaints. Call light in reach, bed alarm in place.  
Occupational Therapy  Facility/Department: [address]  Occupational Therapy Initial Assessment and Treatment Note     Name: Carolee Sharma  : 1950  MRN: [id number]  Date of Service: 2024    Discharge Recommendations:  Subacute/Skilled Nursing Facility  OT Equipment Recommendations  Equipment Needed: No  Other: defer to next care facility       Patient Diagnosis(es): The primary encounter diagnosis was Colitis. Diagnoses of Hyponatremia and Melena were also pertinent to this visit.  Past Medical History:  has a past medical history of Hyperlipidemia, Hypertension, Thyroid disease, and Type II or unspecified type diabetes mellitus without mention of complication, not stated as uncontrolled.  Past Surgical History:  has a past surgical history that includes Total hip arthroplasty (Right, 2021); Kyphosis surgery; Upper gastrointestinal endoscopy (N/A, 2024); and Colonoscopy (N/A, 2024).    Treatment Diagnosis: impaired ADLs/ functional mobility      Assessment  Performance deficits / Impairments: Decreased functional mobility ;Decreased ADL status;Decreased endurance  Assessment: Pt admitted from SNF with colitis - pt at baseline lives at home with spouse and independent with basic ADLs/ mobility.  Pt demonstrates functioning below baseline level with LE ADLs/ functional mobility / transfers.  Pt would benefit from ongoing inpt OT at SNF to maximize functional level. Pt limited by pain/ anxiety.  Will follow as inpt.  Treatment Diagnosis: impaired ADLs/ functional mobility  Decision Making: Medium Complexity  REQUIRES OT FOLLOW-UP: Yes  Activity Tolerance  Activity Tolerance: Patient Tolerated treatment well;Patient limited by pain  Activity Tolerance Comments: Pt reports increased pain with mobility / transfers.     Plan  Occupational Therapy Plan  Times Per Week: 2-5x  Times Per Day: Once a day  Current Treatment Recommendations: Functional mobility training, Endurance 
Occupational Therapy  Facility/Department: [address] TELEMETRY  Daily Treatment Note  NAME: Carolee Sharma  : 1950  MRN: [id number]    Date of Service: 2024    Discharge Recommendations:  Subacute/Skilled Nursing Facility  OT Equipment Recommendations  Equipment Needed: No  Other: defer to next care facility      Patient Diagnosis(es): The primary encounter diagnosis was Colitis. Diagnoses of Hyponatremia, Melena, and Pelvic pain were also pertinent to this visit.     Assessment   Assessment: Pt making good progress with OT goals - progressing to SBA for short distance ambulation and sit>stand transfers, pt completes LB dressing and standing grooming tasks with CGA and no LOB noted throughout. Pt limited by decreased endurance and standing tolerance, pt would benefit from cont therapy to maximize safety/independence with ADLs, functional mobilty and transfers. Cont with POC.  Activity Tolerance: Patient tolerated treatment well  Discharge Recommendations: Subacute/Skilled Nursing Facility  Equipment Needed: No  Other: defer to next care facility     Plan  Occupational Therapy Plan  Times Per Week: 2-5x  Current Treatment Recommendations: Functional mobility training;Endurance training;Patient/Caregiver education & training;Self-Care / ADL;Safety education & training    Restrictions  Position Activity Restriction  Other position/activity restrictions: Up with assist    Subjective  Subjective  Subjective: Pt supine in bed upon arrival, agreeable to OT treatment and reporting mild pain, did not rate  Orientation  Overall Orientation Status: Within Functional Limits  Cognition  Overall Cognitive Status: WFL  Cognition Comment: pt reporting some anxiety with mobility but no unsteadiness noted       Objective  Vitals     Bed Mobility Training  Bed Mobility Training: Yes  Supine to Sit: Supervision (HOB slightly elevated)  Scooting: Modified independent  Balance  Sitting: Intact  Standing: High guard (CGA at 
Patient called this writer in to change her brief, she requested to not wear purewick around 0200. Around 0630, it was noted that there are 2 open tears to patient's pannus, approximately in the area the purewick would be. Patient noted that she has also been scratching since removal of purewick. Area cleaned. Wound consult placed. Patient in bed with call light in reach.  
Patient declined admission questions due to request to sleep and answer during normal hours when awake in the daytime.  
Patient has been given tap water enema, no fecal matter came out only the tap water oozed out.  
Patient taken to EGD  
Physical Therapy    Daily Treatment Note    Discharge Recommendations:  24 hour supervision and or assist, Home with home health PT  Equipment Needs:  None    Assessment:  Pt making progress towards goals, mobilizing safely with SBA and use of rolling walker.  Gait is slow, but steady.  Had a lengthy discussion about d/c.  Pt prefers SNF and is still pursuing placement.  IF pt returns home, pt will need ongoing PT.  Would recommend home PT and then transition to OP PT when able.  Pt has support from spouse if d/c'd home.  Will continue to follow.    Chart Reviewed: Yes   Other position/activity restrictions: Up with assist   Additional Pertinent Hx: Pt to ED 11/10 with concern for melena.  Pt recently admitted and d/c'd 11/8 after an episode of hypoxia, UTI and bilateral sacral ala insufficiency fractures.  Abd CT: (-) acute, possibly a hematoma related to the adjacent fractures.  PMH:  HTN, DM      Diagnosis: Colitis   Treatment Diagnosis: Decreased gait associated with colitis.      Subjective:  Pt found supine in bed.  Pt agreeable for PT.  \"I think my  feels I need another week or 2 of therapy before I come home.\"  Pt reports hopeful to go to SNF at d/c.     Pain:  Denies pain.      Objective:    Bed mobility  Supine to sit:  SBA (HOB raised)    Transfers  Sit to stand:  SBA (to rolling walker)  Stand to sit:  SBA     Ambulation  Assistance Level:  SBA  Assistive device:  Rolling walker  Distance:  250ft  Quality of gait:  slow gait, slightly flexed posture, steady    Stairs  Pt ambulated up/down 1 step with rail SBA.       Neuro/balance  Sitting balance:  WFL  Static standing:  Supervision (with UE support)  Dynamic standing:  SBA (using rolling walker for mobility)    Patient Education  Role of PT and d/c recommendations if she returns home.  Pt verbalized understanding.     Safety Devices  Pt left with needs in reach, seated in chair with alarm in place and RN aware.        AM-PAC score  AM-PAC 
Physical Therapy  Facility/Department: [address]  Physical Therapy Initial Assessment    Name: Carolee Sharma  : 1950  MRN: [id number]  Date of Service: 2024    Discharge Recommendations:  Subacute/Skilled Nursing Facility   PT Equipment Recommendations  Equipment Needed: No    Assessment  Assessment: Pt readmitted from SNF x2 days with colitis.  Pt known to PT from last admission.  Pt with ongoing anxiety and worry, limiting activity.  Mobility is slow and effortful, increased pain with position transitions.  Gait is slow with use of rolling walker, slightly unsteady at times.  Pt is well below independent baseline and will need to return to SNF to increase strength and maximize mobility before returning home.  IF pt returns home, pt would need 24hr capable assist, home PT and a rolling walker to ensure safety.  Treatment Diagnosis: Decreased gait associated with colitis.  Decision Making: Medium Complexity  Requires PT Follow-Up: Yes    Plan  General Plan:  (2-5)  Current Treatment Recommendations: Transfer training, Functional mobility training, Strengthening, Gait training, Safety education & training, Patient/Caregiver education & training    Safety Devices  Type of Devices: Left in chair, Chair alarm in place, Call light within reach, Nurse notified    Restrictions  Other position/activity restrictions: Up with assist     Subjective  Pain: Pt c/o back and hip pain -- not rated - RN aware.  does well with breathing and calming technique.    Chart Reviewed: Yes  Additional Pertinent Hx: Pt to ED 11/10 with concern for melena.  Pt recently admitted and d/c'd  after an episode of hypoxia, UTI and bilateral sacral ala insufficiency fractures.  Abd CT: (-) acute, possibly a hematoma related to the adjacent fractures.  PMH:  HTN, DM    Diagnosis: Colitis    Subjective  Subjective: Pt found supine in bed.  Pt agreeable for PT with encouragement.  \"See I always worry.\"    Social/Functional 
Pt is alert and oriented x4. PRN tyneol is given for back pain. Pt is assist x1 with walker. Pt had bowel movement today. Will continue monitor. Call light within reach  
Pt is alert and oriented x4. PRN tyneol is given for back pain. Pt is assist x1 with walker. Will continue monitor. Call light within reach   
Spiritual Health History and Assessment/Progress Note  [facility]    (P) Initial Encounter, Spiritual/Emotional Needs,  ,  ,      Name: Carolee Sharma MRN: [id number]    Age: 74 y.o.     Sex: female   Language: English   Orthodox: Anabaptist   Colitis     Date: 11/14/2024            Total Time Calculated: (P) 5 min              Spiritual Assessment began in [address] TELEMETRY        Referral/Consult From: (P) Nurse   Encounter Overview/Reason: (P) Initial Encounter, Spiritual/Emotional Needs  Service Provided For: (P) Patient    Trista, Belief, Meaning:   Patient unable to assess at this time    Assessment and Plan of Care:   Patient Interventions include: Facilitated expression of thoughts and feelings and Other: Pt shared hopes, not receptive to visit at this time.  and pt spoke about spiritual care following up later, pt hopeful to be discharged.    Patient Plan of Care: Other: No further visits planned    Electronically signed by KYLE Ferguson on 11/14/2024 at 8:31 AM   
  Skin:     General: Skin is warm and dry.   Neurological:      Mental Status: She is alert and oriented to person, place, and time.      Motor: Weakness (RLE) present.         Medications:   magnesium sulfate  4,000 mg IntraVENous Once    pantoprazole  40 mg Oral BID AC    insulin lispro  0-8 Units SubCUTAneous 4x Daily AC & HS    ciprofloxacin  500 mg Oral 2 times per day    sodium chloride flush  5-40 mL IntraVENous 2 times per day    amLODIPine  2.5 mg Oral Daily    methocarbamol  750 mg Oral TID    metoprolol tartrate  50 mg Oral BID    tiotropium-olodaterol  2 puff Inhalation Daily    sodium chloride flush  5-40 mL IntraVENous 2 times per day    metroNIDAZOLE  500 mg Oral 3 times per day       dextrose      sodium chloride      sodium chloride        glucose, dextrose bolus **OR** dextrose bolus, glucagon (rDNA), dextrose, sodium chloride flush, sodium chloride, acetaminophen **OR** acetaminophen, albuterol, melatonin, sodium chloride flush, sodium chloride, ondansetron **OR** ondansetron, polyethylene glycol, oxyCODONE     Labs:  CBC:   Recent Labs     11/12/24  0447 11/13/24  0550 11/14/24  0446   WBC 6.0 6.6 5.7   HGB 10.3* 10.3* 10.8*   HCT 31.6* 31.4* 32.5*    285 292   MCV 91.6 91.3 90.3       Renal:    Recent Labs     11/12/24  0447 11/13/24  0550 11/14/24  0447   * 133* 133*   K 4.7 4.5 5.1    101 102   CO2 20* 24 23   BUN 26* 27* 23*   CREATININE 1.4* 1.3* 1.2   GLUCOSE 128* 174* 166*   CALCIUM 9.0 9.0 9.3   MG 1.81  --  1.71*   PHOS 3.2  --   --    ANIONGAP 9 8 8       Hepatic:   No results for input(s): \"AST\", \"ALT\", \"BILITOT\", \"BILIDIR\", \"LABALBU\", \"ALKPHOS\" in the last 72 hours.    Invalid input(s): \"PROT\"      Troponin: No results for input(s): \"TROPHS\" in the last 72 hours.      Pro-BNP: No results for input(s): \"PROBNP\" in the last 72 hours.    Lipids: No results for input(s): \"CHOL\", \"TRIG\", \"HDL\", \"VLDL\" in the last 72 hours.    Invalid input(s): \"LDLCALC\"      ABGs:  No 
Transfer: Stand by assistance  Toilet Transfers Comments: Note: does have toilet safety frame around toilet at home       ADL  Grooming: Stand by assistance  Grooming Skilled Clinical Factors: washing hands at sink level  LE Dressing: Stand by assistance  LE Dressing Skilled Clinical Factors: clothing management  Putting On/Taking Off Footwear: Stand by assistance  Putting On/Taking Off Footwear Skilled Clinical Factors: don/doff socks while seated at EOB  Toileting Skilled Clinical Factors: denied need at this time    Functional Mobility: Stand by assistance  Functional Mobility Skilled Clinical Factors: to/from bathroom using RW; mobility in hallway using RW            Bed mobility  Supine to Sit: Stand by assistance (HOB elevated, pt manually assists RLE)  Scooting: Stand by assistance    Transfers  Sit to stand: Stand by assistance  Stand to sit: Stand by assistance       Cognition  Overall Cognitive Status: [facility]  Cognition Comment: pt reporting some anxiety with mobility but no unsteadiness noted  Orientation  Overall Orientation Status: Within Functional Limits                    Education Given To: Patient  Education Provided: Role of Therapy;Plan of Care;ADL Adaptive Strategies;Transfer Training  Education Provided Comments: extensive time spent discussing various d/c options - ie HHOT vs. OP PT.  Education Method: Verbal  Barriers to Learning: None  Education Outcome: Verbalized understanding;Continued education needed;Demonstrated understanding                                                        AM-PAC - ADL  AM-PAC Daily Activity - Inpatient   How much help is needed for putting on and taking off regular lower body clothing?: A Little  How much help is needed for bathing (which includes washing, rinsing, drying)?: A Lot  How much help is needed for toileting (which includes using toilet, bedpan, or urinal)?: A Little  How much help is needed for putting on and taking off regular upper body clothing?: A 
hours.    Invalid input(s): \"LDLCALC\"      ABGs:  No results for input(s): \"PHART\", \"EQH8KTQ\", \"PO2ART\", \"AZJ1REN\", \"BEART\", \"THGBART\", \"H0SJSVSQ\", \"LSB4CCN\" in the last 72 hours.    VBGs: No results for input(s): \"PHVEN\", \"HSZ9OMZ\", \"PO2VEN\" in the last 72 hours.    INR:   Recent Labs     11/10/24  1914   INR 0.94     aPTT: No results for input(s): \"APTT\" in the last 72 hours.    Procalcitonin: No results for input(s): \"PROCAL\" in the last 72 hours.  CRP: No results for input(s): \"CRP\" in the last 72 hours.  ESR: No results for input(s): \"SEDRATE\" in the last 72 hours.      Microbiology:  Results       Procedure Component Value Units Date/Time    Fecal leukocytes [[id number]]     Order Status: Sent Specimen: Stool     Culture, Urine [[id number]]  (Abnormal)  (Susceptibility) Collected: 10/30/24 0015    Order Status: Completed Specimen: Urine, clean catch Updated: 10/31/24 1105     Organism Klebsiella pneumoniae     Urine Culture, Routine >100,000 CFU/ml    Narrative:      ORDER#: [id number]                          ORDERED BY: NOELLE BRITT  SOURCE: Urine Clean Catch                  COLLECTED:  10/30/24 00:15  ANTIBIOTICS AT IGLESIA.:                      RECEIVED :  10/30/24 16:14    Susceptibility        Klebsiella pneumoniae      BACTERIAL SUSCEPTIBILITY PANEL BY PRATIMA      ampicillin  Resistant      ampicillin-sulbactam <=2 mcg/mL Sensitive      ceFAZolin <=4 mcg/mL Sensitive  [1]       cefepime <=0.12 mcg/mL Sensitive      cefTRIAXone <=0.25 mcg/mL Sensitive      ciprofloxacin <=0.25 mcg/mL Sensitive      ertapenem <=0.12 mcg/mL Sensitive      gentamicin <=1 mcg/mL Sensitive      levofloxacin <=0.12 mcg/mL Sensitive      nitrofurantoin 64 mcg/mL Intermediate      piperacillin-tazobactam <=4 mcg/mL Sensitive      trimethoprim-sulfamethoxazole <=20 mcg/mL Sensitive                   [1]  NOTE: Cefazolin should only be used for uncomplicated UTI        for E.coli or Klebsiella pneumoniae.                      
the last 72 hours.    Procalcitonin: No results for input(s): \"PROCAL\" in the last 72 hours.  CRP: No results for input(s): \"CRP\" in the last 72 hours.  ESR: No results for input(s): \"SEDRATE\" in the last 72 hours.      Microbiology:  Results       Procedure Component Value Units Date/Time    Fecal leukocytes [[id number]] Collected: 11/11/24 0000    Order Status: Canceled Specimen: Stool                Radiology:  CTA ABDOMEN PELVIS W CONTRAST   Final Result      1. No acute arterial abnormality.   2. Healing bilateral sacral insufficiency and obturator ring fractures. On the left pelvic sidewall, there is a rim-enhancing fluid collection measuring 8.3 x 4.7 cm, indeterminate, but possibly a hematoma related to the adjacent fractures.   3. Left colonic wall thickening particularly in the rectum. Correlate for colitis.          Electronically signed by Al Mendoza, DO            Assessment & Plan   Ms. Carolee Sharma is a 74 y.o. female with a MHx significant for COPD, T2DM, CKD, hypertension, T2DM, COPD recently admitted on 11/8 for generalized weakness and fatigue (found to have bilateral sacral ala fractures/obturator ring fractures but non-operative) who presented to the ED with blood in the stool.    #Ischemic Colitis  #Iron Pill Gastritis  #Bloody Bowel Movement at Presentation  Most likely lower GI bleed as the patient denied any upper abdominal pain. Lower GI bleed 2/2 to ischemic Colitis (most common in elderly with thickening finding on CT) vs infectious colitis (No WBC) vs hemorrhoids vs Colorectal Cancer (No unintentional no family hx, Colonoscopy 2021 significant for removal of two 4 mm polyps removed with cold snare. Diverticulosis in the sigmoid colon ). GI performed EGD and colonoscopy today. No bleed noted. Ulceration biopsied. Patient to follow up outpatient.  - C/s GI   - EGD & colonoscopy completed 11/11    - No bleed noted    - Ulceration biopsied     - Iron Pill Gastritis      - Hold PO iron, 
biopsied     - Iron Pill Gastritis      - Hold PO iron, give venofer instead     - Ischemic colitis    Plan:  - Protonix 40 mg q12h  - Flagyl 500 mg tid 7 day course (11/11-11/16) completed  -cipro BID completed  - Hold anticoagulation and antiplatelets  - s/p IV iron      #JUDITH; resolved  #NAGMA, resolved  Most likely prerenal 2/2 to dehydration from poor oral intake  Cr 1.6->1.2, Baseline ?1.2, BUN 42->23 with BUN over creatinine initially more than 20. K 5.3->5.1, lactic acid 2.3->1.1.  HCO3 19->24 a gap 13->8.  - f/u BMP daily (Pt declining labs)  - monitor lytes replace as needed  - avoid Nephrotoxic meds- hold HCTZ and cozaar  - monitor I/Os strictly    #Hyponatremia  Most likely 2/2 to poor oral intake. Patient has generalized weakness but mental status is unconcerning. Na 127->133.  - BMP daily (Pt declining labs)  -asymptomatic    Plan:  -monitor  -encourage PO intake    #Bacteriuria  UA (11/10) showed large blood small leukoesterase 21-50 RBC 4+ bacteria.   History of UA positive for Klebsiella recently with urinary frequency and irritation cefdinir 300 mg was completed on previous admission. Pt is asymptomatic.  - Urine culture positive for klebsiella.    Plan:  -Monitor     Chronic Medical Conditions:    #Chronic anemia  Patient is asymptomatic  - Hb 11->10.3  - Hold iron tablets  - Venofer  - CBC daily  - Blood transfusion if hemoglobin less than 7 or symptomatic    #HTN  #HFpEF  Echo (10/24) Severe PH with RSVP of 66 mmHg, G2 diastolic dysfunction, EF 60-65%  - Continue amlodipine  - Continue lopressor  - hold cozaar and HCTZ due to kidney injury    D/c HCTZ  F/u with PCP    #T2DM   #Hyperglycemia  On glipizide 5 mg tablet and metformin 500 mg at home.  - MDSSI-->HDSS  -lantus 5-->10 U  - hypoglycemia protocol  - POCT glucose X 4 daily    #COPD  - Continue Stiolto  -stable    #Bilateral sacral alar insufficiency fractures   - Oxycodone 5 mg q6h PRN  - Robaxin      DVT PPx: SCDs  Diet:  ADULT DIET; 
strictly    Hyponatremia  Most likely 2/2 to poor oral intake. Patient has generalized weakness but mental status is good. Na 127->129.  - mIVF  - BMP daily    Bacteriuria  UA (11/10) showed large blood small leukoesterase 21-50 RBC 4+ bacteria.   History of UA positive for Klebsiella recently with urinary frequency and irritation cefdinir 300 mg was completed on previous admission. Pt is asymptomatic.  - Urine culture pending    Chronic Medical Conditions:    Chronic anemia  Patient is asymptomatic  - Hb 11 which is similar to her baseline  - Hold iron tablets  - CBC daily  - Blood transfusion if hemoglobin less than 7 or symptomatic    HTN  HFpEF  Echo (10/24) Severe PH with RSVP of 66 mmHg, G2 diastolic dysfunction, EF 60-65%  - Continue amlodipine  - Continue lopressor  - hold cozaar and HCTZ due to kidney injury    T2DM   On glipizide 5 mg tablet and metformin 500 mg at home.  - MDSSI  - hypoglycemia protocol  - POCT glucose X 4 daily    COPD  - Continue Stiolto    Bilateral sacral alar insufficiency fractures   - Oxycodone 5 mg q6h PRN  - Robaxin      DVT PPx: SCDs  Diet:  Diet NPO   Code status:  Full Code     ELOS:  Barriers to discharge:  Disposition  - Preadmission: SNF  - Current: 6S  - Upon discharge: TBD      Will discuss with attending physician Dr. Taveras, Robbin AMBROSE MD.     Bryson Adams MD, PGY-1  Internal Medicine Resident  Contact via PerfectServe    Addendum to Resident Progress Note  Pt seen,examined and evaluated. I have reviewed the current history, physical findings, labs and assessment and plan and agree with note as documented by resident MD Robbin Taveras MD  Attending Physician  Hospitalist Service

## 2024-11-19 NOTE — CARE COORDINATION
Case Management Assessment            Discharge Note                    Date / Time of Note: 11/14/2024 11:22 AM                  Discharge Note Completed by: Claire Thompson RN    Patient Name: Carolee Sharma   YOB: 1950  Diagnosis: Colitis [K52.9]  Hyponatremia [E87.1]  Melena [K92.1]  Lower GI bleed [K92.2]   Date / Time: 11/10/2024  6:50 PM    Current PCP: Anirudh Ortez MD  Clinic patient: No    Hospitalization in the last 30 days: Yes  Readmission Assessment  Number of Days since last admission?: 1-7 days  Previous Disposition: SNF  Who is being Interviewed: Patient  What was the patient's/caregiver's perception as to why they think they needed to return back to the hospital?: Other (Comment) (Melana  stool)  Did you visit your Primary Care Physician after you left the hospital, before you returned this time?: No  Why weren't you able to visit your PCP?: Did not have an appointment  Did you see a specialist, such as Cardiac, Pulmonary, Orthopedic Physician, etc. after you left the hospital?: No  Who advised the patient to return to the hospital?: Skilled Unit  Does the patient report anything that got in the way of taking their medications?: No  In our efforts to provide the best possible care to you and others like you, can you think of anything that we could have done to help you after you left the hospital the first time, so that you might not have needed to return so soon?: Teach back instructions regarding management of illness, Discharge instructions that are concise, clear, and non contradictory, Teaching during hospitalization regarding your illness    Advance Directives:  Code Status: Full Code  [place] DNR form completed and on chart: No    Financial:  Payor: [employer] MEDICARE / Plan: AETNA MEDICARE-ADVANTAGE PPO / Product Type: Medicare /      Pharmacy:    [facility]/pharmacy #[id number] - [place] - [address] - P [phone] - F [phone]  [address] 
CM called admissions:     -   [facility] [phone], spoke with Rona who is reviewing and unsure if she will have a bed available, she will CM  back later today .     [facility] can accept pt back and will provide a Private  room.      Electronically signed by Claire Thompson RN on 11/12/2024 at 12:32 PM     ++++++++++++++++++++++++++++++++++++++++++    CM  following for  d/c planning:    Patient from SNF  ([facility]) and admitted with  GIB:  S/P  EGD:  -  Once medically cleaed  will needd  pre cert to return to SNF :    Referrals  faxed:     PTOT pending     Electronically signed by Claire Thompson RN on 11/12/2024 at 9:02 AM       Claire Thompson RN Case Manager  [facility] [place] [address]  [place] [address]  [phone]  Fax [phone]   
CM following for  d/c planing:    Patient hopeful to  d/c to  [facility],  but is  pending PTP ( [employer] closed over the weekend and unable to complete)Avoidable days  noted.  , MD to complete today .   If denied pt  will go home w/ [facility]  services  and  spouse .   -  PTOT working with patient his am .     Electronically signed by Claire Thompson RN on 11/18/2024 at 9:50 AM       Claire Thompson RN Case Manager  [facility] [place] [address]  [facility] [address] [phone]  Fax [phone]   
CM following for D/C Planning:    Patient plans to  d/c to  SNF  :    Pre cert  pending :    [facility]  Services: Skilled Nursing  [address] [place] [id number] [phone]       CM spoke with Fortino at [facility] Pre cert still pending:  -  pt will be given a private room .  -  DERIAN submitted: Document ID : [id number]     Electronically signed by Claire Thompson RN on 11/14/2024 at 9:40 AM       Claire Thompson RN Case Manager  [facility] [place] [address]  [facility] [id number] [phone]  Fax [phone]   
CM following.    CM met with pt and spouse at bedside to discuss discharge planning.     CM received phone call from Blanca at [facility] that they do not have a female bed available.     CM discussed other options with pt and spouse and they decided to go back to [facility] as long as pt could have a private room.     CM spoke with Fortino at [facility] and pt will be given a private room and precert was started.     Luma Acosta RN, BSN,   Case Management Department  [phone]  
Case Management Assessment  Initial Evaluation    Date/Time of Evaluation: 11/11/2024 10:36 AM  Assessment Completed by: Claire Thompson RN    If patient is discharged prior to next notation, then this note serves as note for discharge by case management.    Patient Name: Carolee Sharma                   YOB: 1950  Diagnosis: Colitis [K52.9]  Hyponatremia [E87.1]  Melena [K92.1]  Lower GI bleed [K92.2]                   Date / Time: 11/10/2024  6:50 PM    Patient Admission Status: Inpatient   Readmission Risk (Low < 19, Mod (19-27), High > 27): Readmission Risk Score: 22.7    Current PCP: Anirudh Ortez MD  PCP verified by CM? Yes    Chart Reviewed: Yes      History Provided by: Patient  Patient Orientation: Alert and Oriented, Person, Place, Situation    Patient Cognition: Alert    Hospitalization in the last 30 days (Readmission):  Yes    If yes, Readmission Assessment in  Navigator will be completed.    Advance Directives:      Code Status: Full Code   Patient's Primary Decision Maker is: Legal Next of Kin      Discharge Planning:    Patient lives with: Spouse/Significant Other Type of Home: House  Primary Care Giver: Self  Patient Support Systems include: Spouse/Significant Other   Current Financial resources: Medicare  Current community resources: ECF/Home Care  Current services prior to admission: None            Current DME:              Type of Home Care services:  PT, OT, Aide Services, Skilled Therapy, Nursing Services    ADLS  Prior functional level: Assistance with the following:  Current functional level: Assistance with the following:    PT AM-PAC:   /24  OT AM-PAC:   /24    Family can provide assistance at DC: Yes  Would you like Case Management to discuss the discharge plan with any other family members/significant others, and if so, who? Yes  Plans to Return to Present Housing: Unknown at present  Other Identified Issues/Barriers to RETURNING to current housing: 
Cm called Blanca at [facility]. Left voice mail with referral information to see if they can accept. Asked for return call.   
DISCHARGE PLANNING:    CM following for discharge plan.  Physician unable to complete P2P this weekend due to office closed on weekends.  P2P to have to be completed on 11/18 Monday.    CM called [facility] and spoke with Fortino and gave update regarding P2P.  CM also called both contacts and left HIPAA appropriate VM with call back information.    Electronically signed by JUANA Mann, RN Case Manager on 11/17/2024 at 11:33 AM    
KEO met with pt  and her spouse at  bedside  , to discuss disposition planning:  They are  unsure of where she wants to go , are open to return to  [facility]  but are disappointed she did not have a pvt room .    KEO  recv'd  call from Spouse  requesting a referral to  Manoj at  [facility] .     CM faxed referral and will follow p with admissions  for  bed availability and  determination ,  patient still pending PTOT evals d/t  having scope  procedure today , says she  just returned ans stills  feels \"Out of it \"     Cm cont to follow and  assist  as  needed    Patient will need  [employer] pre cert to go to SNF.    Electronically signed by Claire Thompson RN on 11/11/2024 at 3:36 PM       Claire Thompson RN Case Manager  [facility] [place] [address]  [facility] [id number] [phone]  Fax [phone]   
KEO recv'd message from Fortino admission at [facility], stating pt  pre cert was denied and are offering an PTP/Appeal      - MD can call  [phone]   -  select Option #4.    -  Reference # [id number].  -  :., 1950   - Sub ID # :[id number]     Per  PT : Pt is well below independent baseline and will need to return to SNF to increase strength and maximize mobility before returning home. IF pt returns home, pt would need 24hr capable assist, home PT and a rolling walker to ensure safety.   -  Safety concerns w/  home bound d/c and  patient is a High Risk for Adverse Event  should she  return home @ this time .     CM  notified  physician via Perfect Serve.       Electronically signed by Claire Thompson RN on 11/15/2024 at 2:00 PM     ++++++++++++++++++++++++++++++++++++      KEO  spoke with  Alysia in Admissions  , who stated  they are still awaiting  pre cert  ;    CM  arranged  transport at  1630 pending we receive  pre cert.      Electronically signed by Claire Thompson RN on 11/15/2024 at 11:03 AM     +++++++++++++++++++++++++++++++++      KEO  following for  d/c planning:    Patient plans to  d/c to  [facility] pre cert  pending .     DISCHARGE Disposition:        [facility]  Services: Skilled Nursing  [address] [place] [id number] [phone]      REPORT:  [phone]  FAX:  [phone]    Electronically signed by Claire Thompson RN on 11/15/2024 at 9:30 AM       Claire Thompson RN Case Manager  [facility]  [address]  [facility] [address] [phone]  Fax [phone]   
with YOLANDA Cintron CNP as soon as possible for a visit in 1 week  [facility]  [phone]    COVID Result:    Lab Results   Component Value Date/Time    COVID19 Not Detected 10/29/2024 10:20 PM       The Plan for Transition of Care is related to the following treatment goals of Colitis [K52.9]  Hyponatremia [E87.1]  Melena [K92.1]  Lower GI bleed [K92.2]    The Patient and/or patient representative Carolee and her family were provided with a choice of provider and agrees with the discharge plan Yes    Freedom of choice list was provided with basic dialogue that supports the patient's individualized plan of care/goals and shares the quality data associated with the providers. Yes     Care Transitions patient: ALFIE Thompson RN  [facility]  Case Management Department  Ph: [phone]

## 2024-11-19 NOTE — PLAN OF CARE
Problem: Safety - Adult  Goal: Free from fall injury  11/19/2024 1355 by Ioana Dhillon LPN  Outcome: Adequate for Discharge  11/19/2024 0854 by Mirian Mascorro RN  Outcome: Progressing  11/19/2024 0854 by Mirian Mascorro RN  Outcome: Progressing     Problem: Pain  Goal: Verbalizes/displays adequate comfort level or baseline comfort level  11/19/2024 1355 by Ioana Dhillon LPN  Outcome: Adequate for Discharge  11/19/2024 0854 by Mirian Mascorro RN  Outcome: Progressing  11/19/2024 0854 by Mirian Mascorro RN  Outcome: Progressing     Problem: Skin/Tissue Integrity  Goal: Absence of new skin breakdown  Description: 1.  Monitor for areas of redness and/or skin breakdown  2.  Assess vascular access sites hourly  3.  Every 4-6 hours minimum:  Change oxygen saturation probe site  4.  Every 4-6 hours:  If on nasal continuous positive airway pressure, respiratory therapy assess nares and determine need for appliance change or resting period.  11/19/2024 1355 by Ioana Dhillon LPN  Outcome: Adequate for Discharge  11/19/2024 0854 by Mirian Mascorro RN  Outcome: Progressing  11/19/2024 0854 by Mirian Mascorro RN  Outcome: Progressing     Problem: Nutrition Deficit:  Goal: Optimize nutritional status  Outcome: Adequate for Discharge

## 2025-05-22 ENCOUNTER — APPOINTMENT (OUTPATIENT)
Dept: GENERAL RADIOLOGY | Age: 75
End: 2025-05-22
Payer: MEDICARE

## 2025-05-22 ENCOUNTER — APPOINTMENT (OUTPATIENT)
Dept: CT IMAGING | Age: 75
End: 2025-05-22
Payer: MEDICARE

## 2025-05-22 ENCOUNTER — HOSPITAL ENCOUNTER (EMERGENCY)
Age: 75
Discharge: HOME | End: 2025-05-23
Attending: EMERGENCY MEDICINE
Payer: MEDICARE

## 2025-05-22 DIAGNOSIS — S42.211A CLOSED DISPLACED FRACTURE OF SURGICAL NECK OF RIGHT HUMERUS, UNSPECIFIED FRACTURE MORPHOLOGY, INITIAL ENCOUNTER: Primary | ICD-10-CM

## 2025-05-22 PROCEDURE — 96374 THER/PROPH/DIAG INJ IV PUSH: CPT

## 2025-05-22 PROCEDURE — 6360000002 HC RX W HCPCS

## 2025-05-22 PROCEDURE — 73030 X-RAY EXAM OF SHOULDER: CPT

## 2025-05-22 PROCEDURE — 70450 CT HEAD/BRAIN W/O DYE: CPT

## 2025-05-22 PROCEDURE — 96375 TX/PRO/DX INJ NEW DRUG ADDON: CPT

## 2025-05-22 PROCEDURE — 72125 CT NECK SPINE W/O DYE: CPT

## 2025-05-22 PROCEDURE — 99284 EMERGENCY DEPT VISIT MOD MDM: CPT

## 2025-05-22 RX ORDER — OXYCODONE HYDROCHLORIDE 5 MG/1
5 TABLET ORAL ONCE
Refills: 0 | Status: DISCONTINUED | OUTPATIENT
Start: 2025-05-22 | End: 2025-05-23 | Stop reason: HOSPADM

## 2025-05-22 RX ORDER — LIDOCAINE HYDROCHLORIDE 10 MG/ML
5 INJECTION, SOLUTION INFILTRATION; PERINEURAL ONCE
Status: COMPLETED | OUTPATIENT
Start: 2025-05-22 | End: 2025-05-22

## 2025-05-22 RX ORDER — FENTANYL CITRATE 50 UG/ML
50 INJECTION, SOLUTION INTRAMUSCULAR; INTRAVENOUS ONCE
Status: COMPLETED | OUTPATIENT
Start: 2025-05-22 | End: 2025-05-22

## 2025-05-22 RX ORDER — ONDANSETRON 2 MG/ML
4 INJECTION INTRAMUSCULAR; INTRAVENOUS ONCE
Status: COMPLETED | OUTPATIENT
Start: 2025-05-22 | End: 2025-05-22

## 2025-05-22 RX ORDER — KETOROLAC TROMETHAMINE 30 MG/ML
15 INJECTION, SOLUTION INTRAMUSCULAR; INTRAVENOUS ONCE
Status: COMPLETED | OUTPATIENT
Start: 2025-05-22 | End: 2025-05-22

## 2025-05-22 RX ADMIN — FENTANYL CITRATE 50 MCG: 50 INJECTION INTRAMUSCULAR; INTRAVENOUS at 22:03

## 2025-05-22 RX ADMIN — ONDANSETRON 4 MG: 2 INJECTION, SOLUTION INTRAMUSCULAR; INTRAVENOUS at 23:58

## 2025-05-22 RX ADMIN — KETOROLAC TROMETHAMINE 15 MG: 30 INJECTION, SOLUTION INTRAMUSCULAR at 23:58

## 2025-05-22 RX ADMIN — LIDOCAINE HYDROCHLORIDE 5 ML: 10 INJECTION, SOLUTION INFILTRATION; PERINEURAL at 22:05

## 2025-05-22 ASSESSMENT — PAIN SCALES - GENERAL
PAINLEVEL_OUTOF10: 7
PAINLEVEL_OUTOF10: 9

## 2025-05-22 ASSESSMENT — PAIN DESCRIPTION - DESCRIPTORS: DESCRIPTORS: DISCOMFORT

## 2025-05-22 ASSESSMENT — PAIN DESCRIPTION - LOCATION
LOCATION: SHOULDER
LOCATION: ARM

## 2025-05-22 ASSESSMENT — PAIN - FUNCTIONAL ASSESSMENT: PAIN_FUNCTIONAL_ASSESSMENT: ACTIVITIES ARE NOT PREVENTED

## 2025-05-22 ASSESSMENT — PAIN DESCRIPTION - ORIENTATION
ORIENTATION: RIGHT
ORIENTATION: RIGHT

## 2025-05-23 VITALS
SYSTOLIC BLOOD PRESSURE: 167 MMHG | DIASTOLIC BLOOD PRESSURE: 98 MMHG | TEMPERATURE: 97.6 F | HEART RATE: 78 BPM | BODY MASS INDEX: 24.01 KG/M2 | WEIGHT: 149.4 LBS | OXYGEN SATURATION: 96 % | RESPIRATION RATE: 15 BRPM | HEIGHT: 66 IN

## 2025-05-23 RX ORDER — IBUPROFEN 600 MG/1
600 TABLET, FILM COATED ORAL 3 TIMES DAILY PRN
Qty: 30 TABLET | Refills: 0 | Status: SHIPPED | OUTPATIENT
Start: 2025-05-23

## 2025-05-23 RX ORDER — OXYCODONE HYDROCHLORIDE 5 MG/1
5 TABLET ORAL EVERY 6 HOURS PRN
Qty: 12 TABLET | Refills: 0 | Status: SHIPPED | OUTPATIENT
Start: 2025-05-23 | End: 2025-05-28

## 2025-05-23 RX ORDER — ACETAMINOPHEN 500 MG
500 TABLET ORAL 4 TIMES DAILY PRN
Qty: 120 TABLET | Refills: 0 | Status: SHIPPED | OUTPATIENT
Start: 2025-05-23

## 2025-05-23 NOTE — ED PROVIDER NOTES
THE [facility]  EMERGENCY DEPARTMENT ENCOUNTER          EM RESIDENT NOTE       Date of evaluation: 5/22/2025    Chief Complaint     Fall (Patient brought in by ems after mechanical fall with possible right shoulder dislocation rate pain 9/10, denies LOC)      History of Present Illness     Carolee Sharma is a 75 y.o. female with no pertinent pmhx presenting to the ED c/o R shoulder pain.  Pt reports mechanical GLF just prior to arrival.  Unsure if she hit her head.  No LOC.  Main complaint is L shoulder pain, with inability to range 2/2 pain.    Other than stated above, no additional associated symptoms or aggravating or alleviating factors are noted    MEDICAL DECISION MAKING / ASSESSMENT / PLAN     INITIAL VITALS: BP: (!) 155/85, Temp: 97.6 °F (36.4 °C), Pulse: 83, Respirations: 18, SpO2: 98 %    Carolee Sharma is a 75 y.o. female with a history and presentation as described above in HPI.  The patient was evaluated by myself and the ED Attending Physician,  Dr. Webster . All management and disposition plans were discussed and agreed upon.    Collateral history obtained from EMS    Upon presentation, the patient was Non-toxic-appearing, afebrile and hemodynamically stable.    Pt was seen and evaluated in room TR2, my initial evaluation reveals 76 y/o F with obvious deformity of the R shoulder concerning for fx/dislocation, given deformity index of suspicion for dislocation was high.      Pt initially treated with 50 mcg of fentanyl for pain.  Also given 5 mg of intraarticular 1% lidocaine under sterile conditions for suspected dislocation while awaiting XR.      XR with displaced fx at surgical neck.  Radiology read with possible dislocation of the humeral head.      Pt has previously followed with [facility], and would like to follow-up with [facility].              Shared Decision Making: ***    Is this patient to be included in the SEP-1 core measure? {Sep-1 Core Yes/No:826084}    Medical Decision  ibuprofen (ADVIL;MOTRIN) 600 MG tablet     Sig: Take 1 tablet by mouth 3 times daily as needed for Pain     Dispense:  30 tablet     Refill:  0       CONSULTS:  None    Review of Systems     Review of Systems  All other systems are negative except as mentioned in HPI.    Past Medical, Surgical, Family, and Social History     She has a past medical history of Hyperlipidemia, Hypertension, Thyroid disease, and Type II or unspecified type diabetes mellitus without mention of complication, not stated as uncontrolled.  She has a past surgical history that includes Total hip arthroplasty (Right, 1/18/2021); Kyphosis surgery; Upper gastrointestinal endoscopy (N/A, 11/11/2024); and Colonoscopy (N/A, 11/11/2024).  Her family history is not on file.  She reports that she has quit smoking. She has a 44 pack-year smoking history. She has never used smokeless tobacco. She reports that she does not drink alcohol and does not use drugs.    Medications     Previous Medications    ALBUTEROL SULFATE HFA (PROVENTIL;VENTOLIN;PROAIR) 108 (90 BASE) MCG/ACT INHALER    Inhale 2 puffs into the lungs every 6 hours as needed for Wheezing    AMLODIPINE (NORVASC) 2.5 MG TABLET    Take 1 tablet by mouth daily    ASPIRIN EC 81 MG EC TABLET    Take 1 tablet by mouth 2 times daily To prevent blood clots    ATORVASTATIN (LIPITOR) 40 MG TABLET    Take 1 tablet by mouth daily    CHOLECALCIFEROL (VITAMIN D3) 125 MCG (5000 UT) TABS    Take by mouth    CYANOCOBALAMIN (B-12 PO)    Take 1,000 mcg by mouth daily    DULOXETINE (CYMBALTA) 60 MG EXTENDED RELEASE CAPSULE    Take 1 capsule by mouth daily    FERROUS SULFATE (IRON 325) 325 (65 FE) MG TABLET    Take 1 tablet by mouth daily (with breakfast)    GLIPIZIDE (GLUCOTROL XL) 5 MG EXTENDED RELEASE TABLET    Take 1 tablet by mouth daily 2 tablets at breakfast and 1 tablet at dinner    LOSARTAN (COZAAR) 50 MG TABLET    Take 1 tablet by mouth daily    MELATONIN 5 MG TBDP DISINTEGRATING TABLET    Take 1 tablet

## 2025-05-23 NOTE — ED PROVIDER NOTES
ED Attending Attestation Note     Date of evaluation: 5/22/2025    This patient was seen by the resident.  I have seen and examined the patient, agree with the workup, evaluation, management and diagnosis. The care plan has been discussed.  My assessment reveals patient with right shoulder pain after mechanical fall.  Right shoulder deformity on exam.  Imaging shows right humerus fx with suspected concomitant dislocation of the humeral head anteriorly.  We attempted to get in touch with the patient's orthopedic surgeon from [facility] unsuccessfully as the patient wished to remain with [facility].  Patient desired to be discharged home to follow-up with them the next day and was advised to call her orthopedic surgeon the next day.  She was placed in a sling and pain medication prescribed.     Enoc Webster MD  05/23/25 0018

## 2025-05-23 NOTE — DISCHARGE INSTRUCTIONS
You have a broken humerus.  Take tylenol and ibuprofen every 8 hours.  Take oxycodone as needed to help with pain that isn't controlled with tylenol and ibuprofen.  Call [facility] to schedule a follow-up appointment as soon as you can.

## 2025-06-06 ENCOUNTER — HOSPITAL ENCOUNTER (INPATIENT)
Age: 75
LOS: 1 days | Discharge: NURSING, REHAB OR LONG-TERM CARE | End: 2025-06-07
Attending: EMERGENCY MEDICINE | Admitting: INTERNAL MEDICINE
Payer: MEDICARE

## 2025-06-06 ENCOUNTER — APPOINTMENT (OUTPATIENT)
Dept: GENERAL RADIOLOGY | Age: 75
End: 2025-06-06
Payer: MEDICARE

## 2025-06-06 DIAGNOSIS — S82.001A CLOSED NONDISPLACED FRACTURE OF RIGHT PATELLA, UNSPECIFIED FRACTURE MORPHOLOGY, INITIAL ENCOUNTER: ICD-10-CM

## 2025-06-06 DIAGNOSIS — N17.9 AKI (ACUTE KIDNEY INJURY): Primary | ICD-10-CM

## 2025-06-06 DIAGNOSIS — Z98.890 HISTORY OF RECENT SURGERY: ICD-10-CM

## 2025-06-06 DIAGNOSIS — R26.81 UNSTABLE GAIT: ICD-10-CM

## 2025-06-06 LAB
ANION GAP SERPL CALCULATED.3IONS-SCNC: 13 MMOL/L (ref 3–16)
BASE EXCESS BLDV CALC-SCNC: -8.1 MMOL/L (ref -2–3)
BASOPHILS # BLD: 0.1 K/UL (ref 0–0.2)
BASOPHILS NFR BLD: 0.7 %
BILIRUB UR QL STRIP.AUTO: NEGATIVE
BUN SERPL-MCNC: 44 MG/DL (ref 7–20)
CALCIUM SERPL-MCNC: 9 MG/DL (ref 8.3–10.6)
CHLORIDE SERPL-SCNC: 103 MMOL/L (ref 99–110)
CLARITY UR: CLEAR
CO2 BLDV-SCNC: 20 MMOL/L
CO2 SERPL-SCNC: 16 MMOL/L (ref 21–32)
COHGB MFR BLDV: 1.8 % (ref 0–1.5)
COLOR UR: YELLOW
CREAT SERPL-MCNC: 1.8 MG/DL (ref 0.6–1.2)
DEPRECATED RDW RBC AUTO: 15.8 % (ref 12.4–15.4)
DO-HGB MFR BLDV: 34.1 %
EKG ATRIAL RATE: 79 BPM
EKG DIAGNOSIS: NORMAL
EKG P AXIS: 63 DEGREES
EKG P-R INTERVAL: 322 MS
EKG Q-T INTERVAL: 390 MS
EKG QRS DURATION: 82 MS
EKG QTC CALCULATION (BAZETT): 447 MS
EKG R AXIS: 24 DEGREES
EKG T AXIS: 101 DEGREES
EKG VENTRICULAR RATE: 79 BPM
EOSINOPHIL # BLD: 0.1 K/UL (ref 0–0.6)
EOSINOPHIL NFR BLD: 0.6 %
EPI CELLS #/AREA URNS HPF: NORMAL /HPF (ref 0–5)
FLUAV RNA RESP QL NAA+PROBE: NOT DETECTED
FLUBV RNA RESP QL NAA+PROBE: NOT DETECTED
GFR SERPLBLD CREATININE-BSD FMLA CKD-EPI: 29 ML/MIN/{1.73_M2}
GLUCOSE BLD-MCNC: 225 MG/DL (ref 70–99)
GLUCOSE SERPL-MCNC: 131 MG/DL (ref 70–99)
GLUCOSE UR STRIP.AUTO-MCNC: NEGATIVE MG/DL
HCO3 BLDV-SCNC: 18.7 MMOL/L (ref 24–28)
HCT VFR BLD AUTO: 26.9 % (ref 36–48)
HGB BLD-MCNC: 8.9 G/DL (ref 12–16)
HGB UR QL STRIP.AUTO: NEGATIVE
KETONES UR STRIP.AUTO-MCNC: NEGATIVE MG/DL
LEUKOCYTE ESTERASE UR QL STRIP.AUTO: NEGATIVE
LYMPHOCYTES # BLD: 1 K/UL (ref 1–5.1)
LYMPHOCYTES NFR BLD: 10.4 %
MCH RBC QN AUTO: 30 PG (ref 26–34)
MCHC RBC AUTO-ENTMCNC: 33.2 G/DL (ref 31–36)
MCV RBC AUTO: 90.2 FL (ref 80–100)
METHGB MFR BLDV: 0 % (ref 0–1.5)
MONOCYTES # BLD: 1.2 K/UL (ref 0–1.3)
MONOCYTES NFR BLD: 12.2 %
NEUTROPHILS # BLD: 7.4 K/UL (ref 1.7–7.7)
NEUTROPHILS NFR BLD: 76.1 %
NITRITE UR QL STRIP.AUTO: NEGATIVE
NT-PROBNP SERPL-MCNC: 1949 PG/ML (ref 0–449)
PCO2 BLDV: 43.1 MMHG (ref 41–51)
PERFORMED ON: ABNORMAL
PH BLDV: 7.25 [PH] (ref 7.35–7.45)
PH UR STRIP.AUTO: 6 [PH] (ref 5–8)
PLATELET # BLD AUTO: 214 K/UL (ref 135–450)
PMV BLD AUTO: 7.8 FL (ref 5–10.5)
PO2 BLDV: 39.2 MMHG (ref 25–40)
POTASSIUM SERPL-SCNC: 4.7 MMOL/L (ref 3.5–5.1)
PROT UR STRIP.AUTO-MCNC: ABNORMAL MG/DL
RBC # BLD AUTO: 2.98 M/UL (ref 4–5.2)
RBC #/AREA URNS HPF: NORMAL /HPF (ref 0–4)
SAO2 % BLDV: 65 %
SARS-COV-2 RNA RESP QL NAA+PROBE: NOT DETECTED
SODIUM SERPL-SCNC: 132 MMOL/L (ref 136–145)
SP GR UR STRIP.AUTO: 1.01 (ref 1–1.03)
TROPONIN, HIGH SENSITIVITY: 24 NG/L (ref 0–14)
TROPONIN, HIGH SENSITIVITY: 27 NG/L (ref 0–14)
UA COMPLETE W REFLEX CULTURE PNL UR: ABNORMAL
UA DIPSTICK W REFLEX MICRO PNL UR: YES
URN SPEC COLLECT METH UR: ABNORMAL
UROBILINOGEN UR STRIP-ACNC: 0.2 E.U./DL
WBC # BLD AUTO: 9.8 K/UL (ref 4–11)
WBC #/AREA URNS HPF: NORMAL /HPF (ref 0–5)

## 2025-06-06 PROCEDURE — 97530 THERAPEUTIC ACTIVITIES: CPT

## 2025-06-06 PROCEDURE — 97535 SELF CARE MNGMENT TRAINING: CPT

## 2025-06-06 PROCEDURE — 97166 OT EVAL MOD COMPLEX 45 MIN: CPT

## 2025-06-06 PROCEDURE — 83036 HEMOGLOBIN GLYCOSYLATED A1C: CPT

## 2025-06-06 PROCEDURE — 97162 PT EVAL MOD COMPLEX 30 MIN: CPT

## 2025-06-06 PROCEDURE — 6370000000 HC RX 637 (ALT 250 FOR IP): Performed by: EMERGENCY MEDICINE

## 2025-06-06 PROCEDURE — 83880 ASSAY OF NATRIURETIC PEPTIDE: CPT

## 2025-06-06 PROCEDURE — 80048 BASIC METABOLIC PNL TOTAL CA: CPT

## 2025-06-06 PROCEDURE — 82803 BLOOD GASES ANY COMBINATION: CPT

## 2025-06-06 PROCEDURE — 84484 ASSAY OF TROPONIN QUANT: CPT

## 2025-06-06 PROCEDURE — 6370000000 HC RX 637 (ALT 250 FOR IP): Performed by: INTERNAL MEDICINE

## 2025-06-06 PROCEDURE — 2580000003 HC RX 258: Performed by: EMERGENCY MEDICINE

## 2025-06-06 PROCEDURE — 36415 COLL VENOUS BLD VENIPUNCTURE: CPT

## 2025-06-06 PROCEDURE — 99285 EMERGENCY DEPT VISIT HI MDM: CPT

## 2025-06-06 PROCEDURE — 6360000002 HC RX W HCPCS: Performed by: EMERGENCY MEDICINE

## 2025-06-06 PROCEDURE — 85025 COMPLETE CBC W/AUTO DIFF WBC: CPT

## 2025-06-06 PROCEDURE — 81001 URINALYSIS AUTO W/SCOPE: CPT

## 2025-06-06 PROCEDURE — 73562 X-RAY EXAM OF KNEE 3: CPT

## 2025-06-06 PROCEDURE — 71045 X-RAY EXAM CHEST 1 VIEW: CPT

## 2025-06-06 PROCEDURE — 87636 SARSCOV2 & INF A&B AMP PRB: CPT

## 2025-06-06 PROCEDURE — 1200000000 HC SEMI PRIVATE

## 2025-06-06 PROCEDURE — 93005 ELECTROCARDIOGRAM TRACING: CPT | Performed by: EMERGENCY MEDICINE

## 2025-06-06 RX ORDER — SODIUM CHLORIDE, SODIUM LACTATE, POTASSIUM CHLORIDE, AND CALCIUM CHLORIDE .6; .31; .03; .02 G/100ML; G/100ML; G/100ML; G/100ML
1000 INJECTION, SOLUTION INTRAVENOUS ONCE
Status: COMPLETED | OUTPATIENT
Start: 2025-06-06 | End: 2025-06-06

## 2025-06-06 RX ORDER — SODIUM CHLORIDE 0.9 % (FLUSH) 0.9 %
5-40 SYRINGE (ML) INJECTION EVERY 12 HOURS SCHEDULED
Status: DISCONTINUED | OUTPATIENT
Start: 2025-06-06 | End: 2025-06-07 | Stop reason: HOSPADM

## 2025-06-06 RX ORDER — POLYETHYLENE GLYCOL 3350 17 G/17G
17 POWDER, FOR SOLUTION ORAL DAILY PRN
Status: DISCONTINUED | OUTPATIENT
Start: 2025-06-06 | End: 2025-06-07 | Stop reason: HOSPADM

## 2025-06-06 RX ORDER — SODIUM CHLORIDE 0.9 % (FLUSH) 0.9 %
5-40 SYRINGE (ML) INJECTION PRN
Status: DISCONTINUED | OUTPATIENT
Start: 2025-06-06 | End: 2025-06-07 | Stop reason: HOSPADM

## 2025-06-06 RX ORDER — SODIUM CHLORIDE 9 MG/ML
INJECTION, SOLUTION INTRAVENOUS PRN
Status: DISCONTINUED | OUTPATIENT
Start: 2025-06-06 | End: 2025-06-07 | Stop reason: HOSPADM

## 2025-06-06 RX ORDER — DEXTROSE MONOHYDRATE 100 MG/ML
INJECTION, SOLUTION INTRAVENOUS CONTINUOUS PRN
Status: DISCONTINUED | OUTPATIENT
Start: 2025-06-06 | End: 2025-06-07 | Stop reason: HOSPADM

## 2025-06-06 RX ORDER — MECOBALAMIN 5000 MCG
5 TABLET,DISINTEGRATING ORAL NIGHTLY PRN
Status: DISCONTINUED | OUTPATIENT
Start: 2025-06-06 | End: 2025-06-07 | Stop reason: HOSPADM

## 2025-06-06 RX ORDER — ONDANSETRON 4 MG/1
4 TABLET, ORALLY DISINTEGRATING ORAL EVERY 8 HOURS PRN
Status: DISCONTINUED | OUTPATIENT
Start: 2025-06-06 | End: 2025-06-07 | Stop reason: HOSPADM

## 2025-06-06 RX ORDER — ATORVASTATIN CALCIUM 40 MG/1
40 TABLET, FILM COATED ORAL NIGHTLY
Status: DISCONTINUED | OUTPATIENT
Start: 2025-06-06 | End: 2025-06-07 | Stop reason: HOSPADM

## 2025-06-06 RX ORDER — ONDANSETRON 2 MG/ML
4 INJECTION INTRAMUSCULAR; INTRAVENOUS EVERY 6 HOURS PRN
Status: DISCONTINUED | OUTPATIENT
Start: 2025-06-06 | End: 2025-06-07 | Stop reason: HOSPADM

## 2025-06-06 RX ORDER — HYDROMORPHONE HYDROCHLORIDE 1 MG/ML
0.5 INJECTION, SOLUTION INTRAMUSCULAR; INTRAVENOUS; SUBCUTANEOUS ONCE
Status: COMPLETED | OUTPATIENT
Start: 2025-06-06 | End: 2025-06-06

## 2025-06-06 RX ORDER — GLUCAGON 1 MG/ML
1 KIT INJECTION PRN
Status: DISCONTINUED | OUTPATIENT
Start: 2025-06-06 | End: 2025-06-07 | Stop reason: HOSPADM

## 2025-06-06 RX ORDER — FERROUS SULFATE 325(65) MG
325 TABLET ORAL
Status: DISCONTINUED | OUTPATIENT
Start: 2025-06-07 | End: 2025-06-07 | Stop reason: HOSPADM

## 2025-06-06 RX ORDER — SODIUM CHLORIDE 9 MG/ML
INJECTION, SOLUTION INTRAVENOUS CONTINUOUS
Status: ACTIVE | OUTPATIENT
Start: 2025-06-06 | End: 2025-06-07

## 2025-06-06 RX ORDER — ENOXAPARIN SODIUM 100 MG/ML
30 INJECTION SUBCUTANEOUS DAILY
Status: DISCONTINUED | OUTPATIENT
Start: 2025-06-07 | End: 2025-06-07

## 2025-06-06 RX ORDER — ACETAMINOPHEN 500 MG
1000 TABLET ORAL
Status: COMPLETED | OUTPATIENT
Start: 2025-06-06 | End: 2025-06-06

## 2025-06-06 RX ORDER — INSULIN LISPRO 100 [IU]/ML
0-4 INJECTION, SOLUTION INTRAVENOUS; SUBCUTANEOUS
Status: DISCONTINUED | OUTPATIENT
Start: 2025-06-06 | End: 2025-06-07 | Stop reason: HOSPADM

## 2025-06-06 RX ORDER — ASPIRIN 325 MG
162.5 TABLET ORAL DAILY
COMMUNITY

## 2025-06-06 RX ORDER — AMLODIPINE BESYLATE 2.5 MG/1
2.5 TABLET ORAL DAILY
Status: DISCONTINUED | OUTPATIENT
Start: 2025-06-07 | End: 2025-06-07 | Stop reason: HOSPADM

## 2025-06-06 RX ORDER — METOPROLOL TARTRATE 50 MG
50 TABLET ORAL 2 TIMES DAILY
Status: DISCONTINUED | OUTPATIENT
Start: 2025-06-06 | End: 2025-06-07 | Stop reason: HOSPADM

## 2025-06-06 RX ORDER — ACETAMINOPHEN 325 MG/1
650 TABLET ORAL EVERY 6 HOURS PRN
Status: DISCONTINUED | OUTPATIENT
Start: 2025-06-06 | End: 2025-06-07 | Stop reason: HOSPADM

## 2025-06-06 RX ORDER — DULOXETIN HYDROCHLORIDE 60 MG/1
60 CAPSULE, DELAYED RELEASE ORAL DAILY
Status: DISCONTINUED | OUTPATIENT
Start: 2025-06-07 | End: 2025-06-07 | Stop reason: HOSPADM

## 2025-06-06 RX ORDER — OXYCODONE HYDROCHLORIDE 5 MG/1
5 TABLET ORAL EVERY 4 HOURS PRN
Status: DISCONTINUED | OUTPATIENT
Start: 2025-06-06 | End: 2025-06-07 | Stop reason: HOSPADM

## 2025-06-06 RX ORDER — ACETAMINOPHEN 650 MG/1
650 SUPPOSITORY RECTAL EVERY 6 HOURS PRN
Status: DISCONTINUED | OUTPATIENT
Start: 2025-06-06 | End: 2025-06-07 | Stop reason: HOSPADM

## 2025-06-06 RX ADMIN — SODIUM CHLORIDE, SODIUM LACTATE, POTASSIUM CHLORIDE, AND CALCIUM CHLORIDE 1000 ML: .6; .31; .03; .02 INJECTION, SOLUTION INTRAVENOUS at 11:08

## 2025-06-06 RX ADMIN — HYDROMORPHONE HYDROCHLORIDE 0.5 MG: 1 INJECTION, SOLUTION INTRAMUSCULAR; INTRAVENOUS; SUBCUTANEOUS at 16:54

## 2025-06-06 RX ADMIN — OXYCODONE 5 MG: 5 TABLET ORAL at 22:07

## 2025-06-06 RX ADMIN — ATORVASTATIN CALCIUM 40 MG: 40 TABLET, FILM COATED ORAL at 21:54

## 2025-06-06 RX ADMIN — ACETAMINOPHEN 1000 MG: 500 TABLET ORAL at 10:02

## 2025-06-06 RX ADMIN — METOPROLOL TARTRATE 50 MG: 50 TABLET, FILM COATED ORAL at 21:54

## 2025-06-06 RX ADMIN — INSULIN LISPRO 1 UNITS: 100 INJECTION, SOLUTION INTRAVENOUS; SUBCUTANEOUS at 22:10

## 2025-06-06 ASSESSMENT — PAIN DESCRIPTION - LOCATION
LOCATION: SHOULDER
LOCATION: KNEE
LOCATION: SHOULDER
LOCATION: SHOULDER;KNEE

## 2025-06-06 ASSESSMENT — PAIN SCALES - GENERAL
PAINLEVEL_OUTOF10: 9
PAINLEVEL_OUTOF10: 8
PAINLEVEL_OUTOF10: 5
PAINLEVEL_OUTOF10: 7
PAINLEVEL_OUTOF10: 9

## 2025-06-06 ASSESSMENT — PAIN DESCRIPTION - ORIENTATION
ORIENTATION: RIGHT

## 2025-06-06 ASSESSMENT — PAIN DESCRIPTION - FREQUENCY: FREQUENCY: CONTINUOUS

## 2025-06-06 ASSESSMENT — PAIN - FUNCTIONAL ASSESSMENT
PAIN_FUNCTIONAL_ASSESSMENT: PREVENTS OR INTERFERES WITH MANY ACTIVE NOT PASSIVE ACTIVITIES
PAIN_FUNCTIONAL_ASSESSMENT: PREVENTS OR INTERFERES SOME ACTIVE ACTIVITIES AND ADLS
PAIN_FUNCTIONAL_ASSESSMENT: NONE - DENIES PAIN

## 2025-06-06 ASSESSMENT — PAIN DESCRIPTION - DESCRIPTORS
DESCRIPTORS: ACHING
DESCRIPTORS: ACHING

## 2025-06-06 ASSESSMENT — PAIN DESCRIPTION - ONSET: ONSET: ON-GOING

## 2025-06-06 ASSESSMENT — PAIN DESCRIPTION - PAIN TYPE: TYPE: SURGICAL PAIN;ACUTE PAIN

## 2025-06-06 NOTE — PROGRESS NOTES
Occupational Therapy  Facility/Department: THE [facility] EMERGENCY DEPARTMENT  Occupational Therapy Initial Assessment/Treatment    Name: Carolee Sharma  : 1950  MRN: [id number]  Date of Service: 2025    Discharge Recommendations:  Subacute/Skilled Nursing Facility  OT Equipment Recommendations  Equipment Needed: No (defer)    Patient Diagnosis(es): The primary encounter diagnosis was JUDITH (acute kidney injury). A diagnosis of Closed nondisplaced fracture of right patella, unspecified fracture morphology, initial encounter was also pertinent to this visit.  Past Medical History:  has a past medical history of Hyperlipidemia, Hypertension, Thyroid disease, and Type II or unspecified type diabetes mellitus without mention of complication, not stated as uncontrolled.  Past Surgical History:  has a past surgical history that includes Total hip arthroplasty (Right, 2021); Kyphosis surgery; Upper gastrointestinal endoscopy (N/A, 2024); and Colonoscopy (N/A, 2024).    Treatment Diagnosis: decreased independence w/ ADLs and fx mobility    Assessment  Performance deficits / Impairments: Decreased functional mobility ;Decreased safe awareness;Decreased balance;Decreased cognition;Decreased ADL status;Decreased strength;Decreased posture;Decreased endurance  Assessment: Pt admitted from home after OP RUE ORIF yesterday w/ [facility]. Pt w/ difficulty ambulating and today demo'd needing max1-2 for bed mobility and max Ax2 to stand at edge of bed for brief time. Pt needing R knee block and unable to take steps. Pt very fearful and demo'ing signficant BLE weakness. Pt demo'd poor insight into recent surgery and had no recall of precautions or shoulder immobilizer. Sling obtained for patient 2/2 pt not having sling present. No restrictions noted in chart but it was assumed NWB to protect RUE. Pt appearing very confused v STM deficits and unable to provide clarification. Pt signficantly below her  all  Cognition Comment: impaired*  Orientation  Overall Orientation Status: Within Functional Limits  Orientation Level: Oriented X4      Education Given To: Patient;Staff;Family  Education Provided: Role of Therapy;Plan of Care;Transfer Training;ADL Adaptive Strategies;Family Education;Mobility Training;Precautions  Education Method: Demonstration;Verbal  Barriers to Learning: Cognition  Education Outcome: Continued education needed     AM-PAC - ADL  AM-PAC Daily Activity - Inpatient   How much help is needed for putting on and taking off regular lower body clothing?: Total  How much help is needed for bathing (which includes washing, rinsing, drying)?: A Lot  How much help is needed for toileting (which includes using toilet, bedpan, or urinal)?: Total  How much help is needed for putting on and taking off regular upper body clothing?: A Little  How much help is needed for taking care of personal grooming?: A Little  How much help for eating meals?: A Little  AM-[facility] Inpatient Daily Activity Raw Score: 13  AM-PAC Inpatient ADL T-Scale Score : 32.03  ADL Inpatient CMS 0-100% Score: 63.03  ADL Inpatient CMS G-Code Modifier : CL    Goals  Short Term Goals  Time Frame for Short Term Goals: by discharge  Short Term Goal 1: Pt will perform donning/doffing RUE immobilizer with min A  Short Term Goal 2: Pt will be compliant with precautions w/ no VCs  needed  Short Term Goal 3: Pt will perform sit>stand transfer w/ min A  Patient Goals   Patient goals : not stated    Therapy Time   Individual Concurrent Group Co-treatment   Time In 1405         Time Out 1505         Minutes 60         Timed Code Treatment Minutes: 45 Minutes (+ 15 min OT eval)       Carolee Bell OT

## 2025-06-06 NOTE — ED NOTES
Per ED tech, pt refusing to ambulate - states she does not feel comfortable walking. MD made aware. Pt given boxed lunch      Yesika Jimenez RN  06/06/25 1306

## 2025-06-06 NOTE — PROGRESS NOTES
Physical Therapy  Facility/Department: THE [facility] EMERGENCY DEPARTMENT  Physical Therapy Initial Assessment/Treatment    Name: Carolee Sharma  : 1950  MRN: [id number]  Date of Service: 2025    Discharge Recommendations:  Subacute/Skilled Nursing Facility   PT Equipment Recommendations  Equipment Needed: No      Patient Diagnosis(es): The primary encounter diagnosis was JUDITH (acute kidney injury). A diagnosis of Closed nondisplaced fracture of right patella, unspecified fracture morphology, initial encounter was also pertinent to this visit.  Past Medical History:  has a past medical history of Hyperlipidemia, Hypertension, Thyroid disease, and Type II or unspecified type diabetes mellitus without mention of complication, not stated as uncontrolled.  Past Surgical History:  has a past surgical history that includes Total hip arthroplasty (Right, 2021); Kyphosis surgery; Upper gastrointestinal endoscopy (N/A, 2024); and Colonoscopy (N/A, 2024).    Assessment  Body Structures, Functions, Activity Limitations Requiring Skilled Therapeutic Intervention: Decreased functional mobility   Assessment: 75 y.o. female who presents to the emergency room today due to fatigue. The patient has a history of hypertension, diabetes, she is 1 day status post fixation of right proximal humerus fracture. Pt currently requiring  Ax1-2 for bed mobility and Ax2 for transfers. Pt unable to maintain stance or amb 2/2 pain in RLE. Pt is limited by decreased strength, balance and endurance as well as cognition. Pt uses walker at baseline for mobility. Pt is at high risk of falls and exhibiting decreased safety awareness and decreased insight into deficits. Pt is below her baseline and would benefit from further skilled PT to maximize safety and independence with functional mobility. Will continue to follow.  Treatment Diagnosis: Decreased functional mobility  Therapy Prognosis: Good  Decision Making: Medium  Complexity  Barriers to Learning: cognition  Requires PT Follow-Up: Yes  Activity Tolerance  Activity Tolerance: Patient tolerated evaluation without incident;Patient tolerated treatment well    Plan  Physical Therapy Plan  General Plan:  (2-5)  Current Treatment Recommendations: Strengthening, Functional mobility training, Balance training, Transfer training, Endurance training, Gait training, Safety education & training, Therapeutic activities  Safety Devices  Type of Devices: Left in bed, Gait belt, Nurse notified, Call light within reach (ED stretcher,  present)    Restrictions  Position Activity Restriction  Other Position/Activity Restrictions: no restrictions noted- pt had ORIF 6/5, assuming NWBing status, pts  reporting pt was sent home in shoulder immobilizer with abductor pillow, and only given pendellum exercises- shoulder immobilizer not on patient and not present in hospital     Subjective  General  Chart Reviewed: Yes  Patient assessed for rehabilitation services?: Yes  Additional Pertinent Hx: 75 y.o. female who presents to the emergency room today due to fatigue.  The patient has a history of hypertension, diabetes, she is 1 day status post fixation of right proximal humerus fracture.  Family/Caregiver Present: Yes ()  Referring Practitioner: Chi Chanel MD  Diagnosis: post op difficulty  Follows Commands: Within Functional Limits  Subjective  Subjective: Pt found supine in bed upon arrival, reporting 6/10 pain and agreeable to therapy.         Social/Functional History  Social/Functional History  Lives With: Spouse  Type of Home: House  Home Layout: Two level, Bed/Bath upstairs, Work area in basement  Bathroom Shower/Tub: Walk-in shower  Bathroom Toilet: Standard  Bathroom Equipment: Toilet raiser  Bathroom Accessibility: Accessible  Home Equipment: Walker - Rolling, Walker - 4-Wheeled, Cane, Sock aid, Reacher  Has the patient had two or more falls in the past year

## 2025-06-06 NOTE — ED PROVIDER NOTES
THE [facility]  EMERGENCY DEPARTMENT ENCOUNTER          ATTENDING PHYSICIAN NOTE       Date of evaluation: 6/6/2025    ADDENDUM:      Care of this patient was assumed from the previous provider awaiting return of diagnostic studies as well as initial evaluation by occupational and physical therapy.  The patient was seen for Post-op Problem (Pt reports having R shoulder surgery yesterday, today feeling generalized weakness, unable to get up and walk like normal ) and Fatigue  .  The patient's initial evaluation and plan have been discussed with the prior provider who initially evaluated the patient.  Nursing Notes, Past Medical Hx, Past Surgical Hx, Social Hx, Allergies, and Family Hx were all reviewed.    I have also seen and examined the patient.  She is awake and alert, nontoxic in appearance.  Right upper extremity is in a sling.  She has bruising to her right arm correlating to her recent surgery.  She is able to bend her right knee, but unable to bear weight without significant discomfort.  She has had decreased oral intake starting the night before her surgery, now persisting for 36 to 48 hours.  She does have tacky mucous membranes, but no significant tachycardia or hypotension.    She says that her initial treatment for her patella fracture was a knee immobilizer, slowly progressing to be able to walk without difficulty.  She has not had any new falls, but her pain and ambulatory status is much worse today than it was before she went in for her shoulder surgery.  She is followed by [facility].    Diagnostic Results     RADIOLOGY:  XR KNEE RIGHT (3 VIEWS)   Final Result   1. Patellar fracture.      Electronically signed by Miguel Chand      XR CHEST PORTABLE   Final Result      No acute cardiopulmonary findings.      Electronically signed by Raven Coe MD          LABS:   Results for orders placed or performed during the hospital encounter of 06/06/25   COVID-19 & Influenza Combo    Specimen:  Culture    Specimen: Urine   Result Value Ref Range    Color, UA Yellow Straw/Yellow    Clarity, UA Clear Clear    Glucose, Ur Negative Negative mg/dL    Bilirubin, Urine Negative Negative    Ketones, Urine Negative Negative mg/dL    Specific Gravity, UA 1.010 1.005 - 1.030    Blood, Urine Negative Negative    pH, Urine 6.0 5.0 - 8.0    Protein, UA TRACE (A) Negative mg/dL    Urobilinogen, Urine 0.2 <2.0 E.U./dL    Nitrite, Urine Negative Negative    Leukocyte Esterase, Urine Negative Negative    Microscopic Examination YES     Urine Type NotGiven     Urine Reflex to Culture Not Indicated    Microscopic Urinalysis   Result Value Ref Range    WBC, UA None seen 0 - 5 /HPF    RBC, UA 0-2 0 - 4 /HPF    Epithelial Cells, UA 0-1 0 - 5 /HPF   EKG 12 Lead   Result Value Ref Range    Ventricular Rate 79 BPM    Atrial Rate 79 BPM    P-R Interval 322 ms    QRS Duration 82 ms    Q-T Interval 390 ms    QTc Calculation (Bazett) 447 ms    P Axis 63 degrees    R Axis 24 degrees    T Axis 101 degrees    Diagnosis       AV dual-paced rhythm with prolonged AV conductionAbnormal ECGEKG performed in ER and to be interpreted by ER physician.Confirmed by MD, ER ([id number]),  CHANCE SALAZAR ([id number]) on 6/6/2025 9:45:34 AM       RECENT VITALS:  BP: (!) 129/90, Temp: 97.7 °F (36.5 °C), Pulse: 93, Respirations: 19, SpO2: 97 %     ED Course     The patient was given the following medications:  Orders Placed This Encounter   Medications    acetaminophen (TYLENOL) tablet 1,000 mg    lactated ringers bolus 1,000 mL    HYDROmorphone HCl PF (DILAUDID) injection 0.5 mg     Please see my partners note for initial ED course.  She has been stable throughout her ED stay, has received IV fluids.  She is receiving IV pain medicine, as initial oral therapy did not work.  She has been seen by Occupational Therapy and physical therapy.  I have reached out to her orthopedics, but have not yet been able to talk with them to alert them of her need for

## 2025-06-06 NOTE — CARE COORDINATION
CM  met with pt and her  spouse Wu at bedside  :A-10  agreeable to SNF  :    -  SNF list  provided:    CM  informed them of those SNF in the  local area  and they stated they had been to [facility] in the  past  , declined to return ,  referrals faxed through [facility] proactively:    -    [facility]     They can accept:  and Sara submitted pre cert  and  just  got cert back  although pt  is being admitted  , at this time if  medically cleared  can d/c to  SNF in the  AM .     Recent  Shoulder  surgery: and patellar Fracture   : She is able to bend her right knee, but unable to bear weight without significant discomfort.     HENS started unable to submit:      Will need  EMS transport  : at  d/c        W/E CM /SW to  follow up :     ++++++++++++++++++++++++++++++++++++++++      CM  consult  noted  for: Assistance w/   Skilled  Nursing facility Placement.     Electronically signed by Claire Thompson RN on 6/6/2025 at 2:50 PM       ++++++++++++++++++++++++++++++++++++++++  CM  consulted  for  d/c planning  assistance  awaiting  PTOT  rec:  PTOT  ordered  .    Electronically signed by Claire Thompson RN on 6/6/2025 1: 32 PM        Claire Thompson RN Case Manager  [facility]  [address]  [place] [id number] [phone]  Fax [phone]

## 2025-06-06 NOTE — PROGRESS NOTES
4 Eyes Skin Assessment     NAME:  Carolee Sharma  YOB: 1950  MEDICAL RECORD NUMBER:  [id number]    The patient is being assessed for  Admission    I agree that at least one RN has performed a thorough Head to Toe Skin Assessment on the patient. ALL assessment sites listed below have been assessed.      Areas assessed by both nurses:    Head, Face, Ears, Shoulders, Back, Chest, Arms, Elbows, Hands, Sacrum. Buttock, Coccyx, Ischium, Legs. Feet and Heels, and Under Medical Devices     -shoulder sx incision   -scattered bruising and abrasions  -blanchable redness to buttocks  -multiple small scratches to coccyx (3)        Does the Patient have a Wound? No noted wound(s)       Ra Prevention initiated by RN: Yes  Wound Care Orders initiated by RN: No    Pressure Injury (Stage 3,4, Unstageable, DTI, NWPT, and Complex wounds) if present, place Wound referral order by RN under : No    New Ostomies, if present place, Ostomy referral order under : No     Nurse 1 eSignature: Electronically signed by Priscilla Coughlin RN on 6/6/25 at 6:52 PM EDT    **SHARE this note so that the co-signing nurse can place an eSignature**    Nurse 2 eSignature: Electronically signed by Lucero Ty RN on 6/6/25 at 6:58 PM EDT

## 2025-06-06 NOTE — ED PROVIDER NOTES
THE [facility]  EMERGENCY DEPARTMENT ENCOUNTER          ATTENDING PHYSICIAN NOTE       Date of evaluation: 6/6/2025    Chief Complaint     Post-op Problem (Pt reports having R shoulder surgery yesterday, today feeling generalized weakness, unable to get up and walk like normal ) and Fatigue      History of Present Illness     Carolee Sharma is a 75 y.o. female who presents to the emergency room today due to fatigue.  The patient has a history of hypertension, diabetes, she is 1 day status post fixation of right proximal humerus fracture.  She states that since she got home she has felt very fatigued with any attempts at getting up and getting around.  Denies fevers or chills.  States her pain has been well-controlled postoperatively.  Denies chest pain or difficulty breathing.  Reports no abdominal pain.    Review of Systems     Review of Systems  All systems were reviewed with the patient/family and negative except as otherwise documented in the HPI.      Past Medical, Surgical, Family, and Social History     She has a past medical history of Hyperlipidemia, Hypertension, Thyroid disease, and Type II or unspecified type diabetes mellitus without mention of complication, not stated as uncontrolled.  She has a past surgical history that includes Total hip arthroplasty (Right, 1/18/2021); Kyphosis surgery; Upper gastrointestinal endoscopy (N/A, 11/11/2024); and Colonoscopy (N/A, 11/11/2024).  Her family history is not on file.  She reports that she has quit smoking. She has a 44 pack-year smoking history. She has never used smokeless tobacco. She reports that she does not drink alcohol and does not use drugs.    Medications     Previous Medications    ACETAMINOPHEN (TYLENOL) 500 MG TABLET    Take 1 tablet by mouth 4 times daily as needed for Pain    ALBUTEROL SULFATE HFA (PROVENTIL;VENTOLIN;PROAIR) 108 (90 BASE) MCG/ACT INHALER    Inhale 2 puffs into the lungs every 6 hours as needed for Wheezing    AMLODIPINE  Neutrophils % 76.1 %    Lymphocytes % 10.4 %    Monocytes % 12.2 %    Eosinophils % 0.6 %    Basophils % 0.7 %    Neutrophils Absolute 7.4 1.7 - 7.7 K/uL    Lymphocytes Absolute 1.0 1.0 - 5.1 K/uL    Monocytes Absolute 1.2 0.0 - 1.3 K/uL    Eosinophils Absolute 0.1 0.0 - 0.6 K/uL    Basophils Absolute 0.1 0.0 - 0.2 K/uL   BMP w/ Reflex to MG   Result Value Ref Range    Sodium 132 (L) 136 - 145 mmol/L    Potassium reflex Magnesium 4.7 3.5 - 5.1 mmol/L    Chloride 103 99 - 110 mmol/L    CO2 16 (L) 21 - 32 mmol/L    Anion Gap 13 3 - 16    Glucose 131 (H) 70 - 99 mg/dL    BUN 44 (H) 7 - 20 mg/dL    Creatinine 1.8 (H) 0.6 - 1.2 mg/dL    Est, Glom Filt Rate 29 (A) >60    Calcium 9.0 8.3 - 10.6 mg/dL   Brain Natriuretic Peptide   Result Value Ref Range    NT Pro-BNP 1,949 (H) 0 - 449 pg/mL   Troponin   Result Value Ref Range    Troponin, High Sensitivity 27 (H) 0 - 14 ng/L   Troponin   Result Value Ref Range    Troponin, High Sensitivity 24 (H) 0 - 14 ng/L   Blood Gas, Venous   Result Value Ref Range    pH, Andre 7.245 (L) 7.350 - 7.450    pCO2, Andre 43.1 41.0 - 51.0 mmHg    PO2, Andre 39.2 25.0 - 40.0 mmHg    HCO3, Venous 18.7 (L) 24.0 - 28.0 mmol/L    Base Excess, Andre -8.1 (L) -2.0 - 3.0 mmol/L    O2 Sat, Andre 65 Not established %    Carboxyhemoglobin 1.8 (H) 0.0 - 1.5 %    MetHgb, Andre 0.0 0.0 - 1.5 %    TC02 (Calc), Andre 20 mmol/L    Hemoglobin, Andre, Reduced 34.10 %   Urinalysis with Reflex to Culture    Specimen: Urine   Result Value Ref Range    Color, UA Yellow Straw/Yellow    Clarity, UA Clear Clear    Glucose, Ur Negative Negative mg/dL    Bilirubin, Urine Negative Negative    Ketones, Urine Negative Negative mg/dL    Specific Gravity, UA 1.010 1.005 - 1.030    Blood, Urine Negative Negative    pH, Urine 6.0 5.0 - 8.0    Protein, UA TRACE (A) Negative mg/dL    Urobilinogen, Urine 0.2 <2.0 E.U./dL    Nitrite, Urine Negative Negative    Leukocyte Esterase, Urine Negative Negative    Microscopic Examination YES     Urine

## 2025-06-06 NOTE — PROGRESS NOTES
Patient admitted to 5527 from the ER. A/Ox4. Oriented to room and surroundings. VSS, on RA.   RUE in sling, moderate hand , +2 right radial pulse.   Passed swallow screen without complications.   4 eyes skin assessment completed with charge RN. Fall precautions in place. Bed locked in lowest position with alarm on. Call light within reach and patient educated on use. Plan of care continues.

## 2025-06-06 NOTE — ED NOTES
Patient Name: Carolee Sharma  : 1950 75 y.o.  MRN: [id number]  ED Room #: A10/A10-10     Chief complaint:   Chief Complaint   Patient presents with    Post-op Problem     Pt reports having R shoulder surgery yesterday, today feeling generalized weakness, unable to get up and walk like normal     Fatigue     Hospital Problem/Diagnosis:   Hospital Problems           Last Modified POA    * (Principal) Acute renal failure 2025 Yes         O2 Flow Rate:O2 Device: None (Room air)   (if applicable)  Cardiac Rhythm:   (if applicable)  Active LDA's:   Peripheral IV 25 Left Forearm (Active)   Site Assessment Clean, dry & intact 25 0851   Line Status Normal saline locked 25 0851   Phlebitis Assessment No symptoms 25 0851   Infiltration Assessment 0 25 0851   Alcohol Cap Used Yes 25 0851   Dressing Status Clean, dry & intact 25 0851   Dressing Type Transparent 25 0851      External Urinary Catheter (Active)   Site Assessment Clean,dry & intact 25 1011   Placement Initiated 25 1011          How does patient ambulate? Bed Bound    2. How does patient take pills? Whole with Water    3. Is patient alert? Alert    4. Is patient oriented? To Person, To Place, To Time, To Situation, and Follows Commands    5.   Patient arrived from:  home  Facility Name: ___________________________________________    6. If patient is disoriented or from a Skill Nursing Facility has family been notified of admission?     7. Patient belongings?     Disposition of belongings?      8. Any specific patient or family belongings/needs/dynamics?   a.    9. Miscellaneous comments/pending orders?  a. Needs placement       If there are any additional questions please reach out to the Emergency Department.       Yesika Jimenez RN  [phone]

## 2025-06-06 NOTE — H&P
V2.0  History and Physical      Name:  Carolee Sharma /Age/Sex: 1950  (75 y.o. female)   MRN & CSN:  [id number] & [id number] Encounter Date/Time: 2025 4:48 PM EDT   Location:  [facility] PCP: Anirudh Ortez MD       Hospital Day: 1    Assessment and Plan:   Carolee Sharma is a 75 y.o. female with a pmh of essential hypertension, hyperlipidemia, DM-2, who presents with complaints of generalized weakness, unable to get up and walk like normal and fatigue    Hospital Problems           Last Modified POA    * (Principal) Acute renal failure 2025 Yes   #Generalized weakness and fatigue  #Mild JUDITH  #Poor oral intake for 2 days  #Recent shoulder surgery (2025)  # Known right patella fracture, now unable to bear weight on the right leg.  Patient has had a fall onto her right knee over the Easter weekend.  #DM-2    Plan:  Continue on gentle IV hydration  Monitor renal function and electrolytes  Hold losartan and metformin  Pain relief as needed  Orthopedic surgery consulted by ED  PT/OT  Start on low-dose SSI with hypoglycemia protocol, follow A1c  Case management and social service consult depending on PT/OT recommendations  Continue home medications appropriately  Supportive therapy    Disposition:   Current Living situation: Home  Expected Disposition: To be decided  Estimated D/C: 2-3 days    Diet ADULT DIET; Regular; 4 carb choices (60 gm/meal); Low Fat/Low Chol/High Fiber/2 gm Na   DVT Prophylaxis [x] Lovenox, []  Heparin, [] SCDs, [] Ambulation,  [] Eliquis, [] Xarelto, [] Coumadin   Code Status Full   Surrogate Decision Maker/ POA Spouse     Personally reviewed Lab Studies all CBC, BMP, high-sensitivity troponin, NT proBNP and Imaging     Discussed management of the case with ED provider who recommended admission for further management    EKG interpreted personally and results AV dual paced rhythm with prolonged AV conduction, VR = 79, QTc = 447    Imaging that was interpreted personally  12/16/24  Larissa Gamboa MD   atorvastatin (LIPITOR) 40 MG tablet Take 1 tablet by mouth daily    Gilberto Whipple MD   glipiZIDE (GLUCOTROL XL) 5 MG extended release tablet Take 1 tablet by mouth daily 2 tablets at breakfast and 1 tablet at dinner    Gilberto Whipple MD   metFORMIN (GLUCOPHAGE-XR) 500 MG extended release tablet Take 2 tablets by mouth 2 times daily (before meals)    Gilberto Whipple MD   melatonin 5 MG TBDP disintegrating tablet Take 1 tablet by mouth nightly as needed (insomnia) 11/8/24   Cezar Torres MD   amLODIPine (NORVASC) 2.5 MG tablet Take 1 tablet by mouth daily 3/6/24   Gilberto Whipple MD   aspirin EC 81 MG EC tablet Take 1 tablet by mouth 2 times daily To prevent blood clots 1/19/21   Parag Gu MD   senna-docusate (PERICOLACE) 8.6-50 MG per tablet Take 2 tablets by mouth daily as needed for Constipation 1/19/21   Parag Gu MD   umeclidinium-vilanterol (ANORO ELLIPTA) 62.5-25 MCG/INH AEPB inhaler Inhale 1 puff into the lungs daily    Gilberto Whipple MD   albuterol sulfate HFA (PROVENTIL;VENTOLIN;PROAIR) 108 (90 Base) MCG/ACT inhaler Inhale 2 puffs into the lungs every 6 hours as needed for Wheezing    Gilberto Whipple MD   ferrous sulfate (IRON 325) 325 (65 Fe) MG tablet Take 1 tablet by mouth daily (with breakfast)    Gilberto Whipple MD   Cyanocobalamin (B-12 PO) Take 1,000 mcg by mouth daily    Gilberto Whipple MD   losartan (COZAAR) 50 MG tablet Take 1 tablet by mouth daily    Gilberto Whipple MD   metoprolol tartrate (LOPRESSOR) 50 MG tablet Take 1 tablet by mouth 2 times daily    Gilberto Whipple MD   Cholecalciferol (VITAMIN D3) 125 MCG (5000 UT) TABS Take by mouth    Gilberto Whipple MD       Physical Exam:        General: NAD, nontoxic appearing  Eyes: EOMI  ENT: neck supple, tacky and dry mucous membranes  Cardiovascular: Regular rhythm and rate.  Respiratory: Clear to

## 2025-06-07 VITALS
HEART RATE: 98 BPM | HEIGHT: 66 IN | SYSTOLIC BLOOD PRESSURE: 128 MMHG | OXYGEN SATURATION: 90 % | BODY MASS INDEX: 24.54 KG/M2 | TEMPERATURE: 98.6 F | DIASTOLIC BLOOD PRESSURE: 73 MMHG | RESPIRATION RATE: 17 BRPM | WEIGHT: 152.7 LBS

## 2025-06-07 LAB
ANION GAP SERPL CALCULATED.3IONS-SCNC: 9 MMOL/L (ref 3–16)
BASOPHILS # BLD: 0 K/UL (ref 0–0.2)
BASOPHILS NFR BLD: 0.6 %
BUN SERPL-MCNC: 29 MG/DL (ref 7–20)
CALCIUM SERPL-MCNC: 9 MG/DL (ref 8.3–10.6)
CHLORIDE SERPL-SCNC: 103 MMOL/L (ref 99–110)
CO2 SERPL-SCNC: 22 MMOL/L (ref 21–32)
CREAT SERPL-MCNC: 1.2 MG/DL (ref 0.6–1.2)
DEPRECATED RDW RBC AUTO: 15.9 % (ref 12.4–15.4)
EOSINOPHIL # BLD: 0.1 K/UL (ref 0–0.6)
EOSINOPHIL NFR BLD: 1.9 %
EST. AVERAGE GLUCOSE BLD GHB EST-MCNC: 134.1 MG/DL
GFR SERPLBLD CREATININE-BSD FMLA CKD-EPI: 47 ML/MIN/{1.73_M2}
GLUCOSE BLD-MCNC: 134 MG/DL (ref 70–99)
GLUCOSE BLD-MCNC: 165 MG/DL (ref 70–99)
GLUCOSE BLD-MCNC: 189 MG/DL (ref 70–99)
GLUCOSE SERPL-MCNC: 155 MG/DL (ref 70–99)
HBA1C MFR BLD: 6.3 %
HCT VFR BLD AUTO: 23.8 % (ref 36–48)
HGB BLD-MCNC: 7.8 G/DL (ref 12–16)
LYMPHOCYTES # BLD: 0.8 K/UL (ref 1–5.1)
LYMPHOCYTES NFR BLD: 12.7 %
MCH RBC QN AUTO: 29.8 PG (ref 26–34)
MCHC RBC AUTO-ENTMCNC: 33 G/DL (ref 31–36)
MCV RBC AUTO: 90.3 FL (ref 80–100)
MONOCYTES # BLD: 0.7 K/UL (ref 0–1.3)
MONOCYTES NFR BLD: 10.6 %
NEUTROPHILS # BLD: 4.8 K/UL (ref 1.7–7.7)
NEUTROPHILS NFR BLD: 74.2 %
PERFORMED ON: ABNORMAL
PLATELET # BLD AUTO: 170 K/UL (ref 135–450)
PMV BLD AUTO: 8.3 FL (ref 5–10.5)
POTASSIUM SERPL-SCNC: 4.9 MMOL/L (ref 3.5–5.1)
RBC # BLD AUTO: 2.63 M/UL (ref 4–5.2)
SODIUM SERPL-SCNC: 134 MMOL/L (ref 136–145)
WBC # BLD AUTO: 6.5 K/UL (ref 4–11)

## 2025-06-07 PROCEDURE — 80048 BASIC METABOLIC PNL TOTAL CA: CPT

## 2025-06-07 PROCEDURE — 36415 COLL VENOUS BLD VENIPUNCTURE: CPT

## 2025-06-07 PROCEDURE — 6360000002 HC RX W HCPCS: Performed by: INTERNAL MEDICINE

## 2025-06-07 PROCEDURE — 85025 COMPLETE CBC W/AUTO DIFF WBC: CPT

## 2025-06-07 PROCEDURE — 6370000000 HC RX 637 (ALT 250 FOR IP): Performed by: INTERNAL MEDICINE

## 2025-06-07 RX ORDER — ENOXAPARIN SODIUM 100 MG/ML
40 INJECTION SUBCUTANEOUS DAILY
Status: DISCONTINUED | OUTPATIENT
Start: 2025-06-08 | End: 2025-06-07 | Stop reason: HOSPADM

## 2025-06-07 RX ORDER — OXYCODONE HYDROCHLORIDE 5 MG/1
5 TABLET ORAL EVERY 4 HOURS PRN
Qty: 12 TABLET | Refills: 0 | Status: SHIPPED | OUTPATIENT
Start: 2025-06-07 | End: 2025-06-10

## 2025-06-07 RX ADMIN — OXYCODONE 5 MG: 5 TABLET ORAL at 15:55

## 2025-06-07 RX ADMIN — INSULIN LISPRO 1 UNITS: 100 INJECTION, SOLUTION INTRAVENOUS; SUBCUTANEOUS at 12:07

## 2025-06-07 RX ADMIN — FERROUS SULFATE TAB 325 MG (65 MG ELEMENTAL FE) 325 MG: 325 (65 FE) TAB at 09:12

## 2025-06-07 RX ADMIN — ENOXAPARIN SODIUM 30 MG: 100 INJECTION SUBCUTANEOUS at 09:12

## 2025-06-07 RX ADMIN — AMLODIPINE BESYLATE 2.5 MG: 2.5 TABLET ORAL at 09:11

## 2025-06-07 RX ADMIN — OXYCODONE 5 MG: 5 TABLET ORAL at 09:11

## 2025-06-07 RX ADMIN — METOPROLOL TARTRATE 50 MG: 50 TABLET, FILM COATED ORAL at 09:10

## 2025-06-07 RX ADMIN — DULOXETINE 60 MG: 60 CAPSULE, DELAYED RELEASE ORAL at 09:12

## 2025-06-07 ASSESSMENT — PAIN DESCRIPTION - DESCRIPTORS
DESCRIPTORS: ACHING;SORE
DESCRIPTORS: SORE;TENDER

## 2025-06-07 ASSESSMENT — PAIN DESCRIPTION - LOCATION
LOCATION: SHOULDER
LOCATION: SHOULDER

## 2025-06-07 ASSESSMENT — PAIN DESCRIPTION - ORIENTATION
ORIENTATION: RIGHT
ORIENTATION: RIGHT

## 2025-06-07 ASSESSMENT — PAIN SCALES - GENERAL
PAINLEVEL_OUTOF10: 6
PAINLEVEL_OUTOF10: 6

## 2025-06-07 NOTE — PROGRESS NOTES
Reviewed discharge instructions with patient and family. Pt verbalized understanding. IV removed without complications.Education and care plan complete. Pt discharged to [facility]. Pt denies needs. Pt discharged via transport without complications.

## 2025-06-07 NOTE — PROGRESS NOTES
Pt is alert and oriented. VSS. Pt had an episode of confusion but, was easily reoriented. Medicated pt per orders. All safety measures in place. Will continue to monitor.

## 2025-06-07 NOTE — PROGRESS NOTES
Patient is A&Ox4. VSS this shift. Patient has endorsed pain to that has been managed per MAR and non-pharm measures. Patient is tolerating PO diet. Tolerating ambulation x 1 assist with GB and walker. Voiding adequately. Patient updated on plan of care. Fall and safety precautions in place, call light within reach.

## 2025-06-07 NOTE — PROGRESS NOTES
Pharmacist Review and Automatic Dose Adjustment of Prophylactic Enoxaparin    The reviewing pharmacist has made an adjustment to the ordered enoxaparin dose or converted to UFH per the approved [facility] protocol and table as defined below.    Plan / Rationale: Based upon the patient's weight and renal function, the ordered dose of 30 mg daily has been converted to 40 mg daily.    Thank you,  Vaughn Bolton, [facility]  6/7/2025, 10:55 AM      Carolee Sharma is a 75 y.o. female.     Recent Labs     06/06/25  0916 06/07/25  0655   CREATININE 1.8* 1.2       Estimated Creatinine Clearance: 39 mL/min (based on SCr of 1.2 mg/dL).    Recent Labs     06/06/25  0916   HGB 8.9*   HCT 26.9*        No results for input(s): \"INR\" in the last 72 hours.    Height:   Ht Readings from Last 1 Encounters:   06/06/25 1.676 m (5' 6\")     Weight:  Wt Readings from Last 1 Encounters:   06/06/25 69.3 kg (152 lb 11.2 oz)

## 2025-06-07 NOTE — PLAN OF CARE
Problem: Pain  Goal: Verbalizes/displays adequate comfort level or baseline comfort level  Outcome: Progressing   Medicated pt per orders, please see eMar. Encourage pt to call if pain increases. Will continue to monitor.      Problem: Safety - Adult  Goal: Free from fall injury  Outcome: Progressing   Pt remains free from injury during this shift. Pt will be up with assist x 2, gait belt. Encourage pt to call for all assistance. Call light is in reach, bed alarm is activated for safety, bed locked and in lowest position. Will continue to monitor.

## 2025-06-07 NOTE — DISCHARGE SUMMARY
V2.0  Discharge Summary    Name:  Carolee Sharma /Age/Sex: 1950 (75 y.o. female)   Admit Date: 2025  Discharge Date: 25    MRN & CSN:  [id number] & [id number] Encounter Date and Time 25 6:40 PM EDT    Attending:  No att. providers found Discharging Provider: Chaim Washington DO       Hospital Course:     Brief HPI: Carolee Sharma is a 75 y.o. female who presented with complaints of generalized weakness, fatigue and inability to ambulate due to right knee pain.  Patient states that she had right shoulder surgery yesterday and her oral intake has been poor for the past 2 days.  Patient denies nausea, vomiting, urinary symptoms or changes in bowel habits.  Patient has had a fall onto her right knee over the Easter weekend.  Patient did not seek medical advice immediately.  Later on she was found to have right patella fracture which did not bother her until yesterday night    Brief Problem Based Course:     JUDITH - resolved  - Patient presented with generalized weakness, poor PO intake  - Cr 1.8 on presentation, likely prerenal d/t poor PO intake. Started on IV fluids  - Cr 1.2 this morning which appears to be her baseline  - Patient is being discharged to SNF in stable condition    Generalized weakness  Recent shoulder surgery  Known right patellar fracture  - Had recent right shoulder surgery, done . Since surgery, has felt weak, fatigued  - Reported worsening of her right knee pain as well, has known patellar fracture s/p fall around Easter weekend  - PT/OT recommending SNF, patient is approved for SNF  - Orthopedic surgery consulted from ER  - Patient is being discharged to SNF in stable condition. Will need outpatient orthopedic follow up.     DM2  - Resume home medications on discharge    The patient expressed appropriate understanding of, and agreement with the discharge recommendations, medications, and plan.     Consults this admission:  IP CONSULT TO CASE MANAGEMENT  IP CONSULT TO  6/7/2025 at 6:40 PM

## 2025-06-07 NOTE — CARE COORDINATION
Case Management Assessment            Discharge Note                    Date / Time of Note: 6/7/2025 1:39 PM                  Discharge Note Completed by: MICHELL SMITH    Patient Name: Carolee Sharma   YOB: 1950  Diagnosis: Acute renal failure [N17.9]  Unstable gait [R26.81]  JUDITH (acute kidney injury) [N17.9]  Closed nondisplaced fracture of right patella, unspecified fracture morphology, initial encounter [S82.001A]  History of recent surgery [Z98.890]   Date / Time: 6/6/2025  8:41 AM    Current PCP: Anirudh Ortez MD  Clinic patient: No    Hospitalization in the last 30 days: No       Advance Directives:  Code Status: Full Code  [place] DNR form completed and on chart: Not Indicated    Financial:  Payor: [employer]ICARE / Plan: [employer] MEDICARE-ADVANTAGE PPO / Product Type: Medicare /      Pharmacy:    [facility]/pharmacy #[id number] - [place], [place] - [address] - P [phone] - F [phone]  [address]  [facility] [address]  Phone: [phone] Fax: [phone]      Assistance purchasing medications?:    Assistance provided by Case Management: None at this time    Does patient want to participate in local refill/ meds to beds program?:      Meds To Beds General Rules:  1. Can ONLY be done Monday- Friday between 8:30am-5pm  2. Prescription(s) must be in pharmacy by 3pm to be filled same day  3.Copy of patient's insurance/ prescription drug card and patient face sheet must be sent along with the prescription(s)  4. Cost of Rx cannot be added to hospital bill. If financial assistance is needed, please contact unit  or ;  or  CANNOT provide pharmacy voucher for patients co-pays  5. Patients can then  the prescription on their way out of the hospital at discharge, or pharmacy can deliver to the bedside if staff is available. (payment due at time of pick-up or delivery - cash, check, or card accepted)     Able to afford home  to [phone].    COVID Result:    Lab Results   Component Value Date/Time    COVID19 NOT DETECTED 06/06/2025 09:26 AM       The Plan for Transition of Care is related to the following treatment goals of Acute renal failure [N17.9]  Unstable gait [R26.81]  JUDITH (acute kidney injury) [N17.9]  Closed nondisplaced fracture of right patella, unspecified fracture morphology, initial encounter [S82.001A]  History of recent surgery [Z98.890]    The Patient and/or patient representative Carolee and her family were provided with a choice of provider and agrees with the discharge plan Yes    Freedom of choice list was provided with basic dialogue that supports the patient's individualized plan of care/goals and shares the quality data associated with the providers. Yes    Care Transitions patient: No    MICHELL LUIS  [facility] [place]  Case Management Department  Ph: [phone]  Fax:

## 2025-06-07 NOTE — DISCHARGE INSTRUCTIONS
Please follow up with your orthopedic surgeon within 1-2 weeks following discharge.    Please follow up with your primary care physician within 1 week following discharge.     Please return to the hospital with any new or worsening symptoms.

## 2025-06-07 NOTE — DISCHARGE INSTR - COC
Continuity of Care Form    Patient Name: Carolee Sharma   :  1950  MRN:  [id number]    Admit date:  2025  Discharge date:  2025    Code Status Order: Full Code   Advance Directives:     Admitting Physician:  Cherry Ortiz MD  PCP: Anirudh Ortez MD    Discharging Nurse: POP Vines  Discharging Hospital Unit/Room#: 5527/5527-01  Discharging Unit Phone Number: [phone]    Emergency Contact:   Extended Emergency Contact Information  Primary Emergency Contact: Wu Sharma  Address: [address] [place] [address]  Home Phone: [phone]  Work Phone: [phone]  Relation: Spouse  Secondary Emergency Contact: Crescencio Sharma  Home Phone: [phone]  Relation: Child    Past Surgical History:  Past Surgical History:   Procedure Laterality Date    COLONOSCOPY N/A 2024    COLONOSCOPY BIOPSY performed by Turner Root MD at [facility] ENDOSCOPY    KYPHOSIS SURGERY      TOTAL HIP ARTHROPLASTY Right 2021    RIGHT TOAL HIP ARTHROPLASTY ANTERIOR APPROACH performed by Parag Gu MD at [facility] OR    UPPER GASTROINTESTINAL ENDOSCOPY N/A 2024    ESOPHAGOGASTRODUODENOSCOPY BIOPSY performed by Turner Root MD at [facility] ENDOSCOPY       Immunization History:   Immunization History   Administered Date(s) Administered    COVID-19, MODERNA BLUE border, Primary or Immunocompromised, (age 12y+), IM, 100 mcg/0.5mL 2021    COVID-19, PFIZER Bivalent, DO NOT Dilute, (age 12y+), IM, 30 mcg/0.3 mL 2022    COVID-19, PFIZER PURPLE top, DILUTE for use, (age 12 y+), 30mcg/0.3mL 2021, 2021    COVID-19, PFIZER, , (age 12y+), IM, 30mcg/0.3mL 2023, 2024       Active Problems:  Patient Active Problem List   Diagnosis Code    Closed fracture of right femur, initial encounter ([facility]) S72.91XA    Respiratory failure with hypoxia ([facility]) J96.91    Hypoxia R09.02    Pulmonary HTN ([facility]) I27.20    Colitis K52.9    Melena K92.1    Lower GI

## (undated) DEVICE — 3M™ STERI-DRAPE™ INSTRUMENT POUCH 1018: Brand: STERI-DRAPE™

## (undated) DEVICE — GARMENT,MEDLINE,DVT,INT,CALF,MED, GEN2: Brand: MEDLINE

## (undated) DEVICE — JEWISH HOSPITAL TURNOVER KIT: Brand: MEDLINE INDUSTRIES, INC.

## (undated) DEVICE — APPLICATOR MEDICATED 26 CC SOLUTION HI LT ORNG CHLORAPREP

## (undated) DEVICE — FORCEPS BX L240CM JAW DIA2.4MM ORNG L CAP W/ NDL DISP RAD

## (undated) DEVICE — SUTURE MCRYL SZ 4-0 L27IN ABSRB UD L19MM PS-2 1/2 CIR PRIM Y426H

## (undated) DEVICE — DRAPE C ARM UNIV W41XL74IN CLR PLAS XR VELC CLSR POLY STRP

## (undated) DEVICE — SYRINGE IRRIG 60ML SFT PLIABLE BLB EZ TO GRP 1 HND USE W/

## (undated) DEVICE — Z INACTIVE NO SUPPLIER SOLUTIONIRRIG 3000ML 0.9% SOD CHL FLX CONT [79720808] [HOSPIRA WORLDWIDE INC]

## (undated) DEVICE — SURE SET-DOUBLE BASIN-LF: Brand: MEDLINE INDUSTRIES, INC.

## (undated) DEVICE — CUFF RESTRN WRST OR ANK 45FT AD FOAM

## (undated) DEVICE — SPONGE,LAP,18"X18",DLX,XR,ST,5/PK,40/PK: Brand: MEDLINE

## (undated) DEVICE — COAXIAL HIGH FLOW TIP WITH SOFT SHIELD

## (undated) DEVICE — HANDPIECE SUCTION TUBING INTERPULSE 10FT

## (undated) DEVICE — SUTURE VCRL SZ 2-0 L18IN ABSRB UD CT-1 L36MM 1/2 CIR J839D

## (undated) DEVICE — ELECTROSURGICAL PENCIL ROCKER SWITCH NON COATED BLADE ELECTRODE 10 FT (3 M) CORD HOLSTER: Brand: MEGADYNE

## (undated) DEVICE — COVER LT HNDL BLU PLAS

## (undated) DEVICE — SUTURE MCRYL SZ 3-0 L27IN ABSRB UD L19MM PS-2 3/8 CIR PRIM Y427H

## (undated) DEVICE — PEEL-AWAY HOOD: Brand: FLYTE, SURGICOOL

## (undated) DEVICE — GOWN,SIRUS,POLYRNF,BRTHSLV,XL,30/CS: Brand: MEDLINE

## (undated) DEVICE — BONE PREPARATION KIT: Brand: BIOPREP

## (undated) DEVICE — HOLDER SCALP PLAS G STD

## (undated) DEVICE — SPONGE GZ W4XL4IN COT 12 PLY TYP VII WVN C FLD DSGN

## (undated) DEVICE — SURGICAL SET UP - SURE SET: Brand: MEDLINE INDUSTRIES, INC.

## (undated) DEVICE — SOLUTION IV IRRIG WATER 1000ML POUR BRL 2F7114

## (undated) DEVICE — NEEDLE SUT SZ 3 MAYO 1 2 CIR TAPR PNT DISPOSABLE

## (undated) DEVICE — BLADE SAW RECIP HVY DUTY LNG 0277096327] STRYKER CORP]

## (undated) DEVICE — 6619 2 PTNT ISO SYS INCISE AREA&LT;(&GT;&&LT;)&GT;P: Brand: STERI-DRAPE™ IOBAN™ 2

## (undated) DEVICE — BLANKET WRM W29.9XL79.1IN UP BODY FORC AIR MISTRAL-AIR

## (undated) DEVICE — YANKAUER,OPEN TIP,W/O VENT,STERILE: Brand: MEDLINE INDUSTRIES, INC.

## (undated) DEVICE — ORTHO PRE OP PACK: Brand: MEDLINE INDUSTRIES, INC.

## (undated) DEVICE — UNDERGLOVE SURG SZ 8 BLU LTX FREE SYN POLYISOPRENE POLYMER

## (undated) DEVICE — GOWN,SIRUS,POLYRNF,BRTHSLV,LG,30/CS: Brand: MEDLINE

## (undated) DEVICE — BIPOLAR SEALER 23-112-1 AQM 6.0: Brand: AQUAMANTYS ®

## (undated) DEVICE — DECANTER BAG 9": Brand: MEDLINE INDUSTRIES, INC.

## (undated) DEVICE — SYSTEM SKIN CLSR 22CM DERMBND PRINEO

## (undated) DEVICE — E-Z CLEAN, NON-STICK, PTFE COATED, ELECTROSURGICAL BLADE ELECTRODE, 2.5 INCH (6.35 CM): Brand: EZ CLEAN

## (undated) DEVICE — SYRINGE MED 30ML STD CLR PLAS LUERLOCK TIP N CTRL DISP

## (undated) DEVICE — SUTURE VCRL SZ 0 L27IN ABSRB UD L36MM CT-1 1/2 CIR J260H

## (undated) DEVICE — E-Z CLEAN, NON-STICK, PTFE COATED, ELECTROSURGICAL BLADE ELECTRODE, MODIFIED EXTENDED INSULATION, 6.5 INCH (16.5 CM): Brand: MEGADYNE

## (undated) DEVICE — SUTURE ABSORBABLE BRAIDED 2-0 CT-1 27 IN UD VICRYL J259H

## (undated) DEVICE — PLATE ES AD W 9FT CRD 2

## (undated) DEVICE — STERILE PVP: Brand: MEDLINE INDUSTRIES, INC.

## (undated) DEVICE — PROTECTOR ULN NRV PUR FOAM HK LOOP STRP ANATOMICALLY

## (undated) DEVICE — INTENDED FOR TISSUE SEPARATION, AND OTHER PROCEDURES THAT REQUIRE A SHARP SURGICAL BLADE TO PUNCTURE OR CUT.: Brand: BARD-PARKER ® CARBON RIB-BACK BLADES

## (undated) DEVICE — GLOVE ORTHO 7 1/2   MSG9475

## (undated) DEVICE — NEEDLE SPNL L3.5IN PNK HUB S STL REG WALL FIT STYL W/ QNCKE

## (undated) DEVICE — BOWL AND CEMENT CARTRIDGE WITH BREAKAWAY FEMORAL NOZZLE AND MEDIUM PRESSURIZER: Brand: ACM

## (undated) DEVICE — SUTURE ETHBND EXCEL SZ 5 L30IN NONABSORBABLE GRN L40MM V-37 MB66G